# Patient Record
Sex: FEMALE | Race: WHITE | NOT HISPANIC OR LATINO | Employment: FULL TIME | URBAN - METROPOLITAN AREA
[De-identification: names, ages, dates, MRNs, and addresses within clinical notes are randomized per-mention and may not be internally consistent; named-entity substitution may affect disease eponyms.]

---

## 2017-01-05 ENCOUNTER — OFFICE VISIT (OUTPATIENT)
Dept: PHYSICAL MEDICINE AND REHAB | Facility: CLINIC | Age: 67
End: 2017-01-05

## 2017-01-05 VITALS
SYSTOLIC BLOOD PRESSURE: 142 MMHG | RESPIRATION RATE: 20 BRPM | HEART RATE: 82 BPM | TEMPERATURE: 97.4 F | DIASTOLIC BLOOD PRESSURE: 63 MMHG | HEIGHT: 63 IN | OXYGEN SATURATION: 97 %

## 2017-01-05 DIAGNOSIS — G24.3 SPASMODIC TORTICOLLIS: Primary | ICD-10-CM

## 2017-01-05 DIAGNOSIS — G24.1 DYSTONIA, TORSION, FRAGMENTS OF: ICD-10-CM

## 2017-01-05 ASSESSMENT — PAIN SCALES - GENERAL: PAINLEVEL: MILD PAIN (3)

## 2017-01-05 NOTE — MR AVS SNAPSHOT
After Visit Summary   1/5/2017    Lilli Steven    MRN: [id number]           Patient Information     Date Of Birth          1950        Visit Information        Provider Department      1/5/2017 11:45 AM Hermes Frias MD [facility] Physical Medicine and Rehabilitation         Follow-ups after your visit        Follow-up notes from your care team     Return in about 10 weeks (around 3/16/2017) for Dystonia/Tremor.      Your next 10 appointments already scheduled     Feb 16, 2017  9:15 AM   [facility] Same Day Surgery with Joey Sargent MD   [facility] ([facility])    [facility] ([facility])   [phone]            Feb 16, 2017   Procedure with Joey Sargent MD   [facility] PeriOP Services (--)    [address]  [facility] [address]   [phone]            Mar 13, 2017  3:40 PM   (Arrive by 3:25 PM)   Return Botox with Hermes Frias MD   [facility] Physical Medicine and Rehabilitation ([facility] [place])    [address] [place] [id number] [phone]            May 25, 2017  3:50 PM   (Arrive by 3:35 PM)   Return Botox with Hermes Frias MD   [facility] Physical Medicine and Rehabilitation ([facility])    [address] [place] [id number] [phone]              Who to contact     Please call your clinic at [phone] to:    Ask questions about your health    Make or cancel appointments    Discuss your medicines    Learn about your test results    Speak to your doctor   If you have compliments or concerns about an experience at your clinic, or if you wish to file a complaint, please contact [facility] Patient Relations at [phone] or email us at [email or web address]         Additional Information About  "Your Visit        MyChart Information     911 View gives you secure access to your electronic health record. If you see a primary care provider, you can also send messages to your care team and make appointments. If you have questions, please call your primary care clinic.  If you do not have a primary care provider, please call [phone] and they will assist you.      [facility] View is an electronic gateway that provides easy, online access to your medical records. With [facility], you can request a clinic appointment, read your test results, renew a prescription or communicate with your care team.     To access your existing account, please contact your [facility] or call [phone] for assistance.        Care EveryWhere ID     This is your Care EveryWhere ID. This could be used by other organizations to access your [place] medical records  [id number]        Your Vitals Were     Pulse Temperature Respirations Height Pulse Oximetry Breastfeeding?    82 97.4  F (36.3  C) (Oral) 20 1.6 m (5' 3\") 97% No       Blood Pressure from Last 3 Encounters:   01/05/17 142/63   12/22/16 130/78   12/16/16 106/81    Weight from Last 3 Encounters:   12/22/16 76.658 kg (169 lb)   12/16/16 76.658 kg (169 lb)   10/10/16 74.39 kg (164 lb)              Today, you had the following     No orders found for display       Primary Care Provider Office Phone # Fax #    Vargas Ata Chaudhry [phone]       [address] [address]        Thank you!     Thank you for choosing [facility] PHYSICAL MEDICINE AND REHABILITATION  for your care. Our goal is always to provide you with excellent care. Hearing back from our patients is one way we can continue to improve our services. Please take a few minutes to complete the written survey that you may receive in the mail after your visit with us. Thank you!             Your Updated Medication List - Protect others " around you: Learn how to safely use, store and throw away your medicines at www.disposemymeds.org.          This list is accurate as of: 1/5/17 12:12 PM.  Always use your most recent med list.                   Brand Name Dispense Instructions for use    * BOTOX IJ      Inject 135 Units into the muscle Lot# /C3, Exp 03/2018       * BOTOX IJ      Inject 135 Units into the muscle Lot# /C3, Exp 08/2018       * BOTOX IJ      Inject 135 Units into the muscle Lot # /C3 Exp: 09/2018       * BOTOX IJ      Inject 125 Units as directed Lot #  C3 Exp 12/2018       * BOTOX IJ      Inject 125 Units into the muscle once Lot#/C3, Exp 04/2019       * BOTOX IJ      Inject 125 Units into the muscle Lot # /C3   Exp: 6/2019       * BOTOX IJ      Inject 125 Units into the muscle Lot # /C3  Exp: 8/2019       calcium-vitamin D 600-400 MG-UNIT per tablet    CALTRATE     Take 1 tablet by mouth 2 times daily       simvastatin 20 MG tablet    ZOCOR    90 tablet    Take 1 tablet (20 mg) by mouth At Bedtime       traZODone 50 MG tablet    DESYREL    270 tablet    TAKE THREE TABLETS BY MOUTH NIGHTLY AT BEDTIME       * Notice:  This list has 7 medication(s) that are the same as other medications prescribed for you. Read the directions carefully, and ask your doctor or other care provider to review them with you.

## 2017-01-05 NOTE — Clinical Note
"1/5/2017       RE: Lilli Steven  [address]   [facility] [address]     Dear Colleague,    Thank you for referring your patient, Lilli Steven, to the [facility] PHYSICAL MEDICINE AND REHABILITATION at [facility]. Please see a copy of my visit note below.    BOTULINUM TOXIN PROCEDURE NOTE    Chief Complaint   Patient presents with     Dystonia     UMP-BOTOX- CERVICAL DYSTONIA       /63 mmHg  Pulse 82  Temp(Src) 97.4  F (36.3  C) (Oral)  Resp 20  Ht 1.6 m (5' 3\")  Wt   SpO2 97%  Breastfeeding? No      Current outpatient prescriptions:      traZODone (DESYREL) 50 MG tablet, TAKE THREE TABLETS BY MOUTH NIGHTLY AT BEDTIME, Disp: 270 tablet, Rfl: 0     OnabotulinumtoxinA (BOTOX IJ), Inject 125 Units into the muscle Lot # /C3   Exp: 6/2019, Disp: , Rfl:      OnabotulinumtoxinA (BOTOX IJ), Inject 125 Units into the muscle once Lot#/C3, Exp 04/2019, Disp: , Rfl:      OnabotulinumtoxinA (BOTOX IJ), Inject 125 Units as directed Lot #  C3 Exp 12/2018, Disp: , Rfl:      OnabotulinumtoxinA (BOTOX IJ), Inject 135 Units into the muscle Lot # /C3 Exp: 09/2018, Disp: , Rfl:      OnabotulinumtoxinA (BOTOX IJ), Inject 135 Units into the muscle Lot# /C3, Exp 08/2018, Disp: , Rfl:      simvastatin (ZOCOR) 20 MG tablet, Take 1 tablet (20 mg) by mouth At Bedtime, Disp: 90 tablet, Rfl: 3     OnabotulinumtoxinA (BOTOX IJ), Inject 135 Units into the muscle Lot# /C3, Exp 03/2018, Disp: , Rfl:      calcium-vitamin D (CALTRATE) 600-400 MG-UNIT per tablet, Take 1 tablet by mouth 2 times daily, Disp: , Rfl:      Allergies   Allergen Reactions     Allergy      Dermabond  Anesthesia IV set misc       Meloxicam      Diarrhea, vomiting     Primidone Other (See Comments)     Felt like motion sickness     Tramadol      Diarrhea, vomiting      Adhesive Tape Rash     Durabond only        PHYSICAL EXAM:  HEAD, NECK AND TRUNK PATTERN:   Head Tremor: " Observed   Head & Neck Extension: Present - Slight  Sub-Occipital Extension: Present - Slight  Forward Head: Present - Slight   Head & Neck Lateral Bend: Right   Shoulder Elevation: Right    HPI:    Patient denies new medical diagnoses, illnesses, hospitalizations, emergency room visits, and injuries since the previous injection with botulinum neurotoxin.    We reviewed the recommended safety guidelines for  Botox from any vaccine injection, such as the seasonal flu vaccine, by a minimum of 10-14 days with Lilli Steven. She acknowledged understanding.    RESPONSE TO PREVIOUS TREATMENT:    Lilli Steven received 125 units of Botox on 10/26/2016.    Problems following the previous series of neurotoxin injections included:  No problems reported    BENEFITS BY PATIENT REPORT:    Pain Improvement: Yes. Percent Improvement: 85%,  Duration of Benefit: 9-10 weeks and followed by a gradual reduction in benefit.  Consistent benefit pattern.    Dystonia & Head Tremor Improvement: Yes. Percent Improvement: 85%,  Duration of Benefit: 9-10 weeks and followed by a gradual reduction in benefit.  Consistent benefit pattern.      BOTULINUM NEUROTOXIN INJECTION PROCEDURES:    VERIFICATION OF PATIENT IDENTIFICATION AND PROCEDURE     Initials   Patient Name lmd   Patient  lmd   Procedure Verified by: fareed     Prior to the start of the procedure and with procedural staff participation, I verbally confirmed the patient s identity using two indicators, relevant allergies, that the procedure was appropriate and matched the consent or emergent situation, and that the correct equipment/implants were available. Immediately prior to starting the procedure I conducted the Time Out with the procedural staff and re-confirmed the patient s name, procedure, and site/side. (The Joint Commission universal protocol was followed.)  Yes    Sedation (Moderate or Deep): None      Above assessments performed by:  Mikki Sotelo RN Care  Coordinator    Hermes Frias MD      INDICATION/S FOR PROCEDURE/S:  Lilli Steven is a 66 year old patient with dystonia affecting the head, neck and shoulder girdle musculature secondary to a diagnosis of cervical dystonia with associated pain, tremor, loss of volitional motor control and difficulty with activities of daily living.     Her baseline symptoms have been recalcitrant to oral medications and conservative therapy. She is here today for an injection of Botox.     GOAL OF PROCEDURE:  The goal of this procedure is to increase active range of motion, improve volitional motor control, decrease pain and enhance functional independence associated with dystonic movements.    TOTAL DOSE ADMINISTERED:  Dose Administered:  125 units Botox    Diluent Used:  Preservative Free Normal Saline  Total Volume of Diluent Used:  1.25 ml  Lot # /C3 with Expiration Date:  8/2019  NDC #: Botox 100u ([address]-09)    Medication guide was offered to patient and was declined.    CONSENT:  The risks, benefits, and treatment options were discussed with Lilli Steven and she agreed to proceed.      Written consent was obtained by lmd.     EQUIPMENT USED:  Needle-37mm stimulating/recording  EMG/NCS Machine    SKIN PREPARATION:  Skin preparation was performed using an alcohol wipe.    GUIDANCE DESCRIPTION:  Electro-myographic guidance was necessary throughout the procedure to accurately identify all areas of dystonic muscles while avoiding injection of non-dystonic muscles, neighboring nerves and nearby vascular structures.     AREA/MUSCLE INJECTED:  125 units of Botox  1. HEAD & NECK MUSCLES: 125 units Botox = Total Dose, 1:1 Dilution                 Right Mid-Trapezius - 10 units of Botox at 1 site/s.   Left Mid-Trapezius - 10 units of Botox at 1 site/s.    Right Splenius Capitus - 15 units of Botox at 2 site/s.   Left Splenius Capitus - 10 units of Botox at 2 site/s.    Right Levator Scapulae - 20 units of Botox at 1  site/s (shoulder muscles).   Left Levator Scapulae - 15 units of Botox at 1 site/s (shoulder muscles).    Right Inferior Obliquus Capitis - 10 units of Botox at 1 site/s.   Left Inferior Obliquus Capitis - 15 units of at 1 site/s.    Right Sternal head of the Sternocleidomastoid - 10 units of Botox at 1 site/s.   Left Sternal head of the Sternocleidomastoid - 10 units of Botox at 1 site/s.    RESPONSE TO PROCEDURE:  Lilli Steven tolerated the procedure well and there were no immediate complications.  She was allowed to recover for an appropriate period of time and was discharged home in stable condition.    FOLLOW UP:  Lilli Steven was asked to follow up by phone in 7-14 days with Erin Combs PT, Care Coordinator or Mikki Rojas RN, Care Coordinator, to report her response to this series of injections.  Based on the patient's previous response to this therapy, Lilli Steven was rescheduled for the next series of injections in 10 weeks.    PLAN (Medication Changes, Therapy Orders, Work or Disability Issues, etc.): Will monitor response to today's injections and report.        Again, thank you for allowing me to participate in the care of your patient.      Sincerely,    Hermes Frias MD

## 2017-01-05 NOTE — PROGRESS NOTES
"BOTULINUM TOXIN PROCEDURE NOTE    Chief Complaint   Patient presents with     Dystonia     UMP-BOTOX- CERVICAL DYSTONIA       /63 mmHg  Pulse 82  Temp(Src) 97.4  F (36.3  C) (Oral)  Resp 20  Ht 1.6 m (5' 3\")  Wt   SpO2 97%  Breastfeeding? No      Current outpatient prescriptions:      traZODone (DESYREL) 50 MG tablet, TAKE THREE TABLETS BY MOUTH NIGHTLY AT BEDTIME, Disp: 270 tablet, Rfl: 0     OnabotulinumtoxinA (BOTOX IJ), Inject 125 Units into the muscle Lot # /C3   Exp: 6/2019, Disp: , Rfl:      OnabotulinumtoxinA (BOTOX IJ), Inject 125 Units into the muscle once Lot#/C3, Exp 04/2019, Disp: , Rfl:      OnabotulinumtoxinA (BOTOX IJ), Inject 125 Units as directed Lot #  C3 Exp 12/2018, Disp: , Rfl:      OnabotulinumtoxinA (BOTOX IJ), Inject 135 Units into the muscle Lot # /C3 Exp: 09/2018, Disp: , Rfl:      OnabotulinumtoxinA (BOTOX IJ), Inject 135 Units into the muscle Lot# /C3, Exp 08/2018, Disp: , Rfl:      simvastatin (ZOCOR) 20 MG tablet, Take 1 tablet (20 mg) by mouth At Bedtime, Disp: 90 tablet, Rfl: 3     OnabotulinumtoxinA (BOTOX IJ), Inject 135 Units into the muscle Lot# /C3, Exp 03/2018, Disp: , Rfl:      calcium-vitamin D (CALTRATE) 600-400 MG-UNIT per tablet, Take 1 tablet by mouth 2 times daily, Disp: , Rfl:      Allergies   Allergen Reactions     Allergy      Dermabond  Anesthesia IV set misc       Meloxicam      Diarrhea, vomiting     Primidone Other (See Comments)     Felt like motion sickness     Tramadol      Diarrhea, vomiting      Adhesive Tape Rash     Durabond only        PHYSICAL EXAM:  HEAD, NECK AND TRUNK PATTERN:   Head Tremor: Observed   Head & Neck Extension: Present - Slight  Sub-Occipital Extension: Present - Slight  Forward Head: Present - Slight   Head & Neck Lateral Bend: Right   Shoulder Elevation: Right    HPI:    Patient denies new medical diagnoses, illnesses, hospitalizations, emergency room visits, and injuries since the previous " injection with botulinum neurotoxin.    We reviewed the recommended safety guidelines for  Botox from any vaccine injection, such as the seasonal flu vaccine, by a minimum of 10-14 days with Lilli Steven. She acknowledged understanding.    RESPONSE TO PREVIOUS TREATMENT:    Lilli Steven received 125 units of Botox on 10/26/2016.    Problems following the previous series of neurotoxin injections included:  No problems reported    BENEFITS BY PATIENT REPORT:    Pain Improvement: Yes. Percent Improvement: 85%,  Duration of Benefit: 9-10 weeks and followed by a gradual reduction in benefit.  Consistent benefit pattern.    Dystonia & Head Tremor Improvement: Yes. Percent Improvement: 85%,  Duration of Benefit: 9-10 weeks and followed by a gradual reduction in benefit.  Consistent benefit pattern.      BOTULINUM NEUROTOXIN INJECTION PROCEDURES:    VERIFICATION OF PATIENT IDENTIFICATION AND PROCEDURE     Initials   Patient Name lmd   Patient  lmd   Procedure Verified by: lmd     Prior to the start of the procedure and with procedural staff participation, I verbally confirmed the patient s identity using two indicators, relevant allergies, that the procedure was appropriate and matched the consent or emergent situation, and that the correct equipment/implants were available. Immediately prior to starting the procedure I conducted the Time Out with the procedural staff and re-confirmed the patient s name, procedure, and site/side. (The Joint Commission universal protocol was followed.)  Yes    Sedation (Moderate or Deep): None      Above assessments performed by:  Mikki Sotelo RN Care Coordinator    Hermes Frias MD      INDICATION/S FOR PROCEDURE/S:  Lilli Steven is a 66 year old patient with dystonia affecting the head, neck and shoulder girdle musculature secondary to a diagnosis of cervical dystonia with associated pain, tremor, loss of volitional motor control and difficulty with  activities of daily living.     Her baseline symptoms have been recalcitrant to oral medications and conservative therapy. She is here today for an injection of Botox.     GOAL OF PROCEDURE:  The goal of this procedure is to increase active range of motion, improve volitional motor control, decrease pain and enhance functional independence associated with dystonic movements.    TOTAL DOSE ADMINISTERED:  Dose Administered:  125 units Botox    Diluent Used:  Preservative Free Normal Saline  Total Volume of Diluent Used:  1.25 ml  Lot # /C3 with Expiration Date:  8/2019  NDC #: Botox 100u ([address]-65)    Medication guide was offered to patient and was declined.    CONSENT:  The risks, benefits, and treatment options were discussed with Lilli Steven and she agreed to proceed.      Written consent was obtained by lmd.     EQUIPMENT USED:  Needle-37mm stimulating/recording  EMG/NCS Machine    SKIN PREPARATION:  Skin preparation was performed using an alcohol wipe.    GUIDANCE DESCRIPTION:  Electro-myographic guidance was necessary throughout the procedure to accurately identify all areas of dystonic muscles while avoiding injection of non-dystonic muscles, neighboring nerves and nearby vascular structures.     AREA/MUSCLE INJECTED:  125 units of Botox  1. HEAD & NECK MUSCLES: 125 units Botox = Total Dose, 1:1 Dilution                 Right Mid-Trapezius - 10 units of Botox at 1 site/s.   Left Mid-Trapezius - 10 units of Botox at 1 site/s.    Right Splenius Capitus - 15 units of Botox at 2 site/s.   Left Splenius Capitus - 10 units of Botox at 2 site/s.    Right Levator Scapulae - 20 units of Botox at 1 site/s (shoulder muscles).   Left Levator Scapulae - 15 units of Botox at 1 site/s (shoulder muscles).    Right Inferior Obliquus Capitis - 10 units of Botox at 1 site/s.   Left Inferior Obliquus Capitis - 15 units of at 1 site/s.    Right Sternal head of the Sternocleidomastoid - 10 units of Botox at 1 site/s.    Left Sternal head of the Sternocleidomastoid - 10 units of Botox at 1 site/s.    RESPONSE TO PROCEDURE:  Lilli Steven tolerated the procedure well and there were no immediate complications.  She was allowed to recover for an appropriate period of time and was discharged home in stable condition.    FOLLOW UP:  Lilli Steven was asked to follow up by phone in 7-14 days with Erin Combs PT, Care Coordinator or Mikki Rojas RN, Care Coordinator, to report her response to this series of injections.  Based on the patient's previous response to this therapy, Lilli Steven was rescheduled for the next series of injections in 10 weeks.    PLAN (Medication Changes, Therapy Orders, Work or Disability Issues, etc.): Will monitor response to today's injections and report.

## 2017-01-23 ENCOUNTER — OFFICE VISIT (OUTPATIENT)
Dept: FAMILY MEDICINE | Facility: CLINIC | Age: 67
End: 2017-01-23
Payer: COMMERCIAL

## 2017-01-23 VITALS
RESPIRATION RATE: 16 BRPM | TEMPERATURE: 97.9 F | OXYGEN SATURATION: 96 % | HEIGHT: 63 IN | HEART RATE: 78 BPM | BODY MASS INDEX: 29.41 KG/M2 | DIASTOLIC BLOOD PRESSURE: 78 MMHG | SYSTOLIC BLOOD PRESSURE: 138 MMHG | WEIGHT: 166 LBS

## 2017-01-23 DIAGNOSIS — Z01.818 PREOP GENERAL PHYSICAL EXAM: Primary | ICD-10-CM

## 2017-01-23 DIAGNOSIS — K42.9 UMBILICAL HERNIA WITHOUT OBSTRUCTION AND WITHOUT GANGRENE: ICD-10-CM

## 2017-01-23 DIAGNOSIS — G24.3 CERVICAL DYSTONIA: ICD-10-CM

## 2017-01-23 DIAGNOSIS — G24.3 SPASMODIC TORTICOLLIS: ICD-10-CM

## 2017-01-23 PROBLEM — S83.289A TEAR OF LATERAL CARTILAGE OR MENISCUS OF KNEE, CURRENT: Status: ACTIVE | Noted: 2017-01-23

## 2017-01-23 PROCEDURE — 99214 OFFICE O/P EST MOD 30 MIN: CPT | Performed by: FAMILY MEDICINE

## 2017-01-23 PROCEDURE — 93000 ELECTROCARDIOGRAM COMPLETE: CPT | Performed by: FAMILY MEDICINE

## 2017-01-23 NOTE — NURSING NOTE
"Chief Complaint   Patient presents with     Pre-Op Exam     Date 02/16/2017     /78 mmHg  Pulse 78  Temp(Src) 97.9  F (36.6  C) (Oral)  Resp 16  Ht 5' 3\" (1.6 m)  Wt 166 lb (75.297 kg)  BMI 29.41 kg/m2  SpO2 96% Estimated body mass index is 29.41 kg/(m^2) as calculated from the following:    Height as of this encounter: 5' 3\" (1.6 m).    Weight as of this encounter: 166 lb (75.297 kg).  bp completed using cuff size: regular       Health Maintenance addressed:  NONE    n/a    Valentina Garza MA       "

## 2017-02-16 ENCOUNTER — APPOINTMENT (OUTPATIENT)
Dept: SURGERY | Facility: PHYSICIAN GROUP | Age: 67
End: 2017-02-16
Payer: COMMERCIAL

## 2017-02-16 ENCOUNTER — ANESTHESIA EVENT (OUTPATIENT)
Dept: SURGERY | Facility: CLINIC | Age: 67
End: 2017-02-16
Payer: COMMERCIAL

## 2017-02-16 ENCOUNTER — ANESTHESIA (OUTPATIENT)
Dept: SURGERY | Facility: CLINIC | Age: 67
End: 2017-02-16
Payer: COMMERCIAL

## 2017-02-16 PROBLEM — K43.9 HERNIA OF ABDOMINAL WALL: Status: ACTIVE | Noted: 2017-02-16

## 2017-02-16 PROCEDURE — 25000128 H RX IP 250 OP 636: Performed by: NURSE ANESTHETIST, CERTIFIED REGISTERED

## 2017-02-16 PROCEDURE — 25000125 ZZHC RX 250: Performed by: NURSE ANESTHETIST, CERTIFIED REGISTERED

## 2017-02-16 PROCEDURE — 49585 ZZHC REPAIR UMBILICAL HERN,5+Y/O,REDUC: CPT | Performed by: SURGERY

## 2017-02-16 PROCEDURE — 25800025 ZZH RX 258: Performed by: ANESTHESIOLOGY

## 2017-02-16 PROCEDURE — 25000128 H RX IP 250 OP 636: Performed by: SURGERY

## 2017-02-16 RX ORDER — GLYCOPYRROLATE 0.2 MG/ML
INJECTION, SOLUTION INTRAMUSCULAR; INTRAVENOUS PRN
Status: DISCONTINUED | OUTPATIENT
Start: 2017-02-16 | End: 2017-02-16

## 2017-02-16 RX ORDER — FENTANYL CITRATE 50 UG/ML
INJECTION, SOLUTION INTRAMUSCULAR; INTRAVENOUS PRN
Status: DISCONTINUED | OUTPATIENT
Start: 2017-02-16 | End: 2017-02-16

## 2017-02-16 RX ORDER — LIDOCAINE HYDROCHLORIDE 20 MG/ML
INJECTION, SOLUTION INFILTRATION; PERINEURAL PRN
Status: DISCONTINUED | OUTPATIENT
Start: 2017-02-16 | End: 2017-02-16

## 2017-02-16 RX ORDER — NEOSTIGMINE METHYLSULFATE 1 MG/ML
VIAL (ML) INJECTION PRN
Status: DISCONTINUED | OUTPATIENT
Start: 2017-02-16 | End: 2017-02-16

## 2017-02-16 RX ORDER — VECURONIUM BROMIDE 1 MG/ML
INJECTION, POWDER, LYOPHILIZED, FOR SOLUTION INTRAVENOUS PRN
Status: DISCONTINUED | OUTPATIENT
Start: 2017-02-16 | End: 2017-02-16

## 2017-02-16 RX ORDER — EPHEDRINE SULFATE 50 MG/ML
INJECTION, SOLUTION INTRAMUSCULAR; INTRAVENOUS; SUBCUTANEOUS PRN
Status: DISCONTINUED | OUTPATIENT
Start: 2017-02-16 | End: 2017-02-16

## 2017-02-16 RX ORDER — ONDANSETRON 2 MG/ML
INJECTION INTRAMUSCULAR; INTRAVENOUS PRN
Status: DISCONTINUED | OUTPATIENT
Start: 2017-02-16 | End: 2017-02-16

## 2017-02-16 RX ORDER — PROPOFOL 10 MG/ML
INJECTION, EMULSION INTRAVENOUS CONTINUOUS PRN
Status: DISCONTINUED | OUTPATIENT
Start: 2017-02-16 | End: 2017-02-16

## 2017-02-16 RX ORDER — CEFAZOLIN SODIUM 1 G/3ML
INJECTION, POWDER, FOR SOLUTION INTRAMUSCULAR; INTRAVENOUS PRN
Status: DISCONTINUED | OUTPATIENT
Start: 2017-02-16 | End: 2017-02-16

## 2017-02-16 RX ORDER — PROPOFOL 10 MG/ML
INJECTION, EMULSION INTRAVENOUS PRN
Status: DISCONTINUED | OUTPATIENT
Start: 2017-02-16 | End: 2017-02-16

## 2017-02-16 RX ORDER — DEXAMETHASONE SODIUM PHOSPHATE 4 MG/ML
INJECTION, SOLUTION INTRA-ARTICULAR; INTRALESIONAL; INTRAMUSCULAR; INTRAVENOUS; SOFT TISSUE PRN
Status: DISCONTINUED | OUTPATIENT
Start: 2017-02-16 | End: 2017-02-16

## 2017-02-16 RX ADMIN — LIDOCAINE HYDROCHLORIDE 60 MG: 20 INJECTION, SOLUTION INFILTRATION; PERINEURAL at 08:18

## 2017-02-16 RX ADMIN — PHENYLEPHRINE HYDROCHLORIDE 100 MCG: 10 INJECTION, SOLUTION INTRAMUSCULAR; INTRAVENOUS; SUBCUTANEOUS at 08:22

## 2017-02-16 RX ADMIN — SODIUM CHLORIDE, POTASSIUM CHLORIDE, SODIUM LACTATE AND CALCIUM CHLORIDE: 600; 310; 30; 20 INJECTION, SOLUTION INTRAVENOUS at 09:27

## 2017-02-16 RX ADMIN — ROCURONIUM BROMIDE 40 MG: 10 INJECTION INTRAVENOUS at 08:18

## 2017-02-16 RX ADMIN — FENTANYL CITRATE 50 MCG: 50 INJECTION, SOLUTION INTRAMUSCULAR; INTRAVENOUS at 08:18

## 2017-02-16 RX ADMIN — NEOSTIGMINE METHYLSULFATE 4 MG: 1 INJECTION INTRAMUSCULAR; INTRAVENOUS; SUBCUTANEOUS at 10:24

## 2017-02-16 RX ADMIN — PHENYLEPHRINE HYDROCHLORIDE 100 MCG: 10 INJECTION, SOLUTION INTRAMUSCULAR; INTRAVENOUS; SUBCUTANEOUS at 08:26

## 2017-02-16 RX ADMIN — Medication 5 MG: at 09:16

## 2017-02-16 RX ADMIN — CEFAZOLIN SODIUM 2 G: 2 INJECTION, SOLUTION INTRAVENOUS at 08:25

## 2017-02-16 RX ADMIN — FENTANYL CITRATE 25 MCG: 50 INJECTION, SOLUTION INTRAMUSCULAR; INTRAVENOUS at 10:30

## 2017-02-16 RX ADMIN — Medication 5 MG: at 09:09

## 2017-02-16 RX ADMIN — VECURONIUM BROMIDE 2 MG: 1 INJECTION, POWDER, LYOPHILIZED, FOR SOLUTION INTRAVENOUS at 09:20

## 2017-02-16 RX ADMIN — MIDAZOLAM HYDROCHLORIDE 2 MG: 1 INJECTION, SOLUTION INTRAMUSCULAR; INTRAVENOUS at 08:13

## 2017-02-16 RX ADMIN — PHENYLEPHRINE HYDROCHLORIDE 100 MCG: 10 INJECTION, SOLUTION INTRAMUSCULAR; INTRAVENOUS; SUBCUTANEOUS at 10:06

## 2017-02-16 RX ADMIN — PROPOFOL 35 MCG/KG/MIN: 10 INJECTION, EMULSION INTRAVENOUS at 08:21

## 2017-02-16 RX ADMIN — HYDROMORPHONE HYDROCHLORIDE 0.2 MG: 1 INJECTION, SOLUTION INTRAMUSCULAR; INTRAVENOUS; SUBCUTANEOUS at 09:41

## 2017-02-16 RX ADMIN — VECURONIUM BROMIDE 1 MG: 1 INJECTION, POWDER, LYOPHILIZED, FOR SOLUTION INTRAVENOUS at 08:41

## 2017-02-16 RX ADMIN — HYDROMORPHONE HYDROCHLORIDE 0.3 MG: 1 INJECTION, SOLUTION INTRAMUSCULAR; INTRAVENOUS; SUBCUTANEOUS at 09:34

## 2017-02-16 RX ADMIN — FENTANYL CITRATE 25 MCG: 50 INJECTION, SOLUTION INTRAMUSCULAR; INTRAVENOUS at 10:35

## 2017-02-16 RX ADMIN — FENTANYL CITRATE 100 MCG: 50 INJECTION, SOLUTION INTRAMUSCULAR; INTRAVENOUS at 08:41

## 2017-02-16 RX ADMIN — DEXAMETHASONE SODIUM PHOSPHATE 4 MG: 4 INJECTION, SOLUTION INTRAMUSCULAR; INTRAVENOUS at 08:25

## 2017-02-16 RX ADMIN — SODIUM CHLORIDE, POTASSIUM CHLORIDE, SODIUM LACTATE AND CALCIUM CHLORIDE: 600; 310; 30; 20 INJECTION, SOLUTION INTRAVENOUS at 07:15

## 2017-02-16 RX ADMIN — ONDANSETRON 4 MG: 2 INJECTION INTRAMUSCULAR; INTRAVENOUS at 10:17

## 2017-02-16 RX ADMIN — PHENYLEPHRINE HYDROCHLORIDE 100 MCG: 10 INJECTION, SOLUTION INTRAMUSCULAR; INTRAVENOUS; SUBCUTANEOUS at 09:06

## 2017-02-16 RX ADMIN — PROPOFOL 170 MG: 10 INJECTION, EMULSION INTRAVENOUS at 08:18

## 2017-02-16 RX ADMIN — GLYCOPYRROLATE 0.6 MG: 0.2 INJECTION, SOLUTION INTRAMUSCULAR; INTRAVENOUS at 10:24

## 2017-02-16 RX ADMIN — HYDROMORPHONE HYDROCHLORIDE 0.5 MG: 1 INJECTION, SOLUTION INTRAMUSCULAR; INTRAVENOUS; SUBCUTANEOUS at 09:02

## 2017-02-16 RX ADMIN — CEFAZOLIN 1 G: 1 INJECTION, POWDER, FOR SOLUTION INTRAMUSCULAR; INTRAVENOUS at 10:25

## 2017-02-16 RX ADMIN — Medication 5 MG: at 09:18

## 2017-02-16 RX ADMIN — PHENYLEPHRINE HYDROCHLORIDE 100 MCG: 10 INJECTION, SOLUTION INTRAMUSCULAR; INTRAVENOUS; SUBCUTANEOUS at 08:34

## 2017-02-16 RX ADMIN — FENTANYL CITRATE 50 MCG: 50 INJECTION, SOLUTION INTRAMUSCULAR; INTRAVENOUS at 08:45

## 2017-02-16 RX ADMIN — PHENYLEPHRINE HYDROCHLORIDE 100 MCG: 10 INJECTION, SOLUTION INTRAMUSCULAR; INTRAVENOUS; SUBCUTANEOUS at 08:38

## 2017-02-16 ASSESSMENT — LIFESTYLE VARIABLES: TOBACCO_USE: 1

## 2017-02-16 ASSESSMENT — ENCOUNTER SYMPTOMS: DYSRHYTHMIAS: 0

## 2017-02-16 NOTE — ANESTHESIA POSTPROCEDURE EVALUATION
Patient: Lilli Steven    Procedure(s):  PRIMARY UMBILICAL HERNIA REPAIR AND EPIGASTRIC HERNIA REPAIR; COSMETIC MODIFIED ABDOMINOPLASTY  - Wound Class: I-Clean   - Wound Class: I-Clean   - Wound Class: I-Clean    Diagnosis:UMBILICAL HERNIA AND EXCESS ABDOMINAL SKIN   Diagnosis Additional Information: No value filed.    Anesthesia Type:  General, ETT    Note:  Anesthesia Post Evaluation    Patient location during evaluation: PACU  Patient participation: Able to fully participate in evaluation  Level of consciousness: awake and alert  Pain management: adequate  Cardiovascular status: acceptable  Respiratory status: acceptable  Hydration status: acceptable  PONV: none     Anesthetic complications: None          Last vitals:  Vitals:    02/16/17 1430 02/16/17 1437 02/16/17 1530   BP: 124/61  125/68   Resp: 18 18 18   Temp:      SpO2: 97%  93%         Electronically Signed By: ARACELIS HDZ  February 16, 2017  3:58 PM

## 2017-02-16 NOTE — ANESTHESIA PREPROCEDURE EVALUATION
Anesthesia Evaluation     . Pt has had prior anesthetic.     History of anesthetic complications  - PONV    ROS/MED HX    ENT/Pulmonary:     (+)tobacco use, Past use , . .   (-) sleep apnea   Neurologic:       Cardiovascular:     (+) Dyslipidemia, ----. : . . . :. .      (-) arrhythmias, irregular heartbeat/palpitations and valvular problems/murmurs   METS/Exercise Tolerance:     Hematologic:         Musculoskeletal:         GI/Hepatic:     (+) GERD Other,       Renal/Genitourinary:         Endo:         Psychiatric:         Infectious Disease:         Malignancy:         Other:               Physical Exam  Normal systems: cardiovascular and pulmonary    Airway   Mallampati: II  TM distance: >3 FB  Neck ROM: full    Dental   Comment: native    Cardiovascular       Pulmonary                     Anesthesia Plan      History & Physical Review  History and physical reviewed and following examination; no interval change.    ASA Status:  2 .    NPO Status:  > 8 hours    Plan for General and ETT with Propofol induction. Maintenance will be Balanced.    PONV prophylaxis:  Ondansetron (or other 5HT-3) and Dexamethasone or Solumedrol  Propofol infusion      Postoperative Care  Postoperative pain management:  IV analgesics.      Consents  Anesthetic plan, risks, benefits and alternatives discussed with:  Patient..                          .

## 2017-02-16 NOTE — ANESTHESIA CARE TRANSFER NOTE
Patient: Lilli Steven    Procedure(s):  PRIMARY UMBILICAL HERNIA REPAIR AND EPIGASTRIC HERNIA REPAIR; COSMETIC MODIFIED ABDOMINOPLASTY  - Wound Class: I-Clean   - Wound Class: I-Clean   - Wound Class: I-Clean    Diagnosis: UMBILICAL HERNIA AND EXCESS ABDOMINAL SKIN   Diagnosis Additional Information: No value filed.    Anesthesia Type:   General, ETT     Note:  Airway :Face Mask and Oral Airway  Patient transferred to:PACU  Comments: Neuromuscular blockade reversed, TOF 4/4 with head lift sustained for 5 seconds, spontaneous respirations, followed commands, oropharynx suctioned with soft flexible catheter, extubated atraumatically with suction. Airway patent after extubation. Oxygen via face mask at 10 LPM to PACU, connected to wall O2 in PACU. All monitors and alarms on and functioning. Report given to PACU RN and questions answered.       Vitals: (Last set prior to Anesthesia Care Transfer)    CRNA VITALS  2/16/2017 1021 - 2/16/2017 1057      2/16/2017             Pulse: 98    SpO2: 97 %    Resp Rate (observed): (!)  2    Resp Rate (set): 10                Electronically Signed By: SIERRA Pederson CRNA  February 16, 2017  10:57 AM

## 2017-02-20 ENCOUNTER — TELEPHONE (OUTPATIENT)
Dept: FAMILY MEDICINE | Facility: CLINIC | Age: 67
End: 2017-02-20

## 2017-02-20 DIAGNOSIS — F51.01 PRIMARY INSOMNIA: ICD-10-CM

## 2017-02-20 RX ORDER — TRAZODONE HYDROCHLORIDE 50 MG/1
TABLET, FILM COATED ORAL
Qty: 90 TABLET | Refills: 0 | Status: SHIPPED | OUTPATIENT
Start: 2017-02-20 | End: 2017-03-28

## 2017-02-20 NOTE — TELEPHONE ENCOUNTER
Hospital F/U 2/17/2017 DX: Umbilical Hernia And Excess Abdominal Skin , Hernia Of Abdominal Wall ED/IP 0/0  [phone] (home) [phone] (work)

## 2017-02-20 NOTE — TELEPHONE ENCOUNTER
Pending Prescriptions:                       Disp   Refills    traZODone (DESYREL) 50 MG tablet [Pharmac*270 ta*0            Sig: TAKE THREE TABLETS BY MOUTH NIGHTLY AT BEDTIME           Last Written Prescription Date:  Not sure as med was patient reported  Last Fill Quantity: ; # refills:   Last Office Visit with [facility], P or [facility] prescribing provider:  1/23/17 with Dr. PIPER       Last PHQ-9 score on record= No flowsheet data found.    No results found for: AST  No results found for: ALT    Jumana Ryder MA  [facility]

## 2017-02-20 NOTE — TELEPHONE ENCOUNTER
ED / Discharge Outreach Protocol    Patient Contact    Discharge summary not completed yet.  Miya Valdovinos RN

## 2017-02-20 NOTE — TELEPHONE ENCOUNTER
Last refill 11/12/16 for #270.  Medication is being filled for 1 time refill only due to:  Patient needs to be seen because it has been more than one year since last visit.   Insomnia last addressed 1/7/16.  Miya Valdovinos RN

## 2017-02-21 NOTE — TELEPHONE ENCOUNTER
ED/Discharge Protocol  Following up with Dr. Sargent tomorrow for abdominoplasty and hernia repair.  Luisa Palomares RN

## 2017-02-22 ENCOUNTER — OFFICE VISIT (OUTPATIENT)
Dept: SURGERY | Facility: CLINIC | Age: 67
End: 2017-02-22
Payer: COMMERCIAL

## 2017-02-22 DIAGNOSIS — Z09 SURGERY FOLLOW-UP EXAMINATION: Primary | ICD-10-CM

## 2017-02-22 PROCEDURE — 99024 POSTOP FOLLOW-UP VISIT: CPT | Performed by: SURGERY

## 2017-02-22 NOTE — MR AVS SNAPSHOT
After Visit Summary   2/22/2017    Lilli Steven    MRN: [id number]           Patient Information     Date Of Birth          1950        Visit Information        Provider Department      2/22/2017 9:30 AM Joey Sargent MD [facility] Hernia      Today's Diagnoses     Surgery follow-up examination    -  1       Follow-ups after your visit        Your next 10 appointments already scheduled     Mar 13, 2017  3:40 PM CDT   (Arrive by 3:25 PM)   Return Botox with Hermes Frias MD   [facility] Physical Medicine and Rehabilitation ([facility])    [address] [place] [address] [id number] [phone]            May 25, 2017  3:50 PM CDT   (Arrive by 3:35 PM)   Return Botox with Hermes Frias MD   [facility] Physical Medicine and Rehabilitation ([facility])    [address] [place] [id number] [phone]              Who to contact     If you have questions or need follow up information about today's clinic visit or your schedule please contact [facility] directly at [phone].  Normal or non-critical lab and imaging results will be communicated to you by MyChart, letter or phone within 4 business days after the clinic has received the results. If you do not hear from us within 7 days, please contact the clinic through [employer] or phone. If you have a critical or abnormal lab result, we will notify you by phone as soon as possible.  Submit refill requests through [employer] or call your pharmacy and they will forward the refill request to us. Please allow 3 business days for your refill to be completed.          Additional Information About Your Visit        [employer] Information     [employer] gives you secure access to your electronic health record. If you see a primary care provider, you can also send messages to your  care team and make appointments. If you have questions, please call your primary care clinic.  If you do not have a primary care provider, please call [phone] and they will assist you.        Care EveryWhere ID     This is your Care EveryWhere ID. This could be used by other organizations to access your [place] medical records  [id number]         Blood Pressure from Last 3 Encounters:   02/17/17 103/56   01/23/17 138/78   01/05/17 142/63    Weight from Last 3 Encounters:   02/16/17 168 lb (76.2 kg)   01/23/17 166 lb (75.3 kg)   12/22/16 169 lb (76.7 kg)              Today, you had the following     No orders found for display       Primary Care Provider Office Phone # Fax #    Vargas Ata Chaudhry [phone]       [facility] [address] [facility] [address]        Thank you!     Thank you for choosing [facility]  for your care. Our goal is always to provide you with excellent care. Hearing back from our patients is one way we can continue to improve our services. Please take a few minutes to complete the written survey that you may receive in the mail after your visit with us. Thank you!             Your Updated Medication List - Protect others around you: Learn how to safely use, store and throw away your medicines at www.disposemymeds.org.          This list is accurate as of: 2/22/17 10:07 AM.  Always use your most recent med list.                   Brand Name Dispense Instructions for use    BOTOX IJ      Inject 125 Units into the muscle See Admin Instructions Every 10 weeks (Lot # /[id number]  Exp: 8/2019)       calcium carb 1250 mg (500 mg [place])/vitamin D 200 units 500-200 MG-UNIT per tablet    OSCAL with D     Take 1 tablet by mouth 2 times daily (with meals)       HYDROcodone-acetaminophen 5-325 MG per tablet    NORCO    30 tablet    Take 1-2 tablets by mouth every 4 hours as needed for other (Moderate to Severe Pain)       simvastatin 20 MG  tablet    ZOCOR    90 tablet    Take 1 tablet (20 mg) by mouth At Bedtime       traZODone 50 MG tablet    DESYREL    90 tablet    TAKE THREE TABLETS BY MOUTH NIGHTLY AT BEDTIME       triamcinolone 55 MCG/ACT Inhaler    NASACORT     Spray 1 spray into both nostrils daily as needed (congestion)

## 2017-02-22 NOTE — PROGRESS NOTES
Surgery Postop Note    Lilli Steven presents today for surgical followup.  she is doing well following Umbilical hernia repair with abdominoplasty.  Incisions look fine with no signs of wound infection.  There are significant ischemic changes with the umbilicus which is not terribly surprising. She is scheduled to meet with Dr. Harrell following her appointment with me.  I expect her to make a complete recovery.  Thank you for the opportunity to help in her care.    Neville Sargent M.D.  [facility]  [phone]    Please route or send letter to:  Primary Care Provider (PCP) and Referring Provider

## 2017-02-22 NOTE — LETTER
2017      RE:  Lilli Steven-:  [birth year]    Lilli Steven presents today for surgical followup. She is doing well following Umbilical hernia repair with abdominoplasty. Incisions look fine with no signs of wound infection. There are significant ischemic changes with the umbilicus which is not terribly surprising. She is scheduled to meet with Dr. Harrell following her appointment with me. I expect her to make a complete recovery. Thank you for the opportunity to help in her care.     Neville Sargent M.D.  [facility]  [phone]

## 2017-03-13 ENCOUNTER — RECORDS - HEALTHEAST (OUTPATIENT)
Dept: ADMINISTRATIVE | Facility: OTHER | Age: 67
End: 2017-03-13

## 2017-03-13 ENCOUNTER — OFFICE VISIT (OUTPATIENT)
Dept: PHYSICAL MEDICINE AND REHAB | Facility: CLINIC | Age: 67
End: 2017-03-13

## 2017-03-13 VITALS
WEIGHT: 165.8 LBS | BODY MASS INDEX: 29.38 KG/M2 | DIASTOLIC BLOOD PRESSURE: 52 MMHG | TEMPERATURE: 98.2 F | RESPIRATION RATE: 20 BRPM | OXYGEN SATURATION: 94 % | HEIGHT: 63 IN | HEART RATE: 98 BPM | SYSTOLIC BLOOD PRESSURE: 127 MMHG

## 2017-03-13 DIAGNOSIS — G24.1 DYSTONIA, TORSION, FRAGMENTS OF: ICD-10-CM

## 2017-03-13 DIAGNOSIS — G24.3 SPASMODIC TORTICOLLIS: Primary | ICD-10-CM

## 2017-03-13 ASSESSMENT — PAIN SCALES - GENERAL: PAINLEVEL: MODERATE PAIN (4)

## 2017-03-13 NOTE — LETTER
"3/13/2017       RE: Lilli Steven  [address]   [facility] [address]     Dear Colleague,    Thank you for referring your patient, Lilli Steven, to the [facility] PHYSICAL MEDICINE AND REHABILITATION at [facility]. Please see a copy of my visit note below.    BOTULINUM TOXIN PROCEDURE NOTE    Chief Complaint   Patient presents with     RECHECK     UMP- BOTOX-CERVICAL DYSTONIA       /52 (BP Location: Left arm, Patient Position: Chair, Cuff Size: Adult Large)  Pulse 98  Temp 98.2  F (36.8  C) (Oral)  Resp 20  Ht 1.6 m (5' 3\")  Wt 75.2 kg (165 lb 12.8 oz)  SpO2 94%  Breastfeeding? No  BMI 29.37 kg/m2      Current Outpatient Prescriptions:      CEPHALEXIN PO, Take 250 mg by mouth every 6 hours, Disp: , Rfl:      OnabotulinumtoxinA (BOTOX IJ), Inject 125 Units as directed once Lot #: /[id number] Exp: 09/2019, Disp: , Rfl:      traZODone (DESYREL) 50 MG tablet, TAKE THREE TABLETS BY MOUTH NIGHTLY AT BEDTIME, Disp: 90 tablet, Rfl: 0     triamcinolone (NASACORT) 55 MCG/ACT Inhaler, Spray 1 spray into both nostrils daily as needed (congestion), Disp: , Rfl:      HYDROcodone-acetaminophen (NORCO) 5-325 MG per tablet, Take 1-2 tablets by mouth every 4 hours as needed for other (Moderate to Severe Pain), Disp: 30 tablet, Rfl: 0     calcium carb 1250 mg, 500 mg [place],/vitamin D 200 units (OSCAL WITH D) 500-200 MG-UNIT per tablet, Take 1 tablet by mouth 2 times daily (with meals), Disp: , Rfl:      OnabotulinumtoxinA (BOTOX IJ), Inject 125 Units into the muscle See Admin Instructions Every 10 weeks (Lot # /C3  Exp: 8/2019), Disp: , Rfl:      simvastatin (ZOCOR) 20 MG tablet, Take 1 tablet (20 mg) by mouth At Bedtime, Disp: 90 tablet, Rfl: 3     Allergies   Allergen Reactions     Meloxicam      Diarrhea, vomiting     Primidone Other (See Comments)     Felt like motion sickness     Tramadol      Diarrhea, vomiting      Adhesive Tape Rash     Durabond only     " "Allergy Rash     Dermabond  Anesthesia IV set [facility]          PHYSICAL EXAM:    HEAD, NECK AND TRUNK PATTERN:   Head Tremor: Observed \"no\" tremor   Head & Neck Extension: Present - Slight  Sub-Occipital Extension: Present - Slight  Forward Head: Present - Slight   Head & Neck Lateral Bend: Right   Shoulder Elevation: Right    HPI:    Patient reports the following new medical problems since last visit: pt had a umbilical hernia repair    We reviewed the recommended safety guidelines for  Botox from any vaccine injection, such as the seasonal flu vaccine, by a minimum of 10-14 days with Lilli Steven. She acknowledged understanding.    RESPONSE TO PREVIOUS TREATMENT:    Lilli Steven received 125 units of Botox on 17.    Problems following the previous series of neurotoxin injections included:  No problems reported    BENEFITS BY PATIENT REPORT:    Pain Improvement: Yes.  Percent Improvement: 85 %    Duration of Benefit:  9-10 weeks.    Dystonia and Head Tremor Improvement: Yes.  Percent Improvement: 85 %    Duration of Benefit:  9-10 weeks and followed by a gradual reduction in benefit.      BOTULINUM NEUROTOXIN INJECTION PROCEDURES:    VERIFICATION OF PATIENT IDENTIFICATION AND PROCEDURE     Initials   Patient Name [facility]   Patient  [facility]   Procedure Verified by: [facility]     Prior to the start of the procedure and with procedural staff participation, I verbally confirmed the patient s identity using two indicators, relevant allergies, that the procedure was appropriate and matched the consent or emergent situation, and that the correct equipment/implants were available. Immediately prior to starting the procedure I conducted the Time Out with the procedural staff and re-confirmed the patient s name, procedure, and site/side. (The Joint Commission universal protocol was followed.)  Yes    Sedation (Moderate or Deep): None      Above assessments performed by:  Resident/Fellow         Jerri Wilburn DO      "      The attending provider was present for the entire procedure documented below.      Hermes Frias MD      INDICATION/S FOR PROCEDURE/S:  Lilli Steven is a 66 year old patient with dystonia affecting the  head, neck and shoulder girdle musculature secondary to a diagnosis of cervical dystonia with associated  pain, tremor, loss of volitional motor control and difficulty with activities of daily living.     Her baseline symptoms have been recalcitrant to oral medications and conservative therapy.  She is here today for an injection of Botox.      GOAL OF PROCEDURE:  The goal of this procedure is to increase active range of motion, improve volitional motor control, decrease pain  and enhance functional independence associated with dystonic movements.    TOTAL DOSE ADMINISTERED:  Dose Administered:  125 units Botox    Diluent Used:  Preservative Free Normal Saline  Total Volume of Diluent Used:  1.25 ml  Lot # /C3 with Expiration Date:  09/2019  NDC #: Botox 100u ([address]-82)    Medication guide was offered to patient and was declined.    CONSENT:  The risks, benefits, and treatment options were discussed with Lilli Steven and she agreed to proceed.      Written consent was obtained by [facility].     EQUIPMENT USED:  Needle-37mm stimulating/recording  EMG/NCS Machine    SKIN PREPARATION:  Skin preparation was performed using an alcohol wipe.    GUIDANCE DESCRIPTION:  Electro-myographic guidance was necessary throughout the procedure to accurately identify all areas of dystonic muscles while avoiding injection of non-dystonic muscles, neighboring nerves and nearby vascular structures.     AREA/MUSCLE INJECTED:  125 units of Botox  1. HEAD & NECK MUSCLES: 125 units Botox = Total Dose, 1:1 Dilution                 Right Mid-Trapezius - 10 units of Botox at 1 site/s.   Left Mid-Trapezius - 10 units of Botox at 1 site/s.     Right Splenius Capitus - 15 units of Botox at 2 site/s.   Left Splenius Capitus - 10  units of Botox at 2 site/s.     Right Levator Scapulae - 20 units of Botox at 1 site/s (shoulder muscles).   Left Levator Scapulae - 15 units of Botox at 1 site/s (shoulder muscles).     Right Inferior Obliquus Capitis - 10 units of Botox at 1 site/s.   Left Inferior Obliquus Capitis - 15 units of at 1 site/s.     Right Sternal head of the Sternocleidomastoid - 10 units of Botox at 1 site/s.    Left Sternal head of the Sternocleidomastoid - 10 units of Botox at 1 site/s.      RESPONSE TO PROCEDURE:  Lilli Steven tolerated the procedure well and there were no immediate complications.  She was allowed to recover for an appropriate period of time and was discharged home in stable condition.    FOLLOW UP:  Lilli Steven was asked to follow up by phone in 7-14 days with Erin Combs PT, Care Coordinator or Mikki Rojas RN, Care Coordinator, to report her response to this series of injections.  Based on the patient's previous response to this therapy, Lilli Steven was rescheduled for the next series of injections in 10 weeks.    PLAN (Medication Changes, Therapy Orders, Work or Disability Issues, etc.): Will monitor response to today's injections and report.

## 2017-03-13 NOTE — MR AVS SNAPSHOT
After Visit Summary   3/13/2017    Lilli Steven    MRN: [id number]           Patient Information     Date Of Birth          1950        Visit Information        Provider Department      3/13/2017 3:40 PM Hermes Frias MD [facility] Physical Medicine and Rehabilitation         Follow-ups after your visit        Follow-up notes from your care team     Return in about 20 weeks (around 7/31/2017) for Cervical Dystonia.      Your next 10 appointments already scheduled     May 25, 2017  3:50 PM CDT   (Arrive by 3:35 PM)   Return Botox with Hermes Frias MD   [facility] Physical Medicine and Rehabilitation ([facility])    [address] [place] [id number] [phone]            Aug 01, 2017  3:40 PM CDT   (Arrive by 3:25 PM)   Return Botox with Hermes Frias MD   [facility] Physical Medicine and Rehabilitation ([facility])    [address] [place] [id number] [phone]              Future tests that were ordered for you today     Open Future Orders        Priority Expected Expires Ordered    US Guided Needle Placement Routine  3/13/2018 3/13/2017            Who to contact     Please call your clinic at [phone] to:    Ask questions about your health    Make or cancel appointments    Discuss your medicines    Learn about your test results    Speak to your doctor   If you have compliments or concerns about an experience at your clinic, or if you wish to file a complaint, please contact [facility] Patient Relations at [phone] or email us at [email or web address]         Additional Information About Your Visit        MyChart Information     [employer] gives you secure access to your electronic health record. If you see a primary care provider, you can also send messages to your care team and make appointments. If you have questions, please  "call your primary care clinic.  If you do not have a primary care provider, please call [phone] and they will assist you.      [employer] is an electronic gateway that provides easy, online access to your medical records. With [employer], you can request a clinic appointment, read your test results, renew a prescription or communicate with your care team.     To access your existing account, please contact your [facility] or call [phone] for assistance.        Care EveryWhere ID     This is your Care EveryWhere ID. This could be used by other organizations to access your [place] medical records  [id number]        Your Vitals Were     Pulse Temperature Respirations Height Pulse Oximetry Breastfeeding?    98 98.2  F (36.8  C) (Oral) 20 1.6 m (5' 3\") 94% No    BMI (Body Mass Index)                   29.37 kg/m2            Blood Pressure from Last 3 Encounters:   03/13/17 127/52   02/17/17 103/56   01/23/17 138/78    Weight from Last 3 Encounters:   03/13/17 75.2 kg (165 lb 12.8 oz)   02/16/17 76.2 kg (168 lb)   01/23/17 75.3 kg (166 lb)              Today, you had the following     No orders found for display       Primary Care Provider Office Phone # Fax #    Vargas Ata Chaudhry [phone]       [facility] [address] [facility] [address]        Thank you!     Thank you for choosing [facility] PHYSICAL MEDICINE AND REHABILITATION  for your care. Our goal is always to provide you with excellent care. Hearing back from our patients is one way we can continue to improve our services. Please take a few minutes to complete the written survey that you may receive in the mail after your visit with us. Thank you!             Your Updated Medication List - Protect others around you: Learn how to safely use, store and throw away your medicines at www.disposemymeds.org.          This list is accurate as of: 3/13/17  3:49 PM.  Always use your most " recent med list.                   Brand Name Dispense Instructions for use    * BOTOX IJ      Inject 125 Units into the muscle See Admin Instructions Every 10 weeks (Lot # /C3  Exp: 8/2019)       * BOTOX IJ      Inject 125 Units as directed once Lot #: /C3 Exp: 09/2019       calcium carb 1250 mg (500 mg [place])/vitamin D 200 units 500-200 MG-UNIT per tablet    OSCAL with D     Take 1 tablet by mouth 2 times daily (with meals)       CEPHALEXIN PO      Take 250 mg by mouth every 6 hours       HYDROcodone-acetaminophen 5-325 MG per tablet    NORCO    30 tablet    Take 1-2 tablets by mouth every 4 hours as needed for other (Moderate to Severe Pain)       simvastatin 20 MG tablet    ZOCOR    90 tablet    Take 1 tablet (20 mg) by mouth At Bedtime       traZODone 50 MG tablet    DESYREL    90 tablet    TAKE THREE TABLETS BY MOUTH NIGHTLY AT BEDTIME       triamcinolone 55 MCG/ACT Inhaler    NASACORT     Spray 1 spray into both nostrils daily as needed (congestion)       * Notice:  This list has 2 medication(s) that are the same as other medications prescribed for you. Read the directions carefully, and ask your doctor or other care provider to review them with you.

## 2017-03-13 NOTE — PROGRESS NOTES
"BOTULINUM TOXIN PROCEDURE NOTE    Chief Complaint   Patient presents with     RECHECK     UMP- BOTOX-CERVICAL DYSTONIA       /52 (BP Location: Left arm, Patient Position: Chair, Cuff Size: Adult Large)  Pulse 98  Temp 98.2  F (36.8  C) (Oral)  Resp 20  Ht 1.6 m (5' 3\")  Wt 75.2 kg (165 lb 12.8 oz)  SpO2 94%  Breastfeeding? No  BMI 29.37 kg/m2      Current Outpatient Prescriptions:      CEPHALEXIN PO, Take 250 mg by mouth every 6 hours, Disp: , Rfl:      OnabotulinumtoxinA (BOTOX IJ), Inject 125 Units as directed once Lot #: /C3 Exp: 09/2019, Disp: , Rfl:      traZODone (DESYREL) 50 MG tablet, TAKE THREE TABLETS BY MOUTH NIGHTLY AT BEDTIME, Disp: 90 tablet, Rfl: 0     triamcinolone (NASACORT) 55 MCG/ACT Inhaler, Spray 1 spray into both nostrils daily as needed (congestion), Disp: , Rfl:      HYDROcodone-acetaminophen (NORCO) 5-325 MG per tablet, Take 1-2 tablets by mouth every 4 hours as needed for other (Moderate to Severe Pain), Disp: 30 tablet, Rfl: 0     calcium carb 1250 mg, 500 mg [place],/vitamin D 200 units (OSCAL WITH D) 500-200 MG-UNIT per tablet, Take 1 tablet by mouth 2 times daily (with meals), Disp: , Rfl:      OnabotulinumtoxinA (BOTOX IJ), Inject 125 Units into the muscle See Admin Instructions Every 10 weeks (Lot # /C3  Exp: 8/2019), Disp: , Rfl:      simvastatin (ZOCOR) 20 MG tablet, Take 1 tablet (20 mg) by mouth At Bedtime, Disp: 90 tablet, Rfl: 3     Allergies   Allergen Reactions     Meloxicam      Diarrhea, vomiting     Primidone Other (See Comments)     Felt like motion sickness     Tramadol      Diarrhea, vomiting      Adhesive Tape Rash     Durabond only     Allergy Rash     Dermabond  Anesthesia IV set misc          PHYSICAL EXAM:    HEAD, NECK AND TRUNK PATTERN:   Head Tremor: Observed \"no\" tremor   Head & Neck Extension: Present - Slight  Sub-Occipital Extension: Present - Slight  Forward Head: Present - Slight   Head & Neck Lateral Bend: Right   Shoulder Elevation: " Right    HPI:    Patient reports the following new medical problems since last visit: pt had a umbilical hernia repair    We reviewed the recommended safety guidelines for  Botox from any vaccine injection, such as the seasonal flu vaccine, by a minimum of 10-14 days with Lilli Steven. She acknowledged understanding.    RESPONSE TO PREVIOUS TREATMENT:    Lilli Steven received 125 units of Botox on 17.    Problems following the previous series of neurotoxin injections included:  No problems reported    BENEFITS BY PATIENT REPORT:    Pain Improvement: Yes.  Percent Improvement: 85 %    Duration of Benefit:  9-10 weeks.    Dystonia and Head Tremor Improvement: Yes.  Percent Improvement: 85 %    Duration of Benefit:  9-10 weeks and followed by a gradual reduction in benefit.      BOTULINUM NEUROTOXIN INJECTION PROCEDURES:    VERIFICATION OF PATIENT IDENTIFICATION AND PROCEDURE     Initials   Patient Name [facility]   Patient  [facility]   Procedure Verified by: [facility]     Prior to the start of the procedure and with procedural staff participation, I verbally confirmed the patient s identity using two indicators, relevant allergies, that the procedure was appropriate and matched the consent or emergent situation, and that the correct equipment/implants were available. Immediately prior to starting the procedure I conducted the Time Out with the procedural staff and re-confirmed the patient s name, procedure, and site/side. (The Joint Commission universal protocol was followed.)  Yes    Sedation (Moderate or Deep): None      Above assessments performed by:  Resident/Fellow         Jerri Wilburn DO           The attending provider was present for the entire procedure documented below.      Hermes Frias MD      INDICATION/S FOR PROCEDURE/S:  Lilli Steven is a 66 year old patient with dystonia affecting the  head, neck and shoulder girdle musculature secondary to a diagnosis of cervical dystonia with  associated  pain, tremor, loss of volitional motor control and difficulty with activities of daily living.     Her baseline symptoms have been recalcitrant to oral medications and conservative therapy.  She is here today for an injection of Botox.      GOAL OF PROCEDURE:  The goal of this procedure is to increase active range of motion, improve volitional motor control, decrease pain  and enhance functional independence associated with dystonic movements.    TOTAL DOSE ADMINISTERED:  Dose Administered:  125 units Botox    Diluent Used:  Preservative Free Normal Saline  Total Volume of Diluent Used:  1.25 ml  Lot # /C3 with Expiration Date:  09/2019  NDC #: Botox 100u ([address]-20)    Medication guide was offered to patient and was declined.    CONSENT:  The risks, benefits, and treatment options were discussed with Lilli Steven and she agreed to proceed.      Written consent was obtained by [facility].     EQUIPMENT USED:  Needle-37mm stimulating/recording  EMG/NCS Machine    SKIN PREPARATION:  Skin preparation was performed using an alcohol wipe.    GUIDANCE DESCRIPTION:  Electro-myographic guidance was necessary throughout the procedure to accurately identify all areas of dystonic muscles while avoiding injection of non-dystonic muscles, neighboring nerves and nearby vascular structures.     AREA/MUSCLE INJECTED:  125 units of Botox  1. HEAD & NECK MUSCLES: 125 units Botox = Total Dose, 1:1 Dilution                 Right Mid-Trapezius - 10 units of Botox at 1 site/s.   Left Mid-Trapezius - 10 units of Botox at 1 site/s.     Right Splenius Capitus - 15 units of Botox at 2 site/s.   Left Splenius Capitus - 10 units of Botox at 2 site/s.     Right Levator Scapulae - 20 units of Botox at 1 site/s (shoulder muscles).   Left Levator Scapulae - 15 units of Botox at 1 site/s (shoulder muscles).     Right Inferior Obliquus Capitis - 10 units of Botox at 1 site/s.   Left Inferior Obliquus Capitis - 15 units of at 1  site/s.     Right Sternal head of the Sternocleidomastoid - 10 units of Botox at 1 site/s.    Left Sternal head of the Sternocleidomastoid - 10 units of Botox at 1 site/s.      RESPONSE TO PROCEDURE:  Lilli Steven tolerated the procedure well and there were no immediate complications.  She was allowed to recover for an appropriate period of time and was discharged home in stable condition.    FOLLOW UP:  Lilli Steven was asked to follow up by phone in 7-14 days with Erin Combs PT, Care Coordinator or Mikki Rojas RN, Care Coordinator, to report her response to this series of injections.  Based on the patient's previous response to this therapy, Lilli Steven was rescheduled for the next series of injections in 10 weeks.    PLAN (Medication Changes, Therapy Orders, Work or Disability Issues, etc.): Will monitor response to today's injections and report.

## 2017-03-15 ENCOUNTER — HOSPITAL ENCOUNTER (OUTPATIENT)
Facility: CLINIC | Age: 67
Discharge: HOME | End: 2017-03-15
Attending: RADIOLOGY | Admitting: RADIOLOGY
Payer: COMMERCIAL

## 2017-03-15 ENCOUNTER — HOSPITAL ENCOUNTER (OUTPATIENT)
Dept: ULTRASOUND IMAGING | Facility: CLINIC | Age: 67
End: 2017-03-15
Attending: PLASTIC SURGERY | Admitting: RADIOLOGY
Payer: COMMERCIAL

## 2017-03-15 VITALS — DIASTOLIC BLOOD PRESSURE: 66 MMHG | RESPIRATION RATE: 18 BRPM | SYSTOLIC BLOOD PRESSURE: 177 MMHG

## 2017-03-15 DIAGNOSIS — E65 LOCALIZED ADIPOSITY: ICD-10-CM

## 2017-03-15 PROCEDURE — 40000863 ZZH STATISTIC RADIOLOGY XRAY, US, CT, MAR, NM

## 2017-03-15 PROCEDURE — 76942 ECHO GUIDE FOR BIOPSY: CPT

## 2017-03-15 RX ORDER — LIDOCAINE HYDROCHLORIDE 10 MG/ML
5 INJECTION, SOLUTION EPIDURAL; INFILTRATION; INTRACAUDAL; PERINEURAL ONCE
Status: COMPLETED | OUTPATIENT
Start: 2017-03-15 | End: 2017-03-15

## 2017-03-15 RX ADMIN — LIDOCAINE HYDROCHLORIDE 50 MG: 10 INJECTION, SOLUTION EPIDURAL; INFILTRATION; INTRACAUDAL; PERINEURAL at 13:46

## 2017-03-15 NOTE — PROGRESS NOTES
RADIOLOGY PROCEDURE NOTE  Patient name: Lilli Steven  MRN: [id number]  : 1950    Pre-procedure diagnosis: Abdominal wall seroma  Post-procedure diagnosis: Same    Procedure Date/Time: March 15, 2017  1:38 PM  Procedure: Percutaneous Aspiration  Estimated blood loss: None  Specimen(s) collected with description: seroma fluid  The patient tolerated the procedure well with no immediate complications.    See imaging dictation for procedural details and findings.    Provider name: Monty Govea  Assistant(s):None

## 2017-03-15 NOTE — IP AVS SNAPSHOT
[place] [address]    [place] [address]    Phone:  [phone]                                       After Visit Summary   3/15/2017    Lilli Steven    MRN: [id number]           After Visit Summary Signature Page     I have received my discharge instructions, and my questions have been answered. I have discussed any challenges I see with this plan with the nurse or doctor.    ..........................................................................................................................................  Patient/Patient Representative Signature      ..........................................................................................................................................  Patient Representative Print Name and Relationship to Patient    ..................................................               ................................................  Date                                            Time    ..........................................................................................................................................  Reviewed by Signature/Title    ...................................................              ..............................................  Date                                                            Time

## 2017-03-15 NOTE — DISCHARGE INSTRUCTIONS
Drain Placement Discharge Instructions     After you go home:      You may resume your normal diet    Care of Puncture Site:      You may have mild bruising, soreness & swelling at the puncture site. This will go away in a few days    For swelling & bruising, you may use an ice pack on the site.     Activity:      You may go back to your normal activity    Avoid strenuous activity for 24 hours    Medicines:      You may resume all medications    For minor pain, you may take Acetaminophen (Tylenol) or Ibuprofen (Advil)            Call the provider who ordered this test if:      Increased redness or swelling at the site    Fluid or blood oozing from the site    Severe pain at the site    Chills or a fever greater than 101 F (38 C).    Any questions or concerns    Call  911 or go to the Emergency Room if:      Trouble breathing    Your neck swells    Bleeding that you cannot control    Severe difficulty swallowing    If you have questions call:      [facility] Radiology Dept @ [phone]    The provider who performed your procedure was _________________.

## 2017-03-15 NOTE — IP AVS SNAPSHOT
MRN:[id number]                      After Visit Summary   3/15/2017    Lilli Steven    MRN: [id number]           Visit Information        Department      3/15/2017 12:28 PM [facility]          Review of your medicines      UNREVIEWED medicines. Ask your doctor about these medicines        Dose / Directions    * BOTOX IJ        Dose:  125 Units   Inject 125 Units into the muscle See Admin Instructions Every 10 weeks (Lot # /C3  Exp: 8/2019)   Refills:  0       * BOTOX IJ        Dose:  125 Units   Inject 125 Units as directed once Lot #: /C3 Exp: 09/2019   Refills:  0       calcium carb 1250 mg (500 mg [place])/vitamin D 200 units 500-200 MG-UNIT per tablet   Commonly known as:  OSCAL with D        Dose:  1 tablet   Take 1 tablet by mouth 2 times daily (with meals)   Refills:  0       CEPHALEXIN PO        Dose:  250 mg   Take 250 mg by mouth every 6 hours   Refills:  0       HYDROcodone-acetaminophen 5-325 MG per tablet   Commonly known as:  NORCO   Used for:  Umbilical hernia without obstruction and without gangrene        Dose:  1-2 tablet   Take 1-2 tablets by mouth every 4 hours as needed for other (Moderate to Severe Pain)   Quantity:  30 tablet   Refills:  0       simvastatin 20 MG tablet   Commonly known as:  ZOCOR   Used for:  Hyperlipidemia LDL goal <160        Dose:  20 mg   Take 1 tablet (20 mg) by mouth At Bedtime   Quantity:  90 tablet   Refills:  3       traZODone 50 MG tablet   Commonly known as:  DESYREL   Used for:  Primary insomnia        TAKE THREE TABLETS BY MOUTH NIGHTLY AT BEDTIME   Quantity:  90 tablet   Refills:  0       triamcinolone 55 MCG/ACT Inhaler   Commonly known as:  NASACORT        Dose:  1 spray   Spray 1 spray into both nostrils daily as needed (congestion)   Refills:  0       * Notice:  This list has 2 medication(s) that are the same as other medications prescribed for you. Read the directions carefully, and ask your doctor or other  care provider to review them with you.             Protect others around you: Learn how to safely use, store and throw away your medicines at www.disposemymeds.org.         Follow-ups after your visit        Your next 10 appointments already scheduled     Mar 15, 2017  2:00 PM CDT   US DRAIN OF SEROMA/HEMATOMA/ABSCESS/CYST with SHUS4   [facility]trasound ([facility])    [address] [place] [address] [phone]           Tell us in advance if there s any chance you may be pregnant.  Bring a list of your medicines to the exam. Include vitamins, minerals and over-the-counter drugs.  If you take blood thinners, you may need to stop taking them a few days before treatment. Talk to your doctor before stopping these medicines. You will need a blood test the morning of your exam.   Stop taking Coumadin (warfarin) 3 days before your exam. Restart the day after your exam.   If you take aspirin, you may need to stop taking it 3 days before your scan.   If you take Plavix, Ticlid, Pletal or Persantine, you may need to stop taking them 5 days before your scan. Please talk to your doctor before stopping these medicines.  If you will receive sedation for this test (medicine to help you relax):   See your family doctor for an exam within 30 days of treatment.   Plan for an adult to drive you home and stay with you for at least 6 hours.   Follow the eating and drinking guidelines checked below:   No eating or drinking for 4 hours before your test. You may take medicine with small sips of water.   If you have diabetes:If you take insulin, call your diabetes care team. Do not take diabetes pills on the morning of your test. If you take metformin (Avandamet, Glucophage, Glucovance, Metaglip) and received contrast, wait 48 hours before re-starting this medicine.  Please call the Imaging Department at your exam site with any questions.            May 25, 2017  3:50 PM CDT   (Arrive by 3:35  PM)   Return Botox with Hermes Frias MD   [facility] ([facility])    [address] [facility] [address]   [phone]            Aug 01, 2017  3:40 PM CDT   (Arrive by 3:25 PM)   Return Botox with Hermes Frias MD   [facility] ([facility])    [address] [facility] [address]   [phone]               Care Instructions        Further instructions from your care team       Drain Placement Discharge Instructions     After you go home:      You may resume your normal diet    Care of Puncture Site:      You may have mild bruising, soreness & swelling at the puncture site. This will go away in a few days    For swelling & bruising, you may use an ice pack on the site.     Activity:      You may go back to your normal activity    Avoid strenuous activity for 24 hours    Medicines:      You may resume all medications    For minor pain, you may take Acetaminophen (Tylenol) or Ibuprofen (Advil)            Call the provider who ordered this test if:      Increased redness or swelling at the site    Fluid or blood oozing from the site    Severe pain at the site    Chills or a fever greater than 101 F (38 C).    Any questions or concerns    Call  911 or go to the Emergency Room if:      Trouble breathing    Your neck swells    Bleeding that you cannot control    Severe difficulty swallowing    If you have questions call:      [facility] Radiology Dept @ [phone]    The provider who performed your procedure was _________________.     Additional Information About Your Visit        jobs-dial LLChart Information     [facility] gives you secure access to your electronic health record. If you see a primary care provider, you can also send messages to your care team and make appointments. If you have questions, please call your primary care clinic.   If you do not have a primary care provider, please call [phone] and they will assist you.        Care EveryWhere ID     This is your Care EveryWhere ID. This could be used by other organizations to access your [facility] medical records  [id number]         Primary Care Provider Office Phone # Fax #    Vargas Ata Chaudhry [phone]      Thank you!     Thank you for choosing [facility] for your care. Our goal is always to provide you with excellent care. Hearing back from our patients is one way we can continue to improve our services. Please take a few minutes to complete the written survey that you may receive in the mail after you visit with us. Thank you!             Medication List: This is a list of all your medications and when to take them. Check marks below indicate your daily home schedule. Keep this list as a reference.      Medications           Morning Afternoon Evening Bedtime As Needed    * BOTOX IJ   Inject 125 Units into the muscle See Admin Instructions Every 10 weeks (Lot # /C3  Exp: 8/2019)                                * BOTOX IJ   Inject 125 Units as directed once Lot #: /C3 Exp: 09/2019                                calcium carb 1250 mg (500 mg [place])/vitamin D 200 units 500-200 MG-UNIT per tablet   Commonly known as:  OSCAL with D   Take 1 tablet by mouth 2 times daily (with meals)                                CEPHALEXIN PO   Take 250 mg by mouth every 6 hours                                HYDROcodone-acetaminophen 5-325 MG per tablet   Commonly known as:  NORCO   Take 1-2 tablets by mouth every 4 hours as needed for other (Moderate to Severe Pain)                                simvastatin 20 MG tablet   Commonly known as:  ZOCOR   Take 1 tablet (20 mg) by mouth At Bedtime                                traZODone 50 MG tablet   Commonly known as:  DESYREL   TAKE THREE TABLETS BY MOUTH NIGHTLY AT BEDTIME                                triamcinolone 55  MCG/ACT Inhaler   Commonly known as:  NASACORT   [place] 1 spray into both nostrils daily as needed (congestion)                                * Notice:  This list has 2 medication(s) that are the same as other medications prescribed for you. Read the directions carefully, and ask your doctor or other care provider to review them with you.

## 2017-03-15 NOTE — PROGRESS NOTES
Abdominal drainage sites CDI. Bandages intact.  Pt. Verbalizes understanding of discharge instructions.  No further questions at this time.  Discharge to home.

## 2017-03-20 DIAGNOSIS — E78.5 HYPERLIPIDEMIA LDL GOAL <160: ICD-10-CM

## 2017-03-20 DIAGNOSIS — F51.01 PRIMARY INSOMNIA: ICD-10-CM

## 2017-03-20 NOTE — TELEPHONE ENCOUNTER
Zocor  Last Written Prescription Date: 1/7/16  Last Fill Quantity: 90, # refills: 3  Last Office Visit with [facility] or [facility] prescribing provider: 1/23/17 with Dr. Chaudhry       No results found for: CHOL  No results found for: HDL  No results found for: LDL  No results found for: TRIG  No results found for: CHOLHDLRATIO    Trazodone       Last Written Prescription Date: 2/20/17  Last Fill Quantity: 90; # refills: 0  Last Office Visit with [facility], [facility] or [facility] prescribing provider:          Last PHQ-9 score on record= No flowsheet data found.    No results found for: AST  No results found for: ALT      Jumana Ryder MA  [facility]

## 2017-03-21 RX ORDER — SIMVASTATIN 20 MG
TABLET ORAL
Start: 2017-03-21

## 2017-03-21 RX ORDER — TRAZODONE HYDROCHLORIDE 50 MG/1
TABLET, FILM COATED ORAL
Start: 2017-03-21

## 2017-03-21 NOTE — TELEPHONE ENCOUNTER
Patient due for fasting labs and physical or med check    Next 5 appointments (look out 90 days)     Mar 31, 2017  4:30 PM CDT   Office Visit with Vargas Chaudhry MD   [facility] ([facility]    [address] [facility] [id number] [phone]                Patient states she has enough medication until appt; denied refill requests.  Dasha RYAN RN

## 2017-03-27 ENCOUNTER — OFFICE VISIT (OUTPATIENT)
Dept: FAMILY MEDICINE | Facility: CLINIC | Age: 67
End: 2017-03-27
Payer: COMMERCIAL

## 2017-03-27 VITALS
HEART RATE: 102 BPM | OXYGEN SATURATION: 97 % | DIASTOLIC BLOOD PRESSURE: 72 MMHG | SYSTOLIC BLOOD PRESSURE: 112 MMHG | WEIGHT: 162 LBS | HEIGHT: 63 IN | BODY MASS INDEX: 28.7 KG/M2 | TEMPERATURE: 98.7 F

## 2017-03-27 DIAGNOSIS — E78.5 HYPERLIPIDEMIA LDL GOAL <160: ICD-10-CM

## 2017-03-27 DIAGNOSIS — Z23 NEED FOR TDAP VACCINATION: ICD-10-CM

## 2017-03-27 DIAGNOSIS — Z23 NEED FOR PNEUMOCOCCAL VACCINE: ICD-10-CM

## 2017-03-27 DIAGNOSIS — Z01.818 PREOP GENERAL PHYSICAL EXAM: Primary | ICD-10-CM

## 2017-03-27 LAB
ALBUMIN SERPL-MCNC: 3.4 G/DL (ref 3.4–5)
ALP SERPL-CCNC: 103 U/L (ref 40–150)
ALT SERPL W P-5'-P-CCNC: 19 U/L (ref 0–50)
ANION GAP SERPL CALCULATED.3IONS-SCNC: 6 MMOL/L (ref 3–14)
AST SERPL W P-5'-P-CCNC: 12 U/L (ref 0–45)
BILIRUB SERPL-MCNC: 0.4 MG/DL (ref 0.2–1.3)
BUN SERPL-MCNC: 16 MG/DL (ref 7–30)
CALCIUM SERPL-MCNC: 9.2 MG/DL (ref 8.5–10.1)
CHLORIDE SERPL-SCNC: 109 MMOL/L (ref 94–109)
CHOLEST SERPL-MCNC: 190 MG/DL
CO2 SERPL-SCNC: 27 MMOL/L (ref 20–32)
CREAT SERPL-MCNC: 0.76 MG/DL (ref 0.52–1.04)
GFR SERPL CREATININE-BSD FRML MDRD: 76 ML/MIN/1.7M2
GLUCOSE SERPL-MCNC: 100 MG/DL (ref 70–99)
HDLC SERPL-MCNC: 54 MG/DL
LDLC SERPL CALC-MCNC: 106 MG/DL
NONHDLC SERPL-MCNC: 136 MG/DL
POTASSIUM SERPL-SCNC: 4.6 MMOL/L (ref 3.4–5.3)
PROT SERPL-MCNC: 7.2 G/DL (ref 6.8–8.8)
SODIUM SERPL-SCNC: 142 MMOL/L (ref 133–144)
TRIGL SERPL-MCNC: 151 MG/DL

## 2017-03-27 PROCEDURE — 90472 IMMUNIZATION ADMIN EACH ADD: CPT | Performed by: PHYSICIAN ASSISTANT

## 2017-03-27 PROCEDURE — 99214 OFFICE O/P EST MOD 30 MIN: CPT | Mod: 25 | Performed by: PHYSICIAN ASSISTANT

## 2017-03-27 PROCEDURE — 90670 PCV13 VACCINE IM: CPT | Performed by: PHYSICIAN ASSISTANT

## 2017-03-27 PROCEDURE — 36415 COLL VENOUS BLD VENIPUNCTURE: CPT | Performed by: PHYSICIAN ASSISTANT

## 2017-03-27 PROCEDURE — 80061 LIPID PANEL: CPT | Performed by: PHYSICIAN ASSISTANT

## 2017-03-27 PROCEDURE — 90715 TDAP VACCINE 7 YRS/> IM: CPT | Performed by: PHYSICIAN ASSISTANT

## 2017-03-27 PROCEDURE — 80053 COMPREHEN METABOLIC PANEL: CPT | Performed by: PHYSICIAN ASSISTANT

## 2017-03-27 PROCEDURE — 90471 IMMUNIZATION ADMIN: CPT | Performed by: PHYSICIAN ASSISTANT

## 2017-03-27 NOTE — H&P (VIEW-ONLY)
[facility]  [address] [place]  [facility] [address]  [phone]  Dept: [phone]    PRE-OP EVALUATION:  Today's date: 3/27/2017    Lilli Steven (: 1950) presents for pre-operative evaluation assessment as requested by  Inés Harrell MD.  She requires evaluation and anesthesia risk assessment prior to undergoing surgery/procedure for treatment of ABDOMINAL SEROMA   .  Proposed procedure: COMBINED IRRIGATION AND DEBRIDEMENT TRUNK        Date of Surgery/ Procedure: 2017  Time of Surgery/ Procedure: 4:50PM  Hospital/Surgical Facility:  [facility]  Primary Physician: Vargas Chaudhry  Type of Anesthesia Anticipated: General    Patient has a Health Care Directive or Living Will:  NO    1. NO - Do you have a history of heart attack, stroke, stent, bypass or surgery on an artery in the head, neck, heart or legs?  2. NO - Do you ever have any pain or discomfort in your chest?  3. NO - Do you have a history of  Heart Failure?  4. NO - Are you troubled by shortness of breath when: walking on the level, up a slight hill or at night?  5. NO - Do you currently have a cold, bronchitis or other respiratory infection?  6. NO - Do you have a cough, shortness of breath or wheezing?  7. NO - Do you sometimes get pains in the calves of your legs when you walk?  8. NO - Do you or anyone in your family have previous history of blood clots?  9. NO - Do you or does anyone in your family have a serious bleeding problem such as prolonged bleeding following surgeries or cuts?  10. NO - Have you ever had problems with anemia or been told to take iron pills?  11. NO - Have you had any abnormal blood loss such as black, tarry or bloody stools, or abnormal vaginal bleeding?  12. YES - Have you ever had a blood transfusion? - when she was born (RH) factor  13. YES - Have you or any of your relatives ever had problems with anesthesia? - Self, fainting, vomting  14. NO - Do you have sleep  apnea, excessive snoring or daytime drowsiness?  15. NO - Do you have any prosthetic heart valves?  16. NO - Do you have prosthetic joints?  17. NO - Is there any chance that you may be pregnant?      HPI:                                                      Brief HPI related to upcoming procedure: 66 y/o female here for pre-op exam.  Patient did have hernia repair last month, and since then she has struggled with fluid collection.  She did have US guided drain done 3/15, but it did fill back up within one day.  They are now going to proceed with open surgical repair.  She is not longer taking anything for the pain.  She did get quite nauseated with some vomiting when she came out of anaesthesia.        HYPERLIPIDEMIA - Patient has a long history of significant Hyperlipidemia requiring medication for treatment with recent good control. Patient reports no problems or side effects with the medication.                                                                                                                                                       .    MEDICAL HISTORY:                                                      Patient Active Problem List    Diagnosis Date Noted     Hernia of abdominal wall 02/16/2017     Priority: Medium     Tear of lateral cartilage or meniscus of knee, current 01/23/2017     Priority: Medium     Diagnosed by CT arthrogram (Can't get MRI, has an implanted stimulator)       Umbilical hernia without obstruction and without gangrene 12/23/2016     Priority: Medium     Hyperlipidemia LDL goal <160 01/28/2013     Priority: Medium     On Simvastatin       Urge incontinence 01/28/2013     Priority: Medium     Esophageal reflux 01/28/2013     Priority: Medium     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     Priority: Medium     Insomnia 09/06/2012     Priority: Medium     trazodone       Snores 09/06/2012     Priority: Medium     Wears glasses 09/06/2012     Priority: Medium     Diarrhea 09/06/2012      Priority: Medium     Neck pain 09/06/2012     Priority: Medium     Seasonal allergies 09/06/2012     Priority: Medium     Spring months       Osteopenia 05/17/2011     Priority: Medium     DEXA 2011  T -1 Lumbar spine  Right hip T -1.8  Left hip T -1.9       Spasmodic torticollis 07/19/1988     Priority: Medium     Botox injection        Past Medical History:   Diagnosis Date     Diarrhea 9/6/2012     Hypercholesterolemia 9/6/2012     Neck pain 9/6/2012     PONV (postoperative nausea and vomiting)      Seasonal allergies 9/6/2012     Snores 9/6/2012     Wears glasses 9/6/2012     Past Surgical History:   Procedure Laterality Date     COSMETIC ABDOMINOPLASTY MODIFIED N/A 2/16/2017    Procedure: COSMETIC ABDOMINOPLASTY MODIFIED;  Surgeon: Inés Harrell MD;  Location: [facility] OR     HERNIORRHAPHY EPIGASTRIC N/A 2/16/2017    Procedure: HERNIORRHAPHY EPIGASTRIC;  Surgeon: Joey Sargent MD;  Location: [facility] OR     HERNIORRHAPHY UMBILICAL N/A 2/16/2017    Procedure: HERNIORRHAPHY UMBILICAL;  Surgeon: Joey Sargent MD;  Location:  OR     IMPLANT STIMULATOR SACRAL NERVE STAGE ONE      for bladder incontinence, failed     TONSILLECTOMY       Current Outpatient Prescriptions   Medication Sig Dispense Refill     CEPHALEXIN PO Take 250 mg by mouth every 6 hours       OnabotulinumtoxinA (BOTOX IJ) Inject 125 Units as directed once Lot #: /[id number]  Exp: 09/2019       traZODone (DESYREL) 50 MG tablet TAKE THREE TABLETS BY MOUTH NIGHTLY AT BEDTIME 90 tablet 0     triamcinolone (NASACORT) 55 MCG/ACT Inhaler Spray 1 spray into both nostrils daily as needed (congestion)       HYDROcodone-acetaminophen (NORCO) 5-325 MG per tablet Take 1-2 tablets by mouth every 4 hours as needed for other (Moderate to Severe Pain) 30 tablet 0     calcium carb 1250 mg, 500 mg [place],/vitamin D 200 units (OSCAL WITH D) 500-200 MG-UNIT per tablet Take 1 tablet by mouth 2 times daily (with meals)       OnabotulinumtoxinA (BOTOX IJ)  "Inject 125 Units into the muscle See Admin Instructions Every 10 weeks (Lot # /C3  Exp: 8/2019)       simvastatin (ZOCOR) 20 MG tablet Take 1 tablet (20 mg) by mouth At Bedtime 90 tablet 3     OTC products: None, except as noted above    Allergies   Allergen Reactions     Meloxicam      Diarrhea, vomiting     Primidone Other (See Comments)     Felt like motion sickness     Tramadol      Diarrhea, vomiting      Adhesive Tape Rash     Durabond only     Allergy Rash     Dermabond  Anesthesia IV set misc        Latex Allergy: NO    Social History   Substance Use Topics     Smoking status: Former Smoker     Types: Cigarettes     Quit date: 1/1/1985     Smokeless tobacco: Never Used     Alcohol use 0.0 oz/week     0 Standard drinks or equivalent per week      Comment: glass of wine daily     History   Drug Use No       REVIEW OF SYSTEMS:                                                    Constitutional, HEENT, cardiovascular, pulmonary, gi and gu systems are negative, except as otherwise noted.    EXAM:                                                    /72 (BP Location: Right arm, Patient Position: Right side, Cuff Size: Adult Large)  Pulse 102  Temp 98.7  F (37.1  C) (Tympanic)  Ht 5' 3\" (1.6 m)  Wt 162 lb (73.5 kg)  SpO2 97%  Breastfeeding? No  BMI 28.7 kg/m2    GENERAL APPEARANCE: alert, active and no distress     EYES: EOMI, PERRL     HENT: ear canals and TM's normal and nose and mouth without ulcers or lesions     NECK: no adenopathy, no asymmetry, masses, or scars and thyroid normal to palpation     RESP: lungs clear to auscultation - no rales, rhonchi or wheezes     CV: regular rates and rhythm, normal S1 S2, no S3 or S4 and no murmur, click or rub     MS: extremities normal- no gross deformities noted, no evidence of inflammation in joints, FROM in all extremities.     SKIN: no suspicious lesions or rashes     NEURO: Normal strength and tone, sensory exam grossly normal, mentation intact and " speech normal     PSYCH: mentation appears normal. and affect normal/bright    DIAGNOSTICS:                                                    EKG: from 1/23/17, NSR    Recent Labs   Lab Test  02/16/17   0710   HGB  14.4        IMPRESSION:                                                    Reason for surgery/procedure: irrigation/debridement seroma  Diagnosis/reason for consult: as above    The proposed surgical procedure is considered INTERMEDIATE risk.    REVISED CARDIAC RISK INDEX  The patient has the following serious cardiovascular risks for perioperative complications such as (MI, PE, VFib and 3  AV Block):  No serious cardiac risks  INTERPRETATION: 0 risks: Class I (very low risk - 0.4% complication rate)    The patient has the following additional risks for perioperative complications:  No identified additional risks      ICD-10-CM    1. Preop general physical exam Z01.818    2. Seroma T14.8    3. Hyperlipidemia LDL goal <160 E78.5 Lipid panel reflex to direct LDL     Comprehensive metabolic panel   4. Need for Tdap vaccination Z23 TDAP VACCINE (ADACEL)   5. Need for pneumococcal vaccine Z23 PNEUMOCOCCAL CONJ VACCINE 13 VALENT IM       RECOMMENDATIONS:                                                        Cardiovascular Risk  Performs 4 METs exercise without symptoms (Walk on level ground at 15 minutes per mile (4 miles/hour)) .       --Patient is to take all scheduled medications on the day of surgery EXCEPT for modifications listed below.    APPROVAL GIVEN to proceed with proposed procedure, without further diagnostic evaluation       Signed Electronically by: Viktor Whitaker PA-C    Copy of this evaluation report is provided to requesting physician.    [facility] Preop Guidelines

## 2017-03-27 NOTE — NURSING NOTE
Screening Questionnaire for Adult Immunization    Are you sick today?   No   Do you have allergies to medications, food, a vaccine component or latex?   No   Have you ever had a serious reaction after receiving a vaccination?   No   Do you have a long-term health problem with heart disease, lung disease, asthma, kidney disease, metabolic disease (e.g. diabetes), anemia, or other blood disorder?   No   Do you have cancer, leukemia, HIV/AIDS, or any other immune system problem?   No   In the past 3 months, have you taken medications that affect  your immune system, such as prednisone, other steroids, or anticancer drugs; drugs for the treatment of rheumatoid arthritis, Crohn s disease, or psoriasis; or have you had radiation treatments?   No   Have you had a seizure, or a brain or other nervous system problem?   No   During the past year, have you received a transfusion of blood or blood     products, or been given immune (gamma) globulin or antiviral drug?   No   For women: Are you pregnant or is there a chance you could become        pregnant during the next month?   No   Have you received any vaccinations in the past 4 weeks?   No     Immunization questionnaire answers were all negative.      MNVFC doesn't apply on this patient    Per orders of Vel VALERIO, injection of TDap and PVC 13 given by Bozena Razo. Patient instructed to remain in clinic for 20 minutes afterwards, and to report any adverse reaction to me immediately.       Screening performed by Bozena Razo on 3/27/2017 at 9:07 AM.

## 2017-03-27 NOTE — NURSING NOTE
"Chief Complaint   Patient presents with     Pre-Op Exam     /72 (BP Location: Right arm, Patient Position: Right side, Cuff Size: Adult Large)  Pulse 102  Temp 98.7  F (37.1  C) (Tympanic)  Ht 5' 3\" (1.6 m)  Wt 162 lb (73.5 kg)  SpO2 97%  Breastfeeding? No  BMI 28.7 kg/m2 Estimated body mass index is 28.7 kg/(m^2) as calculated from the following:    Height as of this encounter: 5' 3\" (1.6 m).    Weight as of this encounter: 162 lb (73.5 kg).  bp completed using cuff size: large      Health Maintenance addressed:  NONE    n/a              "

## 2017-03-27 NOTE — MR AVS SNAPSHOT
After Visit Summary   3/27/2017    Lilli Steven    MRN: [id number]           Patient Information     Date Of Birth          1950        Visit Information        Provider Department      3/27/2017 8:40 AM Viktor Whitaker PA-C [facility]        Today's Diagnoses     Preop general physical exam    -  1    Seroma        Hyperlipidemia LDL goal <160        Need for Tdap vaccination        Need for pneumococcal vaccine          Care Instructions      Before Your Surgery      Call your surgeon if there is any change in your health. This includes signs of a cold or flu (such as a sore throat, runny nose, cough, rash or fever).    Do not smoke, drink alcohol or take over the counter medicine (unless your surgeon or primary care doctor tells you to) for the 24 hours before and after surgery.    If you take prescribed drugs: Follow your doctor s orders about which medicines to take and which to stop until after surgery.    Eating and drinking prior to surgery: follow the instructions from your surgeon    Take a shower or bath the night before surgery. Use the soap your surgeon gave you to gently clean your skin. If you do not have soap from your surgeon, use your regular soap. Do not shave or scrub the surgery site.  Wear clean pajamas and have clean sheets on your bed.         Follow-ups after your visit        Your next 10 appointments already scheduled     Mar 29, 2017   Procedure with Inés Harrell MD   [facility] (--)    [address]  [facility] [address]   [phone]            May 25, 2017  3:50 PM CDT   (Arrive by 3:35 PM)   Return Botox with Hermes Frias MD   [facility] ([facility])    [address] [place] [address]   [phone]            Aug 01, 2017  3:40 PM CDT   (Arrive by 3:25 PM)   Return Botox with Hermes Frias MD  "  [facility] ([facility])    [address]  3rd Floor  [facility] [address] [phone]              Who to contact     If you have questions or need follow up information about today's clinic visit or your schedule please contact [facility] directly at [phone].  Normal or non-critical lab and imaging results will be communicated to you by MyChart, letter or phone within 4 business days after the clinic has received the results. If you do not hear from us within 7 days, please contact the clinic through [employer] or phone. If you have a critical or abnormal lab result, we will notify you by phone as soon as possible.  Submit refill requests through [employer] or call your pharmacy and they will forward the refill request to us. Please allow 3 business days for your refill to be completed.          Additional Information About Your Visit        [employer] Information     [employer] gives you secure access to your electronic health record. If you see a primary care provider, you can also send messages to your care team and make appointments. If you have questions, please call your primary care clinic.  If you do not have a primary care provider, please call [phone] and they will assist you.        Care EveryWhere ID     This is your Care EveryWhere ID. This could be used by other organizations to access your [facility] medical records  [id number]        Your Vitals Were     Pulse Temperature Height Pulse Oximetry Breastfeeding? BMI (Body Mass Index)    102 98.7  F (37.1  C) (Tympanic) 5' 3\" (1.6 m) 97% No 28.7 kg/m2       Blood Pressure from Last 3 Encounters:   03/27/17 112/72   03/15/17 177/66   03/13/17 127/52    Weight from Last 3 Encounters:   03/27/17 162 lb (73.5 kg)   03/13/17 165 lb 12.8 oz (75.2 kg)   02/16/17 168 lb (76.2 kg)              We Performed the Following     Comprehensive metabolic panel     Lipid panel reflex " to direct LDL     PNEUMOCOCCAL CONJ VACCINE 13 VALENT IM     TDAP VACCINE (ADACEL)        Primary Care Provider Office Phone # Fax #    Vargas Ata Chaudhry [phone]       [facility] [address] [address]        Thank you!     Thank you for choosing [facility]  for your care. Our goal is always to provide you with excellent care. Hearing back from our patients is one way we can continue to improve our services. Please take a few minutes to complete the written survey that you may receive in the mail after your visit with us. Thank you!             Your Updated Medication List - Protect others around you: Learn how to safely use, store and throw away your medicines at www.disposemymeds.org.          This list is accurate as of: 3/27/17  8:59 AM.  Always use your most recent med list.                   Brand Name Dispense Instructions for use    * BOTOX IJ      Inject 125 Units into the muscle See Admin Instructions Every 10 weeks (Lot # /[id number]  Exp: 8/2019)       * BOTOX IJ      Inject 125 Units as directed once Lot #: /C3 Exp: 09/2019       calcium carb 1250 mg (500 mg [place])/vitamin D 200 units 500-200 MG-UNIT per tablet    OSCAL with D     Take 1 tablet by mouth 2 times daily (with meals)       CEPHALEXIN PO      Take 250 mg by mouth every 6 hours       HYDROcodone-acetaminophen 5-325 MG per tablet    NORCO    30 tablet    Take 1-2 tablets by mouth every 4 hours as needed for other (Moderate to Severe Pain)       simvastatin 20 MG tablet    ZOCOR    90 tablet    Take 1 tablet (20 mg) by mouth At Bedtime       traZODone 50 MG tablet    DESYREL    90 tablet    TAKE THREE TABLETS BY MOUTH NIGHTLY AT BEDTIME       triamcinolone 55 MCG/ACT Inhaler    NASACORT     Spray 1 spray into both nostrils daily as needed (congestion)       * Notice:  This list has 2 medication(s) that are the same as other medications prescribed for you. Read the directions  carefully, and ask your doctor or other care provider to review them with you.

## 2017-03-27 NOTE — PROGRESS NOTES
[facility]  [address]  [facility] [address]  [phone]  Dept: [phone]    PRE-OP EVALUATION:  Today's date: 3/27/2017    Lilli Steven (: 1950) presents for pre-operative evaluation assessment as requested by  Inés Harrell MD.  She requires evaluation and anesthesia risk assessment prior to undergoing surgery/procedure for treatment of ABDOMINAL SEROMA   .  Proposed procedure: COMBINED IRRIGATION AND DEBRIDEMENT TRUNK        Date of Surgery/ Procedure: 2017  Time of Surgery/ Procedure: 4:50PM  Hospital/Surgical Facility:  [facility]  Primary Physician: Vargas Chaudhry  Type of Anesthesia Anticipated: General    Patient has a Health Care Directive or Living Will:  NO    1. NO - Do you have a history of heart attack, stroke, stent, bypass or surgery on an artery in the head, neck, heart or legs?  2. NO - Do you ever have any pain or discomfort in your chest?  3. NO - Do you have a history of  Heart Failure?  4. NO - Are you troubled by shortness of breath when: walking on the level, up a slight hill or at night?  5. NO - Do you currently have a cold, bronchitis or other respiratory infection?  6. NO - Do you have a cough, shortness of breath or wheezing?  7. NO - Do you sometimes get pains in the calves of your legs when you walk?  8. NO - Do you or anyone in your family have previous history of blood clots?  9. NO - Do you or does anyone in your family have a serious bleeding problem such as prolonged bleeding following surgeries or cuts?  10. NO - Have you ever had problems with anemia or been told to take iron pills?  11. NO - Have you had any abnormal blood loss such as black, tarry or bloody stools, or abnormal vaginal bleeding?  12. YES - Have you ever had a blood transfusion? - when she was born (RH) factor  13. YES - Have you or any of your relatives ever had problems with anesthesia? - Self, fainting, vomting  14. NO - Do you have sleep  apnea, excessive snoring or daytime drowsiness?  15. NO - Do you have any prosthetic heart valves?  16. NO - Do you have prosthetic joints?  17. NO - Is there any chance that you may be pregnant?      HPI:                                                      Brief HPI related to upcoming procedure: 66 y/o female here for pre-op exam.  Patient did have hernia repair last month, and since then she has struggled with fluid collection.  She did have US guided drain done 3/15, but it did fill back up within one day.  They are now going to proceed with open surgical repair.  She is not longer taking anything for the pain.  She did get quite nauseated with some vomiting when she came out of anaesthesia.        HYPERLIPIDEMIA - Patient has a long history of significant Hyperlipidemia requiring medication for treatment with recent good control. Patient reports no problems or side effects with the medication.                                                                                                                                                       .    MEDICAL HISTORY:                                                      Patient Active Problem List    Diagnosis Date Noted     Hernia of abdominal wall 02/16/2017     Priority: Medium     Tear of lateral cartilage or meniscus of knee, current 01/23/2017     Priority: Medium     Diagnosed by CT arthrogram (Can't get MRI, has an implanted stimulator)       Umbilical hernia without obstruction and without gangrene 12/23/2016     Priority: Medium     Hyperlipidemia LDL goal <160 01/28/2013     Priority: Medium     On Simvastatin       Urge incontinence 01/28/2013     Priority: Medium     Esophageal reflux 01/28/2013     Priority: Medium     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     Priority: Medium     Insomnia 09/06/2012     Priority: Medium     trazodone       Snores 09/06/2012     Priority: Medium     Wears glasses 09/06/2012     Priority: Medium     Diarrhea 09/06/2012      Priority: Medium     Neck pain 09/06/2012     Priority: Medium     Seasonal allergies 09/06/2012     Priority: Medium     Spring months       Osteopenia 05/17/2011     Priority: Medium     DEXA 2011  T -1 Lumbar spine  Right hip T -1.8  Left hip T -1.9       Spasmodic torticollis 07/19/1988     Priority: Medium     Botox injection        Past Medical History:   Diagnosis Date     Diarrhea 9/6/2012     Hypercholesterolemia 9/6/2012     Neck pain 9/6/2012     PONV (postoperative nausea and vomiting)      Seasonal allergies 9/6/2012     Snores 9/6/2012     Wears glasses 9/6/2012     Past Surgical History:   Procedure Laterality Date     COSMETIC ABDOMINOPLASTY MODIFIED N/A 2/16/2017    Procedure: COSMETIC ABDOMINOPLASTY MODIFIED;  Surgeon: Inés Harrell MD;  Location: [facility] OR     HERNIORRHAPHY EPIGASTRIC N/A 2/16/2017    Procedure: HERNIORRHAPHY EPIGASTRIC;  Surgeon: Joey Sargent MD;  Location: [facility] OR     HERNIORRHAPHY UMBILICAL N/A 2/16/2017    Procedure: HERNIORRHAPHY UMBILICAL;  Surgeon: Joey Sargent MD;  Location:  OR     IMPLANT STIMULATOR SACRAL NERVE STAGE ONE      for bladder incontinence, failed     TONSILLECTOMY       Current Outpatient Prescriptions   Medication Sig Dispense Refill     CEPHALEXIN PO Take 250 mg by mouth every 6 hours       OnabotulinumtoxinA (BOTOX IJ) Inject 125 Units as directed once Lot #: /C3  Exp: 09/2019       traZODone (DESYREL) 50 MG tablet TAKE THREE TABLETS BY MOUTH NIGHTLY AT BEDTIME 90 tablet 0     triamcinolone (NASACORT) 55 MCG/ACT Inhaler Spray 1 spray into both nostrils daily as needed (congestion)       HYDROcodone-acetaminophen (NORCO) 5-325 MG per tablet Take 1-2 tablets by mouth every 4 hours as needed for other (Moderate to Severe Pain) 30 tablet 0     calcium carb 1250 mg, 500 mg [place],/vitamin D 200 units (OSCAL WITH D) 500-200 MG-UNIT per tablet Take 1 tablet by mouth 2 times daily (with meals)       OnabotulinumtoxinA (BOTOX IJ)  "Inject 125 Units into the muscle See Admin Instructions Every 10 weeks (Lot # /C3  Exp: 8/2019)       simvastatin (ZOCOR) 20 MG tablet Take 1 tablet (20 mg) by mouth At Bedtime 90 tablet 3     OTC products: None, except as noted above    Allergies   Allergen Reactions     Meloxicam      Diarrhea, vomiting     Primidone Other (See Comments)     Felt like motion sickness     Tramadol      Diarrhea, vomiting      Adhesive Tape Rash     Durabond only     Allergy Rash     Dermabond  Anesthesia IV set misc        Latex Allergy: NO    Social History   Substance Use Topics     Smoking status: Former Smoker     Types: Cigarettes     Quit date: 1/1/1985     Smokeless tobacco: Never Used     Alcohol use 0.0 oz/week     0 Standard drinks or equivalent per week      Comment: glass of wine daily     History   Drug Use No       REVIEW OF SYSTEMS:                                                    Constitutional, HEENT, cardiovascular, pulmonary, gi and gu systems are negative, except as otherwise noted.    EXAM:                                                    /72 (BP Location: Right arm, Patient Position: Right side, Cuff Size: Adult Large)  Pulse 102  Temp 98.7  F (37.1  C) (Tympanic)  Ht 5' 3\" (1.6 m)  Wt 162 lb (73.5 kg)  SpO2 97%  Breastfeeding? No  BMI 28.7 kg/m2    GENERAL APPEARANCE: alert, active and no distress     EYES: EOMI, PERRL     HENT: ear canals and TM's normal and nose and mouth without ulcers or lesions     NECK: no adenopathy, no asymmetry, masses, or scars and thyroid normal to palpation     RESP: lungs clear to auscultation - no rales, rhonchi or wheezes     CV: regular rates and rhythm, normal S1 S2, no S3 or S4 and no murmur, click or rub     MS: extremities normal- no gross deformities noted, no evidence of inflammation in joints, FROM in all extremities.     SKIN: no suspicious lesions or rashes     NEURO: Normal strength and tone, sensory exam grossly normal, mentation intact and " speech normal     PSYCH: mentation appears normal. and affect normal/bright    DIAGNOSTICS:                                                    EKG: from 1/23/17, NSR    Recent Labs   Lab Test  02/16/17   0710   HGB  14.4        IMPRESSION:                                                    Reason for surgery/procedure: irrigation/debridement seroma  Diagnosis/reason for consult: as above    The proposed surgical procedure is considered INTERMEDIATE risk.    REVISED CARDIAC RISK INDEX  The patient has the following serious cardiovascular risks for perioperative complications such as (MI, PE, VFib and 3  AV Block):  No serious cardiac risks  INTERPRETATION: 0 risks: Class I (very low risk - 0.4% complication rate)    The patient has the following additional risks for perioperative complications:  No identified additional risks      ICD-10-CM    1. Preop general physical exam Z01.818    2. Seroma T14.8    3. Hyperlipidemia LDL goal <160 E78.5 Lipid panel reflex to direct LDL     Comprehensive metabolic panel   4. Need for Tdap vaccination Z23 TDAP VACCINE (ADACEL)   5. Need for pneumococcal vaccine Z23 PNEUMOCOCCAL CONJ VACCINE 13 VALENT IM       RECOMMENDATIONS:                                                        Cardiovascular Risk  Performs 4 METs exercise without symptoms (Walk on level ground at 15 minutes per mile (4 miles/hour)) .       --Patient is to take all scheduled medications on the day of surgery EXCEPT for modifications listed below.    APPROVAL GIVEN to proceed with proposed procedure, without further diagnostic evaluation       Signed Electronically by: Viktor Whitaker PA-C    Copy of this evaluation report is provided to requesting physician.    [facility] Preop Guidelines

## 2017-03-28 NOTE — PROGRESS NOTES
Dear Lilli Good    Your test results are attached, feel free to contact me via [facility].    Everything looks good on your labs, keep up the good work.    Vel Whitaker PA-C

## 2017-03-29 ENCOUNTER — HOSPITAL ENCOUNTER (OUTPATIENT)
Facility: CLINIC | Age: 67
Discharge: HOME | End: 2017-03-29
Attending: PLASTIC SURGERY | Admitting: PLASTIC SURGERY
Payer: COMMERCIAL

## 2017-03-29 ENCOUNTER — ANESTHESIA (OUTPATIENT)
Dept: SURGERY | Facility: CLINIC | Age: 67
End: 2017-03-29
Payer: COMMERCIAL

## 2017-03-29 ENCOUNTER — ANESTHESIA EVENT (OUTPATIENT)
Dept: SURGERY | Facility: CLINIC | Age: 67
End: 2017-03-29
Payer: COMMERCIAL

## 2017-03-29 VITALS
WEIGHT: 161.6 LBS | SYSTOLIC BLOOD PRESSURE: 105 MMHG | HEIGHT: 63 IN | RESPIRATION RATE: 16 BRPM | HEART RATE: 86 BPM | OXYGEN SATURATION: 94 % | BODY MASS INDEX: 28.63 KG/M2 | TEMPERATURE: 97.5 F | DIASTOLIC BLOOD PRESSURE: 71 MMHG

## 2017-03-29 DIAGNOSIS — L76.34 POSTPROCEDURAL SEROMA OF SKIN AND SUBCUTANEOUS TISSUE FOLLOWING OTHER PROCEDURE: Primary | ICD-10-CM

## 2017-03-29 DIAGNOSIS — N39.41 URGE INCONTINENCE: ICD-10-CM

## 2017-03-29 PROCEDURE — 25000125 ZZHC RX 250: Performed by: ANESTHESIOLOGY

## 2017-03-29 PROCEDURE — 36000052 ZZH SURGERY LEVEL 2 EA 15 ADDTL MIN: Performed by: PLASTIC SURGERY

## 2017-03-29 PROCEDURE — 25000128 H RX IP 250 OP 636: Performed by: NURSE ANESTHETIST, CERTIFIED REGISTERED

## 2017-03-29 PROCEDURE — 37000008 ZZH ANESTHESIA TECHNICAL FEE, 1ST 30 MIN: Performed by: PLASTIC SURGERY

## 2017-03-29 PROCEDURE — 25000128 H RX IP 250 OP 636: Performed by: PLASTIC SURGERY

## 2017-03-29 PROCEDURE — 71000027 ZZH RECOVERY PHASE 2 EACH 15 MINS: Performed by: PLASTIC SURGERY

## 2017-03-29 PROCEDURE — 25000132 ZZH RX MED GY IP 250 OP 250 PS 637: Performed by: PLASTIC SURGERY

## 2017-03-29 PROCEDURE — 25000125 ZZHC RX 250: Performed by: PLASTIC SURGERY

## 2017-03-29 PROCEDURE — 25000125 ZZHC RX 250: Performed by: NURSE ANESTHETIST, CERTIFIED REGISTERED

## 2017-03-29 PROCEDURE — 25800025 ZZH RX 258: Performed by: NURSE ANESTHETIST, CERTIFIED REGISTERED

## 2017-03-29 PROCEDURE — 37000009 ZZH ANESTHESIA TECHNICAL FEE, EACH ADDTL 15 MIN: Performed by: PLASTIC SURGERY

## 2017-03-29 PROCEDURE — 71000012 ZZH RECOVERY PHASE 1 LEVEL 1 FIRST HR: Performed by: PLASTIC SURGERY

## 2017-03-29 PROCEDURE — 40000170 ZZH STATISTIC PRE-PROCEDURE ASSESSMENT II: Performed by: PLASTIC SURGERY

## 2017-03-29 PROCEDURE — 36000050 ZZH SURGERY LEVEL 2 1ST 30 MIN: Performed by: PLASTIC SURGERY

## 2017-03-29 PROCEDURE — 27210794 ZZH OR GENERAL SUPPLY STERILE: Performed by: PLASTIC SURGERY

## 2017-03-29 PROCEDURE — 25000128 H RX IP 250 OP 636: Performed by: ANESTHESIOLOGY

## 2017-03-29 PROCEDURE — 71000013 ZZH RECOVERY PHASE 1 LEVEL 1 EA ADDTL HR: Performed by: PLASTIC SURGERY

## 2017-03-29 PROCEDURE — 25000132 ZZH RX MED GY IP 250 OP 250 PS 637: Performed by: ANESTHESIOLOGY

## 2017-03-29 RX ORDER — CEFAZOLIN SODIUM 2 G/100ML
2 INJECTION, SOLUTION INTRAVENOUS
Status: COMPLETED | OUTPATIENT
Start: 2017-03-29 | End: 2017-03-29

## 2017-03-29 RX ORDER — PROPOFOL 10 MG/ML
INJECTION, EMULSION INTRAVENOUS PRN
Status: DISCONTINUED | OUTPATIENT
Start: 2017-03-29 | End: 2017-03-29

## 2017-03-29 RX ORDER — HYDROMORPHONE HYDROCHLORIDE 1 MG/ML
.3-.5 INJECTION, SOLUTION INTRAMUSCULAR; INTRAVENOUS; SUBCUTANEOUS EVERY 10 MIN PRN
Status: DISCONTINUED | OUTPATIENT
Start: 2017-03-29 | End: 2017-03-29 | Stop reason: HOSPADM

## 2017-03-29 RX ORDER — LIDOCAINE HYDROCHLORIDE 20 MG/ML
INJECTION, SOLUTION INFILTRATION; PERINEURAL PRN
Status: DISCONTINUED | OUTPATIENT
Start: 2017-03-29 | End: 2017-03-29

## 2017-03-29 RX ORDER — GLYCOPYRROLATE 0.2 MG/ML
INJECTION, SOLUTION INTRAMUSCULAR; INTRAVENOUS PRN
Status: DISCONTINUED | OUTPATIENT
Start: 2017-03-29 | End: 2017-03-29

## 2017-03-29 RX ORDER — BUPIVACAINE HYDROCHLORIDE AND EPINEPHRINE 2.5; 5 MG/ML; UG/ML
INJECTION, SOLUTION INFILTRATION; PERINEURAL PRN
Status: DISCONTINUED | OUTPATIENT
Start: 2017-03-29 | End: 2017-03-29 | Stop reason: HOSPADM

## 2017-03-29 RX ORDER — MEPERIDINE HYDROCHLORIDE 25 MG/ML
12.5 INJECTION INTRAMUSCULAR; INTRAVENOUS; SUBCUTANEOUS
Status: DISCONTINUED | OUTPATIENT
Start: 2017-03-29 | End: 2017-03-29 | Stop reason: HOSPADM

## 2017-03-29 RX ORDER — SODIUM CHLORIDE, SODIUM LACTATE, POTASSIUM CHLORIDE, CALCIUM CHLORIDE 600; 310; 30; 20 MG/100ML; MG/100ML; MG/100ML; MG/100ML
INJECTION, SOLUTION INTRAVENOUS CONTINUOUS
Status: DISCONTINUED | OUTPATIENT
Start: 2017-03-29 | End: 2017-03-29 | Stop reason: HOSPADM

## 2017-03-29 RX ORDER — ONDANSETRON 4 MG/1
4 TABLET, ORALLY DISINTEGRATING ORAL EVERY 30 MIN PRN
Status: DISCONTINUED | OUTPATIENT
Start: 2017-03-29 | End: 2017-03-29 | Stop reason: HOSPADM

## 2017-03-29 RX ORDER — HYDROCODONE BITARTRATE AND ACETAMINOPHEN 5; 325 MG/1; MG/1
1 TABLET ORAL ONCE
Status: COMPLETED | OUTPATIENT
Start: 2017-03-29 | End: 2017-03-29

## 2017-03-29 RX ORDER — HYDROCODONE BITARTRATE AND ACETAMINOPHEN 5; 325 MG/1; MG/1
1-2 TABLET ORAL EVERY 4 HOURS PRN
Qty: 30 TABLET | Refills: 0 | Status: ON HOLD | OUTPATIENT
Start: 2017-03-29 | End: 2017-06-02

## 2017-03-29 RX ORDER — PROPOFOL 10 MG/ML
INJECTION, EMULSION INTRAVENOUS CONTINUOUS PRN
Status: DISCONTINUED | OUTPATIENT
Start: 2017-03-29 | End: 2017-03-29

## 2017-03-29 RX ORDER — NEOSTIGMINE METHYLSULFATE 1 MG/ML
VIAL (ML) INJECTION PRN
Status: DISCONTINUED | OUTPATIENT
Start: 2017-03-29 | End: 2017-03-29

## 2017-03-29 RX ORDER — SODIUM CHLORIDE, SODIUM LACTATE, POTASSIUM CHLORIDE, CALCIUM CHLORIDE 600; 310; 30; 20 MG/100ML; MG/100ML; MG/100ML; MG/100ML
INJECTION, SOLUTION INTRAVENOUS CONTINUOUS PRN
Status: DISCONTINUED | OUTPATIENT
Start: 2017-03-29 | End: 2017-03-29

## 2017-03-29 RX ORDER — BUPIVACAINE HYDROCHLORIDE AND EPINEPHRINE 2.5; 5 MG/ML; UG/ML
INJECTION, SOLUTION EPIDURAL; INFILTRATION; INTRACAUDAL; PERINEURAL
Status: DISCONTINUED
Start: 2017-03-29 | End: 2017-03-29 | Stop reason: HOSPADM

## 2017-03-29 RX ORDER — NALOXONE HYDROCHLORIDE 0.4 MG/ML
.1-.4 INJECTION, SOLUTION INTRAMUSCULAR; INTRAVENOUS; SUBCUTANEOUS
Status: DISCONTINUED | OUTPATIENT
Start: 2017-03-29 | End: 2017-03-29 | Stop reason: HOSPADM

## 2017-03-29 RX ORDER — DEXAMETHASONE SODIUM PHOSPHATE 4 MG/ML
INJECTION, SOLUTION INTRA-ARTICULAR; INTRALESIONAL; INTRAMUSCULAR; INTRAVENOUS; SOFT TISSUE PRN
Status: DISCONTINUED | OUTPATIENT
Start: 2017-03-29 | End: 2017-03-29

## 2017-03-29 RX ORDER — FENTANYL CITRATE 50 UG/ML
25-50 INJECTION, SOLUTION INTRAMUSCULAR; INTRAVENOUS
Status: DISCONTINUED | OUTPATIENT
Start: 2017-03-29 | End: 2017-03-29 | Stop reason: HOSPADM

## 2017-03-29 RX ORDER — CEFAZOLIN SODIUM 1 G/3ML
1 INJECTION, POWDER, FOR SOLUTION INTRAMUSCULAR; INTRAVENOUS SEE ADMIN INSTRUCTIONS
Status: DISCONTINUED | OUTPATIENT
Start: 2017-03-29 | End: 2017-03-29 | Stop reason: HOSPADM

## 2017-03-29 RX ORDER — FENTANYL CITRATE 50 UG/ML
50-100 INJECTION, SOLUTION INTRAMUSCULAR; INTRAVENOUS
Status: COMPLETED | OUTPATIENT
Start: 2017-03-29 | End: 2017-03-29

## 2017-03-29 RX ORDER — ACETAMINOPHEN 500 MG
1000 TABLET ORAL ONCE
Status: COMPLETED | OUTPATIENT
Start: 2017-03-29 | End: 2017-03-29

## 2017-03-29 RX ORDER — FENTANYL CITRATE 50 UG/ML
25-50 INJECTION, SOLUTION INTRAMUSCULAR; INTRAVENOUS EVERY 5 MIN PRN
Status: DISCONTINUED | OUTPATIENT
Start: 2017-03-29 | End: 2017-03-29 | Stop reason: HOSPADM

## 2017-03-29 RX ORDER — DIPHENHYDRAMINE HYDROCHLORIDE 50 MG/ML
INJECTION INTRAMUSCULAR; INTRAVENOUS PRN
Status: DISCONTINUED | OUTPATIENT
Start: 2017-03-29 | End: 2017-03-29

## 2017-03-29 RX ORDER — BUPIVACAINE HYDROCHLORIDE 2.5 MG/ML
INJECTION, SOLUTION EPIDURAL; INFILTRATION; INTRACAUDAL
Status: DISCONTINUED
Start: 2017-03-29 | End: 2017-03-29 | Stop reason: WASHOUT

## 2017-03-29 RX ORDER — LEVOFLOXACIN 500 MG/1
500 TABLET, FILM COATED ORAL DAILY
Qty: 3 TABLET | Refills: 0 | Status: ON HOLD | OUTPATIENT
Start: 2017-03-29 | End: 2017-06-02

## 2017-03-29 RX ORDER — ONDANSETRON 2 MG/ML
INJECTION INTRAMUSCULAR; INTRAVENOUS PRN
Status: DISCONTINUED | OUTPATIENT
Start: 2017-03-29 | End: 2017-03-29

## 2017-03-29 RX ORDER — SCOLOPAMINE TRANSDERMAL SYSTEM 1 MG/1
1 PATCH, EXTENDED RELEASE TRANSDERMAL
Status: DISCONTINUED | OUTPATIENT
Start: 2017-03-29 | End: 2017-03-29 | Stop reason: HOSPADM

## 2017-03-29 RX ORDER — ONDANSETRON 2 MG/ML
4 INJECTION INTRAMUSCULAR; INTRAVENOUS EVERY 30 MIN PRN
Status: DISCONTINUED | OUTPATIENT
Start: 2017-03-29 | End: 2017-03-29 | Stop reason: HOSPADM

## 2017-03-29 RX ADMIN — HYDROMORPHONE HYDROCHLORIDE 0.5 MG: 1 INJECTION, SOLUTION INTRAMUSCULAR; INTRAVENOUS; SUBCUTANEOUS at 17:54

## 2017-03-29 RX ADMIN — FENTANYL CITRATE 100 MCG: 50 INJECTION, SOLUTION INTRAMUSCULAR; INTRAVENOUS at 15:48

## 2017-03-29 RX ADMIN — GLYCOPYRROLATE 0.4 MG: 0.2 INJECTION, SOLUTION INTRAMUSCULAR; INTRAVENOUS at 16:37

## 2017-03-29 RX ADMIN — PROPOFOL 200 MCG/KG/MIN: 10 INJECTION, EMULSION INTRAVENOUS at 15:48

## 2017-03-29 RX ADMIN — MIDAZOLAM HYDROCHLORIDE 2 MG: 1 INJECTION, SOLUTION INTRAMUSCULAR; INTRAVENOUS at 15:48

## 2017-03-29 RX ADMIN — PROPOFOL 50 MG: 10 INJECTION, EMULSION INTRAVENOUS at 15:59

## 2017-03-29 RX ADMIN — ACETAMINOPHEN 975 MG: 325 TABLET, FILM COATED ORAL at 14:43

## 2017-03-29 RX ADMIN — DIPHENHYDRAMINE HYDROCHLORIDE 12.5 MG: 50 INJECTION, SOLUTION INTRAMUSCULAR; INTRAVENOUS at 15:55

## 2017-03-29 RX ADMIN — FENTANYL CITRATE 50 MCG: 50 INJECTION, SOLUTION INTRAMUSCULAR; INTRAVENOUS at 18:15

## 2017-03-29 RX ADMIN — FENTANYL CITRATE 50 MCG: 50 INJECTION, SOLUTION INTRAMUSCULAR; INTRAVENOUS at 15:59

## 2017-03-29 RX ADMIN — FENTANYL CITRATE 50 MCG: 50 INJECTION, SOLUTION INTRAMUSCULAR; INTRAVENOUS at 17:21

## 2017-03-29 RX ADMIN — DEXAMETHASONE SODIUM PHOSPHATE 4 MG: 4 INJECTION, SOLUTION INTRAMUSCULAR; INTRAVENOUS at 15:55

## 2017-03-29 RX ADMIN — ONDANSETRON 4 MG: 2 INJECTION INTRAMUSCULAR; INTRAVENOUS at 16:38

## 2017-03-29 RX ADMIN — ROCURONIUM BROMIDE 40 MG: 10 INJECTION INTRAVENOUS at 15:48

## 2017-03-29 RX ADMIN — LIDOCAINE HYDROCHLORIDE 100 MG: 20 INJECTION, SOLUTION INFILTRATION; PERINEURAL at 15:48

## 2017-03-29 RX ADMIN — HYDROCODONE BITARTRATE AND ACETAMINOPHEN 1 TABLET: 5; 325 TABLET ORAL at 18:15

## 2017-03-29 RX ADMIN — PROPOFOL 150 MG: 10 INJECTION, EMULSION INTRAVENOUS at 15:48

## 2017-03-29 RX ADMIN — ONDANSETRON 4 MG: 2 INJECTION INTRAMUSCULAR; INTRAVENOUS at 15:55

## 2017-03-29 RX ADMIN — SODIUM CHLORIDE, POTASSIUM CHLORIDE, SODIUM LACTATE AND CALCIUM CHLORIDE: 600; 310; 30; 20 INJECTION, SOLUTION INTRAVENOUS at 15:40

## 2017-03-29 RX ADMIN — SCOPALAMINE 1 PATCH: 1 PATCH, EXTENDED RELEASE TRANSDERMAL at 15:20

## 2017-03-29 RX ADMIN — CEFAZOLIN SODIUM 2 G: 2 INJECTION, SOLUTION INTRAVENOUS at 15:55

## 2017-03-29 RX ADMIN — PHENYLEPHRINE HYDROCHLORIDE 50 MCG: 10 INJECTION, SOLUTION INTRAMUSCULAR; INTRAVENOUS; SUBCUTANEOUS at 16:14

## 2017-03-29 RX ADMIN — NEOSTIGMINE METHYLSULFATE 4 MG: 1 INJECTION INTRAMUSCULAR; INTRAVENOUS; SUBCUTANEOUS at 16:37

## 2017-03-29 RX ADMIN — FENTANYL CITRATE 50 MCG: 50 INJECTION, SOLUTION INTRAMUSCULAR; INTRAVENOUS at 17:11

## 2017-03-29 ASSESSMENT — ENCOUNTER SYMPTOMS
DYSRHYTHMIAS: 0
SEIZURES: 0

## 2017-03-29 ASSESSMENT — LIFESTYLE VARIABLES: TOBACCO_USE: 1

## 2017-03-29 ASSESSMENT — COPD QUESTIONNAIRES: COPD: 0

## 2017-03-29 NOTE — IP AVS SNAPSHOT
[facility]    [address]    [place] [address]    Phone:  [phone]    Fax:  [phone]                                       After Visit Summary   3/29/2017    Lilli Steven    MRN: [id number]           After Visit Summary Signature Page     I have received my discharge instructions, and my questions have been answered. I have discussed any challenges I see with this plan with the nurse or doctor.    ..........................................................................................................................................  Patient/Patient Representative Signature      ..........................................................................................................................................  Patient Representative Print Name and Relationship to Patient    ..................................................               ................................................  Date                                            Time    ..........................................................................................................................................  Reviewed by Signature/Title    ...................................................              ..............................................  Date                                                            Time

## 2017-03-29 NOTE — ANESTHESIA CARE TRANSFER NOTE
Patient: Lilli Steven    Procedure(s):  EVACUATION ABDOMINAL SEROMA  suction lipectomy - Wound Class: I-Clean    Diagnosis: ABDOMINAL SEROMA   Diagnosis Additional Information: No value filed.    Anesthesia Type:   General, ETT     Note:  Airway :Face Mask  Patient transferred to:PACU        Vitals: (Last set prior to Anesthesia Care Transfer)    CRNA VITALS  3/29/2017 1627 - 3/29/2017 1702      3/29/2017             Pulse: 84    SpO2: 100 %    Resp Rate (set): 10                Electronically Signed By: SIERRA Barragan CRNA  March 29, 2017  5:02 PM

## 2017-03-29 NOTE — DISCHARGE INSTRUCTIONS
While you were at the hospital today you were given 975 mg of Tylenol at 1445. The recommended daily maximum dose is 4000 mg.     Information for Patients Discharging with a Transderm Scopolamine Patch     Dry mouth is a common side effect.    Drowsiness is another common side effect especially when combined with pain medication.  Please avoid activities that require mental alertness such as driving a car or making important legal decisions.    Since Scopolamine can cause temporary dilation of the pupils and blurred vision if it comes in contact with the eyes; be sure to wash your hands thoroughly with soap and water immediately after handling the patch.   When you remove your patch, please stick it to a tissue or paper towel for disposal.      Remove the patch immediately and contact a physician in the unlikely event that you experience symptoms of acute glaucoma (pain and reddening of the eyes, accompanied by dilated pupils).    Remove the patch if you develop any difficulties urinating.  If you cannot urinate after removing your patch, please notify your surgeon.    Remove the patch 24 hours after surgery.      Same Day Surgery Discharge Instructions for  Sedation and General Anesthesia       It's not unusual to feel dizzy, light-headed or faint for up to 24 hours after surgery or while taking pain medication.  If you have these symptoms: sit for a few minutes before standing and have someone assist you when you get up to walk or use the bathroom.      You should rest and relax for the next 24 hours. We recommend you make arrangements to have an adult stay with you for at least 24 hours after your discharge.  Avoid hazardous and strenuous activity.      DO NOT DRIVE any vehicle or operate mechanical equipment for 24 hours following the end of your surgery.  Even though you may feel normal, your reactions may be affected by the medication you have received.      Do not drink alcoholic beverages for 24 hours  following surgery.       Slowly progress to your regular diet as you feel able. It's not unusual to feel nauseated and/or vomit after receiving anesthesia.  If you develop these symptoms, drink clear liquids (apple juice, ginger ale, broth, 7-up, etc. ) until you feel better.  If your nausea and vomiting persists for 24 hours, please notify your surgeon.        All narcotic pain medications, along with inactivity and anesthesia, can cause constipation. Drinking plenty of liquids and increasing fiber intake will help.      For any questions of a medical nature, call your surgeon.      Do not make important decisions for 24 hours.      If you had general anesthesia, you may have a sore throat for a couple of days related to the breathing tube used during surgery.  You may use Cepacol lozenges to help with this discomfort.  If it worsens or if you develop a fever, contact your surgeon.       If you feel your pain is not well managed with the pain medications prescribed by your surgeon, please contact your surgeon's office to let them know so they can address your concerns.     **If you have questions or concerns about your procedure,  call  at [phone]**    Reasons to contact your surgeon:    1. Signs of possible infection: Check your incision daily for redness, swelling, warmth, red streaks or foul drainage.   2. Elevated temperature.  3. Pain not controlled with pain medication and/or rest.   4. Uncontrolled nausea or vomiting.  5. Any questions or concerns.      Oniel Linares Drain  Home Care Instructions  What is a Oniel Linares (RITCHIE) Drain?  This is a small tube that connects to a bulb.  Its gentle suction removes extra fluid from a surgical wound.  Your doctor will remove the tube when the amount of fluid decreases.  The color and amount of fluid varies.  Right after surgery the fluid is bright red.  Over time, it changes to light pink and may become clear or the color of straw.    How should I care  for my tube site?    Keep the skin around the tube dry.  Check with your doctor about how to shower.  You may need to cover the site with plastic when you shower.  Or, it may be okay to let the site get wet and put on a clean bandage after you shower.      If the bandage gets wet, you will need to change it.  How should I care for the bulb?    Keep the bulb compressed at all times except while you empty it.     Attach the bulb to your clothing with tape and a safety pin.    Try to empty the bulb at the same time every day.  Empty the bulb at least once a day, or when the bulb becomes half full.  If it becomes too full, there will not be enough suction.    To empty the bulb:    Wash your hands.    Open the bulb cap.    Drain the fluid from the bulb into the measuring cup.  If you have two drains, use two cups.      Clean the mouth of the bulb with an alcohol wipe if your nurse told you to.    Squeeze the bulb (fold it in half before you close the bulb cap) If it does not stay compressed, call your nurse or clinic.    Write the amount of drainage on the drainage record (see back page).  If you have two drains, write the amount for each bulb.    Flush the drainage down the toilet.  Rinse the measuring cup.    Wash your hands.    When should I call my doctor?   Call your doctor if:    You have a fever over 101 F (38.3 C), taken under the tongue.     The drainage increases or smells bad.    The skin around your tube has increased redness, swelling, warmth or pain.    You have pus or fluid leaking at the tube site.    Your stitches break.    You think the tube is not draining.    The tube falls out.    You have any problems or concerns.    Your drainage record:    Empty your bulb at least once per day or when 1/2 to 1/3 full.  Write down the date, time and amount of drainage in each bulb.   Bring this record to each clinic visit.    Date Time Bulb 1: amount of  Drainage in (ml or cc) Bulb 2: amount of drainage (in ml or  cc) Notes

## 2017-03-29 NOTE — IP AVS SNAPSHOT
MRN:[id number]                      After Visit Summary   3/29/2017    Lilli Steven    MRN: [id number]           Thank you!     Thank you for choosing [facility] for your care. Our goal is always to provide you with excellent care. Hearing back from our patients is one way we can continue to improve our services. Please take a few minutes to complete the written survey that you may receive in the mail after you visit with us. Thank you!        Patient Information     Date Of Birth          1950        About your hospital stay     You were admitted on:  March 29, 2017 You last received care in the:  [facility] Same Day Surgery    You were discharged on:  March 29, 2017        Reason for your hospital stay       Surgery.                  Who to Call     For medical emergencies, please call 911.  For non-urgent questions about your medical care, please call your primary care provider or clinic, [phone]  For questions related to your surgery, please call your surgery clinic        Attending Provider     Provider Inés Moyer MD Plastic Surgery       Primary Care Provider Office Phone # Fax #    Vargas Aat Chaudhry [phone]       [address] [address]        After Care Instructions     Discharge Instructions       Follow up with Dr. Harrell in 1 weeks.            Wound care and dressings       Remove dressing 24-48 hours after surgery.  Then may shower with incisions uncovered. Apply Aquaphor to incisions daily. Avoid heavy lifting or strenuous exercise for 2 weeks. Abdominal binder at all times.  Record drain output daily.  Strip drains daily.                  Your next 10 appointments already scheduled     May 25, 2017  3:50 PM CDT   (Arrive by 3:35 PM)   Return Botox with Hermes Frias MD   [facility] ([facility])    [address] [place] [address]  [address]  [address] [facility] [address]   [phone]            Aug 01, 2017  3:40 PM CDT   (Arrive by 3:25 PM)   Return Botox with Hermes Frias MD   [facility] ([facility])    [address]  [address] [facility] [address]   [phone]              Further instructions from your care team       While you were at the hospital today you were given 975 mg of Tylenol at 1445. The recommended daily maximum dose is 4000 mg.     Information for Patients Discharging with a Transderm Scopolamine Patch     Dry mouth is a common side effect.    Drowsiness is another common side effect especially when combined with pain medication.  Please avoid activities that require mental alertness such as driving a car or making important legal decisions.    Since Scopolamine can cause temporary dilation of the pupils and blurred vision if it comes in contact with the eyes; be sure to wash your hands thoroughly with soap and water immediately after handling the patch.   When you remove your patch, please stick it to a tissue or paper towel for disposal.      Remove the patch immediately and contact a physician in the unlikely event that you experience symptoms of acute glaucoma (pain and reddening of the eyes, accompanied by dilated pupils).    Remove the patch if you develop any difficulties urinating.  If you cannot urinate after removing your patch, please notify your surgeon.    Remove the patch 24 hours after surgery.      Same Day Surgery Discharge Instructions for  Sedation and General Anesthesia       It's not unusual to feel dizzy, light-headed or faint for up to 24 hours after surgery or while taking pain medication.  If you have these symptoms: sit for a few minutes before standing and have someone assist you when you get up to walk or use the bathroom.      You should rest and relax for the next 24 hours. We recommend you make  arrangements to have an adult stay with you for at least 24 hours after your discharge.  Avoid hazardous and strenuous activity.      DO NOT DRIVE any vehicle or operate mechanical equipment for 24 hours following the end of your surgery.  Even though you may feel normal, your reactions may be affected by the medication you have received.      Do not drink alcoholic beverages for 24 hours following surgery.       Slowly progress to your regular diet as you feel able. It's not unusual to feel nauseated and/or vomit after receiving anesthesia.  If you develop these symptoms, drink clear liquids (apple juice, ginger ale, broth, 7-up, etc. ) until you feel better.  If your nausea and vomiting persists for 24 hours, please notify your surgeon.        All narcotic pain medications, along with inactivity and anesthesia, can cause constipation. Drinking plenty of liquids and increasing fiber intake will help.      For any questions of a medical nature, call your surgeon.      Do not make important decisions for 24 hours.      If you had general anesthesia, you may have a sore throat for a couple of days related to the breathing tube used during surgery.  You may use Cepacol lozenges to help with this discomfort.  If it worsens or if you develop a fever, contact your surgeon.       If you feel your pain is not well managed with the pain medications prescribed by your surgeon, please contact your surgeon's office to let them know so they can address your concerns.     **If you have questions or concerns about your procedure,  call  at [phone]**    Reasons to contact your surgeon:    1. Signs of possible infection: Check your incision daily for redness, swelling, warmth, red streaks or foul drainage.   2. Elevated temperature.  3. Pain not controlled with pain medication and/or rest.   4. Uncontrolled nausea or vomiting.  5. Any questions or concerns.      Oniel Linares Drain  Home Care Instructions  What is a  Oniel Linares (RITCHIE) Drain?  This is a small tube that connects to a bulb.  Its gentle suction removes extra fluid from a surgical wound.  Your doctor will remove the tube when the amount of fluid decreases.  The color and amount of fluid varies.  Right after surgery the fluid is bright red.  Over time, it changes to light pink and may become clear or the color of straw.    How should I care for my tube site?    Keep the skin around the tube dry.  Check with your doctor about how to shower.  You may need to cover the site with plastic when you shower.  Or, it may be okay to let the site get wet and put on a clean bandage after you shower.      If the bandage gets wet, you will need to change it.  How should I care for the bulb?    Keep the bulb compressed at all times except while you empty it.     Attach the bulb to your clothing with tape and a safety pin.    Try to empty the bulb at the same time every day.  Empty the bulb at least once a day, or when the bulb becomes half full.  If it becomes too full, there will not be enough suction.    To empty the bulb:    Wash your hands.    Open the bulb cap.    Drain the fluid from the bulb into the measuring cup.  If you have two drains, use two cups.      Clean the mouth of the bulb with an alcohol wipe if your nurse told you to.    Squeeze the bulb (fold it in half before you close the bulb cap) If it does not stay compressed, call your nurse or clinic.    Write the amount of drainage on the drainage record (see back page).  If you have two drains, write the amount for each bulb.    Flush the drainage down the toilet.  Rinse the measuring cup.    Wash your hands.    When should I call my doctor?   Call your doctor if:    You have a fever over 101 F (38.3 C), taken under the tongue.     The drainage increases or smells bad.    The skin around your tube has increased redness, swelling, warmth or pain.    You have pus or fluid leaking at the tube site.    Your stitches  "break.    You think the tube is not draining.    The tube falls out.    You have any problems or concerns.    Your drainage record:    Empty your bulb at least once per day or when 1/2 to 1/3 full.  Write down the date, time and amount of drainage in each bulb.   Bring this record to each clinic visit.    Date Time Bulb 1: amount of  Drainage in (ml or cc) Bulb 2: amount of drainage (in ml or cc) Notes                                                              Pending Results     No orders found from 3/27/2017 to 3/30/2017.            Admission Information     Date & Time Provider Department Dept. Phone    3/29/2017 Inés Harrell MD [facility] Same Day Surgery [phone]      Your Vitals Were     Blood Pressure Pulse Temperature Respirations Height Weight    116/50 86 97  F (36.1  C) (Temporal) 20 1.6 m (5' 3\") 73.3 kg (161 lb 9.6 oz)    Pulse Oximetry BMI (Body Mass Index)                94% 28.63 kg/m2          [employer] Information     [employer] gives you secure access to your electronic health record. If you see a primary care provider, you can also send messages to your care team and make appointments. If you have questions, please call your primary care clinic.  If you do not have a primary care provider, please call [phone] and they will assist you.        Care EveryWhere ID     This is your Care EveryWhere ID. This could be used by other organizations to access your [facility] medical records  [id number]           Review of your medicines      START taking        Dose / Directions    HYDROcodone-acetaminophen 5-325 MG per tablet   Commonly known as:  NORCO   Used for:  Postprocedural seroma of skin and subcutaneous tissue following other procedure   Notes to Patient:  One given at 6:10 PM          Dose:  1-2 tablet   Take 1-2 tablets by mouth every 4 hours as needed for moderate to severe pain   Quantity:  30 tablet   Refills:  0       levofloxacin 500 MG tablet   Commonly known as:  " LEVAQUIN   Used for:  Urge incontinence        Dose:  500 mg   Take 1 tablet (500 mg) by mouth daily   Quantity:  3 tablet   Refills:  0         CONTINUE these medicines which have NOT CHANGED        Dose / Directions    BOTOX IJ        Dose:  125 Units   Inject 125 Units into the muscle See Admin Instructions Every 10 weeks (Lot # /C3  Exp: 8/2019)   Refills:  0       calcium carb 1250 mg (500 mg [place])/vitamin D 200 units 500-200 MG-UNIT per tablet   Commonly known as:  OSCAL with D        Dose:  1 tablet   Take 1 tablet by mouth 2 times daily (with meals)   Refills:  0       simvastatin 20 MG tablet   Commonly known as:  ZOCOR   Used for:  Hyperlipidemia LDL goal <160        Dose:  20 mg   Take 1 tablet (20 mg) by mouth At Bedtime   Quantity:  90 tablet   Refills:  3       traZODone 50 MG tablet   Commonly known as:  DESYREL   Used for:  Primary insomnia        TAKE THREE TABLETS BY MOUTH NIGHTLY AT BEDTIME   Quantity:  90 tablet   Refills:  0       triamcinolone 55 MCG/ACT Inhaler   Commonly known as:  NASACORT        Dose:  1 spray   Spray 1 spray into both nostrils daily as needed (congestion)   Refills:  0            Where to get your medicines      These medications were sent to [facility] [place], [place] - [address]  [address] [facility] [address], [place] [address]     Phone:  [phone]     levofloxacin 500 MG tablet         Some of these will need a paper prescription and others can be bought over the counter. Ask your nurse if you have questions.     Bring a paper prescription for each of these medications     HYDROcodone-acetaminophen 5-325 MG per tablet                Protect others around you: Learn how to safely use, store and throw away your medicines at www.disposemymeds.org.             Medication List: This is a list of all your medications and when to take them. Check marks below indicate your daily home schedule. Keep this list as a reference.      Medications            Morning Afternoon Evening Bedtime As Needed    BOTOX IJ   Inject 125 Units into the muscle See Admin Instructions Every 10 weeks (Lot # /C3  Exp: 8/2019)                                calcium carb 1250 mg (500 mg [place])/vitamin D 200 units 500-200 MG-UNIT per tablet   Commonly known as:  OSCAL with D   Take 1 tablet by mouth 2 times daily (with meals)                                HYDROcodone-acetaminophen 5-325 MG per tablet   Commonly known as:  NORCO   Take 1-2 tablets by mouth every 4 hours as needed for moderate to severe pain   Notes to Patient:  One given at 6:10 PM                                  levofloxacin 500 MG tablet   Commonly known as:  LEVAQUIN   Take 1 tablet (500 mg) by mouth daily                                simvastatin 20 MG tablet   Commonly known as:  ZOCOR   Take 1 tablet (20 mg) by mouth At Bedtime                                traZODone 50 MG tablet   Commonly known as:  DESYREL   TAKE THREE TABLETS BY MOUTH NIGHTLY AT BEDTIME                                triamcinolone 55 MCG/ACT Inhaler   Commonly known as:  NASACORT   [place] 1 spray into both nostrils daily as needed (congestion)

## 2017-03-29 NOTE — BRIEF OP NOTE
[facility] Brief Operative Note    Pre-operative diagnosis: ABDOMINAL SEROMA    Post-operative diagnosis abdominal wall hematoma and seroma     Procedure: Procedure(s):  EVACUATION ABDOMINAL SEROMA and hematoma - Wound Class: I-Clean   Surgeon(s): Surgeon(s) and Role:     * Inés Harrell MD - Primary   Estimated blood loss: 5 mL    Specimens: * No specimens in log *   Findings: See op note.

## 2017-03-29 NOTE — ANESTHESIA POSTPROCEDURE EVALUATION
Patient: Lilli Steven    Procedure(s):  EVACUATION ABDOMINAL SEROMA  suction lipectomy - Wound Class: I-Clean    Diagnosis:ABDOMINAL SEROMA   Diagnosis Additional Information: No value filed.    Anesthesia Type:  General, ETT    Note:  Anesthesia Post Evaluation    Patient location during evaluation: PACU  Patient participation: Able to fully participate in evaluation  Level of consciousness: awake  Pain management: adequate  Airway patency: patent  Cardiovascular status: acceptable  Respiratory status: acceptable  Hydration status: acceptable  PONV: none     Anesthetic complications: None          Last vitals:  Vitals:    03/29/17 1745 03/29/17 1754 03/29/17 1815   BP: 116/50  105/71   Pulse:      Resp: 20  14   Temp:   36.4  C (97.5  F)   SpO2: 96% 94% 94%         Electronically Signed By: Fab Goyal MD  March 29, 2017  6:33 PM

## 2017-03-29 NOTE — OR NURSING
Pt's friend did not know she would need to be with her tonight.  After talking, Misa okay with staying.

## 2017-03-29 NOTE — ANESTHESIA PREPROCEDURE EVALUATION
Procedure: Procedure(s):  COMBINED IRRIGATION AND DEBRIDEMENT TRUNK  Preop diagnosis: ABDOMINAL SEROMA     Allergies   Allergen Reactions     Meloxicam      Diarrhea, vomiting     Primidone Other (See Comments)     Felt like motion sickness     Tramadol      Diarrhea, vomiting      Adhesive Tape Rash     Durabond only     Allergy Rash     Dermabond  Anesthesia IV set misc       Past Medical History:   Diagnosis Date     Diarrhea 9/6/2012     Hypercholesterolemia 9/6/2012     Neck pain 9/6/2012     PONV (postoperative nausea and vomiting)      Seasonal allergies 9/6/2012     Snores 9/6/2012     Wears glasses 9/6/2012     Past Surgical History:   Procedure Laterality Date     COSMETIC ABDOMINOPLASTY MODIFIED N/A 2/16/2017    Procedure: COSMETIC ABDOMINOPLASTY MODIFIED;  Surgeon: Inés Harrell MD;  Location:  OR     HERNIORRHAPHY EPIGASTRIC N/A 2/16/2017    Procedure: HERNIORRHAPHY EPIGASTRIC;  Surgeon: Joey Sargent MD;  Location:  OR     HERNIORRHAPHY UMBILICAL N/A 2/16/2017    Procedure: HERNIORRHAPHY UMBILICAL;  Surgeon: Joey Sargent MD;  Location:  OR     IMPLANT STIMULATOR SACRAL NERVE STAGE ONE      for bladder incontinence, failed; battery is dead     TONSILLECTOMY       Prior to Admission medications    Medication Sig Start Date End Date Taking? Authorizing Provider   traZODone (DESYREL) 50 MG tablet TAKE THREE TABLETS BY MOUTH NIGHTLY AT BEDTIME 2/20/17  Yes Vargas Chaudhry MD   triamcinolone (NASACORT) 55 MCG/ACT Inhaler Spray 1 spray into both nostrils daily as needed (congestion)   Yes Reported, Patient   calcium carb 1250 mg, 500 mg [place],/vitamin D 200 units (OSCAL WITH D) 500-200 MG-UNIT per tablet Take 1 tablet by mouth 2 times daily (with meals)   Yes Reported, Patient   OnabotulinumtoxinA (BOTOX IJ) Inject 125 Units into the muscle See Admin Instructions Every 10 weeks (Lot # /C3  Exp: 8/2019) 1/5/17  Yes Hermes Frias MD   simvastatin (ZOCOR)  20 MG tablet Take 1 tablet (20 mg) by mouth At Bedtime 1/7/16  Yes Vargas Chaudhry MD     Current Facility-Administered Medications Ordered in Epic   Medication Dose Route Frequency Last Rate Last Dose     ceFAZolin sodium-dextrose (ANCEF) infusion 2 g  2 g Intravenous Pre-Op/Pre-procedure x 1 dose         ceFAZolin (ANCEF) 1 g vial to attach to  ml bag for ADULT or 50 ml bag for PEDS  1 g Intravenous See Admin Instructions         bupivacaine HCl 0.25 % injection SOLN             No current [facility]-ordered outpatient prescriptions on file.     Wt Readings from Last 1 Encounters:   03/29/17 73.3 kg (161 lb 9.6 oz)     Temp Readings from Last 1 Encounters:   03/29/17 36.2  C (97.2  F) (Oral)     BP Readings from Last 6 Encounters:   03/29/17 164/77   03/27/17 112/72   03/15/17 177/66   03/13/17 127/52   02/17/17 103/56   01/23/17 138/78     Pulse Readings from Last 4 Encounters:   03/29/17 86   03/27/17 102   03/13/17 98   02/16/17 91     Resp Readings from Last 1 Encounters:   03/29/17 16     SpO2 Readings from Last 1 Encounters:   03/29/17 96%     Recent Labs   Lab Test  03/27/17   0910   NA  142   POTASSIUM  4.6   CHLORIDE  109   CO2  27   ANIONGAP  6   GLC  100*   BUN  16   CR  0.76   JAMEE  9.2     Recent Labs   Lab Test  02/16/17   0710   HGB  14.4      RECENT LABS:     ECG: NSR, no significant abnormalities      Anesthesia Evaluation     . Pt has had prior anesthetic.     History of anesthetic complications (Green 2 = Grade 1 View)   - PONV and motion sickness        ROS/MED HX    ENT/Pulmonary:     (+)tobacco use, Past use , . .   (-) asthma, COPD, sleep apnea and recent URI   Neurologic:      (-) seizures and CVA   Cardiovascular:     (+) Dyslipidemia, ----. : . . . :. .      (-) angina, hypertension, CAD, syncope, arrhythmias, irregular heartbeat/palpitations, valvular problems/murmurs and angina   METS/Exercise Tolerance:  >4 METS   Hematologic:         Musculoskeletal:         GI/Hepatic:      (+) GERD (uncommon) Other,      (-) liver disease   Renal/Genitourinary:      (-) renal disease   Endo:      (-) Type II DM, thyroid disease and chronic steroid usage   Psychiatric:         Infectious Disease:         Malignancy:         Other:                     Physical Exam  Normal systems: cardiovascular and pulmonary    Airway   Mallampati: II  TM distance: >3 FB  Neck ROM: full    Dental   Comment: native    Cardiovascular       Pulmonary                         Anesthesia Plan      History & Physical Review  History and physical reviewed and following examination; no interval change.    ASA Status:  2 .    NPO Status:  > 8 hours    Plan for General and ETT with Propofol induction. Maintenance will be TIVA.    PONV prophylaxis:  Ondansetron (or other 5HT-3), Dexamethasone or Solumedrol and Scopolamine patch  Scopolamine patch   Propofol infusion  Zofran 8 mg (divided)   Benadryl 12.5 mg     TIVA with propofol - no background gas       Postoperative Care  Postoperative pain management:  IV analgesics.      Consents  Anesthetic plan, risks, benefits and alternatives discussed with:  Patient..

## 2017-04-01 NOTE — OP NOTE
PLASTIC SURGERY OPERATIVE NOTE  Patient Name:  Lilli Steven     Date of Service:  3/29/2017     PREOPERATIVE DIAGNOSES:   1.  ABDOMINAL wall SEROMA      POSTOPERATIVE DIAGNOSES:   1.  ABDOMINAL wall SEROMA  2. Abdominal wall hematoma          SURGEON:  Inés Harrell MD   OR STAFF:  Circulator: Garima Tubbs RN  Scrub Person: Eri Barrientos VIRAJ     ANESTHESIA:  General     ESTIMATED BLOOD LOSS:  * No blood loss amount entered *   SPECIMEN(S):  * No specimens in log *     PROCEDURES:   1.  Evacuate abdominal wall hematoma  2.  Evacuate abdominal wall seroma      DESCRIPTION OF PROCEDURE:  Patient was marked for incisions and taken to the operating room.  General anesthesia was administered.  The abdominal area was prepped and draped in a sterile manner.  An incision was made along the lower abdomen following the preoperative marking.  Dissection was carried down to the underlying abdominal wall.  Dissection then moved cephalad and a seroma in the lower abdomen was encountered.  This seroma was evacuated and the capsule scored circumferentially and radially.  Dissection then extended into the epigastric area and a hematoma was encountered.  An incision was made through the wall of the hematoma and the liquefied contact evacuated.  The anterior surface of the hematoma capsule was removed.  Hemostasis was achieved.    A drain was placed in the lower abdomen and a second drain placement epigastric area.  Both drains were secured with a 3-0 nylon suture.  The incision was then reapproximated with interrupted 0 PDS in the superficial fascia followed by interrupted 3-0 Monocryl in the subcutaneous tissue and a running 4-0 subcuticular Monocryl suture.  The umbilicus was re-inset using interrupted 5-0 nylon suture.  Xeroform followed by light dressing was applied.    IMPLANTS:  * No implants in log *    Inés Harrell MD  Plastic Surgery  [facility]  [phone] (office)

## 2017-05-25 ENCOUNTER — RECORDS - HEALTHEAST (OUTPATIENT)
Dept: ADMINISTRATIVE | Facility: OTHER | Age: 67
End: 2017-05-25

## 2017-05-25 ENCOUNTER — OFFICE VISIT (OUTPATIENT)
Dept: PHYSICAL MEDICINE AND REHAB | Facility: CLINIC | Age: 67
End: 2017-05-25

## 2017-05-25 VITALS — HEIGHT: 63 IN | SYSTOLIC BLOOD PRESSURE: 132 MMHG | DIASTOLIC BLOOD PRESSURE: 61 MMHG | HEART RATE: 93 BPM

## 2017-05-25 DIAGNOSIS — G24.1 DYSTONIA, TORSION, FRAGMENTS OF: ICD-10-CM

## 2017-05-25 DIAGNOSIS — G24.3 SPASMODIC TORTICOLLIS: Primary | ICD-10-CM

## 2017-05-25 ASSESSMENT — PAIN SCALES - GENERAL: PAINLEVEL: NO PAIN (0)

## 2017-05-25 NOTE — PROGRESS NOTES
"BOTULINUM TOXIN PROCEDURE NOTE    Chief Complaint   Patient presents with     Dystonia     Botox cervical dystonia       /61  Pulse 93  Ht 1.6 m (5' 3\")      Current Outpatient Prescriptions:      traZODone (DESYREL) 50 MG tablet, TAKE 3 TABLETS BY MOUTH AT BEDTIME, Disp: 90 tablet, Rfl: 3     simvastatin (ZOCOR) 20 MG tablet, TAKE ONE TABLET BY MOUTH NIGHTLY AT BEDTIME, Disp: 90 tablet, Rfl: 2     HYDROcodone-acetaminophen (NORCO) 5-325 MG per tablet, Take 1-2 tablets by mouth every 4 hours as needed for moderate to severe pain, Disp: 30 tablet, Rfl: 0     levofloxacin (LEVAQUIN) 500 MG tablet, Take 1 tablet (500 mg) by mouth daily, Disp: 3 tablet, Rfl: 0     triamcinolone (NASACORT) 55 MCG/ACT Inhaler, Spray 1 spray into both nostrils daily as needed (congestion), Disp: , Rfl:      calcium carb 1250 mg, 500 mg [place],/vitamin D 200 units (OSCAL WITH D) 500-200 MG-UNIT per tablet, Take 1 tablet by mouth 2 times daily (with meals), Disp: , Rfl:      OnabotulinumtoxinA (BOTOX IJ), Inject 125 Units into the muscle See Admin Instructions Every 10 weeks (Lot # /C3  Exp: 8/2019), Disp: , Rfl:      Allergies   Allergen Reactions     Meloxicam      Diarrhea, vomiting     Primidone Other (See Comments)     Felt like motion sickness     Tegaderm Transparent Dressing (Informational Only)      Tramadol      Diarrhea, vomiting      Adhesive Tape Rash     Durabond only     Allergy Rash     Dermabond  Anesthesia IV set misc          PHYSICAL EXAM:  HEAD, NECK AND TRUNK PATTERN:   Head Tremor: Observed \"no\" tremor   Head & Neck Extension: Present - Slight  Sub-Occipital Extension: Present - Slight  Forward Head: Present - Slight   Head & Neck Lateral Bend: Right   Shoulder Elevation: Right    HPI:    Patient reports the following new medical problems since last visit: took a fall 2 weeks ago sustained bruising throughout body and top lip.    We reviewed the recommended safety guidelines for  Botox from any " vaccine injection, such as the seasonal flu vaccine, by a minimum of 10-14 days with Lilli Steven. She acknowledged understanding.    RESPONSE TO PREVIOUS TREATMENT:    Lilli Steven received 125 units of Botox on 3/13/2017.    Problems following the previous series of neurotoxin injections included:  No problems reported    BENEFITS BY PATIENT REPORT:    Pain Improvement: Yes.  Percent Improvement: 85 %    Duration of Benefit:  9-10 weeks.     Dystonia and Head Tremor Improvement: Yes.  Percent Improvement: 85 %    Duration of Benefit:  9-10 weeks and followed by a gradual reduction in benefit.       BOTULINUM NEUROTOXIN INJECTION PROCEDURES:    VERIFICATION OF PATIENT IDENTIFICATION AND PROCEDURE     Initials   Patient Name lmd   Patient  lmd   Procedure Verified by: lmd     Prior to the start of the procedure and with procedural staff participation, I verbally confirmed the patient s identity using two indicators, relevant allergies, that the procedure was appropriate and matched the consent or emergent situation, and that the correct equipment/implants were available. Immediately prior to starting the procedure I conducted the Time Out with the procedural staff and re-confirmed the patient s name, procedure, and site/side. (The Joint Commission universal protocol was followed.)  Yes    Sedation (Moderate or Deep): None      Above assessments performed by:  Mikki Sotelo RN Care Coordinator    Hermes Frias MD      INDICATION/S FOR PROCEDURE/S:  Lilli Steven is a 67 year old patient with dystonia affecting the  head, neck and shoulder girdle musculature secondary to a diagnosis of cervical dystonia with associated  pain, tremor, loss of volitional motor control and difficulty with activities of daily living.      Her baseline symptoms have been recalcitrant to oral medications and conservative therapy.  She is here today for an injection of Botox.       GOAL OF PROCEDURE:  The goal of this  procedure is to increase active range of motion, improve volitional motor control, decrease pain  and enhance functional independence associated with dystonic movements.       TOTAL DOSE ADMINISTERED:  Dose Administered:  125 units Botox    Diluent Used:  Preservative Free Normal Saline  Total Volume of Diluent Used:  1.25 ml  Lot # /C3 with Expiration Date:  1/2020  NDC #: Botox 100u ([address]-69)    Medication guide was offered to patient and was declined.    CONSENT:  The risks, benefits, and treatment options were discussed with Lilli Steven and she agreed to proceed.      Written consent was obtained by lmd.     EQUIPMENT USED:  Needle-37mm stimulating/recording  EMG/NCS Machine     SKIN PREPARATION:  Skin preparation was performed using an alcohol wipe.     GUIDANCE DESCRIPTION:  Electro-myographic guidance was necessary throughout the procedure to accurately identify all areas of dystonic muscles while avoiding injection of non-dystonic muscles, neighboring nerves and nearby vascular structures.      AREA/MUSCLE INJECTED:  125 Units Botox  1. HEAD & NECK MUSCLES: 125 units Botox = Total Dose, 1:1 Dilution                 Right Mid-Trapezius - 10 units of Botox at 1 site/s.   Left Mid-Trapezius - 10 units of Botox at 1 site/s.      Right Splenius Capitus - 15 units of Botox at 2 site/s.   Left Splenius Capitus - 10 units of Botox at 2 site/s.      Right Levator Scapulae - 20 units of Botox at 1 site/s (shoulder muscles).   Left Levator Scapulae - 15 units of Botox at 1 site/s (shoulder muscles).      Right Inferior Obliquus Capitis - 10 units of Botox at 1 site/s.   Left Inferior Obliquus Capitis - 15 units of at 1 site/s.      Right Sternal head of the Sternocleidomastoid - 10 units of Botox at 1 site/s.                          Left Sternal head of the Sternocleidomastoid - 10 units of Botox at 1 site/s.    RESPONSE TO PROCEDURE:  Lilli Steven tolerated the procedure well and there were no  immediate complications.  She was allowed to recover for an appropriate period of time and was discharged home in stable condition.    FOLLOW UP:  Lilli Steven was asked to follow up by phone in 7-14 days with Erin Combs PT, Care Coordinator or Mikki Rojas RN, Care Coordinator, to report her response to this series of injections.  Based on the patient's previous response to this therapy, Lilli Steven was rescheduled for the next series of injections in 10 weeks.    PLAN (Medication Changes, Therapy Orders, Work or Disability Issues, etc.): Will monitor response to today's injections and report.

## 2017-05-25 NOTE — NURSING NOTE
Chief Complaint   Patient presents with     RECHECK     Botox cervical dystonia    Jennifer Camacho CMA

## 2017-05-25 NOTE — MR AVS SNAPSHOT
After Visit Summary   5/25/2017    Lilli Steven    MRN: [id number]           Patient Information     Date Of Birth          1950        Visit Information        Provider Department      5/25/2017 3:50 PM Hermes Frias MD [facility]         Follow-ups after your visit        Follow-up notes from your care team     Return in about 10 weeks (around 8/3/2017) for Dystonia.      Your next 10 appointments already scheduled     Aug 01, 2017  3:40 PM CDT   (Arrive by 3:25 PM)   Return Botox with Hermes Frias MD   [facility] ([facility])    [address] [place] [address] [phone]            Oct 12, 2017  3:50 PM CDT   (Arrive by 3:35 PM)   Return Botox with Hermes Frias MD   [facility] ([facility])    [address] [place] [address] [phone]              Who to contact     Please call your clinic at [phone] to:    Ask questions about your health    Make or cancel appointments    Discuss your medicines    Learn about your test results    Speak to your doctor   If you have compliments or concerns about an experience at your clinic, or if you wish to file a complaint, please contact [facility] Patient Relations at [phone] or email us at [email or web address]         Additional Information About Your Visit        [employer] Information     [employer] gives you secure access to your electronic health record. If you see a primary care provider, you can also send messages to your care team and make appointments. If you have questions, please call your primary care clinic.  If you do not have a primary care provider, please call [phone] and they will assist you.      [employer] is an electronic gateway that provides easy,  "online access to your medical records. With [employer], you can request a clinic appointment, read your test results, renew a prescription or communicate with your care team.     To access your existing account, please contact your [facility] or call [phone] for assistance.        Care EveryWhere ID     This is your Care EveryWhere ID. This could be used by other organizations to access your [facility] medical records  [id number]        Your Vitals Were     Pulse Height                93 1.6 m (5' 3\")           Blood Pressure from Last 3 Encounters:   05/25/17 132/61   03/29/17 105/71   03/27/17 112/72    Weight from Last 3 Encounters:   03/29/17 73.3 kg (161 lb 9.6 oz)   03/27/17 73.5 kg (162 lb)   03/13/17 75.2 kg (165 lb 12.8 oz)              Today, you had the following     No orders found for display       Primary Care Provider Office Phone # Fax #    Vargas Ata Chaudhry [phone]       [facility] [address] [place] [address]        Thank you!     Thank you for choosing [facility]IL[facility]ATION  for your care. Our goal is always to provide you with excellent care. Hearing back from our patients is one way we can continue to improve our services. Please take a few minutes to complete the written survey that you may receive in the mail after your visit with us. Thank you!             Your Updated Medication List - Protect others around you: Learn how to safely use, store and throw away your medicines at www.disposemymeds.org.          This list is accurate as of: 5/25/17  4:11 PM.  Always use your most recent med list.                   Brand Name Dispense Instructions for use    * BOTOX IJ      Inject 125 Units into the muscle See Admin Instructions Every 10 weeks (Lot # /C3  Exp: 8/2019)       * BOTOX IJ      Inject 125 Units into the muscle Lot # /c3  Exp: 1/2020       calcium carb 1250 mg (500 mg " [place])/vitamin D 200 units 500-200 MG-UNIT per tablet    OSCAL with D     Take 1 tablet by mouth 2 times daily (with meals)       HYDROcodone-acetaminophen 5-325 MG per tablet    NORCO    30 tablet    Take 1-2 tablets by mouth every 4 hours as needed for moderate to severe pain       levofloxacin 500 MG tablet    LEVAQUIN    3 tablet    Take 1 tablet (500 mg) by mouth daily       simvastatin 20 MG tablet    ZOCOR    90 tablet    TAKE ONE TABLET BY MOUTH NIGHTLY AT BEDTIME       traZODone 50 MG tablet    DESYREL    90 tablet    TAKE 3 TABLETS BY MOUTH AT BEDTIME       triamcinolone 55 MCG/ACT Inhaler    NASACORT     Spray 1 spray into both nostrils daily as needed (congestion)       * Notice:  This list has 2 medication(s) that are the same as other medications prescribed for you. Read the directions carefully, and ask your doctor or other care provider to review them with you.

## 2017-05-25 NOTE — LETTER
"5/25/2017       RE: Lilli Steven  [address]   [facility] [place] [address]     Dear Colleague,    Thank you for referring your patient, Lilli Steven, to the [facility] REHABIL[facility]ATION at [facility]. Please see a copy of my visit note below.    BOTULINUM TOXIN PROCEDURE NOTE  Chief Complaint   Patient presents with     Dystonia     Botox cervical dystonia       /61  Pulse 93  Ht 1.6 m (5' 3\")    Current Outpatient Prescriptions:      traZODone (DESYREL) 50 MG tablet, TAKE 3 TABLETS BY MOUTH AT BEDTIME, Disp: 90 tablet, Rfl: 3     simvastatin (ZOCOR) 20 MG tablet, TAKE ONE TABLET BY MOUTH NIGHTLY AT BEDTIME, Disp: 90 tablet, Rfl: 2     HYDROcodone-acetaminophen (NORCO) 5-325 MG per tablet, Take 1-2 tablets by mouth every 4 hours as needed for moderate to severe pain, Disp: 30 tablet, Rfl: 0     levofloxacin (LEVAQUIN) 500 MG tablet, Take 1 tablet (500 mg) by mouth daily, Disp: 3 tablet, Rfl: 0     triamcinolone (NASACORT) 55 MCG/ACT Inhaler, Spray 1 spray into both nostrils daily as needed (congestion), Disp: , Rfl:      calcium carb 1250 mg, 500 mg [place],/vitamin D 200 units (OSCAL WITH D) 500-200 MG-UNIT per tablet, Take 1 tablet by mouth 2 times daily (with meals), Disp: , Rfl:      OnabotulinumtoxinA (BOTOX IJ), Inject 125 Units into the muscle See Admin Instructions Every 10 weeks (Lot # /C3  Exp: 8/2019), Disp: , Rfl:      Allergies   Allergen Reactions     Meloxicam      Diarrhea, vomiting     Primidone Other (See Comments)     Felt like motion sickness     Tegaderm Transparent Dressing (Informational Only)      Tramadol      Diarrhea, vomiting      Adhesive Tape Rash     Durabond only     Allergy Rash     Dermabond  Anesthesia IV set misc        PHYSICAL EXAM:  HEAD, NECK AND TRUNK PATTERN:   Head Tremor: Observed \"no\" tremor   Head & Neck Extension: Present - Slight  Sub-Occipital Extension: Present - Slight  Forward Head: " Present - Slight   Head & Neck Lateral Bend: Right   Shoulder Elevation: Right    HPI:  Patient reports the following new medical problems since last visit: took a fall 2 weeks ago sustained bruising throughout body and top lip.    We reviewed the recommended safety guidelines for  Botox from any vaccine injection, such as the seasonal flu vaccine, by a minimum of 10-14 days with Lilli Steven. She acknowledged understanding.    RESPONSE TO PREVIOUS TREATMENT:  Lilli Steven received 125 units of Botox on 3/13/2017.    Problems following the previous series of neurotoxin injections included:  No problems reported    BENEFITS BY PATIENT REPORT:  Pain Improvement: Yes.  Percent Improvement: 85 %    Duration of Benefit:  9-10 weeks.     Dystonia and Head Tremor Improvement: Yes.  Percent Improvement: 85 %    Duration of Benefit:  9-10 weeks and followed by a gradual reduction in benefit.       BOTULINUM NEUROTOXIN INJECTION PROCEDURES:    VERIFICATION OF PATIENT IDENTIFICATION AND PROCEDURE     Initials   Patient Name lmd   Patient  lmd   Procedure Verified by: lmd     Prior to the start of the procedure and with procedural staff participation, I verbally confirmed the patient s identity using two indicators, relevant allergies, that the procedure was appropriate and matched the consent or emergent situation, and that the correct equipment/implants were available. Immediately prior to starting the procedure I conducted the Time Out with the procedural staff and re-confirmed the patient s name, procedure, and site/side. (The Joint Commission universal protocol was followed.)  Yes    Sedation (Moderate or Deep): None    Above assessments performed by:  Mikki Sotelo RN Care Coordinator    Hermes Frias MD    INDICATION/S FOR PROCEDURE/S:  Lilli Steven is a 67 year old patient with dystonia affecting the  head, neck and shoulder girdle musculature secondary to a diagnosis of cervical dystonia  with associated  pain, tremor, loss of volitional motor control and difficulty with activities of daily living.      Her baseline symptoms have been recalcitrant to oral medications and conservative therapy.  She is here today for an injection of Botox.       GOAL OF PROCEDURE:  The goal of this procedure is to increase active range of motion, improve volitional motor control, decrease pain  and enhance functional independence associated with dystonic movements.       TOTAL DOSE ADMINISTERED:  Dose Administered:  125 units Botox    Diluent Used:  Preservative Free Normal Saline  Total Volume of Diluent Used:  1.25 ml  Lot # /C3 with Expiration Date:  1/2020  NDC #: Botox 100u ([address]-58)    Medication guide was offered to patient and was declined.    CONSENT:  The risks, benefits, and treatment options were discussed with Lilli Steven and she agreed to proceed.      Written consent was obtained by lmd.     EQUIPMENT USED:  Needle-37mm stimulating/recording  EMG/NCS Machine     SKIN PREPARATION:  Skin preparation was performed using an alcohol wipe.     GUIDANCE DESCRIPTION:  Electro-myographic guidance was necessary throughout the procedure to accurately identify all areas of dystonic muscles while avoiding injection of non-dystonic muscles, neighboring nerves and nearby vascular structures.      AREA/MUSCLE INJECTED:  125 Units Botox  1. HEAD & NECK MUSCLES: 125 units Botox = Total Dose, 1:1 Dilution                 Right Mid-Trapezius - 10 units of Botox at 1 site/s.   Left Mid-Trapezius - 10 units of Botox at 1 site/s.      Right Splenius Capitus - 15 units of Botox at 2 site/s.   Left Splenius Capitus - 10 units of Botox at 2 site/s.      Right Levator Scapulae - 20 units of Botox at 1 site/s (shoulder muscles).   Left Levator Scapulae - 15 units of Botox at 1 site/s (shoulder muscles).      Right Inferior Obliquus Capitis - 10 units of Botox at 1 site/s.   Left Inferior Obliquus Capitis - 15 units  of at 1 site/s.      Right Sternal head of the Sternocleidomastoid - 10 units of Botox at 1 site/s.                          Left Sternal head of the Sternocleidomastoid - 10 units of Botox at 1 site/s.    RESPONSE TO PROCEDURE:  Lilli Steven tolerated the procedure well and there were no immediate complications.  She was allowed to recover for an appropriate period of time and was discharged home in stable condition.    FOLLOW UP:  Lilli Steven was asked to follow up by phone in 7-14 days with Erin Combs PT, Care Coordinator or Mikki Rojas RN, Care Coordinator, to report her response to this series of injections.  Based on the patient's previous response to this therapy, Lilli Steven was rescheduled for the next series of injections in 10 weeks.    PLAN (Medication Changes, Therapy Orders, Work or Disability Issues, etc.): Will monitor response to today's injections and report.    Hermes Frias MD

## 2017-06-02 ENCOUNTER — ANESTHESIA EVENT (OUTPATIENT)
Dept: SURGERY | Facility: CLINIC | Age: 67
End: 2017-06-02
Payer: COMMERCIAL

## 2017-06-02 ENCOUNTER — ANESTHESIA (OUTPATIENT)
Dept: SURGERY | Facility: CLINIC | Age: 67
End: 2017-06-02
Payer: COMMERCIAL

## 2017-06-02 ENCOUNTER — HOSPITAL ENCOUNTER (OUTPATIENT)
Facility: CLINIC | Age: 67
Discharge: HOME | End: 2017-06-03
Attending: PLASTIC SURGERY | Admitting: PLASTIC SURGERY
Payer: COMMERCIAL

## 2017-06-02 PROBLEM — G89.18 POST-OPERATIVE PAIN: Status: ACTIVE | Noted: 2017-06-02

## 2017-06-02 LAB
CREAT SERPL-MCNC: 0.9 MG/DL (ref 0.52–1.04)
GFR SERPL CREATININE-BSD FRML MDRD: 63 ML/MIN/1.7M2
GRAM STN SPEC: ABNORMAL
MICRO REPORT STATUS: ABNORMAL
SPECIMEN SOURCE: ABNORMAL

## 2017-06-02 PROCEDURE — C9399 UNCLASSIFIED DRUGS OR BIOLOG: HCPCS | Performed by: NURSE ANESTHETIST, CERTIFIED REGISTERED

## 2017-06-02 PROCEDURE — 40000170 ZZH STATISTIC PRE-PROCEDURE ASSESSMENT II: Performed by: PLASTIC SURGERY

## 2017-06-02 PROCEDURE — 87205 SMEAR GRAM STAIN: CPT | Performed by: PLASTIC SURGERY

## 2017-06-02 PROCEDURE — 82565 ASSAY OF CREATININE: CPT | Performed by: PLASTIC SURGERY

## 2017-06-02 PROCEDURE — 27210995 ZZH RX 272: Performed by: PLASTIC SURGERY

## 2017-06-02 PROCEDURE — 87186 SC STD MICRODIL/AGAR DIL: CPT | Performed by: PLASTIC SURGERY

## 2017-06-02 PROCEDURE — 87070 CULTURE OTHR SPECIMN AEROBIC: CPT | Performed by: PLASTIC SURGERY

## 2017-06-02 PROCEDURE — 36000056 ZZH SURGERY LEVEL 3 1ST 30 MIN: Performed by: PLASTIC SURGERY

## 2017-06-02 PROCEDURE — 25000125 ZZHC RX 250: Performed by: ANESTHESIOLOGY

## 2017-06-02 PROCEDURE — 27210794 ZZH OR GENERAL SUPPLY STERILE: Performed by: PLASTIC SURGERY

## 2017-06-02 PROCEDURE — 25000128 H RX IP 250 OP 636: Performed by: PLASTIC SURGERY

## 2017-06-02 PROCEDURE — 37000009 ZZH ANESTHESIA TECHNICAL FEE, EACH ADDTL 15 MIN: Performed by: PLASTIC SURGERY

## 2017-06-02 PROCEDURE — 71000012 ZZH RECOVERY PHASE 1 LEVEL 1 FIRST HR: Performed by: PLASTIC SURGERY

## 2017-06-02 PROCEDURE — 37000008 ZZH ANESTHESIA TECHNICAL FEE, 1ST 30 MIN: Performed by: PLASTIC SURGERY

## 2017-06-02 PROCEDURE — 25000132 ZZH RX MED GY IP 250 OP 250 PS 637: Performed by: PLASTIC SURGERY

## 2017-06-02 PROCEDURE — 87077 CULTURE AEROBIC IDENTIFY: CPT | Performed by: PLASTIC SURGERY

## 2017-06-02 PROCEDURE — 36415 COLL VENOUS BLD VENIPUNCTURE: CPT | Performed by: PLASTIC SURGERY

## 2017-06-02 PROCEDURE — 36000058 ZZH SURGERY LEVEL 3 EA 15 ADDTL MIN: Performed by: PLASTIC SURGERY

## 2017-06-02 PROCEDURE — 25000125 ZZHC RX 250: Performed by: NURSE ANESTHETIST, CERTIFIED REGISTERED

## 2017-06-02 PROCEDURE — 87176 TISSUE HOMOGENIZATION CULTR: CPT | Performed by: PLASTIC SURGERY

## 2017-06-02 PROCEDURE — 87075 CULTR BACTERIA EXCEPT BLOOD: CPT | Performed by: PLASTIC SURGERY

## 2017-06-02 PROCEDURE — 25000128 H RX IP 250 OP 636: Performed by: NURSE ANESTHETIST, CERTIFIED REGISTERED

## 2017-06-02 RX ORDER — MEPERIDINE HYDROCHLORIDE 25 MG/ML
12.5 INJECTION INTRAMUSCULAR; INTRAVENOUS; SUBCUTANEOUS
Status: DISCONTINUED | OUTPATIENT
Start: 2017-06-02 | End: 2017-06-02 | Stop reason: HOSPADM

## 2017-06-02 RX ORDER — SODIUM CHLORIDE, SODIUM LACTATE, POTASSIUM CHLORIDE, CALCIUM CHLORIDE 600; 310; 30; 20 MG/100ML; MG/100ML; MG/100ML; MG/100ML
INJECTION, SOLUTION INTRAVENOUS CONTINUOUS
Status: DISCONTINUED | OUTPATIENT
Start: 2017-06-02 | End: 2017-06-02 | Stop reason: HOSPADM

## 2017-06-02 RX ORDER — LIDOCAINE 40 MG/G
CREAM TOPICAL
Status: DISCONTINUED | OUTPATIENT
Start: 2017-06-02 | End: 2017-06-03 | Stop reason: HOSPADM

## 2017-06-02 RX ORDER — ONDANSETRON 4 MG/1
4 TABLET, ORALLY DISINTEGRATING ORAL EVERY 30 MIN PRN
Status: DISCONTINUED | OUTPATIENT
Start: 2017-06-02 | End: 2017-06-02 | Stop reason: HOSPADM

## 2017-06-02 RX ORDER — MAGNESIUM HYDROXIDE 1200 MG/15ML
LIQUID ORAL PRN
Status: DISCONTINUED | OUTPATIENT
Start: 2017-06-02 | End: 2017-06-02 | Stop reason: HOSPADM

## 2017-06-02 RX ORDER — DIPHENHYDRAMINE HYDROCHLORIDE 50 MG/ML
INJECTION INTRAMUSCULAR; INTRAVENOUS PRN
Status: DISCONTINUED | OUTPATIENT
Start: 2017-06-02 | End: 2017-06-02

## 2017-06-02 RX ORDER — HYDROCODONE BITARTRATE AND ACETAMINOPHEN 5; 325 MG/1; MG/1
1-2 TABLET ORAL EVERY 4 HOURS PRN
Status: DISCONTINUED | OUTPATIENT
Start: 2017-06-02 | End: 2017-06-03 | Stop reason: HOSPADM

## 2017-06-02 RX ORDER — ACETAMINOPHEN 325 MG/1
650 TABLET ORAL EVERY 6 HOURS PRN
Status: DISCONTINUED | OUTPATIENT
Start: 2017-06-02 | End: 2017-06-03 | Stop reason: HOSPADM

## 2017-06-02 RX ORDER — SODIUM CHLORIDE, SODIUM LACTATE, POTASSIUM CHLORIDE, CALCIUM CHLORIDE 600; 310; 30; 20 MG/100ML; MG/100ML; MG/100ML; MG/100ML
INJECTION, SOLUTION INTRAVENOUS CONTINUOUS PRN
Status: DISCONTINUED | OUTPATIENT
Start: 2017-06-02 | End: 2017-06-02

## 2017-06-02 RX ORDER — NALOXONE HYDROCHLORIDE 0.4 MG/ML
.1-.4 INJECTION, SOLUTION INTRAMUSCULAR; INTRAVENOUS; SUBCUTANEOUS
Status: DISCONTINUED | OUTPATIENT
Start: 2017-06-02 | End: 2017-06-02 | Stop reason: HOSPADM

## 2017-06-02 RX ORDER — PROCHLORPERAZINE MALEATE 5 MG
5 TABLET ORAL EVERY 6 HOURS PRN
Status: DISCONTINUED | OUTPATIENT
Start: 2017-06-02 | End: 2017-06-03 | Stop reason: HOSPADM

## 2017-06-02 RX ORDER — HYDROMORPHONE HYDROCHLORIDE 1 MG/ML
.3-.5 INJECTION, SOLUTION INTRAMUSCULAR; INTRAVENOUS; SUBCUTANEOUS
Status: DISCONTINUED | OUTPATIENT
Start: 2017-06-02 | End: 2017-06-03 | Stop reason: HOSPADM

## 2017-06-02 RX ORDER — ONDANSETRON 2 MG/ML
INJECTION INTRAMUSCULAR; INTRAVENOUS PRN
Status: DISCONTINUED | OUTPATIENT
Start: 2017-06-02 | End: 2017-06-02

## 2017-06-02 RX ORDER — LIDOCAINE HYDROCHLORIDE 20 MG/ML
INJECTION, SOLUTION INFILTRATION; PERINEURAL PRN
Status: DISCONTINUED | OUTPATIENT
Start: 2017-06-02 | End: 2017-06-02

## 2017-06-02 RX ORDER — ONDANSETRON 4 MG/1
4 TABLET, ORALLY DISINTEGRATING ORAL EVERY 6 HOURS PRN
Status: DISCONTINUED | OUTPATIENT
Start: 2017-06-02 | End: 2017-06-03 | Stop reason: HOSPADM

## 2017-06-02 RX ORDER — GLYCOPYRROLATE 0.2 MG/ML
INJECTION, SOLUTION INTRAMUSCULAR; INTRAVENOUS PRN
Status: DISCONTINUED | OUTPATIENT
Start: 2017-06-02 | End: 2017-06-02

## 2017-06-02 RX ORDER — DEXAMETHASONE SODIUM PHOSPHATE 4 MG/ML
INJECTION, SOLUTION INTRA-ARTICULAR; INTRALESIONAL; INTRAMUSCULAR; INTRAVENOUS; SOFT TISSUE PRN
Status: DISCONTINUED | OUTPATIENT
Start: 2017-06-02 | End: 2017-06-02

## 2017-06-02 RX ORDER — PROPOFOL 10 MG/ML
INJECTION, EMULSION INTRAVENOUS PRN
Status: DISCONTINUED | OUTPATIENT
Start: 2017-06-02 | End: 2017-06-02

## 2017-06-02 RX ORDER — CEFAZOLIN SODIUM 2 G/100ML
2 INJECTION, SOLUTION INTRAVENOUS
Status: COMPLETED | OUTPATIENT
Start: 2017-06-02 | End: 2017-06-02

## 2017-06-02 RX ORDER — METHOCARBAMOL 750 MG/1
750 TABLET, FILM COATED ORAL EVERY 6 HOURS PRN
Status: DISCONTINUED | OUTPATIENT
Start: 2017-06-02 | End: 2017-06-03 | Stop reason: HOSPADM

## 2017-06-02 RX ORDER — FENTANYL CITRATE 0.05 MG/ML
25-50 INJECTION, SOLUTION INTRAMUSCULAR; INTRAVENOUS
Status: DISCONTINUED | OUTPATIENT
Start: 2017-06-02 | End: 2017-06-02 | Stop reason: HOSPADM

## 2017-06-02 RX ORDER — ONDANSETRON 2 MG/ML
4 INJECTION INTRAMUSCULAR; INTRAVENOUS EVERY 6 HOURS PRN
Status: DISCONTINUED | OUTPATIENT
Start: 2017-06-02 | End: 2017-06-03 | Stop reason: HOSPADM

## 2017-06-02 RX ORDER — CEFAZOLIN SODIUM 1 G/3ML
1 INJECTION, POWDER, FOR SOLUTION INTRAMUSCULAR; INTRAVENOUS SEE ADMIN INSTRUCTIONS
Status: DISCONTINUED | OUTPATIENT
Start: 2017-06-02 | End: 2017-06-02 | Stop reason: HOSPADM

## 2017-06-02 RX ORDER — FENTANYL CITRATE 50 UG/ML
INJECTION, SOLUTION INTRAMUSCULAR; INTRAVENOUS PRN
Status: DISCONTINUED | OUTPATIENT
Start: 2017-06-02 | End: 2017-06-02

## 2017-06-02 RX ORDER — NEOSTIGMINE METHYLSULFATE 1 MG/ML
VIAL (ML) INJECTION PRN
Status: DISCONTINUED | OUTPATIENT
Start: 2017-06-02 | End: 2017-06-02

## 2017-06-02 RX ORDER — SCOLOPAMINE TRANSDERMAL SYSTEM 1 MG/1
1 PATCH, EXTENDED RELEASE TRANSDERMAL ONCE
Status: COMPLETED | OUTPATIENT
Start: 2017-06-02 | End: 2017-06-02

## 2017-06-02 RX ORDER — PROPOFOL 10 MG/ML
INJECTION, EMULSION INTRAVENOUS CONTINUOUS PRN
Status: DISCONTINUED | OUTPATIENT
Start: 2017-06-02 | End: 2017-06-02

## 2017-06-02 RX ORDER — IBUPROFEN 600 MG/1
600 TABLET, FILM COATED ORAL EVERY 6 HOURS PRN
Status: DISCONTINUED | OUTPATIENT
Start: 2017-06-02 | End: 2017-06-03 | Stop reason: HOSPADM

## 2017-06-02 RX ORDER — SULFAMETHOXAZOLE/TRIMETHOPRIM 800-160 MG
1 TABLET ORAL 2 TIMES DAILY
Status: DISCONTINUED | OUTPATIENT
Start: 2017-06-02 | End: 2017-06-03 | Stop reason: HOSPADM

## 2017-06-02 RX ORDER — NALOXONE HYDROCHLORIDE 0.4 MG/ML
.1-.4 INJECTION, SOLUTION INTRAMUSCULAR; INTRAVENOUS; SUBCUTANEOUS
Status: DISCONTINUED | OUTPATIENT
Start: 2017-06-02 | End: 2017-06-03 | Stop reason: HOSPADM

## 2017-06-02 RX ORDER — ONDANSETRON 2 MG/ML
4 INJECTION INTRAMUSCULAR; INTRAVENOUS EVERY 30 MIN PRN
Status: DISCONTINUED | OUTPATIENT
Start: 2017-06-02 | End: 2017-06-02 | Stop reason: HOSPADM

## 2017-06-02 RX ADMIN — PROPOFOL 200 MCG/KG/MIN: 10 INJECTION, EMULSION INTRAVENOUS at 07:51

## 2017-06-02 RX ADMIN — MIDAZOLAM HYDROCHLORIDE 2 MG: 1 INJECTION, SOLUTION INTRAMUSCULAR; INTRAVENOUS at 07:49

## 2017-06-02 RX ADMIN — NEOSTIGMINE METHYLSULFATE 4 MG: 1 INJECTION INTRAMUSCULAR; INTRAVENOUS; SUBCUTANEOUS at 08:10

## 2017-06-02 RX ADMIN — SCOPALAMINE 1 PATCH: 1 PATCH, EXTENDED RELEASE TRANSDERMAL at 07:28

## 2017-06-02 RX ADMIN — DIPHENHYDRAMINE HYDROCHLORIDE 12.5 MG: 50 INJECTION, SOLUTION INTRAMUSCULAR; INTRAVENOUS at 07:57

## 2017-06-02 RX ADMIN — ROCURONIUM BROMIDE 35 MG: 10 INJECTION INTRAVENOUS at 07:51

## 2017-06-02 RX ADMIN — PHENYLEPHRINE HYDROCHLORIDE 100 MCG: 10 INJECTION, SOLUTION INTRAMUSCULAR; INTRAVENOUS; SUBCUTANEOUS at 08:06

## 2017-06-02 RX ADMIN — DEXAMETHASONE SODIUM PHOSPHATE 4 MG: 4 INJECTION, SOLUTION INTRA-ARTICULAR; INTRALESIONAL; INTRAMUSCULAR; INTRAVENOUS; SOFT TISSUE at 07:59

## 2017-06-02 RX ADMIN — GLYCOPYRROLATE 0.6 MG: 0.2 INJECTION, SOLUTION INTRAMUSCULAR; INTRAVENOUS at 08:10

## 2017-06-02 RX ADMIN — LIDOCAINE HYDROCHLORIDE 100 MG: 20 INJECTION, SOLUTION INFILTRATION; PERINEURAL at 07:51

## 2017-06-02 RX ADMIN — PHENYLEPHRINE HYDROCHLORIDE 100 MCG: 10 INJECTION, SOLUTION INTRAMUSCULAR; INTRAVENOUS; SUBCUTANEOUS at 08:02

## 2017-06-02 RX ADMIN — SODIUM CHLORIDE, POTASSIUM CHLORIDE, SODIUM LACTATE AND CALCIUM CHLORIDE: 600; 310; 30; 20 INJECTION, SOLUTION INTRAVENOUS at 07:49

## 2017-06-02 RX ADMIN — FENTANYL CITRATE 50 MCG: 50 INJECTION, SOLUTION INTRAMUSCULAR; INTRAVENOUS at 07:49

## 2017-06-02 RX ADMIN — SUGAMMADEX 150 MG: 100 INJECTION, SOLUTION INTRAVENOUS at 08:31

## 2017-06-02 RX ADMIN — PHENYLEPHRINE HYDROCHLORIDE 100 MCG: 10 INJECTION, SOLUTION INTRAMUSCULAR; INTRAVENOUS; SUBCUTANEOUS at 07:58

## 2017-06-02 RX ADMIN — PHENYLEPHRINE HYDROCHLORIDE 100 MCG: 10 INJECTION, SOLUTION INTRAMUSCULAR; INTRAVENOUS; SUBCUTANEOUS at 08:12

## 2017-06-02 RX ADMIN — ONDANSETRON 4 MG: 2 INJECTION INTRAMUSCULAR; INTRAVENOUS at 07:59

## 2017-06-02 RX ADMIN — CEFAZOLIN SODIUM 2 G: 2 INJECTION, SOLUTION INTRAVENOUS at 07:59

## 2017-06-02 RX ADMIN — SULFAMETHOXAZOLE AND TRIMETHOPRIM 1 TABLET: 800; 160 TABLET ORAL at 22:12

## 2017-06-02 RX ADMIN — PROPOFOL 150 MG: 10 INJECTION, EMULSION INTRAVENOUS at 07:51

## 2017-06-02 ASSESSMENT — LIFESTYLE VARIABLES: TOBACCO_USE: 1

## 2017-06-02 NOTE — ANESTHESIA POSTPROCEDURE EVALUATION
Patient: Lilli Setven    Procedure(s):  EVACUATION OF ABDOMINAL WALL SEROMA - Wound Class: II-Clean Contaminated    Diagnosis:ABDOMINAL WALL SEROMA   Diagnosis Additional Information: No value filed.    Anesthesia Type:  General, ETT    Note:  Anesthesia Post Evaluation    Patient location during evaluation: PACU  Patient participation: Able to fully participate in evaluation  Level of consciousness: awake  Pain management: adequate  Cardiovascular status: acceptable  Respiratory status: acceptable  Hydration status: acceptable  PONV: none     Anesthetic complications: None          Last vitals:  Vitals:    06/02/17 0943 06/02/17 0945 06/02/17 1000   BP:  97/67 105/60   Resp: 12 16 18   Temp:      SpO2: 97% 97% 95%         Electronically Signed By: ARACELIS HDZ  June 2, 2017  11:05 AM

## 2017-06-02 NOTE — PROGRESS NOTES
Admission medication history interview status for the 6/2/2017  admission is complete. See EPIC admission navigator for prior to admission medications     Medication history source reliability:Good    Medication history interview source(s):Patient    Medication history resources (including written lists, pill bottles, clinic record):None    Primary pharmacy.[facility]    Additional medication history information not noted on PTA med list :None    Time spent in this activity: 45 minutes    Prior to Admission medications    Medication Sig Last Dose Taking? Auth Provider   SIMVASTATIN PO Take 20 mg by mouth every evening 6/1/2017 at pm Yes Reported, Patient   TRAZODONE HCL PO Take 150 mg by mouth At Bedtime (Takes 3 x 50mg tablet = 150mg dose) 6/1/2017 at pm Yes Reported, Patient   Sulfamethoxazole-Trimethoprim (BACTRIM DS PO) Take 1 tablet by mouth 2 times daily For 10 days 6/1/2017 at pm Yes Reported, Patient   triamcinolone (NASACORT) 55 MCG/ACT Inhaler Spray 1 spray into both nostrils daily as needed (congestion) more than a week at prn Yes Reported, Patient   calcium carb 1250 mg, 500 mg [place],/vitamin D 200 units (OSCAL WITH D) 500-200 MG-UNIT per tablet Take 1 tablet by mouth 2 times daily (with meals) 6/1/2017 at pm Yes Reported, Patient   OnabotulinumtoxinA (BOTOX IJ) Inject 125 Units into the muscle See Admin Instructions Every 10 weeks (Lot # /C3  Exp: 8/2019) 5/30/2017 Yes Hermes Frias MD

## 2017-06-02 NOTE — H&P
H&P from 4/27/2017 reviewed with patient and updated.    No changes.  Lungs: CTAB  Heart: RRR    A/P:  OK to proceed with surgery per evaluation by [facility].  Inés Harrell MD  Plastic Surgery  [facility]  [phone] (office)

## 2017-06-02 NOTE — OR NURSING
Report called to [address], Rose KENDRICK.  Pt to room via cart with NA in escort.  All belongings sent with pt.  Family luis notified of transfer.

## 2017-06-02 NOTE — BRIEF OP NOTE
[facility] Brief Operative Note    Pre-operative diagnosis: ABDOMINAL WALL SEROMA    Post-operative diagnosis chronic abdominal wall seroma     Procedure: Procedure(s):  EVACUATION OF ABDOMINAL WALL SEROMA - Wound Class: II-Clean Contaminated   Surgeon(s): Surgeon(s) and Role:     * Inés Harrell MD - Primary   Estimated blood loss: 5 mL    Specimens:   ID Type Source Tests Collected by Time Destination   1 : Abdominal Wall  Tissue Abdomen ANAEROBIC BACTERIAL CULTURE, GRAM STAIN, TISSUE CULTURE AEROBIC BACTERIAL Inés Harrell MD 6/2/2017  8:03 AM       Findings: See op note.

## 2017-06-02 NOTE — PLAN OF CARE
Problem: Goal Outcome Summary  Goal: Goal Outcome Summary  Outcome: No Change  Dressing with moderate amt sero-sang drainage. Reinforced. Denies pain.

## 2017-06-02 NOTE — IP AVS SNAPSHOT
[facility]    [address] [place] [address]    Phone:  [phone]                                       After Visit Summary   6/2/2017    Lilli Steven    MRN: [id number]           After Visit Summary Signature Page     I have received my discharge instructions, and my questions have been answered. I have discussed any challenges I see with this plan with the nurse or doctor.    ..........................................................................................................................................  Patient/Patient Representative Signature      ..........................................................................................................................................  Patient Representative Print Name and Relationship to Patient    ..................................................               ................................................  Date                                            Time    ..........................................................................................................................................  Reviewed by Signature/Title    ...................................................              ..............................................  Date                                                            Time

## 2017-06-02 NOTE — IP AVS SNAPSHOT
MRN:[id number]                      After Visit Summary   6/2/2017    Lilli Steven    MRN: [id number]           Thank you!     Thank you for choosing [facility] for your care. Our goal is always to provide you with excellent care. Hearing back from our patients is one way we can continue to improve our services. Please take a few minutes to complete the written survey that you may receive in the mail after you visit with us. Thank you!        Patient Information     Date Of Birth          1950        About your hospital stay     You were admitted on:  June 2, 2017 You last received care in the:  [place] [address] [facility]    You were discharged on:  Breana 3, 2017       Who to Call     For medical emergencies, please call 911.  For non-urgent questions about your medical care, please call your primary care provider or clinic, [phone]  For questions related to your surgery, please call your surgery clinic        Attending Provider     Provider Specialty    Inés Harrell MD Plastic Surgery       Primary Care Provider Office Phone # Fax #    Vargas Ata Chaudhry [phone]      Your next 10 appointments already scheduled     Aug 01, 2017  3:40 PM CDT   (Arrive by 3:25 PM)   Return Botox with Hermes Frias MD   [facility] ([facility])    [address] [place] [id number] [phone]            Oct 12, 2017  3:50 PM CDT   (Arrive by 3:35 PM)   Return Botox with Hermes Frias MD   [facility] ([facility])    [address] [place] [id number] [phone]              Further instructions from your care team               Information for Patients Discharging with a Transderm Scopolamine Patch       Dry mouth is a common side effect.    Drowsiness is another common  side effect especially when combined with pain medication.  Please avoid activities that require mental alertness such as driving a car or making important legal decisions.    Since Scopolamine can cause temporary dilation of the pupils and blurred vision if it comes in contact with the eyes; be sure to wash your hands thoroughly with soap and water immediately after handling the patch.   When you remove your patch, please stick it to a tissue or paper towel for disposal.      Remove the patch immediately and contact a physician in the unlikely event that you experience symptoms of acute glaucoma (pain and reddening of the eyes, accompanied by dilated pupils).    Remove the patch if you develop any difficulties urinating.  If you cannot urinate after removing your patch, please notify your surgeon.    Remove the patch 24 hours after surgery.      Discharge instructions:  Regular diet  Activity as tolerated, avoid heavy exercise/lifting greater than 10-15 pounds/belly exercises until instructed by Dr. Gamboa  May shower.  Continue changing dressings 2 times a day at home.  Saline soaked Kerlix roll and then covered with ABD pad and tape.    Call Dr. Gamboa:    1.  ANY questions/concerns  2.  Increased pain needing pain meds  3.  Any sign of infections including purulent drainage, redness/warmth around incision, temp greater than 100.4.     Follow up appt:    Call office to schedule an appt with Dr. Harrell for next week.    Pending Results     Date and Time Order Name Status Description    2017 0803 Tissue Culture Aerobic Bacterial Preliminary     2017 0803 Anaerobic bacterial culture In process             Admission Information     Date & Time Provider Department Dept. Phone    2017 Inés Harrell MD [place] [address] [facility] [phone]      Your Vitals Were     Blood Pressure Temperature Respirations Height Weight Pulse Oximetry    109/71 97.5  F (36.4  C) (Oral) 16 1.6 m (5'  "3\") 73.2 kg (161 lb 6.4 oz) 94%    BMI (Body Mass Index)                   28.59 kg/m2           [employer] Information     [employer] gives you secure access to your electronic health record. If you see a primary care provider, you can also send messages to your care team and make appointments. If you have questions, please call your primary care clinic.  If you do not have a primary care provider, please call [phone] and they will assist you.        Care EveryWhere ID     This is your Care EveryWhere ID. This could be used by other organizations to access your [facility] medical records  [id number]           Review of your medicines      UNREVIEWED medicines. Ask your doctor about these medicines        Dose / Directions    BACTRIM DS PO        Dose:  1 tablet   Take 1 tablet by mouth 2 times daily For 10 days   Refills:  0       BOTOX IJ        Dose:  125 Units   Inject 125 Units into the muscle See Admin Instructions Every 10 weeks (Lot # /C3  Exp: 8/2019)   Refills:  0       calcium carb 1250 mg (500 mg [place])/vitamin D 200 units 500-200 MG-UNIT per tablet   Commonly known as:  OSCAL with D        Dose:  1 tablet   Take 1 tablet by mouth 2 times daily (with meals)   Refills:  0       SIMVASTATIN PO        Dose:  20 mg   Take 20 mg by mouth every evening   Refills:  0       TRAZODONE HCL PO        Dose:  150 mg   Take 150 mg by mouth At Bedtime (Takes 3 x 50mg tablet = 150mg dose)   Refills:  0       triamcinolone 55 MCG/ACT Inhaler   Commonly known as:  NASACORT        Dose:  1 spray   Spray 1 spray into both nostrils daily as needed (congestion)   Refills:  0                Protect others around you: Learn how to safely use, store and throw away your medicines at www.disposemymeds.org.             Medication List: This is a list of all your medications and when to take them. Check marks below indicate your daily home schedule. Keep this list as a reference.      Medications           Morning Afternoon " Evening Bedtime As Needed    BACTRIM DS PO   Take 1 tablet by mouth 2 times daily For 10 days   Last time this was given:  1 tablet on 6/3/2017  8:11 AM   Next Dose Due:  Take again this evening and continue taking 2 times a day for 10 days.                                      BOTOX IJ   Inject 125 Units into the muscle See Admin Instructions Every 10 weeks (Lot # /C3  Exp: 8/2019)                                calcium carb 1250 mg (500 mg [place])/vitamin D 200 units 500-200 MG-UNIT per tablet   Commonly known as:  OSCAL with D   Take 1 tablet by mouth 2 times daily (with meals)   Next Dose Due:  As per home routine                                SIMVASTATIN PO   Take 20 mg by mouth every evening   Next Dose Due:  As per home routine                                TRAZODONE HCL PO   Take 150 mg by mouth At Bedtime (Takes 3 x 50mg tablet = 150mg dose)                                triamcinolone 55 MCG/ACT Inhaler   Commonly known as:  NASACORT   [place] 1 spray into both nostrils daily as needed (congestion)   Next Dose Due:  As per home routine

## 2017-06-02 NOTE — ANESTHESIA PREPROCEDURE EVALUATION
Anesthesia Evaluation     . Pt has had prior anesthetic.     History of anesthetic complications   - PONV        ROS/MED HX    ENT/Pulmonary:     (+)tobacco use, Past use , . .    Neurologic:     (+)other neuro dystonia - s/p botox tx    Cardiovascular:     (+) Dyslipidemia, ----. : . . . :. .       METS/Exercise Tolerance:     Hematologic:         Musculoskeletal:   (+) , , other musculoskeletal- abdominal wall seroma      GI/Hepatic:     (+) GERD Asymptomatic on medication,       Renal/Genitourinary:         Endo:         Psychiatric:         Infectious Disease:         Malignancy:         Other:                     Physical Exam  Normal systems: cardiovascular, pulmonary and dental    Airway   Mallampati: II  TM distance: >3 FB    Dental     Cardiovascular       Pulmonary                     Anesthesia Plan      History & Physical Review  History and physical reviewed and following examination; no interval change.    ASA Status:  2 .    NPO Status:  > 8 hours    Plan for General and ETT with Intravenous induction. Maintenance will be TIVA.    PONV prophylaxis:  Ondansetron (or other 5HT-3), Dexamethasone or Solumedrol and Scopolamine patch       Postoperative Care  Postoperative pain management:  IV analgesics.      Consents  Anesthetic plan, risks, benefits and alternatives discussed with:  Patient..        Procedure: Procedure(s):  COMBINED INCISION AND DRAINAGE ABDOMEN WASHOUT  Preop diagnosis: ABDOMINAL WALL SEROMA     Allergies   Allergen Reactions     Meloxicam      Diarrhea, vomiting     Primidone Other (See Comments)     Felt like motion sickness     Tegaderm Transparent Dressing (Informational Only)      Tramadol      Diarrhea, vomiting      Adhesive Tape Rash     Durabond only     Allergy Rash     Dermabond  Anesthesia IV set misc       Past Medical History:   Diagnosis Date     Diarrhea 9/6/2012     Hypercholesterolemia 9/6/2012     Neck pain 9/6/2012     PONV (postoperative nausea and vomiting)       Seasonal allergies 9/6/2012     Snores 9/6/2012     Wears glasses 9/6/2012     Past Surgical History:   Procedure Laterality Date     COSMETIC ABDOMINOPLASTY MODIFIED N/A 2/16/2017    Procedure: COSMETIC ABDOMINOPLASTY MODIFIED;  Surgeon: Inés Harrell MD;  Location:  OR     HERNIORRHAPHY EPIGASTRIC N/A 2/16/2017    Procedure: HERNIORRHAPHY EPIGASTRIC;  Surgeon: Joey Sargent MD;  Location:  OR     HERNIORRHAPHY UMBILICAL N/A 2/16/2017    Procedure: HERNIORRHAPHY UMBILICAL;  Surgeon: Joey Sargent MD;  Location:  OR     IMPLANT STIMULATOR SACRAL NERVE STAGE ONE      for bladder incontinence, failed; battery is dead     IRRIGATION AND DEBRIDEMENT TRUNK, COMBINED N/A 3/29/2017    Procedure: COMBINED IRRIGATION AND DEBRIDEMENT TRUNK;  Surgeon: Inés Harrell MD;  Location:  SD     TONSILLECTOMY       Prior to Admission medications    Medication Sig Start Date End Date Taking? Authorizing Provider   SIMVASTATIN PO Take 20 mg by mouth every evening   Yes Reported, Patient   TRAZODONE HCL PO Take 150 mg by mouth At Bedtime (Takes 3 x 50mg tablet = 150mg dose)   Yes Reported, Patient   Sulfamethoxazole-Trimethoprim (BACTRIM DS PO) Take 1 tablet by mouth 2 times daily For 10 days 5/31/17 6/10/17 Yes Reported, Patient   triamcinolone (NASACORT) 55 MCG/ACT Inhaler Spray 1 spray into both nostrils daily as needed (congestion)   Yes Reported, Patient   calcium carb 1250 mg, 500 mg [place],/vitamin D 200 units (OSCAL WITH D) 500-200 MG-UNIT per tablet Take 1 tablet by mouth 2 times daily (with meals)   Yes Reported, Patient   OnabotulinumtoxinA (BOTOX IJ) Inject 125 Units into the muscle See Admin Instructions Every 10 weeks (Lot # /C3  Exp: 8/2019) 1/5/17  Yes Hermes Frias MD     No current Epic-ordered facility-administered medications on file.      No current [facility]-ordered outpatient prescriptions on file.     Wt Readings from Last 1 Encounters:   03/29/17 73.3 kg (161 lb  9.6 oz)     Temp Readings from Last 1 Encounters:   03/29/17 36.4  C (97.5  F) (Temporal)     BP Readings from Last 6 Encounters:   05/25/17 132/61   03/29/17 105/71   03/27/17 112/72   03/15/17 177/66   03/13/17 127/52   02/17/17 103/56     Pulse Readings from Last 4 Encounters:   05/25/17 93   03/29/17 86   03/27/17 102   03/13/17 98     Resp Readings from Last 1 Encounters:   03/29/17 16     SpO2 Readings from Last 1 Encounters:   03/29/17 94%     Recent Labs   Lab Test  03/27/17   0910   NA  142   POTASSIUM  4.6   CHLORIDE  109   CO2  27   ANIONGAP  6   GLC  100*   BUN  16   CR  0.76   JAMEE  9.2     Recent Labs   Lab Test  02/16/17   0710   HGB  14.4     No results for input(s): INR in the last 70187 hours.    Invalid input(s): APTT   RECENT LABS:   ECG:   ECHO:   CXR:                        .

## 2017-06-02 NOTE — ANESTHESIA CARE TRANSFER NOTE
Patient: Lilli Steven    Procedure(s):  EVACUATION OF ABDOMINAL WALL SEROMA - Wound Class: II-Clean Contaminated    Diagnosis: ABDOMINAL WALL SEROMA   Diagnosis Additional Information: No value filed.    Anesthesia Type:   General, ETT     Note:  Airway :Face Mask  Patient transferred to:PACU  Comments: Pt to PACU with O2 via mask, airway patent, VSS.  Report to RN.      Vitals: (Last set prior to Anesthesia Care Transfer)    CRNA VITALS  6/2/2017 0802 - 6/2/2017 0837      6/2/2017             NIBP: 130/61    Pulse: 98    NIBP Mean: 74    SpO2: 95 %    Resp Rate (observed): 19                Electronically Signed By: SIERRA Alonso CRNA  June 2, 2017  8:37 AM

## 2017-06-03 VITALS
SYSTOLIC BLOOD PRESSURE: 109 MMHG | RESPIRATION RATE: 16 BRPM | DIASTOLIC BLOOD PRESSURE: 71 MMHG | TEMPERATURE: 97.5 F | OXYGEN SATURATION: 94 % | BODY MASS INDEX: 28.6 KG/M2 | HEIGHT: 63 IN | WEIGHT: 161.4 LBS

## 2017-06-03 PROCEDURE — 25000132 ZZH RX MED GY IP 250 OP 250 PS 637: Performed by: PLASTIC SURGERY

## 2017-06-03 RX ADMIN — SULFAMETHOXAZOLE AND TRIMETHOPRIM 1 TABLET: 800; 160 TABLET ORAL at 08:11

## 2017-06-03 NOTE — OP NOTE
DATE OF PROCEDURE:  2017      PREOPERATIVE DIAGNOSIS:  Chronic abdominal wall seroma.      POSTOPERATIVE DIAGNOSIS:  Chronic abdominal wall seroma.      PROCEDURE:  Evacuation of abdominal wall seroma.      DESCRIPTION OF PROCEDURE:  Sara Steven was marked for incisions and taken to the operating room.  General anesthesia was administered.  The abdominal area was prepped and draped in a sterile manner.  An incision made along the lower abdomen following the previous scar and dissection was carried down to the underlying abdominal wall.  A chronic seroma was entered.  Some fluid and and soft tissue was sent for culture.  The space was irrigated.  Cautery was used to score the wall of the seroma.  Hemostasis was achieved.  Areas of fat necrosis were debrided.  Hemostasis was achieved.  A saline moistened Kerlix roll was then placed within the wound and secured with an ABD.         MELECIO LEE MD             D: 2017 08:21   T: 2017 21:33   MT: [id number]      Name:     SARA STEVEN   MRN:      [id number]        Account:        [id number]   :      1950           Procedure Date: 2017      Document: [id number]

## 2017-06-03 NOTE — PROVIDER NOTIFICATION
On call plastic surgeon paged regarding pt's positive gram stain culture. Order received telephonically to resume home med bactrim ds po.

## 2017-06-03 NOTE — PLAN OF CARE
Problem: Goal Outcome Summary  Goal: Goal Outcome Summary  Outcome: No Change  VSS, denies pain, A&OX4.  Up independently.  Passing flatus.  Tolerating regular diet.  Pt instructed on abdominal dressing change (packing) 2 times a day and states she feels confident she can do it herself at home. DC instructions reviewed and well received by pt.  Pt states having bactrim at home.  DC to home via w/c.

## 2017-06-03 NOTE — PLAN OF CARE
Problem: Goal Outcome Summary  Goal: Goal Outcome Summary  Outcome: Improving  AVSS on 1L o2.. Denies pain and nausea. Dressing changed tonight per order. Bloody drainage. Covered with ABD. Pt resting comfortably

## 2017-06-03 NOTE — DISCHARGE INSTRUCTIONS
Information for Patients Discharging with a Transderm Scopolamine Patch       Dry mouth is a common side effect.    Drowsiness is another common side effect especially when combined with pain medication.  Please avoid activities that require mental alertness such as driving a car or making important legal decisions.    Since Scopolamine can cause temporary dilation of the pupils and blurred vision if it comes in contact with the eyes; be sure to wash your hands thoroughly with soap and water immediately after handling the patch.   When you remove your patch, please stick it to a tissue or paper towel for disposal.      Remove the patch immediately and contact a physician in the unlikely event that you experience symptoms of acute glaucoma (pain and reddening of the eyes, accompanied by dilated pupils).    Remove the patch if you develop any difficulties urinating.  If you cannot urinate after removing your patch, please notify your surgeon.    Remove the patch 24 hours after surgery.      Discharge instructions:  Regular diet  Activity as tolerated, avoid heavy exercise/lifting greater than 10-15 pounds/belly exercises until instructed by Dr. Gamboa  May shower.  Continue changing dressings 2 times a day at home.  Saline soaked Kerlix roll and then covered with ABD pad and tape.    Call Dr. Gamboa:    1.  ANY questions/concerns  2.  Increased pain needing pain meds  3.  Any sign of infections including purulent drainage, redness/warmth around incision, temp greater than 100.4.     Follow up appt:    Call office to schedule an appt with Dr. Harrell for next week.

## 2017-06-03 NOTE — PLAN OF CARE
Problem: Goal Outcome Summary  Goal: Goal Outcome Summary  Outcome: Improving  VSS on RA. A/Ox4. Incision on abdomen UTV, dressing CDI. Denies pain. Up with SBA/independent. Regular diet. BS hypoactive, Flatus -. Voiding adequately to the bathroom. LS clear.

## 2017-06-04 LAB
BACTERIA SPEC CULT: ABNORMAL
MICRO REPORT STATUS: ABNORMAL
MICROORGANISM SPEC CULT: ABNORMAL
SPECIMEN SOURCE: ABNORMAL

## 2017-06-09 LAB
BACTERIA SPEC CULT: NORMAL
Lab: NORMAL
MICRO REPORT STATUS: NORMAL
SPECIMEN SOURCE: NORMAL

## 2017-07-22 DIAGNOSIS — F51.01 PRIMARY INSOMNIA: ICD-10-CM

## 2017-07-24 RX ORDER — TRAZODONE HYDROCHLORIDE 50 MG/1
TABLET, FILM COATED ORAL
Qty: 90 TABLET | Refills: 3 | Status: SHIPPED | OUTPATIENT
Start: 2017-07-24 | End: 2017-10-12

## 2017-07-24 NOTE — TELEPHONE ENCOUNTER
Trazodone       Last Written Prescription Date:    Last Fill Quantity: 90,   # refills: 3  Last Office Visit with G, P or [facility] prescribing provider: 03/27/2017  Future Office visit:       Routing refill request to provider for review/approval because:  Medication is reported/historical

## 2017-07-28 ENCOUNTER — OFFICE VISIT (OUTPATIENT)
Dept: FAMILY MEDICINE | Facility: CLINIC | Age: 67
End: 2017-07-28
Payer: COMMERCIAL

## 2017-07-28 VITALS
SYSTOLIC BLOOD PRESSURE: 136 MMHG | OXYGEN SATURATION: 97 % | BODY MASS INDEX: 28.81 KG/M2 | HEART RATE: 96 BPM | DIASTOLIC BLOOD PRESSURE: 84 MMHG | WEIGHT: 162.6 LBS | HEIGHT: 63 IN

## 2017-07-28 DIAGNOSIS — M85.80 OSTEOPENIA, UNSPECIFIED LOCATION: ICD-10-CM

## 2017-07-28 DIAGNOSIS — Z00.00 ROUTINE HISTORY AND PHYSICAL EXAMINATION OF ADULT: Primary | ICD-10-CM

## 2017-07-28 DIAGNOSIS — L30.9 DERMATITIS: ICD-10-CM

## 2017-07-28 DIAGNOSIS — Z12.31 VISIT FOR SCREENING MAMMOGRAM: ICD-10-CM

## 2017-07-28 DIAGNOSIS — I78.1 SPIDER TELANGIECTASIS: ICD-10-CM

## 2017-07-28 PROCEDURE — 99397 PER PM REEVAL EST PAT 65+ YR: CPT | Performed by: FAMILY MEDICINE

## 2017-07-28 RX ORDER — MOMETASONE FUROATE 1 MG/G
CREAM TOPICAL
Qty: 45 G | Refills: 0 | Status: SHIPPED | OUTPATIENT
Start: 2017-07-28 | End: 2019-01-25

## 2017-07-28 NOTE — MR AVS SNAPSHOT
After Visit Summary   7/28/2017    Lilli Steven    MRN: [id number]           Patient Information     Date Of Birth          1950        Visit Information        Provider Department      7/28/2017 4:30 PM Vargas Chaudhry MD [facility]        Today's Diagnoses     Routine history and physical examination of adult    -  1    Spider telangiectasis        Visit for screening mammogram        Dermatitis        Osteopenia, unspecified location          Care Instructions      Dexa scan [phone]    Call [facility] today to schedule your mammogram: [phone]    ---      Preventive Health Recommendations  Female Ages 65 +    Yearly exam:     See your health care provider every year in order to  o Review health changes.   o Discuss preventive care.    o Review your medicines if your doctor has prescribed any.      You no longer need a yearly Pap test unless you've had an abnormal Pap test in the past 10 years. If you have vaginal symptoms, such as bleeding or discharge, be sure to talk with your provider about a Pap test.      Every 1 to 2 years, have a mammogram.  If you are over 69, talk with your health care provider about whether or not you want to continue having screening mammograms.      Every 10 years, have a colonoscopy. Or, have a yearly FIT test (stool test). These exams will check for colon cancer.       Have a cholesterol test every 5 years, or more often if your doctor advises it.       Have a diabetes test (fasting glucose) every three years. If you are at risk for diabetes, you should have this test more often.       At age 65, have a bone density scan (DEXA) to check for osteoporosis (brittle bone disease).    Shots:    Get a flu shot each year.    Get a tetanus shot every 10 years.    Talk to your doctor about your pneumonia vaccines. There are now two you should receive - Pneumovax (PPSV 23) and Prevnar (PCV 13).    Talk to your doctor about the  shingles vaccine.    Talk to your doctor about the hepatitis B vaccine.    Nutrition:     Eat at least 5 servings of fruits and vegetables each day.      Eat whole-grain bread, whole-wheat pasta and brown rice instead of white grains and rice.      Talk to your provider about Calcium and Vitamin D.     Lifestyle    Exercise at least 150 minutes a week (30 minutes a day, 5 days a week). This will help you control your weight and prevent disease.      Limit alcohol to one drink per day.      No smoking.       Wear sunscreen to prevent skin cancer.       See your dentist twice a year for an exam and cleaning.      See your eye doctor every 1 to 2 years to screen for conditions such as glaucoma, macular degeneration, cataracts, etc           Follow-ups after your visit        Additional Services     DERMATOLOGY REFERRAL       Your provider has referred you to: [facility], [facility] for [place]    Please be aware that coverage of these services is subject to the terms and limitations of your health insurance plan.  Call member services at your health plan with any benefit or coverage questions.      Please bring the following with you to your appointment:    (1) Any X-Rays, CTs or MRIs which have been performed.  Contact the facility where they were done to arrange for  prior to your scheduled appointment.    (2) List of current medications  (3) This referral request   (4) Any documents/labs given to you for this referral                  Your next 10 appointments already scheduled     Aug 01, 2017  3:40 PM CDT   (Arrive by 3:25 PM)   Return Botox with Hermes Frias MD   [facility] ([facility])    [address] [place] [id number] [phone]            Oct 12, 2017  3:50 PM CDT   (Arrive by 3:35 PM)   Return Botox with Hermes Frias MD   [facility] ([facility]    [address] [facility] [address] [phone]              Future tests that were ordered for you today     Open Future Orders        Priority Expected Expires Ordered    MA Screening Digital Bilateral Routine  7/28/2018 7/28/2017    DX Hip/Pelvis/Spine Routine  7/28/2018 7/28/2017            Who to contact     If you have questions or need follow up information about today's clinic visit or your schedule please contact [facility] directly at [phone].  Normal or non-critical lab and imaging results will be communicated to you by Thatgamecompanyhart, letter or phone within 4 business days after the clinic has received the results. If you do not hear from us within 7 days, please contact the clinic through [employer] or phone. If you have a critical or abnormal lab result, we will notify you by phone as soon as possible.  Submit refill requests through [employer] or call your pharmacy and they will forward the refill request to us. Please allow 3 business days for your refill to be completed.          Additional Information About Your Visit        Thatgamecompany[employer]fercity Information     [employer] gives you secure access to your electronic health record. If you see a primary care provider, you can also send messages to your care team and make appointments. If you have questions, please call your primary care clinic.  If you do not have a primary care provider, please call [phone] and they will assist you.        Care EveryWhere ID     This is your Care EveryWhere ID. This could be used by other organizations to access your [facility] medical records  [id number]        Your Vitals Were     Pulse Height Pulse Oximetry BMI (Body Mass Index)          96 5' 3\" (1.6 m) 97% 28.8 kg/m2         Blood Pressure from Last 3 Encounters:   07/28/17 (!) 128/96   06/03/17 109/71   05/25/17 132/61    Weight from Last 3 Encounters:   07/28/17 162 lb 9.6 oz (73.8 kg)   06/02/17 161 lb 6.4 oz (73.2 kg) "   03/29/17 161 lb 9.6 oz (73.3 kg)              We Performed the Following     DERMATOLOGY REFERRAL          Today's Medication Changes          These changes are accurate as of: 7/28/17  5:07 PM.  If you have any questions, ask your nurse or doctor.               Start taking these medicines.        Dose/Directions    mometasone 0.1 % cream   Commonly known as:  ELOCON   Used for:  Dermatitis   Started by:  Vargas Chaudhry MD        Apply sparingly to affected area twice daily as needed.  Do not apply to face.   Quantity:  45 g   Refills:  0            Where to get your medicines      These medications were sent to [place] [address] [facility] - [place], [place] - [address] [facility]  [address] [address]     Phone:  [phone]     mometasone 0.1 % cream                Primary Care Provider Office Phone # Fax #    Vargas Chaudhry [phone]       [facility] [address] [facility] [address]        Equal Access to Services     RAJWINDER TEJEDA AH: Hadii aad ku hadasho Soomaali, waaxda luqadaha, qaybta kaalmada adeegyada, waxay idiin hayaan adeeg kharaalexandrea la'faizan . So [facility] [phone].    ATENCIÓN: Si habla español, tiene a sweet disposición servicios gratuitos de asistencia lingüística. Llame al [phone].    We comply with applicable federal civil rights laws and [place] laws. We do not discriminate on the basis of race, color, national origin, age, disability sex, sexual orientation or gender identity.            Thank you!     Thank you for choosing [facility]  for your care. Our goal is always to provide you with excellent care. Hearing back from our patients is one way we can continue to improve our services. Please take a few minutes to complete the written survey that you may receive in the mail after your visit with us. Thank you!             Your Updated Medication List - Protect others around you: Learn how to safely use, store and  throw away your medicines at www.disposemymeds.org.          This list is accurate as of: 7/28/17  5:07 PM.  Always use your most recent med list.                   Brand Name Dispense Instructions for use Diagnosis    BOTOX IJ      Inject 125 Units into the muscle See Admin Instructions Every 10 weeks (Lot # /[id number]  Exp: 8/2019)        calcium carb 1250 mg (500 mg Igiugig)/vitamin D 200 units 500-200 MG-UNIT per tablet    OSCAL with D     Take 1 tablet by mouth 2 times daily (with meals)        mometasone 0.1 % cream    ELOCON    45 g    Apply sparingly to affected area twice daily as needed.  Do not apply to face.    Dermatitis       SIMVASTATIN PO      Take 20 mg by mouth every evening        * TRAZODONE HCL PO      Take 150 mg by mouth At Bedtime (Takes 3 x 50mg tablet = 150mg dose)        * traZODone 50 MG tablet    DESYREL    90 tablet    TAKE 3 TABLETS BY MOUTH AT BEDTIME    Primary insomnia       triamcinolone 55 MCG/ACT Inhaler    NASACORT     Spray 1 spray into both nostrils daily as needed (congestion)        * Notice:  This list has 2 medication(s) that are the same as other medications prescribed for you. Read the directions carefully, and ask your doctor or other care provider to review them with you.

## 2017-07-28 NOTE — PATIENT INSTRUCTIONS
Dexa scan [phone]    Call [facility] today to schedule your mammogram: [phone]    ---      Preventive Health Recommendations  Female Ages 65 +    Yearly exam:     See your health care provider every year in order to  o Review health changes.   o Discuss preventive care.    o Review your medicines if your doctor has prescribed any.      You no longer need a yearly Pap test unless you've had an abnormal Pap test in the past 10 years. If you have vaginal symptoms, such as bleeding or discharge, be sure to talk with your provider about a Pap test.      Every 1 to 2 years, have a mammogram.  If you are over 69, talk with your health care provider about whether or not you want to continue having screening mammograms.      Every 10 years, have a colonoscopy. Or, have a yearly FIT test (stool test). These exams will check for colon cancer.       Have a cholesterol test every 5 years, or more often if your doctor advises it.       Have a diabetes test (fasting glucose) every three years. If you are at risk for diabetes, you should have this test more often.       At age 65, have a bone density scan (DEXA) to check for osteoporosis (brittle bone disease).    Shots:    Get a flu shot each year.    Get a tetanus shot every 10 years.    Talk to your doctor about your pneumonia vaccines. There are now two you should receive - Pneumovax (PPSV 23) and Prevnar (PCV 13).    Talk to your doctor about the shingles vaccine.    Talk to your doctor about the hepatitis B vaccine.    Nutrition:     Eat at least 5 servings of fruits and vegetables each day.      Eat whole-grain bread, whole-wheat pasta and brown rice instead of white grains and rice.      Talk to your provider about Calcium and Vitamin D.     Lifestyle    Exercise at least 150 minutes a week (30 minutes a day, 5 days a week). This will help you control your weight and prevent disease.      Limit alcohol to one drink per day.      No smoking.       Wear  sunscreen to prevent skin cancer.       See your dentist twice a year for an exam and cleaning.      See your eye doctor every 1 to 2 years to screen for conditions such as glaucoma, macular degeneration, cataracts, etc

## 2017-07-28 NOTE — PROGRESS NOTES
"   SUBJECTIVE:   CC: Lilli Steven is an 67 year old woman who presents for preventive health visit.     Healthy Habits:    Do you get at least three servings of calcium containing foods daily (dairy, green leafy vegetables, etc.)? {YES/NO, DAIRY INTAKE:724159::\"yes\"}    Amount of exercise or daily activities, outside of work: {AMOUNT EXERCISE:051144}    Problems taking medications regularly {Yes /No default:028830::\"No\"}    Medication side effects: {Yes /No default.:393798::\"No\"}    Have you had an eye exam in the past two years? {YESNOBLANK:249886}    Do you see a dentist twice per year? {YESNOBLANK:398034}    Do you have sleep apnea, excessive snoring or daytime drowsiness?{YESNOBLANK:601183}    {Outside tests to abstract? :766364}    {additional problems to add (Optional):673031}    Today's PHQ-2 Score:   PHQ-2 ( 1999 Pfizer) 7/25/2017 3/27/2017   Q1: Little interest or pleasure in doing things 0 0   Q2: Feeling down, depressed or hopeless 0 0   PHQ-2 Score 0 0   Q1: Little interest or pleasure in doing things Not at all -   Q2: Feeling down, depressed or hopeless Not at all -   PHQ-2 Score 0 -     {PHQ-2 LOOK IN ASSESSMENTS (Optional) :573247}  Abuse: Current or Past(Physical, Sexual or Emotional)- {YES/NO/NA:997206}  Do you feel safe in your environment - {YES/NO/NA:357692}    Social History   Substance Use Topics     Smoking status: Former Smoker     Types: Cigarettes     Quit date: 1/1/1985     Smokeless tobacco: Never Used     Alcohol use 0.0 oz/week     0 Standard drinks or equivalent per week      Comment: glass of wine daily     {ETOH AUDIT:229714}    Reviewed orders with patient.  Reviewed health maintenance and updated orders accordingly - {Yes/No:588064::\"Yes\"}  {Chronicprobdata (Optional):764200}    {Mammo Decision Support (Optional):093119}    Pertinent mammograms are reviewed under the imaging tab.  History of abnormal Pap smear: {PAP HX:647631}    Reviewed and updated as needed this visit " "by clinical staff         Reviewed and updated as needed this visit by Provider        {HISTORY OPTIONS (Optional):489995}    ROS:  {FEMALE PREVENTATIVE ROS:489545}    OBJECTIVE:   There were no vitals taken for this visit.  EXAM:  {Exam Choices:084297}    ASSESSMENT/PLAN:   {Diag Picklist:951623}    COUNSELING:   {FEMALE COUNSELING MESSAGES:818022::\"Reviewed preventive health counseling, as reflected in patient instructions\"}    {BP Counseling- Complete if BP >= 120/80  (Optional):600858}     reports that she quit smoking about 32 years ago. Her smoking use included Cigarettes. She has never used smokeless tobacco.  {Tobacco Cessation -- Complete if patient is a smoker (Optional):753593}  Estimated body mass index is 28.59 kg/(m^2) as calculated from the following:    Height as of 6/2/17: 5' 3\" (1.6 m).    Weight as of 6/2/17: 161 lb 6.4 oz (73.2 kg).   {Weight Management Plan (ACO) Complete if BMI is abnormal-  Ages 18-64  BMI >24.9.  Age 65+ with BMI <23 or >30 (Optional):790556}    Counseling Resources:  ATP IV Guidelines  Pooled Cohorts Equation Calculator  Breast Cancer Risk Calculator  FRAX Risk Assessment  ICSI Preventive Guidelines  Dietary Guidelines for Americans, 2010  USDA's MyPlate  ASA Prophylaxis  Lung CA Screening    Vargas Chaudhry MD  [facility] CL[facility]ICAnswers for HPI/ROS submitted by the patient on 7/25/2017   Annual Exam:  Getting at least 3 servings of Calcium per day:: Yes  Bi-annual eye exam:: NO  Dental care twice a year:: Yes  Sleep apnea or symptoms of sleep apnea:: None  Frequency of exercise:: None  Taking medications regularly:: Yes  Medication side effects:: Not applicable  Additional concerns today:: No  PHQ-2 Score: 0    "

## 2017-07-28 NOTE — NURSING NOTE
"Chief Complaint   Patient presents with     Physical     discuss mometasone  would like rx, discuss other rx for botox-referral, also derm referral     BP (!) 128/96  Pulse 96  Ht 5' 3\" (1.6 m)  Wt 162 lb 9.6 oz (73.8 kg)  SpO2 97%  BMI 28.8 kg/m2 Estimated body mass index is 28.8 kg/(m^2) as calculated from the following:    Height as of this encounter: 5' 3\" (1.6 m).    Weight as of this encounter: 162 lb 9.6 oz (73.8 kg).  Medication Reconciliation: complete      Health Maintenance due pending provider review:  Mammogram    MAMMO/COLON--Gave pt phone number, and pended order for Mammo and/or Colonoscopy    Nargis Brown CMA  "

## 2017-07-28 NOTE — PROGRESS NOTES
SUBJECTIVE:   Lilli Steven is a 67 year old female who presents for Preventive Visit.      Are you in the first 12 months of your Medicare coverage?  No    Physical   Annual:     Getting at least 3 servings of Calcium per day::  Yes    Bi-annual eye exam::  NO    Dental care twice a year::  Yes    Sleep apnea or symptoms of sleep apnea::  None    Frequency of exercise::  None    Taking medications regularly::  Yes    Medication side effects::  Not applicable    Additional concerns today::  No      COGNITIVE SCREEN  1) Repeat 3 items (Banana, Sunrise, Chair)    2) Clock draw: NORMAL  3) 3 item recall: Recalls 3 objects  Results: 3 items recalled: COGNITIVE IMPAIRMENT LESS LIKELY    Mini-CogTM Copyright S Sandeep. Licensed by the author for use in [facility] [employer]; reprinted with permission ([email or web address]). All rights reserved.        Reviewed and updated as needed this visit by clinical staffTobacco  Allergies  Meds  Med Hx  Surg Hx  Fam Hx  Soc Hx        Reviewed and updated as needed this visit by Provider        Social History   Substance Use Topics     Smoking status: Former Smoker     Types: Cigarettes     Quit date: 1/1/1985     Smokeless tobacco: Never Used     Alcohol use 0.0 oz/week     0 Standard drinks or equivalent per week      Comment: glass of wine daily       The patient does not drink >3 drinks per day nor >7 drinks per week.            Today's PHQ-2 Score:   PHQ-2 ( 1999 Pfizer) 7/28/2017   Q1: Little interest or pleasure in doing things 0   Q2: Feeling down, depressed or hopeless 0   PHQ-2 Score 0   Q1: Little interest or pleasure in doing things -   Q2: Feeling down, depressed or hopeless -   PHQ-2 Score -       Do you feel safe in your environment - Yes    Do you have a Health Care Directive?: No: Advance care planning was reviewed with patient; patient declined at this time.      Current providers sharing in care for this patient include: Patient Care Team:  Vargas Chaudhry  MD Ata as PCP - General (Family Practice)  Hermes Frias MD as MD (Physical Medicine and Rehab)  Erin Combs PT as Physical Therapist (Physical Medicine and Rehab)  Mikki Sotelo, RN as Clinic Care Coordinator (Physical Medicine and Rehab)      Hearing impairment: No    Ability to successfully perform activities of daily living: Yes, no assistance needed     Fall risk:  Fallen 2 or more times in the past year?: No  Any fall with injury in the past year?: Yes  Timed Up and Go Test (>13.5 is fall risk; contact physician) : 6      Home safety:  none identified      The following health maintenance items are reviewed in Epic and correct as of today:  Health Maintenance   Topic Date Due     ADVANCE DIRECTIVE PLANNING Q5 YRS  03/18/2005     MAMMO SCREEN Q2 YR (SYSTEM ASSIGNED)  04/08/2017     DEXA Q2 YR  07/14/2017     INFLUENZA VACCINE (SYSTEM ASSIGNED)  09/01/2017     PNEUMOCOCCAL (2 of 2 - PPSV23) 03/27/2018     FALL RISK ASSESSMENT  07/28/2018     COLON CANCER SCREEN (SYSTEM ASSIGNED)  10/14/2018     LIPID SCREEN Q5 YR FEMALE (SYSTEM ASSIGNED)  03/27/2022     TETANUS IMMUNIZATION (SYSTEM ASSIGNED)  03/27/2027     HEPATITIS C SCREENING  Addressed     Patient Active Problem List   Diagnosis     Spasmodic torticollis     Wears glasses     Diarrhea     Neck pain     Seasonal allergies     Genetic torsion dystonia     Osteopenia     Hyperlipidemia LDL goal <160     Urge incontinence     Tear of lateral cartilage or meniscus of knee, current     Urinary incontinence     Past Surgical History:   Procedure Laterality Date     COSMETIC ABDOMINOPLASTY MODIFIED N/A 2/16/2017    Procedure: COSMETIC ABDOMINOPLASTY MODIFIED;  Surgeon: Inés Harrell MD;  Location: [facility] OR     HERNIORRHAPHY EPIGASTRIC N/A 2/16/2017    Procedure: HERNIORRHAPHY EPIGASTRIC;  Surgeon: Joey Sargent MD;  Location: [facility] OR     HERNIORRHAPHY UMBILICAL N/A 2/16/2017    Procedure: HERNIORRHAPHY UMBILICAL;  Surgeon:  Joey Sargent MD;  Location:  OR     IMPLANT STIMULATOR SACRAL NERVE STAGE ONE      for bladder incontinence, failed; battery is dead     INCISION AND DRAINAGE ABDOMEN WASHOUT, COMBINED N/A 6/2/2017    Procedure: COMBINED INCISION AND DRAINAGE ABDOMEN WASHOUT;  EVACUATION OF ABDOMINAL WALL SEROMA;  Surgeon: Inés Harrell MD;  Location:  OR     IRRIGATION AND DEBRIDEMENT TRUNK, COMBINED N/A 3/29/2017    Procedure: COMBINED IRRIGATION AND DEBRIDEMENT TRUNK;  Surgeon: Inés Harrell MD;  Location:  [facility]     TONSILLECTOMY         Social History   Substance Use Topics     Smoking status: Former Smoker     Types: Cigarettes     Quit date: 1/1/1985     Smokeless tobacco: Never Used     Alcohol use 0.0 oz/week     0 Standard drinks or equivalent per week      Comment: glass of wine daily     Family History   Problem Relation Age of Onset     CANCER Father      49 yrs old - bladder     Dementia Mother      she is living in [facility] in [facility]     Breast Cancer Mother      70s     HEART DISEASE Maternal Grandfather      disease     Hypertension Maternal Grandfather      High cholesterol Maternal Grandfather      HEART DISEASE Paternal Grandmother      disease     Hypertension Paternal Grandmother      High cholesterol Paternal Grandmother      HEART DISEASE Maternal Grandmother      disease     Hypertension Paternal Grandfather      High cholesterol Paternal Grandfather              Mammogram Screening: Patient over age 50, mutual decision to screen reflected in health maintenance.    History of abnormal Pap smear: NO - age 65 - see link Cervical Cytology Screening Guidelines    in addition to health maintanance patient would like to discuss the following problem:  Went to [facility] dermatology, she though she was just going to a cosmetic appt, but got a medical evaulation first, needs a retroactive referral for that  We discussed briefly some skin issues including spider  "telangiectasias and a cream for dermatitis  Refilled the cream      ROS:  C: NEGATIVE for fever, chills, change in weight  I: NEGATIVE for worrisome rashes, moles or lesions  E: NEGATIVE for vision changes or irritation  E/M: NEGATIVE for ear, mouth and throat problems  R: NEGATIVE for significant cough or SOB  B: NEGATIVE for masses, tenderness or discharge  CV: NEGATIVE for chest pain, palpitations or peripheral edema  GI: NEGATIVE for nausea, abdominal pain, heartburn, or change in bowel habits  : NEGATIVE for frequency, dysuria, or hematuria  M: NEGATIVE for significant arthralgias or myalgia  N: NEGATIVE for weakness, dizziness or paresthesias  E: NEGATIVE for temperature intolerance, skin/hair changes  H: NEGATIVE for bleeding problems  P: NEGATIVE for changes in mood or affect    OBJECTIVE:   Objective:  /84  Pulse 96  Ht 5' 3\" (1.6 m)  Wt 162 lb 9.6 oz (73.8 kg)  SpO2 97%  BMI 28.8 kg/m2  General Appearance: Pleasant, alert, in no acute respiratory distress.  Head Exam: Normal. Normocephalic, atraumatic. No sinus tenderness.  Eye Exam: Normal external eye, conjunctiva, lids. ELENA.  Ear Exam: Normal auditory canals and external ears. Non-tender.  Left TM-normal. Right TM-normal.  OroPharynx Exam: Dental hygiene adequate. Normal buccal mucosa. Normal pharynx.  Neck Exam: Supple, no masses or enlarged, tender nodes.  Thyroid Exam: No nodules or enlargement or tenderness.  Chest/Respiratory Exam: Normal, comfortable, easy respirations. Chest wall normal. Lungs are clear to auscultation. No wheezing, crackles, or rhonchi.  Cardiovascular Exam: Regular rate and rhythm. No murmur, gallop, or rubs. No pedal edema.  Gastrointestinal Exam: Soft, non-tender, no masses or organomegaly.  Musculoskeletal Exam: Back is non-tender, full ROM of upper and lower extremities.   Skin: no rash, warm and dry.   Neurologic Exam: Nonfocal, no tremor. Normal gait.  Psychiatric Exam: Alert - appropriate, normal " "affect      ASSESSMENT / PLAN:       ICD-10-CM    1. Routine history and physical examination of adult Z00.00 MA Screening Digital Bilateral   2. 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 Visit for screening mammogram Z12.31 MA Screening Digital Bilateral   4. Dermatitis L30.9 mometasone (ELOCON) 0.1 % cream   5. Osteopenia, unspecified location M85.80 DX Hip/Pelvis/Spine       End of Life Planning:  Patient currently has an advanced directive: Yes.  Practitioner is supportive of decision.    COUNSELING:  Reviewed preventive health counseling, as reflected in patient instructions       Regular exercise       Healthy diet/nutrition       Osteoporosis Prevention/Bone Health       Colon cancer screening       Mammogram        Estimated body mass index is 28.8 kg/(m^2) as calculated from the following:    Height as of this encounter: 5' 3\" (1.6 m).    Weight as of this encounter: 162 lb 9.6 oz (73.8 kg).  Weight management plan: Discussed healthy diet and exercise guidelines and patient will follow up in 12 months in clinic to re-evaluate.   reports that she quit smoking about 32 years ago. Her smoking use included Cigarettes. She has never used smokeless tobacco.        Appropriate preventive services were discussed with this patient, including applicable screening as appropriate for cardiovascular disease, diabetes, osteopenia/osteoporosis, and glaucoma.  As appropriate for age/gender, discussed screening for colorectal cancer, prostate cancer, breast cancer, and cervical cancer. Checklist reviewing preventive services available has been given to the patient.    Reviewed patients plan of care and provided an AVS. The Basic Care Plan (routine screening as documented in Health Maintenance) for Lilli Good meets the Care Plan requirement. This Care Plan has been established and reviewed with the Patient.    Counseling Resources:  ATP IV Guidelines  Pooled Cohorts Equation Calculator  Breast Cancer Risk " Calculator  FRAX Risk Assessment  ICSI Preventive Guidelines  Dietary Guidelines for Americans, 2010  USDA's MyPlate  ASA Prophylaxis  Lung CA Screening    Vargas Ata Chaudhry MD  [facility]

## 2017-07-31 PROBLEM — G89.18 POST-OPERATIVE PAIN: Status: RESOLVED | Noted: 2017-06-02 | Resolved: 2017-07-31

## 2017-07-31 PROBLEM — K43.9 HERNIA OF ABDOMINAL WALL: Status: RESOLVED | Noted: 2017-02-16 | Resolved: 2017-07-31

## 2017-08-01 ENCOUNTER — OFFICE VISIT (OUTPATIENT)
Dept: PHYSICAL MEDICINE AND REHAB | Facility: CLINIC | Age: 67
End: 2017-08-01

## 2017-08-01 ENCOUNTER — RECORDS - HEALTHEAST (OUTPATIENT)
Dept: ADMINISTRATIVE | Facility: OTHER | Age: 67
End: 2017-08-01

## 2017-08-01 VITALS — DIASTOLIC BLOOD PRESSURE: 58 MMHG | HEART RATE: 87 BPM | SYSTOLIC BLOOD PRESSURE: 147 MMHG | HEIGHT: 63 IN

## 2017-08-01 DIAGNOSIS — G24.1 DYSTONIA, TORSION, FRAGMENTS OF: ICD-10-CM

## 2017-08-01 DIAGNOSIS — G24.3 SPASMODIC TORTICOLLIS: Primary | ICD-10-CM

## 2017-08-01 ASSESSMENT — PAIN SCALES - GENERAL: PAINLEVEL: NO PAIN (0)

## 2017-08-01 NOTE — NURSING NOTE
Chief Complaint   Patient presents with     RECHECK     Cervical dystonia    Jennifer Camacho CMA

## 2017-08-01 NOTE — MR AVS SNAPSHOT
After Visit Summary   8/1/2017    Lilli Steven    MRN: [id number]           Patient Information     Date Of Birth          1950        Visit Information        Provider Department      8/1/2017 3:40 PM Hermes Frias MD [facility]        Today's Diagnoses     Spasmodic torticollis    -  1    Dystonia, torsion, fragments of           Follow-ups after your visit        Your next 10 appointments already scheduled     Aug 10, 2017 11:30 AM CDT   DX HIP/PELVIS/SPINE with [id number]   [facility] ([facility])    [address] [place] [id number] [phone]           Please do not take any of the following 24 hours prior to the day of your exam: vitamins, calcium tablets, antacids.  If possible, please wear clothes without metal (snaps, zippers). A sweatsuit works well.            Oct 12, 2017  3:50 PM CDT   (Arrive by 3:35 PM)   Return Botox with Hermes Frias MD   [facility] ([facility])    [address] [place] [id number] [phone]            Dec 19, 2017  3:40 PM CST   (Arrive by 3:25 PM)   Return Botox with Hermes Frias MD   [facility] ([facility])    [address] [place] [id number] [phone]              Who to contact     Please call your clinic at [phone] to:    Ask questions about your health    Make or cancel appointments    Discuss your medicines    Learn about your test results    Speak to your doctor   If you have compliments or concerns about an experience at your clinic, or if you wish to file a complaint, please contact [facility] Patient Relations at [phone] or email us at [email or web address]         Additional Information About Your Visit       "  MyChart Information     [employer] gives you secure access to your electronic health record. If you see a primary care provider, you can also send messages to your care team and make appointments. If you have questions, please call your primary care clinic.  If you do not have a primary care provider, please call [phone] and they will assist you.      [employer] is an electronic gateway that provides easy, online access to your medical records. With [employer], you can request a clinic appointment, read your test results, renew a prescription or communicate with your care team.     To access your existing account, please contact your [facility] or call [phone] for assistance.        Care EveryWhere ID     This is your Care EveryWhere ID. This could be used by other organizations to access your [facility] medical records  [id number]        Your Vitals Were     Pulse Height                87 1.6 m (5' 3\")           Blood Pressure from Last 3 Encounters:   08/01/17 147/58   07/28/17 136/84   06/03/17 109/71    Weight from Last 3 Encounters:   07/28/17 73.8 kg (162 lb 9.6 oz)   06/02/17 73.2 kg (161 lb 6.4 oz)   03/29/17 73.3 kg (161 lb 9.6 oz)              We Performed the Following     HC CHEMODENERVATION 1 EXTREMITY, EA ADDL EXTREMITY, 1-4 MUSCLE     HC CHEMODENERVATION MUSCLE NECK UNILAT     HC CHEMODENERVATION ONE EXTREMITY, 1-4 MUSCLE     Needle EMG Guide w/Chemodenervation ([id number])        Primary Care Provider Office Phone # Fax #    Vargas Ata Chaudhry [phone]       [place] [id number] [facility] [address]        Equal Access to Services     RAJWINDER TEJEDA : Hadii aad ku hadasho Soomaali, waaxda luqadaha, qaybta kaalmada adeegyada, shakeel prado . So [facility] [phone].    ATENCIÓN: Si habla español, tiene a sweet disposición servicios gratuitos de asistencia lingüística. Llame al [phone].    We comply " with applicable federal civil rights laws and [place] laws. We do not discriminate on the basis of race, color, national origin, age, disability sex, sexual orientation or gender identity.            Thank you!     Thank you for choosing [facility] PHYSICAL MEDICINE AND REHABILITATION  for your care. Our goal is always to provide you with excellent care. Hearing back from our patients is one way we can continue to improve our services. Please take a few minutes to complete the written survey that you may receive in the mail after your visit with us. Thank you!             Your Updated Medication List - Protect others around you: Learn how to safely use, store and throw away your medicines at www.disposemymeds.org.          This list is accurate as of: 8/1/17  4:24 PM.  Always use your most recent med list.                   Brand Name Dispense Instructions for use Diagnosis    * BOTOX IJ      Inject 125 Units into the muscle See Admin Instructions Every 10 weeks (Lot # /C3  Exp: 8/2019)        * BOTOX IJ      Inject 125 Units into the muscle once /C3  Date of exp: 02/2020        calcium carb 1250 mg (500 mg [place])/vitamin D 200 units 500-200 MG-UNIT per tablet    OSCAL with D     Take 1 tablet by mouth 2 times daily (with meals)        mometasone 0.1 % cream    ELOCON    45 g    Apply sparingly to affected area twice daily as needed.  Do not apply to face.    Dermatitis       SIMVASTATIN PO      Take 20 mg by mouth every evening        * TRAZODONE HCL PO      Take 150 mg by mouth At Bedtime (Takes 3 x 50mg tablet = 150mg dose)        * traZODone 50 MG tablet    DESYREL    90 tablet    TAKE 3 TABLETS BY MOUTH AT BEDTIME    Primary insomnia       triamcinolone 55 MCG/ACT Inhaler    NASACORT     Spray 1 spray into both nostrils daily as needed (congestion)        * Notice:  This list has 4 medication(s) that are the same as other medications prescribed for you. Read the directions carefully, and ask your  doctor or other care provider to review them with you.

## 2017-08-01 NOTE — PROGRESS NOTES
Patient seen and injected by  me and discussed with Dr Muhammad. I agree with her note, exam and plan for this patient..  Hermes Frias MD  ................................................................................................................................................................................................................................

## 2017-08-01 NOTE — PROGRESS NOTES
"BOTULINUM TOXIN PROCEDURE NOTE    Chief Complaint   Patient presents with     RECHECK     Cervical dystonia       /58  Pulse 87  Ht 1.6 m (5' 3\")      Current Outpatient Prescriptions:      mometasone (ELOCON) 0.1 % cream, Apply sparingly to affected area twice daily as needed.  Do not apply to face., Disp: 45 g, Rfl: 0     traZODone (DESYREL) 50 MG tablet, TAKE 3 TABLETS BY MOUTH AT BEDTIME, Disp: 90 tablet, Rfl: 3     SIMVASTATIN PO, Take 20 mg by mouth every evening, Disp: , Rfl:      TRAZODONE HCL PO, Take 150 mg by mouth At Bedtime (Takes 3 x 50mg tablet = 150mg dose), Disp: , Rfl:      triamcinolone (NASACORT) 55 MCG/ACT Inhaler, Spray 1 spray into both nostrils daily as needed (congestion), Disp: , Rfl:      calcium carb 1250 mg, 500 mg [place],/vitamin D 200 units (OSCAL WITH D) 500-200 MG-UNIT per tablet, Take 1 tablet by mouth 2 times daily (with meals), Disp: , Rfl:      OnabotulinumtoxinA (BOTOX IJ), Inject 125 Units into the muscle See Admin Instructions Every 10 weeks (Lot # /C3  Exp: 8/2019), Disp: , Rfl:      Allergies   Allergen Reactions     Lidocaine Nausea and Vomiting     Meloxicam      Diarrhea, vomiting     Primidone Other (See Comments)     Felt like motion sickness     Tramadol      Diarrhea, vomiting      Adhesive Tape Rash     Durabond only     Allergy Rash     Dermabond  Anesthesia IV set misc       Tegaderm Transparent Dressing (Informational Only) Rash        PHYSICAL EXAM:    HEAD, NECK AND TRUNK PATTERN:   Head Tremor: Observed.  Head & Neck Extension: Present - Slight  Sub-Occipital Extension: Present - Slight  Forward Head: Present - Slight   Head & Neck Lateral Bend: Right   Shoulder Elevation: Right    HPI:    Patient reports the following new medical problems since last visit: I&D of seroma on 06/02/2017    We reviewed the recommended safety guidelines for  Botox from any vaccine injection, such as the seasonal flu vaccine, by a minimum of 10-14 days with Lilli " Latisha Steven. She acknowledged understanding.    RESPONSE TO PREVIOUS TREATMENT:    Lilli Steven received 125 units of Botox on 2017    Problems following the previous series of neurotoxin injections included:  No problems reported    BENEFITS BY PATIENT REPORT:    Pain Improvement: Yes.  Percent Improvement: unable to quantify in % but repots significant improvement   Duration of Benefit:  10-12  weeks.    Dystonia and tremor Improvement: Yes.  Percent Improvement: unable to quantify in %, but repots significant improvement   Duration of Benefit:  10 weeks.      BOTULINUM NEUROTOXIN INJECTION PROCEDURES:    VERIFICATION OF PATIENT IDENTIFICATION AND PROCEDURE     Initials   Patient Name AM   Patient  AM   Procedure Verified by: AM     Prior to the start of the procedure and with procedural staff participation, I verbally confirmed the patient s identity using two indicators, relevant allergies, that the procedure was appropriate and matched the consent or emergent situation, and that the correct equipment/implants were available. Immediately prior to starting the procedure I conducted the Time Out with the procedural staff and re-confirmed the patient s name, procedure, and site/side. (The Joint Commission universal protocol was followed.)  Yes    Sedation (Moderate or Deep): None      Above assessments performed by:  Resident/Fellow         Oliver Muhammad          The attending provider was present for the entire procedure documented below.        Hermes Frias MD      INDICATION/S FOR PROCEDURE/S:  Lilli Steven is a 67 year old patient with dystonia affecting the  head, neck and shoulder girdle musculature secondary to a diagnosis of cervical dystonia with associated  pain, tremor, spasms, loss of joint motion, loss of volitional motor control and difficulty with activities of daily living.     Her baseline symptoms have been recalcitrant to oral medications and conservative  therapy.  She is here today for an injection of Botox.      GOAL OF PROCEDURE:  The goal of this procedure is to increase active range of motion, improve volitional motor control and decrease pain  associated with dystonic movements, spasticity and tremor.    TOTAL DOSE ADMINISTERED:  Dose Administered:  125 units Botox    Diluent Used:  Preservative Free Normal Saline 1:1  Total Volume of Diluent Used:  1.25 ml  Lot # /C3 with Expiration Date:  02/2020  NDC #: Botox 100u ([address]-34)    Medication guide was offered to patient and was declined.    CONSENT:  The risks, benefits, and treatment options were discussed with Lilli Steven and she agreed to proceed.      Written consent was obtained by AM.     EQUIPMENT USED:  Needle-37mm stimulating/recording  EMG/NCS Machine    SKIN PREPARATION:  Skin preparation was performed using an alcohol wipe.    GUIDANCE DESCRIPTION:  Electro-myographic guidance was necessary throughout the procedure to accurately identify all areas of dystonic and spastic muscles while avoiding injection of non-dystonic muscles and non-spastic muscles, neighboring nerves and nearby vascular structures.   AREA/MUSCLE INJECTED:  125 Units Botox  1. HEAD & NECK MUSCLES: 125 units Botox = Total Dose, 1:1 Dilution                 Right Mid-Trapezius - 10 units of Botox at 1 site/s.   Left Mid-Trapezius - 10 units of Botox at 1 site/s.      Right Splenius Capitus - 15 units of Botox at 2 site/s.   Left Splenius Capitus - 10 units of Botox at 2 site/s.      Right Levator Scapulae - 20 units of Botox at 1 site/s (shoulder muscles).   Left Levator Scapulae - 15 units of Botox at 1 site/s (shoulder muscles).      Right Inferior Obliquus Capitis - 10 units of Botox at 1 site/s.   Left Inferior Obliquus Capitis - 15 units of at 1 site/s.      Right Sternal head of the Sternocleidomastoid - 10 units of Botox at 1 site/s.                          Left Sternal head of the Sternocleidomastoid -  10 units of Botox at 1 site/s.     RESPONSE TO PROCEDURE:  Lilli Steven tolerated the procedure well and there were no immediate complications.  She was allowed to recover for an appropriate period of time and was discharged home in stable condition.     FOLLOW UP:  Lilli Steven was asked to follow up by phone in 7-14 days with Erin Combs PT, Care Coordinator or Mikki Rojas RN, Care Coordinator, to report her response to this series of injections.  Based on the patient's previous response to this therapy, Lilli Steven was rescheduled for the next series of injections in 10 weeks.     PLAN (Medication Changes, Therapy Orders, Work or Disability Issues, etc.): Will monitor response to today's injections and report.

## 2017-08-01 NOTE — LETTER
"8/1/2017       RE: Lilli Steven  [address]   [place] [address]     Dear Colleague,    Thank you for referring your patient, Lilli Steven, to the [facility] PHYSICAL MEDICINE AND REHABIL[facility]ATION at [facility]. Please see a copy of my visit note below.    Patient seen and injected by  me and discussed with Dr Muhammad. I agree with her note, exam and plan for this patient..  Hermes Frias MD  ................................................................................................................................................................................................................................    BOTULINUM TOXIN PROCEDURE NOTE    Chief Complaint   Patient presents with     RECHECK     Cervical dystonia       /58  Pulse 87  Ht 1.6 m (5' 3\")      Current Outpatient Prescriptions:      mometasone (ELOCON) 0.1 % cream, Apply sparingly to affected area twice daily as needed.  Do not apply to face., Disp: 45 g, Rfl: 0     traZODone (DESYREL) 50 MG tablet, TAKE 3 TABLETS BY MOUTH AT BEDTIME, Disp: 90 tablet, Rfl: 3     SIMVASTATIN PO, Take 20 mg by mouth every evening, Disp: , Rfl:      TRAZODONE HCL PO, Take 150 mg by mouth At Bedtime (Takes 3 x 50mg tablet = 150mg dose), Disp: , Rfl:      triamcinolone (NASACORT) 55 MCG/ACT Inhaler, Spray 1 spray into both nostrils daily as needed (congestion), Disp: , Rfl:      calcium carb 1250 mg, 500 mg [place],/vitamin D 200 units (OSCAL WITH D) 500-200 MG-UNIT per tablet, Take 1 tablet by mouth 2 times daily (with meals), Disp: , Rfl:      OnabotulinumtoxinA (BOTOX IJ), Inject 125 Units into the muscle See Admin Instructions Every 10 weeks (Lot # /C3  Exp: 8/2019), Disp: , Rfl:      Allergies   Allergen Reactions     Lidocaine Nausea and Vomiting     Meloxicam      Diarrhea, vomiting     Primidone Other (See Comments)     Felt like motion sickness     Tramadol      Diarrhea, " vomiting      Adhesive Tape Rash     Durabond only     Allergy Rash     Dermabond  Anesthesia IV set misc       Tegaderm Transparent Dressing (Informational Only) Rash        PHYSICAL EXAM:    HEAD, NECK AND TRUNK PATTERN:   Head Tremor: Observed.  Head & Neck Extension: Present - Slight  Sub-Occipital Extension: Present - Slight  Forward Head: Present - Slight   Head & Neck Lateral Bend: Right   Shoulder Elevation: Right    HPI:    Patient reports the following new medical problems since last visit: I&D of seroma on 2017    We reviewed the recommended safety guidelines for  Botox from any vaccine injection, such as the seasonal flu vaccine, by a minimum of 10-14 days with Lilli Steven. She acknowledged understanding.    RESPONSE TO PREVIOUS TREATMENT:    Lilli Steven received 125 units of Botox on 2017    Problems following the previous series of neurotoxin injections included:  No problems reported    BENEFITS BY PATIENT REPORT:    Pain Improvement: Yes.  Percent Improvement: unable to quantify in % but repots significant improvement   Duration of Benefit:  10-12  weeks.    Dystonia and tremor Improvement: Yes.  Percent Improvement: unable to quantify in %, but repots significant improvement   Duration of Benefit:  10 weeks.      BOTULINUM NEUROTOXIN INJECTION PROCEDURES:    VERIFICATION OF PATIENT IDENTIFICATION AND PROCEDURE     Initials   Patient Name AM   Patient  AM   Procedure Verified by: AM     Prior to the start of the procedure and with procedural staff participation, I verbally confirmed the patient s identity using two indicators, relevant allergies, that the procedure was appropriate and matched the consent or emergent situation, and that the correct equipment/implants were available. Immediately prior to starting the procedure I conducted the Time Out with the procedural staff and re-confirmed the patient s name, procedure, and site/side. (The Joint  Commission universal protocol was followed.)  Yes    Sedation (Moderate or Deep): None      Above assessments performed by:  Resident/Fellow         Oliver Muhammad          The attending provider was present for the entire procedure documented below.        Hermes Frias MD      INDICATION/S FOR PROCEDURE/S:  Lilli Steven is a 67 year old patient with dystonia affecting the  head, neck and shoulder girdle musculature secondary to a diagnosis of cervical dystonia with associated  pain, tremor, spasms, loss of joint motion, loss of volitional motor control and difficulty with activities of daily living.     Her baseline symptoms have been recalcitrant to oral medications and conservative therapy.  She is here today for an injection of Botox.      GOAL OF PROCEDURE:  The goal of this procedure is to increase active range of motion, improve volitional motor control and decrease pain  associated with dystonic movements, spasticity and tremor.    TOTAL DOSE ADMINISTERED:  Dose Administered:  125 units Botox    Diluent Used:  Preservative Free Normal Saline 1:1  Total Volume of Diluent Used:  1.25 ml  Lot # /C3 with Expiration Date:  02/2020  NDC #: Botox 100u ([address]-25)    Medication guide was offered to patient and was declined.    CONSENT:  The risks, benefits, and treatment options were discussed with Lilli Steven and she agreed to proceed.      Written consent was obtained by AM.     EQUIPMENT USED:  Needle-37mm stimulating/recording  EMG/NCS Machine    SKIN PREPARATION:  Skin preparation was performed using an alcohol wipe.    GUIDANCE DESCRIPTION:  Electro-myographic guidance was necessary throughout the procedure to accurately identify all areas of dystonic and spastic muscles while avoiding injection of non-dystonic muscles and non-spastic muscles, neighboring nerves and nearby vascular structures.   AREA/MUSCLE INJECTED:  125 Units Botox  1. HEAD & NECK MUSCLES: 125 units Botox =  Total Dose, 1:1 Dilution                 Right Mid-Trapezius - 10 units of Botox at 1 site/s.   Left Mid-Trapezius - 10 units of Botox at 1 site/s.      Right Splenius Capitus - 15 units of Botox at 2 site/s.   Left Splenius Capitus - 10 units of Botox at 2 site/s.      Right Levator Scapulae - 20 units of Botox at 1 site/s (shoulder muscles).   Left Levator Scapulae - 15 units of Botox at 1 site/s (shoulder muscles).      Right Inferior Obliquus Capitis - 10 units of Botox at 1 site/s.   Left Inferior Obliquus Capitis - 15 units of at 1 site/s.      Right Sternal head of the Sternocleidomastoid - 10 units of Botox at 1 site/s.                          Left Sternal head of the Sternocleidomastoid - 10 units of Botox at 1 site/s.     RESPONSE TO PROCEDURE:  Lilli Steven tolerated the procedure well and there were no immediate complications.  She was allowed to recover for an appropriate period of time and was discharged home in stable condition.     FOLLOW UP:  Lilli Steven was asked to follow up by phone in 7-14 days with Erin Combs PT, Care Coordinator or Mikki Rojas RN, Care Coordinator, to report her response to this series of injections.  Based on the patient's previous response to this therapy, Lilli Steven was rescheduled for the next series of injections in 10 weeks.     PLAN (Medication Changes, Therapy Orders, Work or Disability Issues, etc.): Will monitor response to today's injections and report.       Again, thank you for allowing me to participate in the care of your patient.      Sincerely,    Hermes Frias MD

## 2017-08-10 ENCOUNTER — HOSPITAL ENCOUNTER (OUTPATIENT)
Dept: MAMMOGRAPHY | Facility: CLINIC | Age: 67
Discharge: HOME | End: 2017-08-10
Attending: FAMILY MEDICINE | Admitting: FAMILY MEDICINE
Payer: COMMERCIAL

## 2017-08-10 DIAGNOSIS — Z12.31 VISIT FOR SCREENING MAMMOGRAM: ICD-10-CM

## 2017-08-10 PROCEDURE — G0202 SCR MAMMO BI INCL CAD: HCPCS

## 2017-08-15 ENCOUNTER — RADIANT APPOINTMENT (OUTPATIENT)
Dept: BONE DENSITY | Facility: CLINIC | Age: 67
End: 2017-08-15
Attending: FAMILY MEDICINE
Payer: COMMERCIAL

## 2017-08-15 DIAGNOSIS — M85.80 OSTEOPENIA, UNSPECIFIED LOCATION: ICD-10-CM

## 2017-08-15 PROCEDURE — 77080 DXA BONE DENSITY AXIAL: CPT | Performed by: INTERNAL MEDICINE

## 2017-09-29 DIAGNOSIS — G47.00 PERSISTENT INSOMNIA: Primary | ICD-10-CM

## 2017-09-29 RX ORDER — TRAZODONE HYDROCHLORIDE 50 MG/1
150 TABLET, FILM COATED ORAL AT BEDTIME
Qty: 90 TABLET | Refills: 1 | Status: SHIPPED | OUTPATIENT
Start: 2017-09-29 | End: 2017-10-12

## 2017-09-29 NOTE — TELEPHONE ENCOUNTER
Trazodone 50 mg    Last Office Visit with [facility] primary care provider:  7/28/2017  Listed as historical.  Please authorize if appropriate.  Thanks, Miya Valdovinos RN

## 2017-10-12 ENCOUNTER — OFFICE VISIT (OUTPATIENT)
Dept: PHYSICAL MEDICINE AND REHAB | Facility: CLINIC | Age: 67
End: 2017-10-12

## 2017-10-12 ENCOUNTER — RECORDS - HEALTHEAST (OUTPATIENT)
Dept: ADMINISTRATIVE | Facility: OTHER | Age: 67
End: 2017-10-12

## 2017-10-12 VITALS
WEIGHT: 166.4 LBS | TEMPERATURE: 98.2 F | OXYGEN SATURATION: 93 % | HEART RATE: 100 BPM | HEIGHT: 63 IN | SYSTOLIC BLOOD PRESSURE: 140 MMHG | DIASTOLIC BLOOD PRESSURE: 70 MMHG | RESPIRATION RATE: 20 BRPM | BODY MASS INDEX: 29.48 KG/M2

## 2017-10-12 DIAGNOSIS — G24.3 SPASMODIC TORTICOLLIS: Primary | ICD-10-CM

## 2017-10-12 DIAGNOSIS — G24.1 DYSTONIA, TORSION, FRAGMENTS OF: ICD-10-CM

## 2017-10-12 ASSESSMENT — PAIN SCALES - GENERAL: PAINLEVEL: NO PAIN (0)

## 2017-10-12 NOTE — LETTER
"10/12/2017       RE: Lilli Steven  [address]   [place] [facility] [address]     Dear Colleague,    Thank you for referring your patient, Lilli Steven, to the [facility] PHYSICAL MEDICINE AND REHABILITATION at [facility]. Please see a copy of my visit note below.    BOTULINUM TOXIN PROCEDURE NOTE    Chief Complaint   Patient presents with     RECHECK     UMP- BOTOX- CERVICAL DYSTONIA       /70  Pulse 100  Temp 98.2  F (36.8  C)  Resp 20  Ht 1.6 m (5' 3\")  Wt 75.5 kg (166 lb 6.4 oz)  SpO2 93%  Breastfeeding? No  BMI 29.48 kg/m2      Current Outpatient Prescriptions:      OnabotulinumtoxinA (BOTOX IJ), Inject 125 Units into the muscle once Lot /C3 Exp 05/2020, Disp: , Rfl:      OnabotulinumtoxinA (BOTOX IJ), Inject 125 Units into the muscle once /C3  Date of exp: 02/2020, Disp: , Rfl:      mometasone (ELOCON) 0.1 % cream, Apply sparingly to affected area twice daily as needed.  Do not apply to face., Disp: 45 g, Rfl: 0     SIMVASTATIN PO, Take 20 mg by mouth every evening, Disp: , Rfl:      TRAZODONE HCL PO, Take 150 mg by mouth At Bedtime (Takes 3 x 50mg tablet = 150mg dose), Disp: , Rfl:      triamcinolone (NASACORT) 55 MCG/ACT Inhaler, Spray 1 spray into both nostrils daily as needed (congestion), Disp: , Rfl:      calcium carb 1250 mg, 500 mg [place],/vitamin D 200 units (OSCAL WITH D) 500-200 MG-UNIT per tablet, Take 1 tablet by mouth 2 times daily (with meals), Disp: , Rfl:      [DISCONTINUED] traZODone (DESYREL) 50 MG tablet, Take 3 tablets (150 mg) by mouth At Bedtime, Disp: 90 tablet, Rfl: 1     [DISCONTINUED] traZODone (DESYREL) 50 MG tablet, TAKE 3 TABLETS BY MOUTH AT BEDTIME, Disp: 90 tablet, Rfl: 3     [DISCONTINUED] OnabotulinumtoxinA (BOTOX IJ), Inject 125 Units into the muscle See Admin Instructions Every 10 weeks (Lot # /C3  Exp: 8/2019), Disp: , Rfl:      Allergies   Allergen Reactions     Lidocaine Nausea and Vomiting "     Meloxicam      Diarrhea, vomiting     Primidone Other (See Comments)     Felt like motion sickness     Tramadol      Diarrhea, vomiting      Adhesive Tape Rash     Durabond only     Allergy Rash     Dermabond  Anesthesia IV set misc       Tegaderm Transparent Dressing (Informational Only) Rash        PHYSICAL EXAM:    HEAD, NECK AND TRUNK PATTERN:   Head Tremor: Observed - no-no tremor  Head & Neck Extension: Present - Slight  Sub-Occipital Extension: Present - Slight  Forward Head: Present - Slight   Head & Neck Lateral Bend: Right   Shoulder Elevation: Right    HPI:    Patient denies new medical diagnoses, illnesses, hospitalizations, emergency room visits, and injuries since the previous injection with botulinum neurotoxin.    We reviewed the recommended safety guidelines for  Botox from any vaccine injection, such as the seasonal flu vaccine, by a minimum of 10-14 days with Lilli Steven. She acknowledged understanding.    RESPONSE TO PREVIOUS TREATMENT:    Lilli Steven received 125 units of Botox on 2017    Problems following the previous series of neurotoxin injections included:  No problems reported    BENEFITS BY PATIENT REPORT:    Pain Improvement: Yes.  Percent Improvement: unable to quantify in % but repots significant improvement  Duration of Benefit:  10-12  weeks.    Dystonia and tremor Improvement: Yes.  Percent Improvement: unable to quantify in %, but repots significant improvement   Duration of Benefit:  10 weeks.      BOTULINUM NEUROTOXIN INJECTION PROCEDURES:    VERIFICATION OF PATIENT IDENTIFICATION AND PROCEDURE     Initials   Patient Name SES   Patient  SES   Procedure Verified by: SES     Prior to the start of the procedure and with procedural staff participation, I verbally confirmed the patient s identity using two indicators, relevant allergies, that the procedure was appropriate and matched the consent or emergent situation, and that the correct  equipment/implants were available. Immediately prior to starting the procedure I conducted the Time Out with the procedural staff and re-confirmed the patient s name, procedure, and site/side. (The Joint Commission universal protocol was followed.)  Yes    Sedation (Moderate or Deep): None      Above assessments performed by:  Cassidy Frias MD      INDICATION/S FOR PROCEDURE/S:  Lilli Steven is a 67-year-old patient with dystonia affecting the  head, neck and shoulder girdle musculature secondary to a diagnosis of cervical dystonia with associated  pain, tremor, spasms, loss of joint motion, loss of volitional motor control and difficulty with activities of daily living.     Her baseline symptoms have been recalcitrant to oral medications and conservative therapy.  She is here today for an injection of Botox.      GOAL OF PROCEDURE:  The goal of this procedure is to increase active range of motion, improve volitional motor control and decrease pain  associated with dystonic movements, spasticity and tremor.    TOTAL DOSE ADMINISTERED:  Dose Administered:  125 units Botox    Diluent Used:  Preservative Free Normal Saline  Total Volume of Diluent Used:  1.25 ml  Lot # /C3 with Expiration Date:  05/2020  NDC #: Botox 100u ([address]-85)    Medication guide was offered to patient and was declined.    CONSENT:  The risks, benefits, and treatment options were discussed with Lilli Steven and she agreed to proceed.      Written consent was obtained by [facility].     EQUIPMENT USED:  Needle-37mm stimulating/recording  EMG/NCS Machine    SKIN PREPARATION:  Skin preparation was performed using an alcohol wipe.    GUIDANCE DESCRIPTION:  Electro-myographic guidance was necessary throughout the procedure to accurately identify all areas of dystonic and spastic muscles while avoiding injection of non-dystonic muscles and non-spastic muscles, neighboring nerves and nearby vascular  structures.     AREA/MUSCLE INJECTED:  125 Units Botox  1. HEAD & NECK MUSCLES: 125 units Botox = Total Dose, 1:1 Dilution                 Right Mid-Trapezius - 10 units of Botox at 1 site/s.   Left Mid-Trapezius - 10 units of Botox at 1 site/s.      Right Splenius Capitus - 15 units of Botox at 2 site/s.   Left Splenius Capitus - 10 units of Botox at 2 site/s.      Right Levator Scapulae - 20 units of Botox at 1 site/s (shoulder muscles).   Left Levator Scapulae - 15 units of Botox at 1 site/s (shoulder muscles).      Right Inferior Obliquus Capitis - 10 units of Botox at 1 site/s.   Left Inferior Obliquus Capitis - 15 units of at 1 site/s.      Right Sternal head of the Sternocleidomastoid - 10 units of Botox at 1 site/s.               Left Sternal head of the Sternocleidomastoid - 10 units of Botox at 1 site/s.       RESPONSE TO PROCEDURE:  Lilli Steven tolerated the procedure well and there were no immediate complications.  She was allowed to recover for an appropriate period of time and was discharged home in stable condition.     FOLLOW UP:  Lilli Steven was asked to follow up by phone in 7-14 days with Erin Combs PT, Care Coordinator or Mikki Rojas RN, Care Coordinator, to report her response to this series of injections.  Based on the patient's previous response to this therapy, Lilli Steven was rescheduled for the next series of injections in 10 weeks.     PLAN (Medication Changes, Therapy Orders, Work or Disability Issues, etc.): Will monitor response to today's injections and report.       Again, thank you for allowing me to participate in the care of your patient.      Sincerely,    Hermes Frias MD

## 2017-10-12 NOTE — MR AVS SNAPSHOT
After Visit Summary   10/12/2017    Lilli Steven    MRN: [id number]           Patient Information     Date Of Birth          1950        Visit Information        Provider Department      10/12/2017 3:50 PM Hermes Frias MD [facility] Physical Medicine and Rehabilitation        Today's Diagnoses     Spasmodic torticollis    -  1    Dystonia, torsion, fragments of           Follow-ups after your visit        Your next 10 appointments already scheduled     Dec 19, 2017  3:40 PM CST   (Arrive by 3:25 PM)   Return Botox with Hermes Frias MD   [facility] Physical Medicine and Rehabilitation ([facility])    [address] [place] [address] [phone]              Who to contact     Please call your clinic at [phone] to:    Ask questions about your health    Make or cancel appointments    Discuss your medicines    Learn about your test results    Speak to your doctor   If you have compliments or concerns about an experience at your clinic, or if you wish to file a complaint, please contact [facility] Patient Relations at [phone] or email us at [email or web address]         Additional Information About Your Visit        MyChart Information     [employer] gives you secure access to your electronic health record. If you see a primary care provider, you can also send messages to your care team and make appointments. If you have questions, please call your primary care clinic.  If you do not have a primary care provider, please call [phone] and they will assist you.      [employer] is an electronic gateway that provides easy, online access to your medical records. With [employer], you can request a clinic appointment, read your test results, renew a prescription or communicate with your care team.     To access your existing account, please contact your [facility] or  "call [phone] for assistance.        Care EveryWhere ID     This is your Care EveryWhere ID. This could be used by other organizations to access your [place] medical records  [id number]        Your Vitals Were     Pulse Temperature Respirations Height Pulse Oximetry Breastfeeding?    100 98.2  F (36.8  C) 20 1.6 m (5' 3\") 93% No    BMI (Body Mass Index)                   29.48 kg/m2            Blood Pressure from Last 3 Encounters:   10/12/17 140/70   08/01/17 147/58   07/28/17 136/84    Weight from Last 3 Encounters:   10/12/17 75.5 kg (166 lb 6.4 oz)   07/28/17 73.8 kg (162 lb 9.6 oz)   06/02/17 73.2 kg (161 lb 6.4 oz)              We Performed the Following     HC CHEMODENERVATION 1 EXTREMITY, EA ADDL EXTREMITY, 1-4 MUSCLE     HC CHEMODENERVATION MUSCLE NECK UNILAT     HC CHEMODENERVATION ONE EXTREMITY, 1-4 MUSCLE     Needle EMG Guide w/Chemodenervation ([id number])        Primary Care Provider Office Phone # Fax #    Vargas Ata Chaudhry [phone] [address] [facility] [address]        Equal Access to Services     RAJWINDER TEJEDA AH: Hadii thais ku hadasho Soomaali, waaxda luqadaha, qaybta kaalmada adeegyada, shakeel mcbridein hayjhonathann maria e carrillo. So [facility] [phone].    ATENCIÓN: Si habla español, tiene a sweet disposición servicios gratuitos de asistencia lingüística. Llame al [phone].    We comply with applicable federal civil rights laws and [place] laws. We do not discriminate on the basis of race, color, national origin, age, disability, sex, sexual orientation, or gender identity.            Thank you!     Thank you for choosing [facility] PHYSICAL MEDICINE AND REHABILITATION  for your care. Our goal is always to provide you with excellent care. Hearing back from our patients is one way we can continue to improve our services. Please take a few minutes to complete the written survey that you may receive in the mail after your visit with us. Thank you!           "   Your Updated Medication List - Protect others around you: Learn how to safely use, store and throw away your medicines at www.disposemymeds.org.          This list is accurate as of: 10/12/17  4:28 PM.  Always use your most recent med list.                   Brand Name Dispense Instructions for use Diagnosis    * BOTOX IJ      Inject 125 Units into the muscle once /C3  Date of exp: 02/2020        * BOTOX IJ      Inject 125 Units into the muscle once Lot /C3 Exp 05/2020        calcium carb 1250 mg (500 mg [place])/vitamin D 200 units 500-200 MG-UNIT per tablet    OSCAL with D     Take 1 tablet by mouth 2 times daily (with meals)        mometasone 0.1 % cream    ELOCON    45 g    Apply sparingly to affected area twice daily as needed.  Do not apply to face.    Dermatitis       SIMVASTATIN PO      Take 20 mg by mouth every evening        TRAZODONE HCL PO      Take 150 mg by mouth At Bedtime (Takes 3 x 50mg tablet = 150mg dose)        triamcinolone 55 MCG/ACT Inhaler    NASACORT     Spray 1 spray into both nostrils daily as needed (congestion)        * Notice:  This list has 2 medication(s) that are the same as other medications prescribed for you. Read the directions carefully, and ask your doctor or other care provider to review them with you.

## 2017-10-12 NOTE — NURSING NOTE
Chief Complaint   Patient presents with     RECHECK     UMP- BOTOX- CERVICAL DYSTONIA     Mauricio Watkins, CMA

## 2017-10-12 NOTE — PROGRESS NOTES
"BOTULINUM TOXIN PROCEDURE NOTE    Chief Complaint   Patient presents with     RECHECK     UMP- BOTOX- CERVICAL DYSTONIA       /70  Pulse 100  Temp 98.2  F (36.8  C)  Resp 20  Ht 1.6 m (5' 3\")  Wt 75.5 kg (166 lb 6.4 oz)  SpO2 93%  Breastfeeding? No  BMI 29.48 kg/m2      Current Outpatient Prescriptions:      OnabotulinumtoxinA (BOTOX IJ), Inject 125 Units into the muscle once Lot /C3 Exp 05/2020, Disp: , Rfl:      OnabotulinumtoxinA (BOTOX IJ), Inject 125 Units into the muscle once /C3  Date of exp: 02/2020, Disp: , Rfl:      mometasone (ELOCON) 0.1 % cream, Apply sparingly to affected area twice daily as needed.  Do not apply to face., Disp: 45 g, Rfl: 0     SIMVASTATIN PO, Take 20 mg by mouth every evening, Disp: , Rfl:      TRAZODONE HCL PO, Take 150 mg by mouth At Bedtime (Takes 3 x 50mg tablet = 150mg dose), Disp: , Rfl:      triamcinolone (NASACORT) 55 MCG/ACT Inhaler, Spray 1 spray into both nostrils daily as needed (congestion), Disp: , Rfl:      calcium carb 1250 mg, 500 mg [place],/vitamin D 200 units (OSCAL WITH D) 500-200 MG-UNIT per tablet, Take 1 tablet by mouth 2 times daily (with meals), Disp: , Rfl:      [DISCONTINUED] traZODone (DESYREL) 50 MG tablet, Take 3 tablets (150 mg) by mouth At Bedtime, Disp: 90 tablet, Rfl: 1     [DISCONTINUED] traZODone (DESYREL) 50 MG tablet, TAKE 3 TABLETS BY MOUTH AT BEDTIME, Disp: 90 tablet, Rfl: 3     [DISCONTINUED] OnabotulinumtoxinA (BOTOX IJ), Inject 125 Units into the muscle See Admin Instructions Every 10 weeks (Lot # /C3  Exp: 8/2019), Disp: , Rfl:      Allergies   Allergen Reactions     Lidocaine Nausea and Vomiting     Meloxicam      Diarrhea, vomiting     Primidone Other (See Comments)     Felt like motion sickness     Tramadol      Diarrhea, vomiting      Adhesive Tape Rash     Durabond only     Allergy Rash     Dermabond  Anesthesia IV set misc       Tegaderm Transparent Dressing (Informational Only) Rash        PHYSICAL " EXAM:    HEAD, NECK AND TRUNK PATTERN:   Head Tremor: Observed - no-no tremor  Head & Neck Extension: Present - Slight  Sub-Occipital Extension: Present - Slight  Forward Head: Present - Slight   Head & Neck Lateral Bend: Right   Shoulder Elevation: Right    HPI:    Patient denies new medical diagnoses, illnesses, hospitalizations, emergency room visits, and injuries since the previous injection with botulinum neurotoxin.    We reviewed the recommended safety guidelines for  Botox from any vaccine injection, such as the seasonal flu vaccine, by a minimum of 10-14 days with Lilli Steven. She acknowledged understanding.    RESPONSE TO PREVIOUS TREATMENT:    Lilli Steven received 125 units of Botox on 2017    Problems following the previous series of neurotoxin injections included:  No problems reported    BENEFITS BY PATIENT REPORT:    Pain Improvement: Yes.  Percent Improvement: unable to quantify in % but repots significant improvement  Duration of Benefit:  10-12  weeks.    Dystonia and tremor Improvement: Yes.  Percent Improvement: unable to quantify in %, but repots significant improvement   Duration of Benefit:  10 weeks.      BOTULINUM NEUROTOXIN INJECTION PROCEDURES:    VERIFICATION OF PATIENT IDENTIFICATION AND PROCEDURE     Initials   Patient Name SES   Patient  SES   Procedure Verified by: SES     Prior to the start of the procedure and with procedural staff participation, I verbally confirmed the patient s identity using two indicators, relevant allergies, that the procedure was appropriate and matched the consent or emergent situation, and that the correct equipment/implants were available. Immediately prior to starting the procedure I conducted the Time Out with the procedural staff and re-confirmed the patient s name, procedure, and site/side. (The Joint Commission universal protocol was followed.)  Yes    Sedation (Moderate or Deep): None      Above assessments  performed by:  Cassidy Frias MD      INDICATION/S FOR PROCEDURE/S:  Lilli Steven is a 67-year-old patient with dystonia affecting the  head, neck and shoulder girdle musculature secondary to a diagnosis of cervical dystonia with associated  pain, tremor, spasms, loss of joint motion, loss of volitional motor control and difficulty with activities of daily living.     Her baseline symptoms have been recalcitrant to oral medications and conservative therapy.  She is here today for an injection of Botox.      GOAL OF PROCEDURE:  The goal of this procedure is to increase active range of motion, improve volitional motor control and decrease pain  associated with dystonic movements, spasticity and tremor.    TOTAL DOSE ADMINISTERED:  Dose Administered:  125 units Botox    Diluent Used:  Preservative Free Normal Saline  Total Volume of Diluent Used:  1.25 ml  Lot # /C3 with Expiration Date:  05/2020  NDC #: Botox 100u ([address]-41)    Medication guide was offered to patient and was declined.    CONSENT:  The risks, benefits, and treatment options were discussed with Lilli Steven and she agreed to proceed.      Written consent was obtained by [facility].     EQUIPMENT USED:  Needle-37mm stimulating/recording  EMG/NCS Machine    SKIN PREPARATION:  Skin preparation was performed using an alcohol wipe.    GUIDANCE DESCRIPTION:  Electro-myographic guidance was necessary throughout the procedure to accurately identify all areas of dystonic and spastic muscles while avoiding injection of non-dystonic muscles and non-spastic muscles, neighboring nerves and nearby vascular structures.     AREA/MUSCLE INJECTED:  125 Units Botox  1. HEAD & NECK MUSCLES: 125 units Botox = Total Dose, 1:1 Dilution                 Right Mid-Trapezius - 10 units of Botox at 1 site/s.   Left Mid-Trapezius - 10 units of Botox at 1 site/s.      Right Splenius Capitus - 15 units of Botox at 2 site/s.   Left  Splenius Capitus - 10 units of Botox at 2 site/s.      Right Levator Scapulae - 20 units of Botox at 1 site/s (shoulder muscles).   Left Levator Scapulae - 15 units of Botox at 1 site/s (shoulder muscles).      Right Inferior Obliquus Capitis - 10 units of Botox at 1 site/s.   Left Inferior Obliquus Capitis - 15 units of at 1 site/s.      Right Sternal head of the Sternocleidomastoid - 10 units of Botox at 1 site/s.               Left Sternal head of the Sternocleidomastoid - 10 units of Botox at 1 site/s.       RESPONSE TO PROCEDURE:  Lilli Steven tolerated the procedure well and there were no immediate complications.  She was allowed to recover for an appropriate period of time and was discharged home in stable condition.     FOLLOW UP:  Lilli Steven was asked to follow up by phone in 7-14 days with Erin Combs PT, Care Coordinator or Mikki Rojas RN, Care Coordinator, to report her response to this series of injections.  Based on the patient's previous response to this therapy, Lilli Steven was rescheduled for the next series of injections in 10 weeks.     PLAN (Medication Changes, Therapy Orders, Work or Disability Issues, etc.): Will monitor response to today's injections and report.

## 2017-12-06 ENCOUNTER — OFFICE VISIT (OUTPATIENT)
Dept: FAMILY MEDICINE | Facility: CLINIC | Age: 67
End: 2017-12-06
Payer: COMMERCIAL

## 2017-12-06 VITALS
HEART RATE: 92 BPM | BODY MASS INDEX: 29.5 KG/M2 | SYSTOLIC BLOOD PRESSURE: 126 MMHG | TEMPERATURE: 97 F | HEIGHT: 63 IN | OXYGEN SATURATION: 96 % | DIASTOLIC BLOOD PRESSURE: 78 MMHG | RESPIRATION RATE: 16 BRPM | WEIGHT: 166.5 LBS

## 2017-12-06 DIAGNOSIS — R09.81 NASAL CONGESTION: ICD-10-CM

## 2017-12-06 DIAGNOSIS — J01.90 ACUTE SINUSITIS WITH SYMPTOMS > 10 DAYS: Primary | ICD-10-CM

## 2017-12-06 DIAGNOSIS — J34.2 DEVIATED NASAL SEPTUM: ICD-10-CM

## 2017-12-06 PROCEDURE — 99214 OFFICE O/P EST MOD 30 MIN: CPT | Performed by: FAMILY MEDICINE

## 2017-12-06 NOTE — MR AVS SNAPSHOT
After Visit Summary   12/6/2017    Lilli Steven    MRN: [id number]           Patient Information     Date Of Birth          1950        Visit Information        Provider Department      12/6/2017 4:20 PM Larissa Quinn MD [facility]        Today's Diagnoses     Acute sinusitis with symptoms > 10 days    -  1    Nasal congestion        Deviated nasal septum           Follow-ups after your visit        Additional Services     OTOLARYNGOLOGY REFERRAL       Your provider has referred you to: [facility]: Adult Ear, Nose [facility] Throat Clinic (Otolaryngology) [facility] [phone]  [email or web address]    Please be aware that coverage of these services is subject to the terms and limitations of your health insurance plan.  Call member services at your health plan with any benefit or coverage questions.      Please bring the following with you to your appointment:    (1) Any X-Rays, CTs or MRIs which have been performed.  Contact the facility where they were done to arrange for  prior to your scheduled appointment.   (2) List of current medications  (3) This referral request   (4) Any documents/labs given to you for this referral                  Your next 10 appointments already scheduled     Dec 19, 2017  3:40 PM CST   (Arrive by 3:25 PM)   Return Botox with Hermes Frias MD   [facility] ([facility])    [address] [place] [id number] [phone]            Mar 01, 2018  3:50 PM CST   (Arrive by 3:35 PM)   Return Botox with Hermes Frias MD   [facility] ([facility])    [address] [place] [id number] [phone]              Who to contact     If you have questions or need follow up information about today's clinic visit or  "your schedule please contact [facility] directly at [phone].  Normal or non-critical lab and imaging results will be communicated to you by MyChart, letter or phone within 4 business days after the clinic has received the results. If you do not hear from us within 7 days, please contact the clinic through [facility] [employer] or phone. If you have a critical or abnormal lab result, we will notify you by phone as soon as possible.  Submit refill requests through [employer] or call your pharmacy and they will forward the refill request to us. Please allow 3 business days for your refill to be completed.          Additional Information About Your Visit        [facility] [place] [employer] Information     [employer] gives you secure access to your electronic health record. If you see a primary care provider, you can also send messages to your care team and make appointments. If you have questions, please call your primary care clinic.  If you do not have a primary care provider, please call [phone] and they will assist you.        Care EveryWhere ID     This is your Care EveryWhere ID. This could be used by other organizations to access your [facility] medical records  [id number]        Your Vitals Were     Pulse Temperature Respirations Height Last Period Pulse Oximetry    92 97  F (36.1  C) (Oral) 16 5' 3\" (1.6 m) (LMP Unknown) 96%    Breastfeeding? BMI (Body Mass Index)                No 29.49 kg/m2           Blood Pressure from Last 3 Encounters:   12/06/17 126/78   10/12/17 140/70   08/01/17 147/58    Weight from Last 3 Encounters:   12/06/17 166 lb 8 oz (75.5 kg)   10/12/17 166 lb 6.4 oz (75.5 kg)   07/28/17 162 lb 9.6 oz (73.8 kg)              We Performed the Following     OTOLARYNGOLOGY REFERRAL          Today's Medication Changes          These changes are accurate as of: 12/6/17  4:50 PM.  If you have any questions, ask your nurse or doctor.               Start taking these medicines.        " Dose/Directions    amoxicillin-clavulanate 875-125 MG per tablet   Commonly known as:  AUGMENTIN   Used for:  Acute sinusitis with symptoms > 10 days, Nasal congestion, Deviated nasal septum   Started by:  Larissa Quinn MD        Dose:  1 tablet   Take 1 tablet by mouth 2 times daily   Quantity:  20 tablet   Refills:  0            Where to get your medicines      These medications were sent to [place] [address] [place] [facility] [place], [place] - [address]  [address], [place] [address]     Phone:  [phone]     amoxicillin-clavulanate 875-125 MG per tablet                Primary Care Provider Office Phone # Fax #    Vargas Ata Chaudhry [phone] [address] [facility] [address]        Equal Access to Services     RAJWINDER TEJEDA : Hadii thais fuentes hadasho Soomaali, waaxda luqadaha, qaybta kaalmada adeegyada, shakeel stevens hayfaizan prado . So [facility] [phone].    ATENCIÓN: Si habla español, tiene a sweet disposición servicios gratuitos de asistencia lingüística. Llame al [phone].    We comply with applicable federal civil rights laws and [place] laws. We do not discriminate on the basis of race, color, national origin, age, disability, sex, sexual orientation, or gender identity.            Thank you!     Thank you for choosing [facility]  for your care. Our goal is always to provide you with excellent care. Hearing back from our patients is one way we can continue to improve our services. Please take a few minutes to complete the written survey that you may receive in the mail after your visit with us. Thank you!             Your Updated Medication List - Protect others around you: Learn how to safely use, store and throw away your medicines at www.disposemymeds.org.          This list is accurate as of: 12/6/17  4:50 PM.  Always use your most recent med list.                   Brand Name Dispense Instructions for use  Diagnosis    amoxicillin-clavulanate 875-125 MG per tablet    AUGMENTIN    20 tablet    Take 1 tablet by mouth 2 times daily    Acute sinusitis with symptoms > 10 days, Nasal congestion, Deviated nasal septum       * BOTOX IJ      Inject 125 Units into the muscle once /C3  Date of exp: 02/2020        * BOTOX IJ      Inject 125 Units into the muscle once Lot /C3 Exp 05/2020        Calcium carb-Vitamin D 500 mg [place]-200 units 500-200 MG-UNIT per tablet    OSCAL with D;[place] Shell Calcium     Take 1 tablet by mouth 2 times daily (with meals)        mometasone 0.1 % cream    ELOCON    45 g    Apply sparingly to affected area twice daily as needed.  Do not apply to face.    Dermatitis       SIMVASTATIN PO      Take 20 mg by mouth every evening        TRAZODONE HCL PO      Take 150 mg by mouth At Bedtime (Takes 3 x 50mg tablet = 150mg dose)        triamcinolone 55 MCG/ACT Inhaler    NASACORT     Spray 1 spray into both nostrils daily as needed (congestion)        * Notice:  This list has 2 medication(s) that are the same as other medications prescribed for you. Read the directions carefully, and ask your doctor or other care provider to review them with you.

## 2017-12-06 NOTE — PROGRESS NOTES
SUBJECTIVE:   Lilli Steven is a 67 year old female who presents to clinic today for the following health issues:    RESPIRATORY SYMPTOMS      Duration: x 2 months    Description  Nose bleeds, nasal congestion, rhinorrhea, sore throat, facial pain/pressure, cough, headache, fatigue/malaise and conjunctival irritation    Severity: moderate    Accompanying signs and symptoms: None    History (predisposing factors):  none    Precipitating or alleviating factors: None    Therapies tried and outcome:  rest and fluids oral decongestant      Started 2 months ago.  At first khurram had allergies and took allergy medicine and didn't help.  Then med for contestion and didn't help.  Nose keeps getting more pluggesd  When blows it bleeds.  Changes seeds.  Bad headaches are new.  No fevers.  Has tried Nasacort - no help.    Lilli Steven is a 67 year old female here with concerns about sinus infection.  She states onset of symptoms were 2 month(s) ago.  She has had maxillary pressure. Course of illness is worsening. Severity severe  Current and Associated symptoms: nasal congestion, rhinorrhea, facial pain/pressure, headache and post-nasal drainage  Predisposing factors include none. Recent treatment has included: Antihistamine, Decongestants, OTC meds and Steroid nasal spray    Past Medical History:   Diagnosis Date     Diarrhea 9/6/2012     Hypercholesterolemia 9/6/2012     Neck pain 9/6/2012     PONV (postoperative nausea and vomiting)      Seasonal allergies 9/6/2012     Snores 9/6/2012     Tremor      Wears glasses 9/6/2012     Social History   Substance Use Topics     Smoking status: Former Smoker     Types: Cigarettes     Quit date: 1/1/1985     Smokeless tobacco: Never Used     Alcohol use 0.0 oz/week     0 Standard drinks or equivalent per week      Comment: glass of wine daily       ROS:  7 point Review of systems negative except as stated above.    OBJECTIVE:  /78  Pulse 92  Temp 97  F (36.1  C) (Oral)  " Resp 16  Ht 5' 3\" (1.6 m)  Wt 166 lb 8 oz (75.5 kg)  LMP  (LMP Unknown)  SpO2 96%  Breastfeeding? No  BMI 29.49 kg/m2  Exam:GENERAL APPEARANCE: alert, mild distress and cooperative  EYES: EOMI,  PERRL, conjunctiva clear  HENT: nasal turbinates erythematous, swollen, maxillary sinus tenderness  and significantly deviated septum  NECK: supple, nontender, no lymphadenopathy  RESP: lungs clear to auscultation - no rales, rhonchi or wheezes  CV: regular rates and rhythm, normal S1 S2, no murmur noted  ABDOMEN:  soft, nontender, no HSM or masses and bowel sounds normal  NEURO: Normal strength and tone, sensory exam grossly normal,  normal speech and mentation  SKIN: no suspicious lesions or rashes    ASSESSMENT:  Lilli was seen today for uri.    Diagnoses and all orders for this visit:    Acute sinusitis with symptoms > 10 days  Comments:  HA and symptoms x 10 days.  Treat for bacterial sinusitis.  Follow up if worsens or doesn't improve  Orders:  -     amoxicillin-clavulanate (AUGMENTIN) 875-125 MG per tablet; Take 1 tablet by mouth 2 times daily    Nasal congestion  -     amoxicillin-clavulanate (AUGMENTIN) 875-125 MG per tablet; Take 1 tablet by mouth 2 times daily  -     OTOLARYNGOLOGY REFERRAL    Deviated nasal septum  Comments:  Significantly deviated septum.  Should see ENT as may be contributing.  Referred  Orders:  -     amoxicillin-clavulanate (AUGMENTIN) 875-125 MG per tablet; Take 1 tablet by mouth 2 times daily  -     OTOLARYNGOLOGY REFERRAL      "

## 2017-12-06 NOTE — NURSING NOTE
"Chief Complaint   Patient presents with     URI     /78  Pulse 92  Temp 97  F (36.1  C) (Oral)  Resp 16  Ht 5' 3\" (1.6 m)  Wt 166 lb 8 oz (75.5 kg)  LMP  (LMP Unknown)  SpO2 96%  Breastfeeding? No  BMI 29.49 kg/m2 Estimated body mass index is 29.49 kg/(m^2) as calculated from the following:    Height as of this encounter: 5' 3\" (1.6 m).    Weight as of this encounter: 166 lb 8 oz (75.5 kg).  BP completed using cuff size: regular   Judi Marrero CMA    Health Maintenance Due   Topic Date Due     ADVANCE DIRECTIVE PLANNING Q5 YRS  03/18/2005     INFLUENZA VACCINE (SYSTEM ASSIGNED)  09/01/2017     Health Maintenance reviewed at today's visit patient asked to schedule/complete:   Immunizations:  Patient agrees to schedule    "

## 2017-12-11 DIAGNOSIS — E78.5 HYPERLIPIDEMIA LDL GOAL <160: ICD-10-CM

## 2017-12-11 RX ORDER — SIMVASTATIN 20 MG
TABLET ORAL
Qty: 90 TABLET | Refills: 2 | Status: SHIPPED | OUTPATIENT
Start: 2017-12-11 | End: 2018-03-13

## 2017-12-11 NOTE — TELEPHONE ENCOUNTER
Routing refill request to provider for review/approval because:  Medication is reported/historical  Dasha RYAN RN    Requested Prescriptions   Pending Prescriptions Disp Refills     simvastatin (ZOCOR) 20 MG tablet [Pharmacy Med Name: SIMVASTATIN 20 MG TABLET] 90 tablet 2     Sig: TAKE ONE TABLET BY MOUTH NIGHTLY AT BEDTIME    Statins Protocol Passed    12/11/2017 12:34 PM       Passed - LDL on file in past 12 months    Recent Labs   Lab Test  03/27/17   0910   LDL  106*            Passed - No abnormal creatine kinase in past 12 months    No lab results found.         Passed - Recent or future visit with authorizing provider    Patient had office visit in the last year or has a visit in the next 30 days with authorizing provider.  See chart review.              Passed - Patient is age 18 or older       Passed - No active pregnancy on record       Passed - No positive pregnancy test in past 12 months

## 2017-12-19 ENCOUNTER — OFFICE VISIT (OUTPATIENT)
Dept: PHYSICAL MEDICINE AND REHAB | Facility: CLINIC | Age: 67
End: 2017-12-19
Payer: COMMERCIAL

## 2017-12-19 ENCOUNTER — RECORDS - HEALTHEAST (OUTPATIENT)
Dept: ADMINISTRATIVE | Facility: OTHER | Age: 67
End: 2017-12-19

## 2017-12-19 VITALS — HEIGHT: 63 IN | SYSTOLIC BLOOD PRESSURE: 125 MMHG | DIASTOLIC BLOOD PRESSURE: 84 MMHG | HEART RATE: 94 BPM

## 2017-12-19 DIAGNOSIS — G24.3 SPASMODIC TORTICOLLIS: Primary | ICD-10-CM

## 2017-12-19 DIAGNOSIS — G24.1 DYSTONIA, TORSION, FRAGMENTS OF: ICD-10-CM

## 2017-12-19 ASSESSMENT — PAIN SCALES - GENERAL: PAINLEVEL: NO PAIN (0)

## 2017-12-19 NOTE — PROGRESS NOTES
"BOTULINUM TOXIN PROCEDURE NOTE    Chief Complaint   Patient presents with     RECHECK     Cervical Dystonia       /84  Pulse 94  Ht 1.6 m (5' 3\")  LMP  (LMP Unknown)      Current Outpatient Prescriptions:      simvastatin (ZOCOR) 20 MG tablet, TAKE ONE TABLET BY MOUTH NIGHTLY AT BEDTIME, Disp: 90 tablet, Rfl: 2     OnabotulinumtoxinA (BOTOX IJ), Inject 125 Units into the muscle once Lot /C3 Exp 05/2020, Disp: , Rfl:      OnabotulinumtoxinA (BOTOX IJ), Inject 125 Units into the muscle once /C3  Date of exp: 02/2020, Disp: , Rfl:      mometasone (ELOCON) 0.1 % cream, Apply sparingly to affected area twice daily as needed.  Do not apply to face., Disp: 45 g, Rfl: 0     SIMVASTATIN PO, Take 20 mg by mouth every evening, Disp: , Rfl:      TRAZODONE HCL PO, Take 150 mg by mouth At Bedtime (Takes 3 x 50mg tablet = 150mg dose), Disp: , Rfl:      triamcinolone (NASACORT) 55 MCG/ACT Inhaler, Spray 1 spray into both nostrils daily as needed (congestion), Disp: , Rfl:      calcium carb 1250 mg, 500 mg [place],/vitamin D 200 units (OSCAL WITH D) 500-200 MG-UNIT per tablet, Take 1 tablet by mouth 2 times daily (with meals), Disp: , Rfl:      Allergies   Allergen Reactions     Lidocaine Nausea and Vomiting     Meloxicam      Diarrhea, vomiting     Primidone Other (See Comments)     Felt like motion sickness     Tramadol      Diarrhea, vomiting      Adhesive Tape Rash     Durabond only     Allergy Rash     Dermabond  Anesthesia IV set misc       Tegaderm Transparent Dressing (Informational Only) Rash        PHYSICAL EXAM:    HEAD, NECK AND TRUNK PATTERN:   Head Tremor: Observed - no-no tremor  Head & Neck Extension: Present - Slight  Sub-Occipital Extension: Present - Slight  Forward Head: Present - Slight   Head & Neck Lateral Bend: Right   Shoulder Elevation: Right    HPI:    Patient denies new medical diagnoses, illnesses, hospitalizations, emergency room visits, and injuries since the previous injection with " botulinum neurotoxin.    We reviewed the recommended safety guidelines for  Botox from any vaccine injection, such as the seasonal flu vaccine, by a minimum of 10-14 days with Lilli Steven. She acknowledged understanding.    RESPONSE TO PREVIOUS TREATMENT:    Lilli Steven received 125 units of Botox on 10/12/2017    Problems following the previous series of neurotoxin injections included:  No problems reported    BENEFITS BY PATIENT REPORT:    Pain Improvement: Yes.  Percent Improvement: unable to quantify in % but repots significant improvement  Duration of Benefit:  10-12  weeks.    Dystonia and tremor Improvement: Yes.  Percent Improvement: unable to quantify in %, but repots significant improvement   Duration of Benefit:  10 weeks.      BOTULINUM NEUROTOXIN INJECTION PROCEDURES:    VERIFICATION OF PATIENT IDENTIFICATION AND PROCEDURE     Initials   Patient Name SES   Patient  SES   Procedure Verified by: SES     Prior to the start of the procedure and with procedural staff participation, I verbally confirmed the patient s identity using two indicators, relevant allergies, that the procedure was appropriate and matched the consent or emergent situation, and that the correct equipment/implants were available. Immediately prior to starting the procedure I conducted the Time Out with the procedural staff and re-confirmed the patient s name, procedure, and site/side. (The Joint Commission universal protocol was followed.)  Yes    Sedation (Moderate or Deep): None      Above assessments performed by:  Cassidy Frias MD      INDICATION/S FOR PROCEDURE/S:  Lilli Steven is a 67-year-old patient with dystonia affecting the  head, neck and shoulder girdle musculature secondary to a diagnosis of cervical dystonia with associated  pain, tremor, spasms, loss of joint motion, loss of volitional motor control and difficulty with activities of daily living.      Her baseline symptoms have been recalcitrant to oral medications and conservative therapy.  She is here today for an injection of Botox.      GOAL OF PROCEDURE:  The goal of this procedure is to increase active range of motion, improve volitional motor control and decrease pain  associated with dystonic movements, spasticity and tremor.    TOTAL DOSE ADMINISTERED:  Dose Administered:  125 units Botox    Diluent Used:  Preservative Free Normal Saline  Total Volume of Diluent Used:  1.25 ml  Lot # /C3 with Expiration Date:  07/2020  NDC #: Botox 100u ([address]-33)    Medication guide was offered to patient and was declined.    CONSENT:  The risks, benefits, and treatment options were discussed with Lilli Steven and she agreed to proceed.      Written consent was obtained by [facility].     EQUIPMENT USED:  Needle-37mm stimulating/recording  EMG/NCS Machine    SKIN PREPARATION:  Skin preparation was performed using an alcohol wipe.    GUIDANCE DESCRIPTION:  Electro-myographic guidance was necessary throughout the procedure to accurately identify all areas of dystonic and spastic muscles while avoiding injection of non-dystonic muscles and non-spastic muscles, neighboring nerves and nearby vascular structures.     AREA/MUSCLE INJECTED:  150 Units Botox  1. HEAD & NECK MUSCLES: 150 units Botox = Total Dose, 1:1 Dilution                 Right Mid-Trapezius - 10 units of Botox at 1 site/s.   Left Mid-Trapezius - 10 units of Botox at 1 site/s.      Right Splenius Capitus - 15 units of Botox at 2 site/s.   Left Splenius Capitus - 10 units of Botox at 2 site/s.      Right Levator Scapulae - 30 units of Botox at 1 site/s (shoulder muscles).   Left Levator Scapulae - 20 units of Botox at 1 site/s (shoulder muscles).      Right Inferior Obliquus Capitis - 10 units of Botox at 1 site/s.   Left Inferior Obliquus Capitis - 15 units of at 1 site/s.      Right Sternal head of the Sternocleidomastoid - 15 units of Botox  at 1 site/s.               Left Sternal head of the Sternocleidomastoid - 15 units of Botox at 1 site/s.       RESPONSE TO PROCEDURE:  Lilli Steven tolerated the procedure well and there were no immediate complications.  She was allowed to recover for an appropriate period of time and was discharged home in stable condition.     FOLLOW UP:  Lilli Steven was asked to follow up by phone in 7-14 days with Erin Combs PT, Care Coordinator or Mikki Rojas RN, Care Coordinator, to report her response to this series of injections.  Based on the patient's previous response to this therapy, Lilli Steven was rescheduled for the next series of injections in 10 weeks.     PLAN (Medication Changes, Therapy Orders, Work or Disability Issues, etc.): Dose increased from 125 units to 150 units today in hopes of increasing effectiveness. Will monitor response to today's injections and report.

## 2017-12-19 NOTE — NURSING NOTE
Chief Complaint   Patient presents with     RECHECK     Cervical Dystonia    Jennifer Camacho CMA

## 2017-12-19 NOTE — MR AVS SNAPSHOT
After Visit Summary   12/19/2017    Lilli Steven    MRN: [id number]           Patient Information     Date Of Birth          1950        Visit Information        Provider Department      12/19/2017 3:40 PM Hermes Frias MD [facility]        Today's Diagnoses     Spasmodic torticollis    -  1    Dystonia, torsion, fragments of           Follow-ups after your visit        Follow-up notes from your care team     Return in about 24 weeks (around 6/5/2018) for Cervical Dystonia.      Your next 10 appointments already scheduled     Mar 13, 2018  3:40 PM CDT   (Arrive by 3:25 PM)   Return Botox with Hermes Frias MD   [facility] ([facility])    [address] [place] [id number] [phone]            Jun 05, 2018  3:40 PM CDT   (Arrive by 3:25 PM)   Return Botox with Hermes Frias MD   [facility] ([facility])    [address] [place] [id number] [phone]              Who to contact     Please call your clinic at [phone] to:    Ask questions about your health    Make or cancel appointments    Discuss your medicines    Learn about your test results    Speak to your doctor   If you have compliments or concerns about an experience at your clinic, or if you wish to file a complaint, please contact [facility] Patient Relations at [phone] or email us at [email or web address]         Additional Information About Your Visit        MyChart Information     [facility] gives you secure access to your electronic health record. If you see a primary care provider, you can also send messages to your care team and make appointments. If you have questions, please call your primary care clinic.  If you do not have a primary care provider, please  "call [phone] and they will assist you.      [employer] is an electronic gateway that provides easy, online access to your medical records. With [employer], you can request a clinic appointment, read your test results, renew a prescription or communicate with your care team.     To access your existing account, please contact your [facility] or call [phone] for assistance.        Care EveryWhere ID     This is your Care EveryWhere ID. This could be used by other organizations to access your [place] medical records  [id number]        Your Vitals Were     Pulse Height Last Period             94 1.6 m (5' 3\") (LMP Unknown)          Blood Pressure from Last 3 Encounters:   12/19/17 125/84   12/06/17 126/78   10/12/17 140/70    Weight from Last 3 Encounters:   12/06/17 75.5 kg (166 lb 8 oz)   10/12/17 75.5 kg (166 lb 6.4 oz)   07/28/17 73.8 kg (162 lb 9.6 oz)              We Performed the Following     HC CHEMODENERVATION 1 EXTREMITY, EA ADDL EXTREMITY, 1-4 MUSCLE     HC CHEMODENERVATION MUSCLE NECK UNILAT     HC CHEMODENERVATION ONE EXTREMITY, 1-4 MUSCLE     Needle EMG Guide w/Chemodenervation (48036)        Primary Care Provider Office Phone # Fax #    Vargas Ata Chaudhry [phone] [address] [facility] [address]        Equal Access to Services     RAJWINDER TEJEDA AH: Hadii aad ku hadasho Soomaali, waaxda luqadaha, qaybta kaalmada adeegyada, shakeel prado . So [facility] [phone].    ATENCIÓN: Si habla español, tiene a sweet disposición servicios gratuitos de asistencia lingüística. Llame al [phone].    We comply with applicable federal civil rights laws and [place] laws. We do not discriminate on the basis of race, color, national origin, age, disability, sex, sexual orientation, or gender identity.            Thank you!     Thank you for choosing [facility]  for your care. Our goal is " always to provide you with excellent care. Hearing back from our patients is one way we can continue to improve our services. Please take a few minutes to complete the written survey that you may receive in the mail after your visit with us. Thank you!             Your Updated Medication List - Protect others around you: Learn how to safely use, store and throw away your medicines at www.disposemymeds.org.          This list is accurate as of: 12/19/17  4:48 PM.  Always use your most recent med list.                   Brand Name Dispense Instructions for use Diagnosis    * BOTOX IJ      Inject 125 Units into the muscle once /C3  Date of exp: 02/2020        * BOTOX IJ      Inject 125 Units into the muscle once Lot /C3 Exp 05/2020        * BOTOX IJ      Inject 150 Units into the muscle once Lot # /C3 Expiration Date:  07/2020        Calcium carb-Vitamin D 500 mg [place]-200 units 500-200 MG-UNIT per tablet    OSCAL with D;Oyster Shell Calcium     Take 1 tablet by mouth 2 times daily (with meals)        mometasone 0.1 % cream    ELOCON    45 g    Apply sparingly to affected area twice daily as needed.  Do not apply to face.    Dermatitis       * SIMVASTATIN PO      Take 20 mg by mouth every evening        * simvastatin 20 MG tablet    ZOCOR    90 tablet    TAKE ONE TABLET BY MOUTH NIGHTLY AT BEDTIME    Hyperlipidemia LDL goal <160       TRAZODONE HCL PO      Take 150 mg by mouth At Bedtime (Takes 3 x 50mg tablet = 150mg dose)        triamcinolone 55 MCG/ACT Inhaler    NASACORT     Spray 1 spray into both nostrils daily as needed (congestion)        * Notice:  This list has 5 medication(s) that are the same as other medications prescribed for you. Read the directions carefully, and ask your doctor or other care provider to review them with you.

## 2017-12-19 NOTE — LETTER
"12/19/2017       RE: Lilli Steven  [address]   [place] [address]     Dear Colleague,    Thank you for referring your patient, Lilli Steven, to the [facility] at [facility]. Please see a copy of my visit note below.    BOTULINUM TOXIN PROCEDURE NOTE    Chief Complaint   Patient presents with     RECHECK     Cervical Dystonia       /84  Pulse 94  Ht 1.6 m (5' 3\")  LMP  (LMP Unknown)      Current Outpatient Prescriptions:      simvastatin (ZOCOR) 20 MG tablet, TAKE ONE TABLET BY MOUTH NIGHTLY AT BEDTIME, Disp: 90 tablet, Rfl: 2     OnabotulinumtoxinA (BOTOX IJ), Inject 125 Units into the muscle once Lot /C3 Exp 05/2020, Disp: , Rfl:      OnabotulinumtoxinA (BOTOX IJ), Inject 125 Units into the muscle once /C3  Date of exp: 02/2020, Disp: , Rfl:      mometasone (ELOCON) 0.1 % cream, Apply sparingly to affected area twice daily as needed.  Do not apply to face., Disp: 45 g, Rfl: 0     SIMVASTATIN PO, Take 20 mg by mouth every evening, Disp: , Rfl:      TRAZODONE HCL PO, Take 150 mg by mouth At Bedtime (Takes 3 x 50mg tablet = 150mg dose), Disp: , Rfl:      triamcinolone (NASACORT) 55 MCG/ACT Inhaler, Spray 1 spray into both nostrils daily as needed (congestion), Disp: , Rfl:      calcium carb 1250 mg, 500 mg [place],/vitamin D 200 units (OSCAL WITH D) 500-200 MG-UNIT per tablet, Take 1 tablet by mouth 2 times daily (with meals), Disp: , Rfl:      Allergies   Allergen Reactions     Lidocaine Nausea and Vomiting     Meloxicam      Diarrhea, vomiting     Primidone Other (See Comments)     Felt like motion sickness     Tramadol      Diarrhea, vomiting      Adhesive Tape Rash     Durabond only     Allergy Rash     Dermabond  Anesthesia IV set misc       Tegaderm Transparent Dressing (Informational Only) Rash        PHYSICAL EXAM:    HEAD, NECK AND TRUNK PATTERN:   Head Tremor: Observed - no-no tremor  Head & " Neck Extension: Present - Slight  Sub-Occipital Extension: Present - Slight  Forward Head: Present - Slight   Head & Neck Lateral Bend: Right   Shoulder Elevation: Right    HPI:    Patient denies new medical diagnoses, illnesses, hospitalizations, emergency room visits, and injuries since the previous injection with botulinum neurotoxin.    We reviewed the recommended safety guidelines for  Botox from any vaccine injection, such as the seasonal flu vaccine, by a minimum of 10-14 days with Lilli Steven. She acknowledged understanding.    RESPONSE TO PREVIOUS TREATMENT:    Lilli Steven received 125 units of Botox on 10/12/2017    Problems following the previous series of neurotoxin injections included:  No problems reported    BENEFITS BY PATIENT REPORT:    Pain Improvement: Yes.  Percent Improvement: unable to quantify in % but repots significant improvement  Duration of Benefit:  10-12  weeks.    Dystonia and tremor Improvement: Yes.  Percent Improvement: unable to quantify in %, but repots significant improvement   Duration of Benefit:  10 weeks.      BOTULINUM NEUROTOXIN INJECTION PROCEDURES:    VERIFICATION OF PATIENT IDENTIFICATION AND PROCEDURE     Initials   Patient Name SES   Patient  SES   Procedure Verified by: SES     Prior to the start of the procedure and with procedural staff participation, I verbally confirmed the patient s identity using two indicators, relevant allergies, that the procedure was appropriate and matched the consent or emergent situation, and that the correct equipment/implants were available. Immediately prior to starting the procedure I conducted the Time Out with the procedural staff and re-confirmed the patient s name, procedure, and site/side. (The Joint Commission universal protocol was followed.)  Yes    Sedation (Moderate or Deep): None      Above assessments performed by:  Cassidy Frias MD      INDICATION/S FOR  PROCEDURE/S:  Lilli Steven is a 67-year-old patient with dystonia affecting the  head, neck and shoulder girdle musculature secondary to a diagnosis of cervical dystonia with associated  pain, tremor, spasms, loss of joint motion, loss of volitional motor control and difficulty with activities of daily living.     Her baseline symptoms have been recalcitrant to oral medications and conservative therapy.  She is here today for an injection of Botox.      GOAL OF PROCEDURE:  The goal of this procedure is to increase active range of motion, improve volitional motor control and decrease pain  associated with dystonic movements, spasticity and tremor.    TOTAL DOSE ADMINISTERED:  Dose Administered:  125 units Botox    Diluent Used:  Preservative Free Normal Saline  Total Volume of Diluent Used:  1.25 ml  Lot # /C3 with Expiration Date:  07/2020  NDC #: Botox 100u ([address]-33)    Medication guide was offered to patient and was declined.    CONSENT:  The risks, benefits, and treatment options were discussed with Lilli Steven and she agreed to proceed.      Written consent was obtained by [facility].     EQUIPMENT USED:  Needle-37mm stimulating/recording  EMG/NCS Machine    SKIN PREPARATION:  Skin preparation was performed using an alcohol wipe.    GUIDANCE DESCRIPTION:  Electro-myographic guidance was necessary throughout the procedure to accurately identify all areas of dystonic and spastic muscles while avoiding injection of non-dystonic muscles and non-spastic muscles, neighboring nerves and nearby vascular structures.     AREA/MUSCLE INJECTED:  1 50 Units Botox  1. HEAD & NECK MUSCLES: 150 units Botox = Total Dose, 1:1 Dilution                 Right Mid-Trapezius - 10 units of Botox at 1 site/s.   Left Mid-Trapezius - 10 units of Botox at 1 site/s.      Right Splenius Capitus - 15 units of Botox at 2 site/s.   Left Splenius Capitus - 10 units of Botox at 2 site/s.      Right Levator Scapulae - 30  units of Botox at 1 site/s (shoulder muscles).   Left Levator Scapulae - 20 units of Botox at 1 site/s (shoulder muscles).      Right Inferior Obliquus Capitis - 10 units of Botox at 1 site/s.   Left Inferior Obliquus Capitis - 15 units of at 1 site/s.      Right Sternal head of the Sternocleidomastoid - 15 units of Botox at 1 site/s.               Left Sternal head of the Sternocleidomastoid - 15 units of Botox at 1 site/s.       RESPONSE TO PROCEDURE:  Lilli Steven tolerated the procedure well and there were no immediate complications.  She was allowed to recover for an appropriate period of time and was discharged home in stable condition.     FOLLOW UP:  Lilli Steven was asked to follow up by phone in 7-14 days with Erin Combs PT, Care Coordinator or Mikki Rojas RN, Care Coordinator, to report her response to this series of injections.  Based on the patient's previous response to this therapy, Lilli Steven was rescheduled for the next series of injections in 10 weeks.     PLAN (Medication Changes, Therapy Orders, Work or Disability Issues, etc.):  Dose increased from 125 units to 150 units today in hopes of increasing effectiveness. Will monitor response to today's injections and report.       Again, thank you for allowing me to participate in the care of your patient.      Sincerely,    Hermes Frias MD

## 2017-12-21 DIAGNOSIS — G47.00 PERSISTENT INSOMNIA: ICD-10-CM

## 2017-12-22 RX ORDER — TRAZODONE HYDROCHLORIDE 50 MG/1
TABLET, FILM COATED ORAL
Qty: 90 TABLET | Refills: 1 | Status: SHIPPED | OUTPATIENT
Start: 2017-12-22 | End: 2018-03-03

## 2017-12-22 NOTE — TELEPHONE ENCOUNTER
Routing refill request to provider for review/approval because:  Medication is reported/historical  Dasha RYAN RN    Requested Prescriptions   Pending Prescriptions Disp Refills     traZODone (DESYREL) 50 MG tablet [Pharmacy Med Name: TRAZODONE 50 MG TABLET] 90 tablet 1     Sig: TAKE 3 TABLETS BY MOUTH AT BEDTIME    Serotonin Modulators Passed    12/21/2017  1:04 AM       Passed - Recent or future visit with authorizing provider's specialty    Patient had office visit in the last year or has a visit in the next 30 days with authorizing provider.  See chart review.              Passed - Patient is age 18 or older       Passed - No active pregnancy on record       Passed - No positive pregnancy test in past 12 months

## 2018-01-25 ENCOUNTER — OFFICE VISIT (OUTPATIENT)
Dept: FAMILY MEDICINE | Facility: CLINIC | Age: 68
End: 2018-01-25
Payer: COMMERCIAL

## 2018-01-25 VITALS
BODY MASS INDEX: 28.89 KG/M2 | HEART RATE: 110 BPM | WEIGHT: 163.1 LBS | SYSTOLIC BLOOD PRESSURE: 122 MMHG | OXYGEN SATURATION: 95 % | DIASTOLIC BLOOD PRESSURE: 74 MMHG | TEMPERATURE: 97.6 F | RESPIRATION RATE: 16 BRPM

## 2018-01-25 DIAGNOSIS — J20.9 ACUTE BRONCHITIS WITH SYMPTOMS > 10 DAYS: Primary | ICD-10-CM

## 2018-01-25 PROCEDURE — 99213 OFFICE O/P EST LOW 20 MIN: CPT | Performed by: FAMILY MEDICINE

## 2018-01-25 RX ORDER — ALBUTEROL SULFATE 90 UG/1
1-2 AEROSOL, METERED RESPIRATORY (INHALATION) EVERY 4 HOURS PRN
Qty: 1 INHALER | Refills: 0 | Status: SHIPPED | OUTPATIENT
Start: 2018-01-25 | End: 2018-03-13

## 2018-01-25 RX ORDER — AZITHROMYCIN 250 MG/1
TABLET, FILM COATED ORAL
Qty: 6 TABLET | Refills: 0 | Status: SHIPPED | OUTPATIENT
Start: 2018-01-25 | End: 2018-03-13

## 2018-01-25 NOTE — MR AVS SNAPSHOT
After Visit Summary   1/25/2018    Lilli Steven    MRN: [id number]           Patient Information     Date Of Birth          1950        Visit Information        Provider Department      1/25/2018 12:45 PM Vargas Chaudhry MD [facility]        Today's Diagnoses     Acute bronchitis with symptoms > 10 days    -  1       Follow-ups after your visit        Follow-up notes from your care team     Return if symptoms worsen or fail to improve.      Your next 10 appointments already scheduled     Mar 13, 2018  3:40 PM CDT   (Arrive by 3:25 PM)   Return Botox with Hermes Frias MD   [facility] ([facility])    [address] [place] [id number] [phone]            Jun 05, 2018  3:40 PM CDT   (Arrive by 3:25 PM)   Return Botox with Hermes Frias MD   [facility] ([facility])    [address] [place] [address] [phone]              Who to contact     If you have questions or need follow up information about today's clinic visit or your schedule please contact [facility] [place] directly at [phone].  Normal or non-critical lab and imaging results will be communicated to you by MyChart, letter or phone within 4 business days after the clinic has received the results. If you do not hear from us within 7 days, please contact the clinic through MyChart or phone. If you have a critical or abnormal lab result, we will notify you by phone as soon as possible.  Submit refill requests through [employer] or call your pharmacy and they will forward the refill request to us. Please allow 3 business days for your refill to be completed.          Additional Information About Your Visit        [employer] Information     [employer] gives you secure access to your electronic health record. If you see a primary  care provider, you can also send messages to your care team and make appointments. If you have questions, please call your primary care clinic.  If you do not have a primary care provider, please call [phone] and they will assist you.        Care EveryWhere ID     This is your Care EveryWhere ID. This could be used by other organizations to access your [facility] medical records  [id number]        Your Vitals Were     Pulse Temperature Respirations Last Period Pulse Oximetry BMI (Body Mass Index)    110 97.6  F (36.4  C) (Oral) 16 (LMP Unknown) 95% 28.89 kg/m2       Blood Pressure from Last 3 Encounters:   01/25/18 122/74   12/19/17 125/84   12/06/17 126/78    Weight from Last 3 Encounters:   01/25/18 163 lb 1.6 oz (74 kg)   12/06/17 166 lb 8 oz (75.5 kg)   10/12/17 166 lb 6.4 oz (75.5 kg)              Today, you had the following     No orders found for display         Today's Medication Changes          These changes are accurate as of 1/25/18 11:59 PM.  If you have any questions, ask your nurse or doctor.               Start taking these medicines.        Dose/Directions    albuterol 108 (90 BASE) MCG/ACT Inhaler   Commonly known as:  PROAIR HFA/PROVENTIL HFA/VENTOLIN HFA   Used for:  Acute bronchitis with symptoms > 10 days   Started by:  Vargas Chaudhry MD        Dose:  1-2 puff   Inhale 1-2 puffs into the lungs every 4 hours as needed for shortness of breath / dyspnea or wheezing   Quantity:  1 Inhaler   Refills:  0       azithromycin 250 MG tablet   Commonly known as:  ZITHROMAX   Used for:  Acute bronchitis with symptoms > 10 days   Started by:  Vargas Chaudhry MD        Two tablets first day, then one tablet daily for four days.   Quantity:  6 tablet   Refills:  0            Where to get your medicines      These medications were sent to [facility] [id number] [place], [place] - [address]  [address], [place] [address]     Phone:  [phone]     albuterol 108 (90  BASE) MCG/ACT Inhaler    azithromycin 250 MG tablet                Primary Care Provider Office Phone # Fax #    Vargas Ata Chaudhry [phone] [address] [facility] [address]        Equal Access to Services     LESLYEELVIRA NIKHIL : Hadii aad ku haddhruvo Sokunalali, waaxda luqadaha, qaybta kaalmada adeegyada, shakeel hainesn celiolincoln donaldson laivaniakevan . So [facility] [phone].    ATENCIÓN: Si habla español, tiene a sweet disposición servicios gratuitos de asistencia lingüística. Llame al [phone].    We comply with applicable federal civil rights laws and [place] laws. We do not discriminate on the basis of race, color, national origin, age, disability, sex, sexual orientation, or gender identity.            Thank you!     Thank you for choosing [facility]  for your care. Our goal is always to provide you with excellent care. Hearing back from our patients is one way we can continue to improve our services. Please take a few minutes to complete the written survey that you may receive in the mail after your visit with us. Thank you!             Your Updated Medication List - Protect others around you: Learn how to safely use, store and throw away your medicines at www.disposemymeds.org.          This list is accurate as of 1/25/18 11:59 PM.  Always use your most recent med list.                   Brand Name Dispense Instructions for use Diagnosis    albuterol 108 (90 BASE) MCG/ACT Inhaler    PROAIR HFA/PROVENTIL HFA/VENTOLIN HFA    1 Inhaler    Inhale 1-2 puffs into the lungs every 4 hours as needed for shortness of breath / dyspnea or wheezing    Acute bronchitis with symptoms > 10 days       azithromycin 250 MG tablet    ZITHROMAX    6 tablet    Two tablets first day, then one tablet daily for four days.    Acute bronchitis with symptoms > 10 days       * BOTOX IJ      Inject 125 Units into the muscle once /C3  Date of exp: 02/2020        * BOTOX IJ      Inject 125 Units  into the muscle once Lot /C3 Exp 05/2020        * BOTOX IJ      Inject 150 Units into the muscle once Lot # /C3 Expiration Date:  07/2020        Calcium carb-Vitamin D 500 mg [place]-200 units 500-200 MG-UNIT per tablet    OSCAL with D;Oyster Shell Calcium     Take 1 tablet by mouth 2 times daily (with meals)        mometasone 0.1 % cream    ELOCON    45 g    Apply sparingly to affected area twice daily as needed.  Do not apply to face.    Dermatitis       * SIMVASTATIN PO      Take 20 mg by mouth every evening        * simvastatin 20 MG tablet    ZOCOR    90 tablet    TAKE ONE TABLET BY MOUTH NIGHTLY AT BEDTIME    Hyperlipidemia LDL goal <160       * TRAZODONE HCL PO      Take 150 mg by mouth At Bedtime (Takes 3 x 50mg tablet = 150mg dose)        * traZODone 50 MG tablet    DESYREL    90 tablet    TAKE 3 TABLETS BY MOUTH AT BEDTIME    Persistent insomnia       triamcinolone 55 MCG/ACT Inhaler    NASACORT     Spray 1 spray into both nostrils daily as needed (congestion)        * Notice:  This list has 7 medication(s) that are the same as other medications prescribed for you. Read the directions carefully, and ask your doctor or other care provider to review them with you.

## 2018-01-25 NOTE — PROGRESS NOTES
SUBJECTIVE:   Lilli Steven is a 67 year old female who presents to clinic today for the following health issues:    RESPIRATORY SYMPTOMS      Duration: x 2 days of wheezing     Description  nasal congestion, facial pain/pressure, cough and wheezing    Severity: moderate    Accompanying signs and symptoms: None    History (predisposing factors):  none    Precipitating or alleviating factors: None    Therapies tried and outcome:  none      mild  Modifiers: OTC meds not helpful  Trend of symptoms: worsening  No international travel.    PMH reviewed  Past Medical History:   Diagnosis Date     Diarrhea 9/6/2012     Hypercholesterolemia 9/6/2012     Neck pain 9/6/2012     PONV (postoperative nausea and vomiting)      Seasonal allergies 9/6/2012     Snores 9/6/2012     Tremor      Wears glasses 9/6/2012       OBJECTIVE:  /74  Pulse 110  Temp 97.6  F (36.4  C) (Oral)  Resp 16  Wt 163 lb 1.6 oz (74 kg)  LMP  (LMP Unknown)  SpO2 95%  BMI 28.89 kg/m2  General appearance: ill appearing but no respiratory distress  Left Ear: normal  Right Ear: normal  Nose: rhinorrhea  Sinuses: normal  Oropharynx: mild erythema  Neck: no swollen nodes  Lungs: rales both lung bases.  Heart: normal    LAB: Rapid influenza not indicated    ASSESSMENT/PLAN:    ICD-10-CM    1. Acute bronchitis with symptoms > 10 days J20.9 azithromycin (ZITHROMAX) 250 MG tablet     albuterol (PROAIR HFA/PROVENTIL HFA/VENTOLIN HFA) 108 (90 BASE) MCG/ACT Inhaler     respiratory infection, most likely viral.  Discussed OTC therapies and to call back if symptoms worsen, fever  abx if worsens, Rx.  Try albuterol first    Symptomatic therapy suggested with fluids, acetaminophen for fever  tamiflu not indicated due to being outpatient and lack of risk factors  Call or return to clinic if these symptoms worsen or fail to improve as anticipated.

## 2018-01-25 NOTE — NURSING NOTE
"Chief Complaint   Patient presents with     Respiratory Problems     Initial /74  Pulse 110  Temp 97.6  F (36.4  C) (Oral)  Resp 16  Wt 163 lb 1.6 oz (74 kg)  LMP  (LMP Unknown)  SpO2 95%  BMI 28.89 kg/m2 Estimated body mass index is 28.89 kg/(m^2) as calculated from the following:    Height as of 12/19/17: 5' 3\" (1.6 m).    Weight as of this encounter: 163 lb 1.6 oz (74 kg).  BP completed using cuff size: large. R arm       Health Maintenance that is potentially due pending provider review:   Pt declined health care directive    n/a       Kimberly Winn CMA       "

## 2018-02-07 ENCOUNTER — TELEPHONE (OUTPATIENT)
Dept: FAMILY MEDICINE | Facility: CLINIC | Age: 68
End: 2018-02-07

## 2018-02-07 DIAGNOSIS — G24.3 SPASMODIC TORTICOLLIS: Primary | ICD-10-CM

## 2018-02-07 NOTE — TELEPHONE ENCOUNTER
Inés,  Would patient need a neuro referral to [facility]?  Thank you,  Luisa Palomares RN

## 2018-02-07 NOTE — TELEPHONE ENCOUNTER
Reason for Call: Request for an order or referral:    Order or referral being requested: needs a Referral to see  at [facility]  A  Neurologist     Date needed: as soon as possible    Has the patient been seen by the PCP for this problem? YES    Additional comments: call once taking care of    Phone number Patient can be reached at:  Cell number on file:    Telephone Information:   Mobile [phone]       Best Time:  anytime    Can we leave a detailed message on this number?  YES    Call taken on 2/7/2018 at 4:44 PM by Sotero Hines

## 2018-02-08 NOTE — TELEPHONE ENCOUNTER
LDVM - referral for Altagracia was signed.  If any questions call back to triage.  Luisa Palomares RN

## 2018-02-08 NOTE — TELEPHONE ENCOUNTER
Dr Frias is Physical Medicine & Rhab at [facility]. The [facility] always requires patient to have a referral before they can make an appt. Pending referral.    Inés Fisher  Referral Coordiantor

## 2018-02-08 NOTE — TELEPHONE ENCOUNTER
JF  Pt needs renewal of referral for physiatry referral, Dr. Frias.   Thank you,  Luisa Palomares RN

## 2018-03-03 DIAGNOSIS — G47.00 PERSISTENT INSOMNIA: ICD-10-CM

## 2018-03-05 RX ORDER — TRAZODONE HYDROCHLORIDE 50 MG/1
TABLET, FILM COATED ORAL
Qty: 90 TABLET | Refills: 0 | Status: SHIPPED | OUTPATIENT
Start: 2018-03-05 | End: 2018-03-13

## 2018-03-05 NOTE — TELEPHONE ENCOUNTER
JF  Routing refill request to provider for review/approval because:  Medication is reported/historical  Due for physical in July.  Thanks, Miya Valdovinos RN

## 2018-03-13 ENCOUNTER — OFFICE VISIT (OUTPATIENT)
Dept: FAMILY MEDICINE | Facility: CLINIC | Age: 68
End: 2018-03-13
Payer: COMMERCIAL

## 2018-03-13 ENCOUNTER — OFFICE VISIT (OUTPATIENT)
Dept: PHYSICAL MEDICINE AND REHAB | Facility: CLINIC | Age: 68
End: 2018-03-13
Payer: COMMERCIAL

## 2018-03-13 ENCOUNTER — RECORDS - HEALTHEAST (OUTPATIENT)
Dept: ADMINISTRATIVE | Facility: OTHER | Age: 68
End: 2018-03-13

## 2018-03-13 VITALS
SYSTOLIC BLOOD PRESSURE: 126 MMHG | WEIGHT: 162.9 LBS | OXYGEN SATURATION: 96 % | HEART RATE: 102 BPM | DIASTOLIC BLOOD PRESSURE: 70 MMHG | TEMPERATURE: 99.1 F | HEIGHT: 61 IN | BODY MASS INDEX: 30.76 KG/M2 | RESPIRATION RATE: 16 BRPM

## 2018-03-13 VITALS — HEIGHT: 63 IN | WEIGHT: 162.2 LBS | RESPIRATION RATE: 24 BRPM | BODY MASS INDEX: 28.74 KG/M2

## 2018-03-13 DIAGNOSIS — G24.3 SPASMODIC TORTICOLLIS: Primary | ICD-10-CM

## 2018-03-13 DIAGNOSIS — L90.5 PAIN IN SURGICAL SCAR: ICD-10-CM

## 2018-03-13 DIAGNOSIS — G24.1 DYSTONIA, TORSION, FRAGMENTS OF: ICD-10-CM

## 2018-03-13 DIAGNOSIS — Z01.818 PREOP GENERAL PHYSICAL EXAM: Primary | ICD-10-CM

## 2018-03-13 DIAGNOSIS — G47.00 PERSISTENT INSOMNIA: ICD-10-CM

## 2018-03-13 DIAGNOSIS — R52 PAIN IN SURGICAL SCAR: ICD-10-CM

## 2018-03-13 DIAGNOSIS — E78.5 HYPERLIPIDEMIA LDL GOAL <160: ICD-10-CM

## 2018-03-13 PROCEDURE — 99214 OFFICE O/P EST MOD 30 MIN: CPT | Performed by: FAMILY MEDICINE

## 2018-03-13 RX ORDER — TRAZODONE HYDROCHLORIDE 50 MG/1
TABLET, FILM COATED ORAL
Qty: 90 TABLET | Refills: 11 | Status: SHIPPED | OUTPATIENT
Start: 2018-03-13 | End: 2019-03-03

## 2018-03-13 RX ORDER — SIMVASTATIN 20 MG
20 TABLET ORAL AT BEDTIME
Qty: 90 TABLET | Refills: 3 | Status: ON HOLD | OUTPATIENT
Start: 2018-03-13 | End: 2019-05-02

## 2018-03-13 ASSESSMENT — PAIN SCALES - GENERAL: PAINLEVEL: NO PAIN (0)

## 2018-03-13 NOTE — PATIENT INSTRUCTIONS
Before Your Surgery      Call your surgeon if there is any change in your health. This includes signs of a cold or flu (such as a sore throat, runny nose, cough, rash or fever).    Do not smoke, drink alcohol or take over the counter medicine (unless your surgeon or primary care doctor tells you to) for the 24 hours before and after surgery.    If you take prescribed drugs: Follow your doctor s orders about which medicines to take and which to stop until after surgery.    Eating and drinking prior to surgery: follow the instructions from your surgeon    Take a shower or bath the night before surgery. Use the soap your surgeon gave you to gently clean your skin. If you do not have soap from your surgeon, use your regular soap. Do not shave or scrub the surgery site.  Wear clean pajamas and have clean sheets on your bed.     Before Your Surgery      Call your surgeon if there is any change in your health. This includes signs of a cold or flu (such as a sore throat, runny nose, cough, rash or fever).    Do not smoke, drink alcohol or take over the counter medicine (unless your surgeon or primary care doctor tells you to) for the 24 hours before and after surgery.    If you take prescribed drugs: Follow your doctor s orders about which medicines to take and which to stop until after surgery.    Eating and drinking prior to surgery: follow the instructions from your surgeon    Take a shower or bath the night before surgery. Use the soap your surgeon gave you to gently clean your skin. If you do not have soap from your surgeon, use your regular soap. Do not shave or scrub the surgery site.  Wear clean pajamas and have clean sheets on your bed.     Before Your Surgery      Call your surgeon if there is any change in your health. This includes signs of a cold or flu (such as a sore throat, runny nose, cough, rash or fever).    Do not smoke, drink alcohol or take over the counter medicine (unless your surgeon or primary  care doctor tells you to) for the 24 hours before and after surgery.    If you take prescribed drugs: Follow your doctor s orders about which medicines to take and which to stop until after surgery.    Eating and drinking prior to surgery: follow the instructions from your surgeon    Take a shower or bath the night before surgery. Use the soap your surgeon gave you to gently clean your skin. If you do not have soap from your surgeon, use your regular soap. Do not shave or scrub the surgery site.  Wear clean pajamas and have clean sheets on your bed.

## 2018-03-13 NOTE — NURSING NOTE
"Chief Complaint   Patient presents with     Pre-Op Exam     Initial /70  Pulse 102  Temp 99.1  F (37.3  C) (Tympanic)  Resp 16  Ht 5' 1.25\" (1.556 m)  Wt 162 lb 14.4 oz (73.9 kg)  LMP  (LMP Unknown)  SpO2 96%  BMI 30.53 kg/m2 Estimated body mass index is 30.53 kg/(m^2) as calculated from the following:    Height as of this encounter: 5' 1.25\" (1.556 m).    Weight as of this encounter: 162 lb 14.4 oz (73.9 kg).  BP completed using cuff size: regular. L arm       Health Maintenance that is potentially due pending provider review:   Pt states that she has the health care directive, but is currently working on it.        Kimberly Winn CMA     "

## 2018-03-13 NOTE — NURSING NOTE
Chief Complaint   Patient presents with     RECHECK     [facility]- BOTOX-CERVICAL DYSTONIA     Mauricio Watkins, CMA

## 2018-03-13 NOTE — PROGRESS NOTES
[facility]  [address]  [facility] [address] [phone]  Dept: [phone]    PRE-OP EVALUATION:  Today's date: 3/13/2018    Lilli Steven (: 1950) presents for pre-operative evaluation assessment as requested by Dr.Jackqueline Reta Harrell MD .  She requires evaluation and anesthesia risk assessment prior to undergoing surgery/procedure for treatment of Abdominal Scar revision  .    :[facility]   Primary Physician: Vargas Chaudhry  Type of Anesthesia Anticipated: to be determined    Patient has a Health Care Directive or Living Will:  NO    Preop Questions 3/11/2018   Who is doing your surgery? Dr Harrell   What are you having done? Fix to previous sugery   Date of Surgery/Procedure: -   Facility or Hospital where procedure/surgery will be performed: [facility]   1.  Do you have a history of Heart attack, stroke, stent, coronary bypass surgery, or other heart surgery? No   2.  Do you ever have any pain or discomfort in your chest? No   3.  Do you have a history of  Heart Failure? No   4.   Are you troubled by shortness of breath when:  walking on a level surface, or up a slight hill, or at night? No   5.  Do you currently have a cold, bronchitis or other respiratory infection? No   6.  Do you have a cough, shortness of breath, or wheezing? No   7.  Do you sometimes get pains in the calves of your legs when you walk? No   8. Do you or anyone in your family have previous history of blood clots? No   9.  Do you or does anyone in your family have a serious bleeding problem such as prolonged bleeding following surgeries or cuts? No   10. Have you ever had problems with anemia or been told to take iron pills? No   11. Have you had any abnormal blood loss such as black, tarry or bloody stools, or abnormal vaginal bleeding? No   12. Have you ever had a blood transfusion? No   13. Have you or any of your relatives ever had problems with anesthesia? YES - Self,  fainting, vomting   14. Do you have sleep apnea, excessive snoring or daytime drowsiness? No   15. Do you have any prosthetic heart valves? No   16. Do you have prosthetic joints? No   17. Is there any chance that you may be pregnant? No         HPI:     HPI related to upcoming procedure: Having a revision of an abdominal scar related to a previous surgery that had an infection, it is uncomfortable      See problem list for active medical problems.  Problems all longstanding and stable, except as noted/documented.  See ROS for pertinent symptoms related to these conditions.                                                                                                  .    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     Priority: Medium     Overview:   Created by Conversion       Wears glasses 09/06/2012     Priority: Medium     Neck pain 09/06/2012     Priority: Medium     Seasonal allergies 09/06/2012     Priority: Medium     Spring months       Osteopenia 05/17/2011     Priority: Medium     DEXA 2011  T -1 Lumbar spine  Right hip T -1.8  Left hip T -1.9       Urinary incontinence 08/04/2006     Priority: Medium     Spasmodic torticollis 07/19/1988     Priority: Medium     Botox injection        Past Medical History:   Diagnosis Date     Diarrhea 9/6/2012     Hypercholesterolemia 9/6/2012     Neck pain 9/6/2012     PONV (postoperative nausea and vomiting)      Seasonal allergies 9/6/2012     Snores 9/6/2012     Tremor      Wears glasses 9/6/2012     Past Surgical History:   Procedure Laterality Date     COSMETIC ABDOMINOPLASTY MODIFIED N/A 2/16/2017    Procedure: COSMETIC ABDOMINOPLASTY  MODIFIED;  Surgeon: Inés Harrell MD;  Location:  OR     HERNIORRHAPHY EPIGASTRIC N/A 2/16/2017    Procedure: HERNIORRHAPHY EPIGASTRIC;  Surgeon: Joey Sargent MD;  Location:  OR     HERNIORRHAPHY UMBILICAL N/A 2/16/2017    Procedure: HERNIORRHAPHY UMBILICAL;  Surgeon: Joey Sargent MD;  Location:  OR     IMPLANT STIMULATOR SACRAL NERVE STAGE ONE      for bladder incontinence, failed; battery is dead     INCISION AND DRAINAGE ABDOMEN WASHOUT, COMBINED N/A 6/2/2017    Procedure: COMBINED INCISION AND DRAINAGE ABDOMEN WASHOUT;  EVACUATION OF ABDOMINAL WALL SEROMA;  Surgeon: Inés Harrell MD;  Location:  OR     IRRIGATION AND DEBRIDEMENT TRUNK, COMBINED N/A 3/29/2017    Procedure: COMBINED IRRIGATION AND DEBRIDEMENT TRUNK;  Surgeon: Inés Harrell MD;  Location:  SD     TONSILLECTOMY       Current Outpatient Prescriptions   Medication Sig Dispense Refill     traZODone (DESYREL) 50 MG tablet TAKE 3 TABLETS BY MOUTH AT BEDTIME 90 tablet 11     simvastatin (ZOCOR) 20 MG tablet Take 1 tablet (20 mg) by mouth At Bedtime 90 tablet 3     mometasone (ELOCON) 0.1 % cream Apply sparingly to affected area twice daily as needed.  Do not apply to face. 45 g 0     calcium carb 1250 mg, 500 mg [place],/vitamin D 200 units (OSCAL WITH D) 500-200 MG-UNIT per tablet Take 1 tablet by mouth 2 times daily (with meals)       OnabotulinumtoxinA (BOTOX IJ) Inject 150 Units into the muscle Lot # /[id number]  Exp: 10/2020       OTC products: None, except as noted above    Allergies   Allergen Reactions     Lidocaine Nausea and Vomiting     Meloxicam      Diarrhea, vomiting     Primidone Other (See Comments)     Felt like motion sickness     Tramadol      Diarrhea, vomiting      Adhesive Tape Rash     Durabond only     Allergy Rash     Dermabond  Anesthesia IV set misc       Tegaderm Transparent Dressing (Informational Only) Rash      Latex Allergy: NO    Social History   Substance Use Topics  "    Smoking status: Former Smoker     Types: Cigarettes     Quit date: 1/1/1985     Smokeless tobacco: Never Used     Alcohol use 0.0 oz/week     0 Standard drinks or equivalent per week      Comment: glass of wine daily     History   Drug Use No       REVIEW OF SYSTEMS:   CONSTITUTIONAL: NEGATIVE for fever, chills, change in weight  INTEGUMENTARY/SKIN: NEGATIVE for worrisome rashes, moles or lesions  EYES: NEGATIVE for vision changes or irritation  ENT/MOUTH: NEGATIVE for ear, mouth and throat problems  RESP: NEGATIVE for significant cough or SOB  BREAST: NEGATIVE for masses, tenderness or discharge  CV: NEGATIVE for chest pain, palpitations or peripheral edema  GI: NEGATIVE for nausea, abdominal pain, heartburn, or change in bowel habits  : NEGATIVE for frequency, dysuria, or hematuria  MUSCULOSKELETAL: NEGATIVE for significant arthralgias or myalgia  NEURO: NEGATIVE for weakness, dizziness or paresthesias  ENDOCRINE: NEGATIVE for temperature intolerance, skin/hair changes  HEME: NEGATIVE for bleeding problems  PSYCHIATRIC: NEGATIVE for changes in mood or affect    EXAM:   /70  Pulse 102  Temp 99.1  F (37.3  C) (Tympanic)  Resp 16  Ht 5' 1.25\" (1.556 m)  Wt 162 lb 14.4 oz (73.9 kg)  LMP  (LMP Unknown)  SpO2 96%  BMI 30.53 kg/m2    General Appearance: Pleasant, alert, in no acute respiratory distress.  Head Exam: Normal. Normocephalic, atraumatic. No sinus tenderness.  Eye Exam: Normal external eye, conjunctiva, lids. ELENA.  Ear Exam: Normal auditory canals and external ears. Non-tender.  Left TM-normal. Right TM-normal.  OroPharynx Exam: Dental hygiene adequate. Normal buccal mucosa. Normal pharynx.  Neck Exam: Supple, no masses or enlarged, tender nodes.  Thyroid Exam: No nodules or enlargement or tenderness.  Chest/Respiratory Exam: Normal, comfortable, easy respirations. Chest wall normal. Lungs are clear to auscultation. No wheezing, crackles, or rhonchi.  Cardiovascular Exam: Regular rate and " rhythm. No murmur, gallop, or rubs. No pedal edema.  Gastrointestinal Exam: Soft, non-tender, no masses or organomegaly.  Musculoskeletal Exam: Back is non-tender, full ROM of upper and lower extremities.  Skin: no rash, warm and dry, contractured scar on lower abdomen  Neurologic Exam: Nonfocal, no tremor. Normal gait.  Psychiatric Exam: Alert - appropriate, normal affect      DIAGNOSTICS:   No labs or EKG required for low risk surgery (cataract, skin procedure, breast biopsy, etc)    Recent Labs   Lab Test  06/02/17   2140  03/27/17   0910  02/16/17   0710   HGB   --    --   14.4   NA   --   142   --    POTASSIUM   --   4.6   --    CR  0.90  0.76   --         IMPRESSION:   Reason for surgery/procedure: scar revision  Diagnosis/reason for consult: routine    The proposed surgical procedure is considered LOW risk.    REVISED CARDIAC RISK INDEX  The patient has the following serious cardiovascular risks for perioperative complications such as (MI, PE, VFib and 3  AV Block):  No serious cardiac risks  INTERPRETATION: 0 risks: Class I (very low risk - 0.4% complication rate)    The patient has the following additional risks for perioperative complications:  No identified additional risks      ICD-10-CM    1. Preop general physical exam Z01.818    2. Pain in surgical scar R52     L90.5    3. Persistent insomnia G47.00 traZODone (DESYREL) 50 MG tablet   4. Hyperlipidemia LDL goal <160 E78.5 simvastatin (ZOCOR) 20 MG tablet       RECOMMENDATIONS:         --Patient is to take all scheduled medications on the day of surgery EXCEPT for modifications listed below.    APPROVAL GIVEN to proceed with proposed procedure, without further diagnostic evaluation       Signed Electronically by: Vargas Chaudhry MD    Copy of this evaluation report is provided to requesting physician.    [place] Preop Guidelines

## 2018-03-13 NOTE — MR AVS SNAPSHOT
After Visit Summary   3/13/2018    Lilli Steven    MRN: [id number]           Patient Information     Date Of Birth          1950        Visit Information        Provider Department      3/13/2018 1:00 PM Vargas Chaudhry MD [facility]        Today's Diagnoses     Preop general physical exam    -  1    Pain in surgical scar        Persistent insomnia        Hyperlipidemia LDL goal <160          Care Instructions      Before Your Surgery      Call your surgeon if there is any change in your health. This includes signs of a cold or flu (such as a sore throat, runny nose, cough, rash or fever).    Do not smoke, drink alcohol or take over the counter medicine (unless your surgeon or primary care doctor tells you to) for the 24 hours before and after surgery.    If you take prescribed drugs: Follow your doctor s orders about which medicines to take and which to stop until after surgery.    Eating and drinking prior to surgery: follow the instructions from your surgeon    Take a shower or bath the night before surgery. Use the soap your surgeon gave you to gently clean your skin. If you do not have soap from your surgeon, use your regular soap. Do not shave or scrub the surgery site.  Wear clean pajamas and have clean sheets on your bed.     Before Your Surgery      Call your surgeon if there is any change in your health. This includes signs of a cold or flu (such as a sore throat, runny nose, cough, rash or fever).    Do not smoke, drink alcohol or take over the counter medicine (unless your surgeon or primary care doctor tells you to) for the 24 hours before and after surgery.    If you take prescribed drugs: Follow your doctor s orders about which medicines to take and which to stop until after surgery.    Eating and drinking prior to surgery: follow the instructions from your surgeon    Take a shower or bath the night before surgery. Use the soap your surgeon gave you to  gently clean your skin. If you do not have soap from your surgeon, use your regular soap. Do not shave or scrub the surgery site.  Wear clean pajamas and have clean sheets on your bed.     Before Your Surgery      Call your surgeon if there is any change in your health. This includes signs of a cold or flu (such as a sore throat, runny nose, cough, rash or fever).    Do not smoke, drink alcohol or take over the counter medicine (unless your surgeon or primary care doctor tells you to) for the 24 hours before and after surgery.    If you take prescribed drugs: Follow your doctor s orders about which medicines to take and which to stop until after surgery.    Eating and drinking prior to surgery: follow the instructions from your surgeon    Take a shower or bath the night before surgery. Use the soap your surgeon gave you to gently clean your skin. If you do not have soap from your surgeon, use your regular soap. Do not shave or scrub the surgery site.  Wear clean pajamas and have clean sheets on your bed.           Follow-ups after your visit        Follow-up notes from your care team     Return if symptoms worsen or fail to improve.      Your next 10 appointments already scheduled     Mar 21, 2018   Procedure with Inés Harrell MD   [facility] (--)    [address]  [facility] [address]   [phone]            Jun 05, 2018  3:40 PM CDT   (Arrive by 3:25 PM)   Return Botox with Hermes Frias MD   [facility] ([facility])    [address] [place] [id number] [phone]            Aug 15, 2018  3:40 PM CDT   (Arrive by 3:25 PM)   Return Botox with Hermes Frias MD   [facility] ([facility])    [address] [place] [id number] [phone]              Who to contact     If you have  "questions or need follow up information about today's clinic visit or your schedule please contact [facility] directly at [phone].  Normal or non-critical lab and imaging results will be communicated to you by Just E[employer], letter or phone within 4 business days after the clinic has received the results. If you do not hear from us within 7 days, please contact the clinic through [employer] or phone. If you have a critical or abnormal lab result, we will notify you by phone as soon as possible.  Submit refill requests through [facility]ar or call your pharmacy and they will forward the refill request to us. Please allow 3 business days for your refill to be completed.          Additional Information About Your Visit        Just [employer] Windows Information     [employer]azar gives you secure access to your electronic health record. If you see a primary care provider, you can also send messages to your care team and make appointments. If you have questions, please call your primary care clinic.  If you do not have a primary care provider, please call [phone] and they will assist you.        Care EveryWhere ID     This is your Care EveryWhere ID. This could be used by other organizations to access your [facility] medical records  [id number]        Your Vitals Were     Pulse Temperature Respirations Height Last Period Pulse Oximetry    102 99.1  F (37.3  C) (Tympanic) 16 5' 1.25\" (1.556 m) (LMP Unknown) 96%    BMI (Body Mass Index)                   30.53 kg/m2            Blood Pressure from Last 3 Encounters:   03/13/18 126/70   01/25/18 122/74   12/19/17 125/84    Weight from Last 3 Encounters:   03/13/18 162 lb 3.2 oz (73.6 kg)   03/13/18 162 lb 14.4 oz (73.9 kg)   01/25/18 163 lb 1.6 oz (74 kg)              Today, you had the following     No orders found for display         Today's Medication Changes          These changes are accurate as of 3/13/18 11:59 PM.  If you have any questions, ask your nurse or doctor.             "   These medicines have changed or have updated prescriptions.        Dose/Directions    simvastatin 20 MG tablet   Commonly known as:  ZOCOR   This may have changed:  See the new instructions.   Used for:  Hyperlipidemia LDL goal <160   Changed by:  Vargas Chaudhry MD        Dose:  20 mg   Take 1 tablet (20 mg) by mouth At Bedtime   Quantity:  90 tablet   Refills:  3       traZODone 50 MG tablet   Commonly known as:  DESYREL   This may have changed:  See the new instructions.   Used for:  Persistent insomnia   Changed by:  Vargas Chaudhry MD        TAKE 3 TABLETS BY MOUTH AT BEDTIME   Quantity:  90 tablet   Refills:  11            Where to get your medicines      These medications were sent to [place] [address] [place] [facility] [place], [place] - [address]  [address], [place] [address]     Phone:  [phone]     simvastatin 20 MG tablet    traZODone 50 MG tablet                Primary Care Provider Office Phone # Fax #    Vargas Chaudhry [phone] [address] [facility] [address]        Equal Access to Services     [facility]: Hadii aad ku hadasho Soomaali, waaxda luqadaha, qaybta kaalmada adeegyada, waxay idiin hayaan maria e prado . So [facility] [phone].    ATENCIÓN: Si habla español, tiene a sweet disposición servicios gratuitos de asistencia lingüística. Llame al [phone].    We comply with applicable federal civil rights laws and [place] laws. We do not discriminate on the basis of race, color, national origin, age, disability, sex, sexual orientation, or gender identity.            Thank you!     Thank you for choosing [facility]  for your care. Our goal is always to provide you with excellent care. Hearing back from our patients is one way we can continue to improve our services. Please take a few minutes to complete the written survey that you may receive in the mail after your visit with us. Thank you!              Your Updated Medication List - Protect others around you: Learn how to safely use, store and throw away your medicines at www.disposemymeds.org.          This list is accurate as of 3/13/18 11:59 PM.  Always use your most recent med list.                   Brand Name Dispense Instructions for use Diagnosis    BOTOX IJ      Inject 150 Units into the muscle Lot # /[id number]  Exp: 10/2020        Calcium carb-Vitamin D 500 mg [place]-200 units 500-200 MG-UNIT per tablet    OSCAL with D;Oyster Shell Calcium     Take 1 tablet by mouth 2 times daily (with meals)        mometasone 0.1 % cream    ELOCON    45 g    Apply sparingly to affected area twice daily as needed.  Do not apply to face.    Dermatitis       simvastatin 20 MG tablet    ZOCOR    90 tablet    Take 1 tablet (20 mg) by mouth At Bedtime    Hyperlipidemia LDL goal <160       traZODone 50 MG tablet    DESYREL    90 tablet    TAKE 3 TABLETS BY MOUTH AT BEDTIME    Persistent insomnia

## 2018-03-13 NOTE — PROGRESS NOTES
"BOTULINUM TOXIN PROCEDURE NOTE    Chief Complaint   Patient presents with     Dystonia     UMP- BOTOX-CERVICAL DYSTONIA       Resp 24  Ht 1.6 m (5' 3\")  Wt 73.6 kg (162 lb 3.2 oz)  LMP  (LMP Unknown)  Breastfeeding? No  BMI 28.73 kg/m2      Current Outpatient Prescriptions:      traZODone (DESYREL) 50 MG tablet, TAKE 3 TABLETS BY MOUTH AT BEDTIME, Disp: 90 tablet, Rfl: 11     simvastatin (ZOCOR) 20 MG tablet, Take 1 tablet (20 mg) by mouth At Bedtime, Disp: 90 tablet, Rfl: 3     mometasone (ELOCON) 0.1 % cream, Apply sparingly to affected area twice daily as needed.  Do not apply to face., Disp: 45 g, Rfl: 0     calcium carb 1250 mg, 500 mg [place],/vitamin D 200 units (OSCAL WITH D) 500-200 MG-UNIT per tablet, Take 1 tablet by mouth 2 times daily (with meals), Disp: , Rfl:      [DISCONTINUED] traZODone (DESYREL) 50 MG tablet, TAKE 3 TABLETS BY MOUTH AT BEDTIME, Disp: 90 tablet, Rfl: 0     [DISCONTINUED] simvastatin (ZOCOR) 20 MG tablet, TAKE ONE TABLET BY MOUTH NIGHTLY AT BEDTIME, Disp: 90 tablet, Rfl: 2     [DISCONTINUED] SIMVASTATIN PO, Take 20 mg by mouth every evening, Disp: , Rfl:      [DISCONTINUED] TRAZODONE HCL PO, Take 150 mg by mouth At Bedtime (Takes 3 x 50mg tablet = 150mg dose), Disp: , Rfl:      Allergies   Allergen Reactions     Lidocaine Nausea and Vomiting     Meloxicam      Diarrhea, vomiting     Primidone Other (See Comments)     Felt like motion sickness     Tramadol      Diarrhea, vomiting      Adhesive Tape Rash     Durabond only     Allergy Rash     Dermabond  Anesthesia IV set misc       Tegaderm Transparent Dressing (Informational Only) Rash        PHYSICAL EXAM:  HEAD, NECK AND TRUNK PATTERN:   Head Tremor: Observed - no-no tremor  Head & Neck Extension: Present - Slight  Sub-Occipital Extension: Present - Slight  Forward Head: Present - Slight   Head & Neck Lateral Bend: Right   Shoulder Elevation: Right    HPI:    Patient denies new medical diagnoses, illnesses, hospitalizations, " emergency room visits, and injuries since the previous injection with botulinum neurotoxin.  Will have surgery next week.    We reviewed the recommended safety guidelines for  Botox from any vaccine injection, such as the seasonal flu vaccine, by a minimum of 10-14 days with Lilli Steven. She acknowledged understanding.    RESPONSE TO PREVIOUS TREATMENT:    Lilli Steven received 150 units of Botox on 2017.    Problems following the previous series of neurotoxin injections included:  No problems reported    BENEFITS BY PATIENT REPORT:  Pain Improvement: Yes.  Percent Improvement: unable to quantify in % but reports significant improvement  Duration of Benefit:  10-12  weeks.     Dystonia and tremor Improvement: Yes.  Percent Improvement: unable to quantify in %, but reports significant improvement Duration of Benefit:  10 weeks.    BOTULINUM NEUROTOXIN INJECTION PROCEDURES:    VERIFICATION OF PATIENT IDENTIFICATION AND PROCEDURE     Initials   Patient Name lmd   Patient  lmd   Procedure Verified by: lmd     Prior to the start of the procedure and with procedural staff participation, I verbally confirmed the patient s identity using two indicators, relevant allergies, that the procedure was appropriate and matched the consent or emergent situation, and that the correct equipment/implants were available. Immediately prior to starting the procedure I conducted the Time Out with the procedural staff and re-confirmed the patient s name, procedure, and site/side. (The Joint Commission universal protocol was followed.)  Yes    Sedation (Moderate or Deep): None      Above assessments performed by:  Mikki Sotelo RN Care Coordinator    Hermes Frias MD    INDICATION/S FOR PROCEDURE/S:  Lilli Steven is a 67-year-old patient with dystonia affecting the  head, neck and shoulder girdle musculature secondary to a diagnosis of cervical dystonia with associated  pain, tremor, spasms, loss of  joint motion, loss of volitional motor control and difficulty with activities of daily living.      Her baseline symptoms have been recalcitrant to oral medications and conservative therapy.  She is here today for an injection of Botox.       GOAL OF PROCEDURE:  The goal of this procedure is to increase active range of motion, improve volitional motor control and decrease pain  associated with dystonic movements, spasticity and tremor.    TOTAL DOSE ADMINISTERED:  Dose Administered:  150 units Botox    Diluent Used:  Preservative Free Normal Saline  Total Volume of Diluent Used:  1.50 ml  Lot # /C3 with Expiration Date:  10/2020  NDC #: Botox 100u ([address]-61)    Medication guide was offered to patient and was declined.    CONSENT:  The risks, benefits, and treatment options were discussed with Lilli Steven and she agreed to proceed.      Written consent was obtained by lmd.     EQUIPMENT USED:  Needle-37mm stimulating/recording  EMG/NCS Machine     SKIN PREPARATION:  Skin preparation was performed using an alcohol wipe.     GUIDANCE DESCRIPTION:  Electro-myographic guidance was necessary throughout the procedure to accurately identify all areas of dystonic and spastic muscles while avoiding injection of non-dystonic muscles and non-spastic muscles, neighboring nerves and nearby vascular structures.     AREA/MUSCLE INJECTED:  150 Units Botox  1. HEAD & NECK MUSCLES: 150 units Botox = Total Dose, 1:1 Dilution                 Right Mid-Trapezius - 10 units of Botox at 1 site/s.   Left Mid-Trapezius - 10 units of Botox at 1 site/s.      Right Splenius Capitus - 15 units of Botox at 2 site/s.   Left Splenius Capitus - 10 units of Botox at 2 site/s.      Right Levator Scapulae - 30 units of Botox at 1 site/s (shoulder muscles).   Left Levator Scapulae - 20 units of Botox at 1 site/s (shoulder muscles).      Right Inferior Obliquus Capitis - 10 units of Botox at 1 site/s.   Left Inferior Obliquus Capitis - 15  units of at 1 site/s.      Right Sternal head of the Sternocleidomastoid - 15 units of Botox at 1 site/s.               Left Sternal head of the Sternocleidomastoid - 15 units of Botox at 1 site/s.        RESPONSE TO PROCEDURE:  Lilli Steven tolerated the procedure well and there were no immediate complications.  She was allowed to recover for an appropriate period of time and was discharged home in stable condition.    FOLLOW UP:  Lilli Steven was asked to follow up by phone in 7-14 days with Erin Combs PT, Care Coordinator or Mikki Rojas RN, Care Coordinator, to report her response to this series of injections.  Based on the patient's previous response to this therapy, Llili Steven was rescheduled for the next series of injections in 12 weeks.    PLAN (Medication Changes, Therapy Orders, Work or Disability Issues, etc.): Will monitor response to today's injections and report.

## 2018-03-13 NOTE — MR AVS SNAPSHOT
After Visit Summary   3/13/2018    Lilli Steven    MRN: [id number]           Patient Information     Date Of Birth          1950        Visit Information        Provider Department      3/13/2018 3:40 PM Hermes Frias MD [facility]         Follow-ups after your visit        Follow-up notes from your care team     Return in about 10 weeks (around 5/22/2018) for Cervical Dystonia.      Your next 10 appointments already scheduled     Mar 21, 2018   Procedure with Inés Harrell MD   [facility] (--)    [address]  [facility] [address]   [phone]            Jun 05, 2018  3:40 PM CDT   (Arrive by 3:25 PM)   Return Botox with Hermes Frias MD   [facility] ([facility])    [address] [place] [address] [id number] [phone]            Aug 15, 2018  3:40 PM CDT   (Arrive by 3:25 PM)   Return Botox with Hermes Frias MD   [facility] ([facility])    [address] [place] [address] [id number] [phone]              Who to contact     Please call your clinic at [phone] to:    Ask questions about your health    Make or cancel appointments    Discuss your medicines    Learn about your test results    Speak to your doctor            Additional Information About Your Visit        [facility] Information     CrossReader gives you secure access to your electronic health record. If you see a primary care provider, you can also send messages to your care team and make appointments. If you have questions, please call your primary care clinic.  If you do not have a primary care provider, please call [phone] and they will assist you.      CrossReader is an electronic gateway that provides easy, online access to your medical records. With [employer]Reader, you  "can request a clinic appointment, read your test results, renew a prescription or communicate with your care team.     To access your existing account, please contact your [facility] or call [phone] for assistance.        Care EveryWhere ID     This is your Care EveryWhere ID. This could be used by other organizations to access your [facility] medical records  [id number]        Your Vitals Were     Respirations Height Last Period Breastfeeding? BMI (Body Mass Index)       24 1.6 m (5' 3\") (LMP Unknown) No 28.73 kg/m2        Blood Pressure from Last 3 Encounters:   03/13/18 126/70   01/25/18 122/74   12/19/17 125/84    Weight from Last 3 Encounters:   03/13/18 73.6 kg (162 lb 3.2 oz)   03/13/18 73.9 kg (162 lb 14.4 oz)   01/25/18 74 kg (163 lb 1.6 oz)              Today, you had the following     No orders found for display         Today's Medication Changes          These changes are accurate as of 3/13/18  4:06 PM.  If you have any questions, ask your nurse or doctor.               These medicines have changed or have updated prescriptions.        Dose/Directions    simvastatin 20 MG tablet   Commonly known as:  ZOCOR   This may have changed:  See the new instructions.   Used for:  Hyperlipidemia LDL goal <160   Changed by:  Vargas Chaudhry MD        Dose:  20 mg   Take 1 tablet (20 mg) by mouth At Bedtime   Quantity:  90 tablet   Refills:  3       traZODone 50 MG tablet   Commonly known as:  DESYREL   This may have changed:  See the new instructions.   Used for:  Persistent insomnia   Changed by:  Vargas Chaudhry MD        TAKE 3 TABLETS BY MOUTH AT BEDTIME   Quantity:  90 tablet   Refills:  11            Where to get your medicines      These medications were sent to [facility] [address] [place], [place] - [address]  [address], [place] [address]     Phone:  [phone]     simvastatin 20 MG tablet    traZODone 50 MG tablet                " Primary Care Provider Office Phone # Fax #    Vargas Ata Chaudhry [phone] [address] [facility] [address]        Equal Access to Services     RAJWINDER TEJEDA : Zana thais fuentes violeto Sokunalali, waaxda luqadaha, qaybta kaalmada meagan, shakeel knappfaizan carrillo. So [facility] [phone].    ATENCIÓN: Si habla español, tiene a sweet disposición servicios gratuitos de asistencia lingüística. Llame al [phone].    We comply with applicable federal civil rights laws and [place] laws. We do not discriminate on the basis of race, color, national origin, age, disability, sex, sexual orientation, or gender identity.            Thank you!     Thank you for choosing [facility]  for your care. Our goal is always to provide you with excellent care. Hearing back from our patients is one way we can continue to improve our services. Please take a few minutes to complete the written survey that you may receive in the mail after your visit with us. Thank you!             Your Updated Medication List - Protect others around you: Learn how to safely use, store and throw away your medicines at www.disposemymeds.org.          This list is accurate as of 3/13/18  4:06 PM.  Always use your most recent med list.                   Brand Name Dispense Instructions for use Diagnosis    BOTOX IJ      Inject 150 Units into the muscle Lot # /[id number]  Exp: 10/2020        Calcium carb-Vitamin D 500 mg [place]-200 units 500-200 MG-UNIT per tablet    OSCAL with D;[place] Calcium     Take 1 tablet by mouth 2 times daily (with meals)        mometasone 0.1 % cream    ELOCON    45 g    Apply sparingly to affected area twice daily as needed.  Do not apply to face.    Dermatitis       simvastatin 20 MG tablet    ZOCOR    90 tablet    Take 1 tablet (20 mg) by mouth At Bedtime    Hyperlipidemia LDL goal <160       traZODone 50 MG tablet    DESYREL    90 tablet    TAKE 3  TABLETS BY MOUTH AT BEDTIME    Persistent insomnia

## 2018-03-13 NOTE — LETTER
"3/13/2018       RE: Lilli Steven  [address]   [facility] [address]     Dear Colleague,    Thank you for referring your patient, Lilli Steven, to the [facility] at [facility]. Please see a copy of my visit note below.    BOTULINUM TOXIN PROCEDURE NOTE    Chief Complaint   Patient presents with     Dystonia     UMP- BOTOX-CERVICAL DYSTONIA       Resp 24  Ht 1.6 m (5' 3\")  Wt 73.6 kg (162 lb 3.2 oz)  LMP  (LMP Unknown)  Breastfeeding? No  BMI 28.73 kg/m2      Current Outpatient Prescriptions:      traZODone (DESYREL) 50 MG tablet, TAKE 3 TABLETS BY MOUTH AT BEDTIME, Disp: 90 tablet, Rfl: 11     simvastatin (ZOCOR) 20 MG tablet, Take 1 tablet (20 mg) by mouth At Bedtime, Disp: 90 tablet, Rfl: 3     mometasone (ELOCON) 0.1 % cream, Apply sparingly to affected area twice daily as needed.  Do not apply to face., Disp: 45 g, Rfl: 0     calcium carb 1250 mg, 500 mg [place],/vitamin D 200 units (OSCAL WITH D) 500-200 MG-UNIT per tablet, Take 1 tablet by mouth 2 times daily (with meals), Disp: , Rfl:      [DISCONTINUED] traZODone (DESYREL) 50 MG tablet, TAKE 3 TABLETS BY MOUTH AT BEDTIME, Disp: 90 tablet, Rfl: 0     [DISCONTINUED] simvastatin (ZOCOR) 20 MG tablet, TAKE ONE TABLET BY MOUTH NIGHTLY AT BEDTIME, Disp: 90 tablet, Rfl: 2     [DISCONTINUED] SIMVASTATIN PO, Take 20 mg by mouth every evening, Disp: , Rfl:      [DISCONTINUED] TRAZODONE HCL PO, Take 150 mg by mouth At Bedtime (Takes 3 x 50mg tablet = 150mg dose), Disp: , Rfl:      Allergies   Allergen Reactions     Lidocaine Nausea and Vomiting     Meloxicam      Diarrhea, vomiting     Primidone Other (See Comments)     Felt like motion sickness     Tramadol      Diarrhea, vomiting      Adhesive Tape Rash     Durabond only     Allergy Rash     Dermabond  Anesthesia IV set misc       Tegaderm Transparent Dressing (Informational Only) Rash        PHYSICAL EXAM:  HEAD, NECK " AND TRUNK PATTERN:   Head Tremor: Observed - no-no tremor  Head & Neck Extension: Present - Slight  Sub-Occipital Extension: Present - Slight  Forward Head: Present - Slight   Head & Neck Lateral Bend: Right   Shoulder Elevation: Right    HPI:    Patient denies new medical diagnoses, illnesses, hospitalizations, emergency room visits, and injuries since the previous injection with botulinum neurotoxin.  Will have surgery next week.    We reviewed the recommended safety guidelines for  Botox from any vaccine injection, such as the seasonal flu vaccine, by a minimum of 10-14 days with Lilli Steven. She acknowledged understanding.    RESPONSE TO PREVIOUS TREATMENT:    Lilli Steven received 150 units of Botox on 2017.    Problems following the previous series of neurotoxin injections included:  No problems reported    BENEFITS BY PATIENT REPORT:  Pain Improvement: Yes.  Percent Improvement: unable to quantify in % but reports significant improvement  Duration of Benefit:  10-12  weeks.     Dystonia and tremor Improvement: Yes.  Percent Improvement: unable to quantify in %, but reports significant improvement Duration of Benefit:  10 weeks.    BOTULINUM NEUROTOXIN INJECTION PROCEDURES:    VERIFICATION OF PATIENT IDENTIFICATION AND PROCEDURE     Initials   Patient Name lmd   Patient  lmd   Procedure Verified by: lmd     Prior to the start of the procedure and with procedural staff participation, I verbally confirmed the patient s identity using two indicators, relevant allergies, that the procedure was appropriate and matched the consent or emergent situation, and that the correct equipment/implants were available. Immediately prior to starting the procedure I conducted the Time Out with the procedural staff and re-confirmed the patient s name, procedure, and site/side. (The Joint Commission universal protocol was followed.)  Yes    Sedation (Moderate or Deep): None      Above assessments  performed by:  Mikki Sotelo RN Care Coordinator    INDICATION/S FOR PROCEDURE/S:  Lilli Steven is a 67-year-old patient with dystonia affecting the  head, neck and shoulder girdle musculature secondary to a diagnosis of cervical dystonia with associated  pain, tremor, spasms, loss of joint motion, loss of volitional motor control and difficulty with activities of daily living.      Her baseline symptoms have been recalcitrant to oral medications and conservative therapy.  She is here today for an injection of Botox.       GOAL OF PROCEDURE:  The goal of this procedure is to increase active range of motion, improve volitional motor control and decrease pain  associated with dystonic movements, spasticity and tremor.    TOTAL DOSE ADMINISTERED:  Dose Administered:  150 units Botox    Diluent Used:  Preservative Free Normal Saline  Total Volume of Diluent Used:  1.50 ml  Lot # /C3 with Expiration Date:  10/2020  NDC #: Botox 100u ([address]-05)    Medication guide was offered to patient and was declined.    CONSENT:  The risks, benefits, and treatment options were discussed with Lilli Steven and she agreed to proceed.      Written consent was obtained by lmd.     EQUIPMENT USED:  Needle-37mm stimulating/recording  EMG/NCS Machine     SKIN PREPARATION:  Skin preparation was performed using an alcohol wipe.     GUIDANCE DESCRIPTION:  Electro-myographic guidance was necessary throughout the procedure to accurately identify all areas of dystonic and spastic muscles while avoiding injection of non-dystonic muscles and non-spastic muscles, neighboring nerves and nearby vascular structures.     AREA/MUSCLE INJECTED:  150 Units Botox  1. HEAD & NECK MUSCLES: 150 units Botox = Total Dose, 1:1 Dilution                 Right Mid-Trapezius - 10 units of Botox at 1 site/s.   Left Mid-Trapezius - 10 units of Botox at 1 site/s.      Right Splenius Capitus - 15 units of Botox at 2 site/s.   Left Splenius  Capitus - 10 units of Botox at 2 site/s.      Right Levator Scapulae - 30 units of Botox at 1 site/s (shoulder muscles).   Left Levator Scapulae - 20 units of Botox at 1 site/s (shoulder muscles).      Right Inferior Obliquus Capitis - 10 units of Botox at 1 site/s.   Left Inferior Obliquus Capitis - 15 units of at 1 site/s.      Right Sternal head of the Sternocleidomastoid - 15 units of Botox at 1 site/s.               Left Sternal head of the Sternocleidomastoid - 15 units of Botox at 1 site/s.        RESPONSE TO PROCEDURE:  Lilli Steven tolerated the procedure well and there were no immediate complications.  She was allowed to recover for an appropriate period of time and was discharged home in stable condition.    FOLLOW UP:  Lilli Steven was asked to follow up by phone in 7-14 days with Erin Combs PT, Care Coordinator or Mikki Rojas RN, Care Coordinator, to report her response to this series of injections.  Based on the patient's previous response to this therapy, Lilli Steven was rescheduled for the next series of injections in 12 weeks.    PLAN (Medication Changes, Therapy Orders, Work or Disability Issues, etc.): Will monitor response to today's injections and report.    Again, thank you for allowing me to participate in the care of your patient.      Sincerely,    Hermes Frias MD

## 2018-03-14 ASSESSMENT — PATIENT HEALTH QUESTIONNAIRE - PHQ9: SUM OF ALL RESPONSES TO PHQ QUESTIONS 1-9: 0

## 2018-03-19 NOTE — H&P (VIEW-ONLY)
[facility]  [address]  [facility] [id number] [phone]  Dept: [phone]    PRE-OP EVALUATION:  Today's date: 3/13/2018    Lilli Steven (: 1950) presents for pre-operative evaluation assessment as requested by Dr.Jackqueline Reta Harrell MD .  She requires evaluation and anesthesia risk assessment prior to undergoing surgery/procedure for treatment of Abdominal Scar revision  .    :[facility]   Primary Physician: Vargas Chaudhry  Type of Anesthesia Anticipated: to be determined    Patient has a Health Care Directive or Living Will:  NO    Preop Questions 3/11/2018   Who is doing your surgery? Dr Harrell   What are you having done? Fix to previous sugery   Date of Surgery/Procedure: -   Facility or Hospital where procedure/surgery will be performed: [facility]   1.  Do you have a history of Heart attack, stroke, stent, coronary bypass surgery, or other heart surgery? No   2.  Do you ever have any pain or discomfort in your chest? No   3.  Do you have a history of  Heart Failure? No   4.   Are you troubled by shortness of breath when:  walking on a level surface, or up a slight hill, or at night? No   5.  Do you currently have a cold, bronchitis or other respiratory infection? No   6.  Do you have a cough, shortness of breath, or wheezing? No   7.  Do you sometimes get pains in the calves of your legs when you walk? No   8. Do you or anyone in your family have previous history of blood clots? No   9.  Do you or does anyone in your family have a serious bleeding problem such as prolonged bleeding following surgeries or cuts? No   10. Have you ever had problems with anemia or been told to take iron pills? No   11. Have you had any abnormal blood loss such as black, tarry or bloody stools, or abnormal vaginal bleeding? No   12. Have you ever had a blood transfusion? No   13. Have you or any of your relatives ever had problems with anesthesia? YES - Self,  fainting, vomting   14. Do you have sleep apnea, excessive snoring or daytime drowsiness? No   15. Do you have any prosthetic heart valves? No   16. Do you have prosthetic joints? No   17. Is there any chance that you may be pregnant? No         HPI:     HPI related to upcoming procedure: Having a revision of an abdominal scar related to a previous surgery that had an infection, it is uncomfortable      See problem list for active medical problems.  Problems all longstanding and stable, except as noted/documented.  See ROS for pertinent symptoms related to these conditions.                                                                                                  .    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     Priority: Medium     Overview:   Created by Conversion       Wears glasses 09/06/2012     Priority: Medium     Neck pain 09/06/2012     Priority: Medium     Seasonal allergies 09/06/2012     Priority: Medium     Spring months       Osteopenia 05/17/2011     Priority: Medium     DEXA 2011  T -1 Lumbar spine  Right hip T -1.8  Left hip T -1.9       Urinary incontinence 08/04/2006     Priority: Medium     Spasmodic torticollis 07/19/1988     Priority: Medium     Botox injection        Past Medical History:   Diagnosis Date     Diarrhea 9/6/2012     Hypercholesterolemia 9/6/2012     Neck pain 9/6/2012     PONV (postoperative nausea and vomiting)      Seasonal allergies 9/6/2012     Snores 9/6/2012     Tremor      Wears glasses 9/6/2012     Past Surgical History:   Procedure Laterality Date     COSMETIC ABDOMINOPLASTY MODIFIED N/A 2/16/2017    Procedure: COSMETIC ABDOMINOPLASTY  MODIFIED;  Surgeon: Inés Harrell MD;  Location:  OR     HERNIORRHAPHY EPIGASTRIC N/A 2/16/2017    Procedure: HERNIORRHAPHY EPIGASTRIC;  Surgeon: Joey Sargent MD;  Location:  OR     HERNIORRHAPHY UMBILICAL N/A 2/16/2017    Procedure: HERNIORRHAPHY UMBILICAL;  Surgeon: Joey Sargent MD;  Location:  OR     IMPLANT STIMULATOR SACRAL NERVE STAGE ONE      for bladder incontinence, failed; battery is dead     INCISION AND DRAINAGE ABDOMEN WASHOUT, COMBINED N/A 6/2/2017    Procedure: COMBINED INCISION AND DRAINAGE ABDOMEN WASHOUT;  EVACUATION OF ABDOMINAL WALL SEROMA;  Surgeon: Inés Harrell MD;  Location:  OR     IRRIGATION AND DEBRIDEMENT TRUNK, COMBINED N/A 3/29/2017    Procedure: COMBINED IRRIGATION AND DEBRIDEMENT TRUNK;  Surgeon: Inés Harrell MD;  Location:  SD     TONSILLECTOMY       Current Outpatient Prescriptions   Medication Sig Dispense Refill     traZODone (DESYREL) 50 MG tablet TAKE 3 TABLETS BY MOUTH AT BEDTIME 90 tablet 11     simvastatin (ZOCOR) 20 MG tablet Take 1 tablet (20 mg) by mouth At Bedtime 90 tablet 3     mometasone (ELOCON) 0.1 % cream Apply sparingly to affected area twice daily as needed.  Do not apply to face. 45 g 0     calcium carb 1250 mg, 500 mg [place],/vitamin D 200 units (OSCAL WITH D) 500-200 MG-UNIT per tablet Take 1 tablet by mouth 2 times daily (with meals)       OnabotulinumtoxinA (BOTOX IJ) Inject 150 Units into the muscle Lot # /[id number]  Exp: 10/2020       OTC products: None, except as noted above    Allergies   Allergen Reactions     Lidocaine Nausea and Vomiting     Meloxicam      Diarrhea, vomiting     Primidone Other (See Comments)     Felt like motion sickness     Tramadol      Diarrhea, vomiting      Adhesive Tape Rash     Durabond only     Allergy Rash     Dermabond  Anesthesia IV set misc       Tegaderm Transparent Dressing (Informational Only) Rash      Latex Allergy: NO    Social History   Substance Use Topics  "    Smoking status: Former Smoker     Types: Cigarettes     Quit date: 1/1/1985     Smokeless tobacco: Never Used     Alcohol use 0.0 oz/week     0 Standard drinks or equivalent per week      Comment: glass of wine daily     History   Drug Use No       REVIEW OF SYSTEMS:   CONSTITUTIONAL: NEGATIVE for fever, chills, change in weight  INTEGUMENTARY/SKIN: NEGATIVE for worrisome rashes, moles or lesions  EYES: NEGATIVE for vision changes or irritation  ENT/MOUTH: NEGATIVE for ear, mouth and throat problems  RESP: NEGATIVE for significant cough or SOB  BREAST: NEGATIVE for masses, tenderness or discharge  CV: NEGATIVE for chest pain, palpitations or peripheral edema  GI: NEGATIVE for nausea, abdominal pain, heartburn, or change in bowel habits  : NEGATIVE for frequency, dysuria, or hematuria  MUSCULOSKELETAL: NEGATIVE for significant arthralgias or myalgia  NEURO: NEGATIVE for weakness, dizziness or paresthesias  ENDOCRINE: NEGATIVE for temperature intolerance, skin/hair changes  HEME: NEGATIVE for bleeding problems  PSYCHIATRIC: NEGATIVE for changes in mood or affect    EXAM:   /70  Pulse 102  Temp 99.1  F (37.3  C) (Tympanic)  Resp 16  Ht 5' 1.25\" (1.556 m)  Wt 162 lb 14.4 oz (73.9 kg)  LMP  (LMP Unknown)  SpO2 96%  BMI 30.53 kg/m2    General Appearance: Pleasant, alert, in no acute respiratory distress.  Head Exam: Normal. Normocephalic, atraumatic. No sinus tenderness.  Eye Exam: Normal external eye, conjunctiva, lids. ELENA.  Ear Exam: Normal auditory canals and external ears. Non-tender.  Left TM-normal. Right TM-normal.  OroPharynx Exam: Dental hygiene adequate. Normal buccal mucosa. Normal pharynx.  Neck Exam: Supple, no masses or enlarged, tender nodes.  Thyroid Exam: No nodules or enlargement or tenderness.  Chest/Respiratory Exam: Normal, comfortable, easy respirations. Chest wall normal. Lungs are clear to auscultation. No wheezing, crackles, or rhonchi.  Cardiovascular Exam: Regular rate and " rhythm. No murmur, gallop, or rubs. No pedal edema.  Gastrointestinal Exam: Soft, non-tender, no masses or organomegaly.  Musculoskeletal Exam: Back is non-tender, full ROM of upper and lower extremities.  Skin: no rash, warm and dry, contractured scar on lower abdomen  Neurologic Exam: Nonfocal, no tremor. Normal gait.  Psychiatric Exam: Alert - appropriate, normal affect      DIAGNOSTICS:   No labs or EKG required for low risk surgery (cataract, skin procedure, breast biopsy, etc)    Recent Labs   Lab Test  06/02/17   2140  03/27/17   0910  02/16/17   0710   HGB   --    --   14.4   NA   --   142   --    POTASSIUM   --   4.6   --    CR  0.90  0.76   --         IMPRESSION:   Reason for surgery/procedure: scar revision  Diagnosis/reason for consult: routine    The proposed surgical procedure is considered LOW risk.    REVISED CARDIAC RISK INDEX  The patient has the following serious cardiovascular risks for perioperative complications such as (MI, PE, VFib and 3  AV Block):  No serious cardiac risks  INTERPRETATION: 0 risks: Class I (very low risk - 0.4% complication rate)    The patient has the following additional risks for perioperative complications:  No identified additional risks      ICD-10-CM    1. Preop general physical exam Z01.818    2. Pain in surgical scar R52     L90.5    3. Persistent insomnia G47.00 traZODone (DESYREL) 50 MG tablet   4. Hyperlipidemia LDL goal <160 E78.5 simvastatin (ZOCOR) 20 MG tablet       RECOMMENDATIONS:         --Patient is to take all scheduled medications on the day of surgery EXCEPT for modifications listed below.    APPROVAL GIVEN to proceed with proposed procedure, without further diagnostic evaluation       Signed Electronically by: Vargas Chaudhry MD    Copy of this evaluation report is provided to requesting physician.    [facility] Preop Guidelines

## 2018-03-21 ENCOUNTER — ANESTHESIA EVENT (OUTPATIENT)
Dept: SURGERY | Facility: CLINIC | Age: 68
End: 2018-03-21
Payer: COMMERCIAL

## 2018-03-21 ENCOUNTER — ANESTHESIA (OUTPATIENT)
Dept: SURGERY | Facility: CLINIC | Age: 68
End: 2018-03-21
Payer: COMMERCIAL

## 2018-03-21 ENCOUNTER — SURGERY (OUTPATIENT)
Age: 68
End: 2018-03-21

## 2018-03-21 ENCOUNTER — HOSPITAL ENCOUNTER (OUTPATIENT)
Facility: CLINIC | Age: 68
Discharge: HOME | End: 2018-03-21
Attending: PLASTIC SURGERY | Admitting: PLASTIC SURGERY
Payer: COMMERCIAL

## 2018-03-21 VITALS
OXYGEN SATURATION: 92 % | WEIGHT: 162 LBS | TEMPERATURE: 99.5 F | HEIGHT: 63 IN | HEART RATE: 88 BPM | RESPIRATION RATE: 20 BRPM | SYSTOLIC BLOOD PRESSURE: 178 MMHG | BODY MASS INDEX: 28.7 KG/M2 | DIASTOLIC BLOOD PRESSURE: 86 MMHG

## 2018-03-21 DIAGNOSIS — L90.5 SCAR OF ABDOMINAL WALL: Primary | ICD-10-CM

## 2018-03-21 PROCEDURE — 25000125 ZZHC RX 250: Performed by: PLASTIC SURGERY

## 2018-03-21 PROCEDURE — 71000027 ZZH RECOVERY PHASE 2 EACH 15 MINS: Performed by: PLASTIC SURGERY

## 2018-03-21 PROCEDURE — 37000008 ZZH ANESTHESIA TECHNICAL FEE, 1ST 30 MIN: Performed by: PLASTIC SURGERY

## 2018-03-21 PROCEDURE — 40000170 ZZH STATISTIC PRE-PROCEDURE ASSESSMENT II: Performed by: PLASTIC SURGERY

## 2018-03-21 PROCEDURE — 25000128 H RX IP 250 OP 636: Performed by: PLASTIC SURGERY

## 2018-03-21 PROCEDURE — 25000128 H RX IP 250 OP 636: Performed by: NURSE ANESTHETIST, CERTIFIED REGISTERED

## 2018-03-21 PROCEDURE — 25000125 ZZHC RX 250: Performed by: NURSE ANESTHETIST, CERTIFIED REGISTERED

## 2018-03-21 PROCEDURE — 71000013 ZZH RECOVERY PHASE 1 LEVEL 1 EA ADDTL HR: Performed by: PLASTIC SURGERY

## 2018-03-21 PROCEDURE — 37000009 ZZH ANESTHESIA TECHNICAL FEE, EACH ADDTL 15 MIN: Performed by: PLASTIC SURGERY

## 2018-03-21 PROCEDURE — A9270 NON-COVERED ITEM OR SERVICE: HCPCS | Mod: GY | Performed by: PLASTIC SURGERY

## 2018-03-21 PROCEDURE — 36000052 ZZH SURGERY LEVEL 2 EA 15 ADDTL MIN: Performed by: PLASTIC SURGERY

## 2018-03-21 PROCEDURE — 25000132 ZZH RX MED GY IP 250 OP 250 PS 637: Performed by: PLASTIC SURGERY

## 2018-03-21 PROCEDURE — 27210995 ZZH RX 272: Performed by: PLASTIC SURGERY

## 2018-03-21 PROCEDURE — 27210794 ZZH OR GENERAL SUPPLY STERILE: Performed by: PLASTIC SURGERY

## 2018-03-21 PROCEDURE — 71000012 ZZH RECOVERY PHASE 1 LEVEL 1 FIRST HR: Performed by: PLASTIC SURGERY

## 2018-03-21 PROCEDURE — 25800025 ZZH RX 258: Performed by: PLASTIC SURGERY

## 2018-03-21 PROCEDURE — 25000128 H RX IP 250 OP 636: Performed by: ANESTHESIOLOGY

## 2018-03-21 PROCEDURE — 25000125 ZZHC RX 250: Performed by: ANESTHESIOLOGY

## 2018-03-21 PROCEDURE — 36000050 ZZH SURGERY LEVEL 2 1ST 30 MIN: Performed by: PLASTIC SURGERY

## 2018-03-21 RX ORDER — ONDANSETRON 2 MG/ML
4 INJECTION INTRAMUSCULAR; INTRAVENOUS EVERY 30 MIN PRN
Status: DISCONTINUED | OUTPATIENT
Start: 2018-03-21 | End: 2018-03-21 | Stop reason: HOSPADM

## 2018-03-21 RX ORDER — FENTANYL CITRATE 50 UG/ML
25-50 INJECTION, SOLUTION INTRAMUSCULAR; INTRAVENOUS
Status: DISCONTINUED | OUTPATIENT
Start: 2018-03-21 | End: 2018-03-21 | Stop reason: HOSPADM

## 2018-03-21 RX ORDER — ACETAMINOPHEN 500 MG
1000 TABLET ORAL ONCE
Status: COMPLETED | OUTPATIENT
Start: 2018-03-21 | End: 2018-03-21

## 2018-03-21 RX ORDER — DIPHENHYDRAMINE HYDROCHLORIDE 50 MG/ML
INJECTION INTRAMUSCULAR; INTRAVENOUS PRN
Status: DISCONTINUED | OUTPATIENT
Start: 2018-03-21 | End: 2018-03-21

## 2018-03-21 RX ORDER — PROPOFOL 10 MG/ML
INJECTION, EMULSION INTRAVENOUS PRN
Status: DISCONTINUED | OUTPATIENT
Start: 2018-03-21 | End: 2018-03-21

## 2018-03-21 RX ORDER — CEFAZOLIN SODIUM 1 G
1 VIAL (EA) INJECTION SEE ADMIN INSTRUCTIONS
Status: DISCONTINUED | OUTPATIENT
Start: 2018-03-21 | End: 2018-03-21 | Stop reason: HOSPADM

## 2018-03-21 RX ORDER — LIDOCAINE HYDROCHLORIDE 20 MG/ML
INJECTION, SOLUTION INFILTRATION; PERINEURAL PRN
Status: DISCONTINUED | OUTPATIENT
Start: 2018-03-21 | End: 2018-03-21

## 2018-03-21 RX ORDER — CEFAZOLIN SODIUM 2 G/100ML
2 INJECTION, SOLUTION INTRAVENOUS
Status: COMPLETED | OUTPATIENT
Start: 2018-03-21 | End: 2018-03-21

## 2018-03-21 RX ORDER — NALOXONE HYDROCHLORIDE 0.4 MG/ML
.1-.4 INJECTION, SOLUTION INTRAMUSCULAR; INTRAVENOUS; SUBCUTANEOUS
Status: DISCONTINUED | OUTPATIENT
Start: 2018-03-21 | End: 2018-03-21 | Stop reason: HOSPADM

## 2018-03-21 RX ORDER — MEPERIDINE HYDROCHLORIDE 25 MG/ML
12.5 INJECTION INTRAMUSCULAR; INTRAVENOUS; SUBCUTANEOUS
Status: DISCONTINUED | OUTPATIENT
Start: 2018-03-21 | End: 2018-03-21 | Stop reason: HOSPADM

## 2018-03-21 RX ORDER — SODIUM CHLORIDE, SODIUM LACTATE, POTASSIUM CHLORIDE, CALCIUM CHLORIDE 600; 310; 30; 20 MG/100ML; MG/100ML; MG/100ML; MG/100ML
INJECTION, SOLUTION INTRAVENOUS CONTINUOUS
Status: DISCONTINUED | OUTPATIENT
Start: 2018-03-21 | End: 2018-03-21 | Stop reason: HOSPADM

## 2018-03-21 RX ORDER — BUPIVACAINE HYDROCHLORIDE 2.5 MG/ML
INJECTION, SOLUTION INFILTRATION; PERINEURAL PRN
Status: DISCONTINUED | OUTPATIENT
Start: 2018-03-21 | End: 2018-03-21 | Stop reason: HOSPADM

## 2018-03-21 RX ORDER — HYDROMORPHONE HYDROCHLORIDE 1 MG/ML
.3-.5 INJECTION, SOLUTION INTRAMUSCULAR; INTRAVENOUS; SUBCUTANEOUS EVERY 10 MIN PRN
Status: DISCONTINUED | OUTPATIENT
Start: 2018-03-21 | End: 2018-03-21 | Stop reason: HOSPADM

## 2018-03-21 RX ORDER — PROPOFOL 10 MG/ML
INJECTION, EMULSION INTRAVENOUS CONTINUOUS PRN
Status: DISCONTINUED | OUTPATIENT
Start: 2018-03-21 | End: 2018-03-21

## 2018-03-21 RX ORDER — SODIUM CHLORIDE, SODIUM LACTATE, POTASSIUM CHLORIDE, CALCIUM CHLORIDE 600; 310; 30; 20 MG/100ML; MG/100ML; MG/100ML; MG/100ML
INJECTION, SOLUTION INTRAVENOUS CONTINUOUS PRN
Status: DISCONTINUED | OUTPATIENT
Start: 2018-03-21 | End: 2018-03-21

## 2018-03-21 RX ORDER — FENTANYL CITRATE 50 UG/ML
25-50 INJECTION, SOLUTION INTRAMUSCULAR; INTRAVENOUS EVERY 5 MIN PRN
Status: DISCONTINUED | OUTPATIENT
Start: 2018-03-21 | End: 2018-03-21 | Stop reason: HOSPADM

## 2018-03-21 RX ORDER — ONDANSETRON 2 MG/ML
INJECTION INTRAMUSCULAR; INTRAVENOUS PRN
Status: DISCONTINUED | OUTPATIENT
Start: 2018-03-21 | End: 2018-03-21

## 2018-03-21 RX ORDER — HYDROCODONE BITARTRATE AND ACETAMINOPHEN 5; 325 MG/1; MG/1
1-2 TABLET ORAL EVERY 4 HOURS PRN
Qty: 10 TABLET | Refills: 0 | Status: SHIPPED | OUTPATIENT
Start: 2018-03-21 | End: 2018-06-05

## 2018-03-21 RX ORDER — FENTANYL CITRATE 50 UG/ML
INJECTION, SOLUTION INTRAMUSCULAR; INTRAVENOUS PRN
Status: DISCONTINUED | OUTPATIENT
Start: 2018-03-21 | End: 2018-03-21

## 2018-03-21 RX ORDER — DEXAMETHASONE SODIUM PHOSPHATE 4 MG/ML
INJECTION, SOLUTION INTRA-ARTICULAR; INTRALESIONAL; INTRAMUSCULAR; INTRAVENOUS; SOFT TISSUE PRN
Status: DISCONTINUED | OUTPATIENT
Start: 2018-03-21 | End: 2018-03-21

## 2018-03-21 RX ORDER — ONDANSETRON 4 MG/1
4 TABLET, ORALLY DISINTEGRATING ORAL EVERY 30 MIN PRN
Status: DISCONTINUED | OUTPATIENT
Start: 2018-03-21 | End: 2018-03-21 | Stop reason: HOSPADM

## 2018-03-21 RX ORDER — HYDROCODONE BITARTRATE AND ACETAMINOPHEN 5; 325 MG/1; MG/1
1 TABLET ORAL ONCE
Status: COMPLETED | OUTPATIENT
Start: 2018-03-21 | End: 2018-03-21

## 2018-03-21 RX ORDER — EPINEPHRINE 1 MG/ML
INJECTION, SOLUTION, CONCENTRATE INTRAVENOUS PRN
Status: DISCONTINUED | OUTPATIENT
Start: 2018-03-21 | End: 2018-03-21 | Stop reason: HOSPADM

## 2018-03-21 RX ORDER — SCOLOPAMINE TRANSDERMAL SYSTEM 1 MG/1
1 PATCH, EXTENDED RELEASE TRANSDERMAL
Status: COMPLETED | OUTPATIENT
Start: 2018-03-21 | End: 2018-03-21

## 2018-03-21 RX ORDER — MAGNESIUM HYDROXIDE 1200 MG/15ML
LIQUID ORAL PRN
Status: DISCONTINUED | OUTPATIENT
Start: 2018-03-21 | End: 2018-03-21 | Stop reason: HOSPADM

## 2018-03-21 RX ORDER — PHYSOSTIGMINE SALICYLATE 1 MG/ML
1.2 INJECTION INTRAVENOUS
Status: DISCONTINUED | OUTPATIENT
Start: 2018-03-21 | End: 2018-03-21 | Stop reason: HOSPADM

## 2018-03-21 RX ADMIN — SCOPALAMINE 1 PATCH: 1 PATCH, EXTENDED RELEASE TRANSDERMAL at 09:57

## 2018-03-21 RX ADMIN — PHENYLEPHRINE HYDROCHLORIDE 100 MCG: 10 INJECTION INTRAVENOUS at 10:40

## 2018-03-21 RX ADMIN — FENTANYL CITRATE 50 MCG: 50 INJECTION, SOLUTION INTRAMUSCULAR; INTRAVENOUS at 10:41

## 2018-03-21 RX ADMIN — FENTANYL CITRATE 50 MCG: 50 INJECTION, SOLUTION INTRAMUSCULAR; INTRAVENOUS at 10:35

## 2018-03-21 RX ADMIN — PROPOFOL 40 MG: 10 INJECTION, EMULSION INTRAVENOUS at 12:01

## 2018-03-21 RX ADMIN — EPINEPHRINE 1 ML: 1 INJECTION, SOLUTION, CONCENTRATE INTRAVENOUS at 11:01

## 2018-03-21 RX ADMIN — PROPOFOL 200 MG: 10 INJECTION, EMULSION INTRAVENOUS at 10:36

## 2018-03-21 RX ADMIN — PROPOFOL 200 MCG/KG/MIN: 10 INJECTION, EMULSION INTRAVENOUS at 10:37

## 2018-03-21 RX ADMIN — ONDANSETRON 4 MG: 2 INJECTION INTRAMUSCULAR; INTRAVENOUS at 10:47

## 2018-03-21 RX ADMIN — SODIUM CHLORIDE 200 ML: 900 IRRIGANT IRRIGATION at 10:59

## 2018-03-21 RX ADMIN — DIPHENHYDRAMINE HYDROCHLORIDE 12.5 MG: 50 INJECTION, SOLUTION INTRAMUSCULAR; INTRAVENOUS at 10:58

## 2018-03-21 RX ADMIN — CEFAZOLIN SODIUM 2 G: 2 INJECTION, SOLUTION INTRAVENOUS at 10:38

## 2018-03-21 RX ADMIN — PROPOFOL 40 MG: 10 INJECTION, EMULSION INTRAVENOUS at 10:50

## 2018-03-21 RX ADMIN — SODIUM CHLORIDE 1000 ML: 900 IRRIGANT IRRIGATION at 10:57

## 2018-03-21 RX ADMIN — DEXAMETHASONE SODIUM PHOSPHATE 4 MG: 4 INJECTION, SOLUTION INTRA-ARTICULAR; INTRALESIONAL; INTRAMUSCULAR; INTRAVENOUS; SOFT TISSUE at 10:47

## 2018-03-21 RX ADMIN — FENTANYL CITRATE 25 MCG: 50 INJECTION, SOLUTION INTRAMUSCULAR; INTRAVENOUS at 11:33

## 2018-03-21 RX ADMIN — HYDROCODONE BITARTRATE AND ACETAMINOPHEN 1 TABLET: 5; 325 TABLET ORAL at 13:33

## 2018-03-21 RX ADMIN — SODIUM CHLORIDE, POTASSIUM CHLORIDE, SODIUM LACTATE AND CALCIUM CHLORIDE: 600; 310; 30; 20 INJECTION, SOLUTION INTRAVENOUS at 10:35

## 2018-03-21 RX ADMIN — FENTANYL CITRATE 50 MCG: 50 INJECTION INTRAMUSCULAR; INTRAVENOUS at 12:58

## 2018-03-21 RX ADMIN — FENTANYL CITRATE 50 MCG: 50 INJECTION INTRAMUSCULAR; INTRAVENOUS at 13:11

## 2018-03-21 RX ADMIN — FENTANYL CITRATE 50 MCG: 50 INJECTION, SOLUTION INTRAMUSCULAR; INTRAVENOUS at 10:57

## 2018-03-21 RX ADMIN — ACETAMINOPHEN 1000 MG: 500 TABLET, FILM COATED ORAL at 09:57

## 2018-03-21 RX ADMIN — SODIUM CHLORIDE 256 ML: 900 IRRIGANT IRRIGATION at 11:33

## 2018-03-21 RX ADMIN — ONDANSETRON 4 MG: 2 INJECTION INTRAMUSCULAR; INTRAVENOUS at 12:11

## 2018-03-21 RX ADMIN — PHENYLEPHRINE HYDROCHLORIDE 100 MCG: 10 INJECTION INTRAVENOUS at 10:51

## 2018-03-21 RX ADMIN — MIDAZOLAM 2 MG: 1 INJECTION INTRAMUSCULAR; INTRAVENOUS at 10:35

## 2018-03-21 RX ADMIN — PHENYLEPHRINE HYDROCHLORIDE 100 MCG: 10 INJECTION INTRAVENOUS at 11:17

## 2018-03-21 RX ADMIN — LIDOCAINE HYDROCHLORIDE 60 MG: 20 INJECTION, SOLUTION INFILTRATION; PERINEURAL at 10:36

## 2018-03-21 RX ADMIN — BUPIVACAINE HYDROCHLORIDE 30 ML: 2.5 INJECTION, SOLUTION EPIDURAL; INFILTRATION; INTRACAUDAL; PERINEURAL at 12:03

## 2018-03-21 ASSESSMENT — LIFESTYLE VARIABLES: TOBACCO_USE: 1

## 2018-03-21 NOTE — IP AVS SNAPSHOT
MRN:[id number]                      After Visit Summary   3/21/2018    Lilli Steven    MRN: [id number]           Thank you!     Thank you for choosing [facility] for your care. Our goal is always to provide you with excellent care. Hearing back from our patients is one way we can continue to improve our services. Please take a few minutes to complete the written survey that you may receive in the mail after you visit with us. Thank you!        Patient Information     Date Of Birth          1950        Designated Caregiver       Most Recent Value    Caregiver    Will someone help with your care after discharge? yes    Name of designated caregiver Aleena - sister    Phone number of caregiver [phone]      About your hospital stay     You were admitted on:  March 21, 2018 [facility]    You were discharged on:  March 21, 2018        Reason for your hospital stay       Surgery.                  Who to Call     For medical emergencies, please call 911.  For non-urgent questions about your medical care, please call your primary care provider or clinic, [phone]  For questions related to your surgery, please call your surgery clinic        Attending Provider     Provider Inés Moyer MD Plastic Surgery       Primary Care Provider Office Phone # Fax #    Vargas Ata Chaudhry [phone]      After Care Instructions     Discharge Instructions       Follow up with Dr. Harrell in 1-2 weeks.            Wound care and dressings       Remove dressing 24-48 hours after surgery.  Then may shower with incisions uncovered. Apply Aquaphor to incisions daily. No restrictions on arm movements. Avoid heavy lifting or strenuous exercise for 2 weeks. Abdominal binder at all times while active. Strip drains daily, record output daily. May use OTC ibuprofen/tylenol for discomfort.                  Your next 10  appointments already scheduled     Jun 05, 2018  3:40 PM CDT   (Arrive by 3:25 PM)   Return Botox with Hermes Frias MD   [facility] ([facility])    [address] [place] [id number]   [phone]            Aug 15, 2018  3:40 PM CDT   (Arrive by 3:25 PM)   Return Botox with Hermes Frias MD   [facility] ([facility])    [address] [place] [id number]   [phone]              Further instructions from your care team       Same Day Surgery Discharge Instructions for  Sedation and General Anesthesia       It's not unusual to feel dizzy, light-headed or faint for up to 24 hours after surgery or while taking pain medication.  If you have these symptoms: sit for a few minutes before standing and have someone assist you when you get up to walk or use the bathroom.      You should rest and relax for the next 24 hours. We recommend you make arrangements to have an adult stay with you for at least 24 hours after your discharge.  Avoid hazardous and strenuous activity.      DO NOT DRIVE any vehicle or operate mechanical equipment for 24 hours following the end of your surgery.  Even though you may feel normal, your reactions may be affected by the medication you have received.      Do not drink alcoholic beverages for 24 hours following surgery.       Slowly progress to your regular diet as you feel able. It's not unusual to feel nauseated and/or vomit after receiving anesthesia.  If you develop these symptoms, drink clear liquids (apple juice, ginger ale, broth, 7-up, etc. ) until you feel better.  If your nausea and vomiting persists for 24 hours, please notify your surgeon.        All narcotic pain medications, along with inactivity and anesthesia, can cause constipation. Drinking plenty of liquids and increasing fiber intake will  help.      For any questions of a medical nature, call your surgeon.      Do not make important decisions for 24 hours.      If you had general anesthesia, you may have a sore throat for a couple of days related to the breathing tube used during surgery.  You may use Cepacol lozenges to help with this discomfort.  If it worsens or if you develop a fever, contact your surgeon.       If you feel your pain is not well managed with the pain medications prescribed by your surgeon, please contact your surgeon's office to let them know so they can address your concerns.         **If you have questions or concerns about your procedure,  call  at [phone]**        Discharge Instructions following General Abdominal Surgery    Diet:    Diet as instructed by surgeon.  Avoid gas forming foods.  You may find your appetite to be diminished briefly after surgery.  Drink plenty of fluids, especially water.  Activities:    Activity as tolerated and instructed by surgeon.  Take frequent, short walks.  No strenuous activity or heavy lifting (greater than 10-15 lbs) until approved by surgeon.  Bathing/Incision Care    Ok to shower.  Do not submerge incision (no tub baths or swimming) until advised by surgeon.  Pat incisions dry.  If present, staples will be removed in the office.  If present, tape dressing (Steri-Strips) will fall off on their own in 7-10 days.  No lotion, powders, or perfumes near or on the incision.  What to expect:    A tingly or itchy sensation around the incision is a sign of normal healing.    A small amount of clear, pink drainage from the incision is normal.  Call your surgeon if you have these signs or symptoms:    Fever greater than 101 oF or chills.    Redness, swelling, warmth, foul-smelling drainage from incision.    Increased pain that does not improve with pain medicine.    With any questions or concerns.        Today you were given 1000 mg of Tylenol at 10:00am. The recommended daily maximum  dose is 4000 mg.       Information for Patients Discharging with a Transderm Scopolamine Patch       Dry mouth is a common side effect.    Drowsiness is another common side effect especially when combined with pain medication.  Please avoid activities that require mental alertness such as driving a car or making important legal decisions.    Since Scopolamine can cause temporary dilation of the pupils and blurred vision if it comes in contact with the eyes; be sure to wash your hands thoroughly with soap and water immediately after handling the patch.   When you remove your patch, please stick it to a tissue or paper towel for disposal.      Remove the patch immediately and contact a physician in the unlikely event that you experience symptoms of acute glaucoma (pain and reddening of the eyes, accompanied by dilated pupils).    Remove the patch if you develop any difficulties urinating.  If you cannot urinate after removing your patch, please notify your surgeon.    Remove the patch 24 hours after surgery.      Oniel Linares Drain  Home Care Instructions    What is a Oniel Linares (RITCHIE) Drain?  This is a small tube that connects to a bulb.  Its gentle suction removes extra fluid from a surgical wound.  Your doctor will remove the tube when the amount of fluid decreases.  The color and amount of fluid varies.  Right after surgery the fluid is bright red.  Over time, it changes to light pink and may become clear or the color of straw.    How should I care for my tube site?    Keep the skin around the tube dry.  Check with your doctor about how to shower.  You may need to cover the site with plastic when you shower.  Or, it may be okay to let the site get wet and put on a clean bandage after you shower.      If the bandage gets wet, you will need to change it.  How should I care for the bulb?    Keep the bulb compressed at all times except while you empty it.     Attach the bulb to your clothing with tape and a safety  "pin.    Try to empty the bulb at the same time every day.  Empty the bulb at least once a day, or when the bulb becomes half full.  If it becomes too full, there will not be enough suction.    To empty the bulb:    Wash your hands.    Open the bulb cap.    Drain the fluid from the bulb into the measuring cup.  If you have two drains, use two cups.      Clean the mouth of the bulb with an alcohol wipe if your nurse told you to.    Squeeze the bulb (fold it in half before you close the bulb cap) If it does not stay compressed, call your nurse or clinic.    Write the amount of drainage on the drainage record (see back page).  If you have two drains, write the amount for each bulb.    Flush the drainage down the toilet.  Rinse the measuring cup.    Wash your hands.    When should I call my doctor?   Call your doctor if:    You have a fever over 101 F (38.3 C), taken under the tongue.     The drainage increases or smells bad.    The skin around your tube has increased redness, swelling, warmth or pain.    You have pus or fluid leaking at the tube site.    Your stitches break.    You think the tube is not draining.    The tube falls out.    You have any problems or concerns.    Your drainage record:    Empty your bulb at least once per day or when 1/2 to 1/3 full.  Write down the date, time and amount of drainage in each bulb.   Bring this record to each clinic visit.    Date Time Bulb 1: amount of  Drainage in (ml or cc) Bulb 2: amount of drainage (in ml or cc) Notes                                                        Pending Results     No orders found from 3/19/2018 to 3/22/2018.            Admission Information     Date & Time Provider Department Dept. Phone    3/21/2018 Inés Harrell MD [facility] Same Day Surgery [phone]      Your Vitals Were     Blood Pressure Pulse Temperature Respirations Height Weight    143/85 88 99  F (37.2  C) 16 1.6 m (5' 3\") 73.5 kg (162 lb)    Last Period Pulse " Oximetry BMI (Body Mass Index)             (LMP Unknown) 92% 28.7 kg/m2         [facility] Information     [facility] gives you secure access to your electronic health record. If you see a primary care provider, you can also send messages to your care team and make appointments. If you have questions, please call your primary care clinic.  If you do not have a primary care provider, please call [phone] and they will assist you.        Care EveryWhere ID     This is your Care EveryWhere ID. This could be used by other organizations to access your [place] medical records  [id number]        Equal Access to Services     [facility] : Zana Romero, odell rajput, samia rhodes, shakeel carrillo. So [facility] [phone].    ATENCIÓN: Si habla español, tiene a sweet disposición servicios gratuitos de asistencia lingüística. [facility] [phone].    We comply with applicable federal civil rights laws and [place] laws. We do not discriminate on the basis of race, color, national origin, age, disability, sex, sexual orientation, or gender identity.               Review of your medicines      START taking        Dose / Directions    HYDROcodone-acetaminophen 5-325 MG per tablet   Commonly known as:  NORCO   Used for:  Scar of abdominal wall        Dose:  1-2 tablet   Take 1-2 tablets by mouth every 4 hours as needed for moderate to severe pain   Quantity:  10 tablet   Refills:  0         CONTINUE these medicines which have NOT CHANGED        Dose / Directions    BOTOX IJ        Dose:  150 Units   Inject 150 Units into the muscle Lot # /[id number]  Exp: 10/2020   Refills:  0       Calcium carb-Vitamin D 500 mg [place]-200 units 500-200 MG-UNIT per tablet   Commonly known as:  OSCAL with D;Oyster Shell Calcium        Dose:  1 tablet   Take 1 tablet by mouth 2 times daily (with meals)   Refills:  0       mometasone 0.1 % cream   Commonly known as:  ELOCON   Used for:  Dermatitis         Apply sparingly to affected area twice daily as needed.  Do not apply to face.   Quantity:  45 g   Refills:  0       simvastatin 20 MG tablet   Commonly known as:  ZOCOR   Used for:  Hyperlipidemia LDL goal <160        Dose:  20 mg   Take 1 tablet (20 mg) by mouth At Bedtime   Quantity:  90 tablet   Refills:  3       traZODone 50 MG tablet   Commonly known as:  DESYREL   Used for:  Persistent insomnia        TAKE 3 TABLETS BY MOUTH AT BEDTIME   Quantity:  90 tablet   Refills:  11            Where to get your medicines      Some of these will need a paper prescription and others can be bought over the counter. Ask your nurse if you have questions.     Bring a paper prescription for each of these medications     HYDROcodone-acetaminophen 5-325 MG per tablet                Protect others around you: Learn how to safely use, store and throw away your medicines at www.disposemymeds.org.        Information about OPIOIDS     PRESCRIPTION OPIOIDS: WHAT YOU NEED TO KNOW    Prescription opioids can be used to help relieve moderate to severe pain and are often prescribed following a surgery or injury, or for certain health conditions. These medications can be an important part of treatment but also come with serious risks. It is important to work with your health care provider to make sure you are getting the safest, most effective care.    WHAT ARE THE RISKS AND SIDE EFFECTS OF OPIOID USE?  Prescription opioids carry serious risks of addiction and overdose, especially with prolonged use. An opioid overdose, often marked by slowed breathing can cause sudden death. The use of prescription opioids can have a number of side effects as well, even when taken as directed:      Tolerance - meaning you might need to take more of a medication for the same pain relief    Physical dependence - meaning you have symptoms of withdrawal when a medication is stopped    Increased sensitivity to pain    Constipation    Nausea, vomiting,  and dry mouth    Sleepiness and dizziness    Confusion    Depression    Low levels of testosterone that can result in lower sex drive, energy, and strength    Itching and sweating    RISKS ARE GREATER WITH:    History of drug misuse, substance use disorder, or overdose    Mental health conditions (such as depression or anxiety)    Sleep apnea    Older age (65 years or older)    Pregnancy    Avoid alcohol while taking prescription opioids.   Also, unless specifically advised by your health care provider, medications to avoid include:    Benzodiazepines (such as Xanax or Valium)    Muscle relaxants (such as Soma or Flexeril)    Hypnotics (such as Ambien or Lunesta)    Other prescription opioids    KNOW YOUR OPTIONS:  Talk to your health care provider about ways to manage your pain that do not involve prescription opioids. Some of these options may actually work better and have fewer risks and side effects:    Pain relievers such as acetaminophen, ibuprofen, and naproxen    Some medications that are also used for depression or seizures    Physical therapy and exercise    Cognitive behavioral therapy, a psychological, goal-directed approach, in which patients learn how to modify physical, behavioral, and emotional triggers of pain and stress    IF YOU ARE PRESCRIBED OPIOIDS FOR PAIN:    Never take opioids in greater amounts or more often than prescribed    Follow up with your primary health care provider and work together to create a plan on how to manage your pain.    Talk about ways to help manage your pain that do not involve prescription opioids    Talk about all concerns and side effects    Help prevent misuse and abuse    Never sell or share prescription opioids    Never use another person's prescription opioids    Store prescription opioids in a secure place and out of reach of others (this may include visitors, children, friends, and family)    Visit www.cdc.gov/drugoverdose to learn about risks of opioid abuse  and overdose    If you believe you may be struggling with addiction, tell your health care provider and ask for guidance or call [facility]'s National Helpline at 0-782-683-HELP    LEARN MORE / www.cdc.gov/drugoverdose/prescribing/guideline.html    Safely dispose of unused prescription opioids: Find your local drug take-back programs and more information about the importance of safe disposal at www.doseofreality.[place].gov             Medication List: This is a list of all your medications and when to take them. Check marks below indicate your daily home schedule. Keep this list as a reference.      Medications           Morning Afternoon Evening Bedtime As Needed    BOTOX IJ   Inject 150 Units into the muscle Lot # /[id number]  Exp: 10/2020                                Calcium carb-Vitamin D 500 mg [place]-200 units 500-200 MG-UNIT per tablet   Commonly known as:  OSCAL with D;Oyster Shell Calcium   Take 1 tablet by mouth 2 times daily (with meals)                                HYDROcodone-acetaminophen 5-325 MG per tablet   Commonly known as:  NORCO   Take 1-2 tablets by mouth every 4 hours as needed for moderate to severe pain   Last time this was given:  1 tablet on 3/21/2018  1:33 PM                                mometasone 0.1 % cream   Commonly known as:  ELOCON   Apply sparingly to affected area twice daily as needed.  Do not apply to face.                                simvastatin 20 MG tablet   Commonly known as:  ZOCOR   Take 1 tablet (20 mg) by mouth At Bedtime                                traZODone 50 MG tablet   Commonly known as:  DESYREL   TAKE 3 TABLETS BY MOUTH AT BEDTIME

## 2018-03-21 NOTE — IP AVS SNAPSHOT
[facility] [place] [facility]    [address]    [place] [address]    Phone:  [phone]    Fax:  [phone]                                       After Visit Summary   3/21/2018    Lilli Steven    MRN: [id number]           After Visit Summary Signature Page     I have received my discharge instructions, and my questions have been answered. I have discussed any challenges I see with this plan with the nurse or doctor.    ..........................................................................................................................................  Patient/Patient Representative Signature      ..........................................................................................................................................  Patient Representative Print Name and Relationship to Patient    ..................................................               ................................................  Date                                            Time    ..........................................................................................................................................  Reviewed by Signature/Title    ...................................................              ..............................................  Date                                                            Time

## 2018-03-21 NOTE — OR NURSING
Reviewed RITCHIE drain teaching with sister and patient. Patient has had RITCHIE drains prior to this procedures. Reviewed general instructions. All questions asked and answered. VSS. IV will remain in place until ready to discharge from phase 2. PNDS met, po per I&O sheet. Pt dressed, up in recliner and transported to Phase 2.

## 2018-03-21 NOTE — ANESTHESIA POSTPROCEDURE EVALUATION
Patient: Lilli Steven    Procedure(s):  ABDOMINAL SCAR REVISION. - Wound Class: I-Clean    Diagnosis:ADBOMINAL WOUND (INSURANCE)  Diagnosis Additional Information: No value filed.    Anesthesia Type:  General    Note:  Anesthesia Post Evaluation    Patient location during evaluation: PACU  Patient participation: Able to fully participate in evaluation  Level of consciousness: awake  Pain management: adequate  Airway patency: patent  Cardiovascular status: acceptable  Respiratory status: acceptable  Hydration status: acceptable  PONV: controlled     Anesthetic complications: None          Last vitals:  Vitals:    03/21/18 1219 03/21/18 1230 03/21/18 1245   BP: 145/85 (!) 164/94 170/90   Pulse:      Resp: 16 16 16   Temp: 36.7  C (98  F) 36.8  C (98.2  F) 36.9  C (98.4  F)   SpO2: 98% 99% 99%         Electronically Signed By: Janes Talley MD  March 21, 2018  1:08 PM

## 2018-03-21 NOTE — DISCHARGE INSTRUCTIONS
Same Day Surgery Discharge Instructions for  Sedation and General Anesthesia       It's not unusual to feel dizzy, light-headed or faint for up to 24 hours after surgery or while taking pain medication.  If you have these symptoms: sit for a few minutes before standing and have someone assist you when you get up to walk or use the bathroom.      You should rest and relax for the next 24 hours. We recommend you make arrangements to have an adult stay with you for at least 24 hours after your discharge.  Avoid hazardous and strenuous activity.      DO NOT DRIVE any vehicle or operate mechanical equipment for 24 hours following the end of your surgery.  Even though you may feel normal, your reactions may be affected by the medication you have received.      Do not drink alcoholic beverages for 24 hours following surgery.       Slowly progress to your regular diet as you feel able. It's not unusual to feel nauseated and/or vomit after receiving anesthesia.  If you develop these symptoms, drink clear liquids (apple juice, ginger ale, broth, 7-up, etc. ) until you feel better.  If your nausea and vomiting persists for 24 hours, please notify your surgeon.        All narcotic pain medications, along with inactivity and anesthesia, can cause constipation. Drinking plenty of liquids and increasing fiber intake will help.      For any questions of a medical nature, call your surgeon.      Do not make important decisions for 24 hours.      If you had general anesthesia, you may have a sore throat for a couple of days related to the breathing tube used during surgery.  You may use Cepacol lozenges to help with this discomfort.  If it worsens or if you develop a fever, contact your surgeon.       If you feel your pain is not well managed with the pain medications prescribed by your surgeon, please contact your surgeon's office to let them know so they can address your concerns.         **If you have questions or concerns about  your procedure,  call  at [phone]**        Discharge Instructions following General Abdominal Surgery    Diet:    Diet as instructed by surgeon.  Avoid gas forming foods.  You may find your appetite to be diminished briefly after surgery.  Drink plenty of fluids, especially water.  Activities:    Activity as tolerated and instructed by surgeon.  Take frequent, short walks.  No strenuous activity or heavy lifting (greater than 10-15 lbs) until approved by surgeon.  Bathing/Incision Care    Ok to shower.  Do not submerge incision (no tub baths or swimming) until advised by surgeon.  Pat incisions dry.  If present, staples will be removed in the office.  If present, tape dressing (Steri-Strips) will fall off on their own in 7-10 days.  No lotion, powders, or perfumes near or on the incision.  What to expect:    A tingly or itchy sensation around the incision is a sign of normal healing.    A small amount of clear, pink drainage from the incision is normal.  Call your surgeon if you have these signs or symptoms:    Fever greater than 101 oF or chills.    Redness, swelling, warmth, foul-smelling drainage from incision.    Increased pain that does not improve with pain medicine.    With any questions or concerns.        Today you were given 1000 mg of Tylenol at 10:00am. The recommended daily maximum dose is 4000 mg.       Information for Patients Discharging with a Transderm Scopolamine Patch       Dry mouth is a common side effect.    Drowsiness is another common side effect especially when combined with pain medication.  Please avoid activities that require mental alertness such as driving a car or making important legal decisions.    Since Scopolamine can cause temporary dilation of the pupils and blurred vision if it comes in contact with the eyes; be sure to wash your hands thoroughly with soap and water immediately after handling the patch.   When you remove your patch, please stick it to a tissue or  paper towel for disposal.      Remove the patch immediately and contact a physician in the unlikely event that you experience symptoms of acute glaucoma (pain and reddening of the eyes, accompanied by dilated pupils).    Remove the patch if you develop any difficulties urinating.  If you cannot urinate after removing your patch, please notify your surgeon.    Remove the patch 24 hours after surgery.      Oniel Linares Drain  Home Care Instructions    What is a Oniel Linares (RITCHIE) Drain?  This is a small tube that connects to a bulb.  Its gentle suction removes extra fluid from a surgical wound.  Your doctor will remove the tube when the amount of fluid decreases.  The color and amount of fluid varies.  Right after surgery the fluid is bright red.  Over time, it changes to light pink and may become clear or the color of straw.    How should I care for my tube site?    Keep the skin around the tube dry.  Check with your doctor about how to shower.  You may need to cover the site with plastic when you shower.  Or, it may be okay to let the site get wet and put on a clean bandage after you shower.      If the bandage gets wet, you will need to change it.  How should I care for the bulb?    Keep the bulb compressed at all times except while you empty it.     Attach the bulb to your clothing with tape and a safety pin.    Try to empty the bulb at the same time every day.  Empty the bulb at least once a day, or when the bulb becomes half full.  If it becomes too full, there will not be enough suction.    To empty the bulb:    Wash your hands.    Open the bulb cap.    Drain the fluid from the bulb into the measuring cup.  If you have two drains, use two cups.      Clean the mouth of the bulb with an alcohol wipe if your nurse told you to.    Squeeze the bulb (fold it in half before you close the bulb cap) If it does not stay compressed, call your nurse or clinic.    Write the amount of drainage on the drainage record (see  back page).  If you have two drains, write the amount for each bulb.    Flush the drainage down the toilet.  Rinse the measuring cup.    Wash your hands.    When should I call my doctor?   Call your doctor if:    You have a fever over 101 F (38.3 C), taken under the tongue.     The drainage increases or smells bad.    The skin around your tube has increased redness, swelling, warmth or pain.    You have pus or fluid leaking at the tube site.    Your stitches break.    You think the tube is not draining.    The tube falls out.    You have any problems or concerns.    Your drainage record:    Empty your bulb at least once per day or when 1/2 to 1/3 full.  Write down the date, time and amount of drainage in each bulb.   Bring this record to each clinic visit.    Date Time Bulb 1: amount of  Drainage in (ml or cc) Bulb 2: amount of drainage (in ml or cc) Notes

## 2018-03-21 NOTE — ANESTHESIA PREPROCEDURE EVALUATION
Anesthesia Evaluation     . Pt has had prior anesthetic. Type: General    History of anesthetic complications   - PONV        ROS/MED HX    ENT/Pulmonary:     (+)tobacco use, Past use , . .    Neurologic:       Cardiovascular:     (+) Dyslipidemia, ----. : . . . :. .       METS/Exercise Tolerance:     Hematologic:         Musculoskeletal:         GI/Hepatic:         Renal/Genitourinary:         Endo:         Psychiatric:         Infectious Disease:         Malignancy:         Other:                                    Anesthesia Plan      History & Physical Review  History and physical reviewed and following examination; no interval change.    ASA Status:  2 .        Plan for General with Propofol and Intravenous induction. Maintenance will be TIVA.    PONV prophylaxis:  Ondansetron (or other 5HT-3) and Dexamethasone or Solumedrol  Propofol, Zofran, and decadron      Postoperative Care  Postoperative pain management:  IV analgesics and Oral pain medications.      Consents  Anesthetic plan, risks, benefits and alternatives discussed with:  Patient..                          .

## 2018-03-21 NOTE — ANESTHESIA CARE TRANSFER NOTE
Patient: Lilil Steven    Procedure(s):  ABDOMINAL SCAR REVISION. - Wound Class: I-Clean    Diagnosis: ADBOMINAL WOUND (INSURANCE)  Diagnosis Additional Information: No value filed.    Anesthesia Type:   General     Note:  Airway :Face Mask  Patient transferred to:PACU  Comments: Spontaneous respirations, tidal volume > 500, oxygen saturation > 97%, follows commands.  Suctioned and LMA removed.  Exchanging well.Transferred to PACU, spontaneous respirations, 10L oxygen via facemask.  All monitors and alarms on and functioning, VSS.  Patient awake, comfortable.  Report to PACU RN.Handoff Report: Identifed the Patient, Identified the Reponsible Provider, Reviewed the pertinent medical history, Discussed the surgical course, Reviewed Intra-OP anesthesia mangement and issues during anesthesia, Set expectations for post-procedure period and Allowed opportunity for questions and acknowledgement of understanding      Vitals: (Last set prior to Anesthesia Care Transfer)    CRNA VITALS  3/21/2018 1147 - 3/21/2018 1222      3/21/2018             Pulse: 92    SpO2: 98 %    Resp Rate (observed): 13                Electronically Signed By: SIERRA Caal CRNA  March 21, 2018  12:22 PM

## 2018-06-05 ENCOUNTER — RECORDS - HEALTHEAST (OUTPATIENT)
Dept: ADMINISTRATIVE | Facility: OTHER | Age: 68
End: 2018-06-05

## 2018-06-05 ENCOUNTER — OFFICE VISIT (OUTPATIENT)
Dept: PHYSICAL MEDICINE AND REHAB | Facility: CLINIC | Age: 68
End: 2018-06-05
Payer: COMMERCIAL

## 2018-06-05 VITALS
HEART RATE: 94 BPM | DIASTOLIC BLOOD PRESSURE: 69 MMHG | RESPIRATION RATE: 24 BRPM | BODY MASS INDEX: 28.92 KG/M2 | OXYGEN SATURATION: 95 % | WEIGHT: 163.2 LBS | TEMPERATURE: 97.8 F | SYSTOLIC BLOOD PRESSURE: 149 MMHG | HEIGHT: 63 IN

## 2018-06-05 DIAGNOSIS — G24.3 SPASMODIC TORTICOLLIS: Primary | ICD-10-CM

## 2018-06-05 DIAGNOSIS — G24.1 DYSTONIA, TORSION, FRAGMENTS OF: ICD-10-CM

## 2018-06-05 ASSESSMENT — PAIN SCALES - GENERAL: PAINLEVEL: MODERATE PAIN (4)

## 2018-06-05 NOTE — MR AVS SNAPSHOT
After Visit Summary   6/5/2018    Lilli Steven    MRN: [id number]           Patient Information     Date Of Birth          1950        Visit Information        Provider Department      6/5/2018 3:40 PM Hermes Frias MD [facility]        Today's Diagnoses     Spasmodic torticollis    -  1    Dystonia, torsion, fragments of           Follow-ups after your visit        Your next 10 appointments already scheduled     Aug 15, 2018  3:40 PM CDT   (Arrive by 3:25 PM)   Return Botox with Hermes Frias MD   [facility] ([facility])    [address] [place] [id number] [phone]            Nov 05, 2018  3:40 PM CST   (Arrive by 3:25 PM)   Return Botox with Hermes Frias MD   [facility] ([facility])    [address] [place] [id number] [phone]              Who to contact     Please call your clinic at [phone] to:    Ask questions about your health    Make or cancel appointments    Discuss your medicines    Learn about your test results    Speak to your doctor            Additional Information About Your Visit        [employer] Information     [employer] gives you secure access to your electronic health record. If you see a primary care provider, you can also send messages to your care team and make appointments. If you have questions, please call your primary care clinic.  If you do not have a primary care provider, please call [phone] and they will assist you.      [employer] is an electronic gateway that provides easy, online access to your medical records. With [employer], you can request a clinic appointment, read your test results, renew a prescription or communicate with your care team.     To access your existing account, please contact your [facility]  [facility] or call [phone] for assistance.        Care EveryWhere ID     This is your Care EveryWhere ID. This could be used by other organizations to access your [place] medical records  [id number]        Your Vitals Were     Pulse Temperature Respirations Height Last Period Pulse Oximetry    94 97.8  F (36.6  C) (Oral) 24 1.6 m (5' 3\") (LMP Unknown) 95%    Breastfeeding? BMI (Body Mass Index)                No 28.91 kg/m2           Blood Pressure from Last 3 Encounters:   06/05/18 149/69   03/21/18 178/86   03/13/18 126/70    Weight from Last 3 Encounters:   06/05/18 74 kg (163 lb 3.2 oz)   03/21/18 73.5 kg (162 lb)   03/13/18 73.6 kg (162 lb 3.2 oz)              We Performed the Following     HC CHEMODENERVATION 1 EXTREMITY, EA ADDL EXTREMITY, 1-4 MUSCLE     HC CHEMODENERVATION MUSCLE NECK UNILAT     HC CHEMODENERVATION ONE EXTREMITY, 1-4 MUSCLE     Needle EMG Guide w/Chemodenervation (38857)        Primary Care Provider Office Phone # Fax #    Vargas Ata Chaudhry [phone] [address] [facility] [address]        Equal Access to Services     RAJWINDER TEJEDA : Hadii aad ku hadasho Soomaali, waaxda luqadaha, qaybta kaalmada adeegyada, waxay idiin hayjhonathann maria e carrillo. So [facility] [phone].    ATENCIÓN: Si habla español, tiene a sweet disposición servicios gratuitos de asistencia lingüística. Llame al [phone].    We comply with applicable federal civil rights laws and [place] laws. We do not discriminate on the basis of race, color, national origin, age, disability, sex, sexual orientation, or gender identity.            Thank you!     Thank you for choosing [facility]  for your care. Our goal is always to provide you with excellent care. Hearing back from our patients is one way we can continue to improve our services. Please take a few minutes to complete the written survey that you may receive in the mail after your " visit with us. Thank you!             Your Updated Medication List - Protect others around you: Learn how to safely use, store and throw away your medicines at www.disposemymeds.org.          This list is accurate as of 6/5/18  4:18 PM.  Always use your most recent med list.                   Brand Name Dispense Instructions for use Diagnosis    * BOTOX IJ      Inject 150 Units into the muscle Lot # /C3  Exp: 10/2020        * BOTOX IJ      Inject 150 Units as directed once Lot # /C3 with Expiration Date:  11/2020        Calcium carb-Vitamin D 500 mg [place]-200 units 500-200 MG-UNIT per tablet    OSCAL with D;Oyster Shell Calcium     Take 1 tablet by mouth 2 times daily (with meals)        mometasone 0.1 % cream    ELOCON    45 g    Apply sparingly to affected area twice daily as needed.  Do not apply to face.    Dermatitis       simvastatin 20 MG tablet    ZOCOR    90 tablet    Take 1 tablet (20 mg) by mouth At Bedtime    Hyperlipidemia LDL goal <160       traZODone 50 MG tablet    DESYREL    90 tablet    TAKE 3 TABLETS BY MOUTH AT BEDTIME    Persistent insomnia       * Notice:  This list has 2 medication(s) that are the same as other medications prescribed for you. Read the directions carefully, and ask your doctor or other care provider to review them with you.

## 2018-06-05 NOTE — NURSING NOTE
Chief Complaint   Patient presents with     RECHECK     UMP- BOTOX- DYSTONIA     Mauricio Watkins, CMA

## 2018-06-05 NOTE — PROGRESS NOTES
"BOTULINUM TOXIN PROCEDURE NOTE    Chief Complaint   Patient presents with     RECHECK     UMP- BOTOX- DYSTONIA       /69  Pulse 94  Temp 97.8  F (36.6  C) (Oral)  Resp 24  Ht 1.6 m (5' 3\")  Wt 74 kg (163 lb 3.2 oz)  LMP  (LMP Unknown)  SpO2 95%  Breastfeeding? No  BMI 28.91 kg/m2      Current Outpatient Prescriptions:      calcium carb 1250 mg, 500 mg [place],/vitamin D 200 units (OSCAL WITH D) 500-200 MG-UNIT per tablet, Take 1 tablet by mouth 2 times daily (with meals), Disp: , Rfl:      mometasone (ELOCON) 0.1 % cream, Apply sparingly to affected area twice daily as needed.  Do not apply to face., Disp: 45 g, Rfl: 0     OnabotulinumtoxinA (BOTOX IJ), Inject 150 Units into the muscle Lot # /C3  Exp: 10/2020, Disp: , Rfl:      simvastatin (ZOCOR) 20 MG tablet, Take 1 tablet (20 mg) by mouth At Bedtime, Disp: 90 tablet, Rfl: 3     traZODone (DESYREL) 50 MG tablet, TAKE 3 TABLETS BY MOUTH AT BEDTIME, Disp: 90 tablet, Rfl: 11     Allergies   Allergen Reactions     Meloxicam      Diarrhea, vomiting     Primidone Other (See Comments)     Felt like motion sickness     Tramadol      Diarrhea, vomiting      Adhesive Tape Rash     Durabond only     Allergy Rash     Dermabond  Anesthesia IV set misc       Tegaderm Transparent Dressing (Informational Only) Rash        PHYSICAL EXAM:  HEAD, NECK AND TRUNK PATTERN:   Head Tremor: Observed - no-no tremor  Head & Neck Extension: Present - Slight  Sub-Occipital Extension: Present - Slight  Forward Head: Present - Slight   Head & Neck Lateral Bend: Right   Shoulder Elevation: Right    HPI:    Patient reports the following new medical problems since last visit: She underwent surgical revision of abdominal wall scar on 3/21/18. Otherwise, she denies any other new medical issues.       We reviewed the recommended safety guidelines for  Botox from any vaccine injection, such as the seasonal flu vaccine, by a minimum of 10-14 days with Lilli Steven. She " acknowledged understanding.    RESPONSE TO PREVIOUS TREATMENT:    Lilli Steven received 150 units of Botox on 2018.    Problems following the previous series of neurotoxin injections included:  No problems reported    BENEFITS BY PATIENT REPORT:  Pain Improvement: Yes.  Percent Improvement: unable to quantify in % but reports significant improvement  Duration of Benefit:  10-12  weeks.     Dystonia and tremor Improvement: Yes.  Percent Improvement: unable to quantify in %, but reports significant improvement Duration of Benefit:  10 weeks.    BOTULINUM NEUROTOXIN INJECTION PROCEDURES:    VERIFICATION OF PATIENT IDENTIFICATION AND PROCEDURE     Initials   Patient Name ses   Patient  ses   Procedure Verified by: ses     Prior to the start of the procedure and with procedural staff participation, I verbally confirmed the patient s identity using two indicators, relevant allergies, that the procedure was appropriate and matched the consent or emergent situation, and that the correct equipment/implants were available. Immediately prior to starting the procedure I conducted the Time Out with the procedural staff and re-confirmed the patient s name, procedure, and site/side. (The Joint Commission universal protocol was followed.)  Yes    Sedation (Moderate or Deep): None      Above assessments performed by:  Cassidy Frias MD    INDICATION/S FOR PROCEDURE/S:  Lilli Steven is a 67-year-old patient with dystonia affecting the  head, neck and shoulder girdle musculature secondary to a diagnosis of cervical dystonia with associated  pain, tremor, spasms, loss of joint motion, loss of volitional motor control and difficulty with activities of daily living.      Her baseline symptoms have been recalcitrant to oral medications and conservative therapy.  She is here today for an injection of Botox.       GOAL OF PROCEDURE:  The goal of this procedure is to increase active range of  motion, improve volitional motor control and decrease pain  associated with dystonic movements, spasticity and tremor.    TOTAL DOSE ADMINISTERED:  Dose Administered:  150 units Botox    Diluent Used:  Preservative Free Normal Saline  Total Volume of Diluent Used:  1.50 ml  Lot # /C3 with Expiration Date:  11/2020  NDC #: Botox 100u ([address]-18)    Medication guide was offered to patient and was declined.    CONSENT:  The risks, benefits, and treatment options were discussed with Lilli Steven and she agreed to proceed.      Written consent was obtained by [facility].     EQUIPMENT USED:  Needle-37mm stimulating/recording  EMG/NCS Machine     SKIN PREPARATION:  Skin preparation was performed using an alcohol wipe.     GUIDANCE DESCRIPTION:  Electro-myographic guidance was necessary throughout the procedure to accurately identify all areas of dystonic and spastic muscles while avoiding injection of non-dystonic muscles and non-spastic muscles, neighboring nerves and nearby vascular structures.     AREA/MUSCLE INJECTED:  150 Units Botox  1. HEAD & NECK MUSCLES: 150 units Botox = Total Dose, 1:1 Dilution                 Right Mid-Trapezius - 10 units of Botox at 1 site/s.   Left Mid-Trapezius - 10 units of Botox at 1 site/s.      Right Splenius Capitus - 15 units of Botox at 2 site/s.   Left Splenius Capitus - 10 units of Botox at 2 site/s.      Right Levator Scapulae - 30 units of Botox at 1 site/s (shoulder muscles).   Left Levator Scapulae - 20 units of Botox at 1 site/s (shoulder muscles).      Right Inferior Obliquus Capitis - 10 units of Botox at 1 site/s.   Left Inferior Obliquus Capitis - 15 units of at 1 site/s.      Right Sternal head of the Sternocleidomastoid - 15 units of Botox at 1 site/s.               Left Sternal head of the Sternocleidomastoid - 15 units of Botox at 1 site/s.        RESPONSE TO PROCEDURE:  Lilli Steven tolerated the procedure well and there were no immediate complications.   She was allowed to recover for an appropriate period of time and was discharged home in stable condition.    FOLLOW UP:  Lilli Steven was asked to follow up by phone in 7-14 days with Erin Combs PT, Care Coordinator or Mikki Rojas RN, Care Coordinator, to report her response to this series of injections.  Based on the patient's previous response to this therapy, Lilli Steven was rescheduled for the next series of injections in 12 weeks.    PLAN (Medication Changes, Therapy Orders, Work or Disability Issues, etc.): Will monitor response to today's injections and report.    There were 25 units of Botox as unavoidable waste for this patient.

## 2018-06-05 NOTE — LETTER
"6/5/2018     RE: Lilli Stveen  [address]  [facility] [address]     Dear Colleague,    Thank you for referring your patient, Lilli Steven, to the [facility] PHYSICAL MEDICINE AND REHABILITATION at [facility]. Please see a copy of my visit note below.    BOTULINUM TOXIN PROCEDURE NOTE    Chief Complaint   Patient presents with     RECHECK     UMP- BOTOX- DYSTONIA       /69  Pulse 94  Temp 97.8  F (36.6  C) (Oral)  Resp 24  Ht 1.6 m (5' 3\")  Wt 74 kg (163 lb 3.2 oz)  LMP  (LMP Unknown)  SpO2 95%  Breastfeeding? No  BMI 28.91 kg/m2      Current Outpatient Prescriptions:      calcium carb 1250 mg, 500 mg [place],/vitamin D 200 units (OSCAL WITH D) 500-200 MG-UNIT per tablet, Take 1 tablet by mouth 2 times daily (with meals), Disp: , Rfl:      mometasone (ELOCON) 0.1 % cream, Apply sparingly to affected area twice daily as needed.  Do not apply to face., Disp: 45 g, Rfl: 0     OnabotulinumtoxinA (BOTOX IJ), Inject 150 Units into the muscle Lot # /C3  Exp: 10/2020, Disp: , Rfl:      simvastatin (ZOCOR) 20 MG tablet, Take 1 tablet (20 mg) by mouth At Bedtime, Disp: 90 tablet, Rfl: 3     traZODone (DESYREL) 50 MG tablet, TAKE 3 TABLETS BY MOUTH AT BEDTIME, Disp: 90 tablet, Rfl: 11     Allergies   Allergen Reactions     Meloxicam      Diarrhea, vomiting     Primidone Other (See Comments)     Felt like motion sickness     Tramadol      Diarrhea, vomiting      Adhesive Tape Rash     Durabond only     Allergy Rash     Dermabond  Anesthesia IV set misc       Tegaderm Transparent Dressing (Informational Only) Rash        PHYSICAL EXAM:  HEAD, NECK AND TRUNK PATTERN:   Head Tremor: Observed - no-no tremor  Head & Neck Extension: Present - Slight  Sub-Occipital Extension: Present - Slight  Forward Head: Present - Slight   Head & Neck Lateral Bend: Right   Shoulder Elevation: Right    HPI:    Patient reports the following new medical problems since last " visit: She underwent surgical revision of abdominal wall scar on 3/21/18. Otherwise, she denies any other new medical issues.       We reviewed the recommended safety guidelines for  Botox from any vaccine injection, such as the seasonal flu vaccine, by a minimum of 10-14 days with Lilli Steven. She acknowledged understanding.    RESPONSE TO PREVIOUS TREATMENT:    Lilli Steven received 150 units of Botox on 2018.    Problems following the previous series of neurotoxin injections included:  No problems reported    BENEFITS BY PATIENT REPORT:  Pain Improvement: Yes.  Percent Improvement: unable to quantify in % but reports significant improvement  Duration of Benefit:  10-12  weeks.     Dystonia and tremor Improvement: Yes.  Percent Improvement: unable to quantify in %, but reports significant improvement Duration of Benefit:  10 weeks.    BOTULINUM NEUROTOXIN INJECTION PROCEDURES:    VERIFICATION OF PATIENT IDENTIFICATION AND PROCEDURE     Initials   Patient Name ses   Patient  ses   Procedure Verified by: ses     Prior to the start of the procedure and with procedural staff participation, I verbally confirmed the patient s identity using two indicators, relevant allergies, that the procedure was appropriate and matched the consent or emergent situation, and that the correct equipment/implants were available. Immediately prior to starting the procedure I conducted the Time Out with the procedural staff and re-confirmed the patient s name, procedure, and site/side. (The Joint Commission universal protocol was followed.)  Yes    Sedation (Moderate or Deep): None      Above assessments performed by:  Cassidy Frias MD    INDICATION/S FOR PROCEDURE/S:  Lilli Steven is a 67-year-old patient with dystonia affecting the  head, neck and shoulder girdle musculature secondary to a diagnosis of cervical dystonia with associated  pain, tremor, spasms, loss of joint  motion, loss of volitional motor control and difficulty with activities of daily living.      Her baseline symptoms have been recalcitrant to oral medications and conservative therapy.  She is here today for an injection of Botox.       GOAL OF PROCEDURE:  The goal of this procedure is to increase active range of motion, improve volitional motor control and decrease pain  associated with dystonic movements, spasticity and tremor.    TOTAL DOSE ADMINISTERED:  Dose Administered:  150 units Botox    Diluent Used:  Preservative Free Normal Saline  Total Volume of Diluent Used:  1.50 ml  Lot # /C3 with Expiration Date:  11/2020  NDC #: Botox 100u ([address]-61)    Medication guide was offered to patient and was declined.    CONSENT:  The risks, benefits, and treatment options were discussed with Lilli Steven and she agreed to proceed.      Written consent was obtained by [facility].     EQUIPMENT USED:  Needle-37mm stimulating/recording  EMG/NCS Machine     SKIN PREPARATION:  Skin preparation was performed using an alcohol wipe.     GUIDANCE DESCRIPTION:  Electro-myographic guidance was necessary throughout the procedure to accurately identify all areas of dystonic and spastic muscles while avoiding injection of non-dystonic muscles and non-spastic muscles, neighboring nerves and nearby vascular structures.     AREA/MUSCLE INJECTED:  150 Units Botox  1. HEAD & NECK MUSCLES: 150 units Botox = Total Dose, 1:1 Dilution                 Right Mid-Trapezius - 10 units of Botox at 1 site/s.   Left Mid-Trapezius - 10 units of Botox at 1 site/s.      Right Splenius Capitus - 15 units of Botox at 2 site/s.   Left Splenius Capitus - 10 units of Botox at 2 site/s.      Right Levator Scapulae - 30 units of Botox at 1 site/s (shoulder muscles).   Left Levator Scapulae - 20 units of Botox at 1 site/s (shoulder muscles).      Right Inferior Obliquus Capitis - 10 units of Botox at 1 site/s.   Left Inferior Obliquus Capitis - 15 units  of at 1 site/s.      Right Sternal head of the Sternocleidomastoid - 15 units of Botox at 1 site/s.               Left Sternal head of the Sternocleidomastoid - 15 units of Botox at 1 site/s.        RESPONSE TO PROCEDURE:  Lilli Steven tolerated the procedure well and there were no immediate complications.  She was allowed to recover for an appropriate period of time and was discharged home in stable condition.    FOLLOW UP:  Lilli Steven was asked to follow up by phone in 7-14 days with Erin Combs PT, Care Coordinator or Mikki Rojas RN, Care Coordinator, to report her response to this series of injections.  Based on the patient's previous response to this therapy, Lilli Steven was rescheduled for the next series of injections in 12 weeks.    PLAN (Medication Changes, Therapy Orders, Work or Disability Issues, etc.): Will monitor response to today's injections and report.    There were 25 units of Botox as unavoidable waste for this patient.    Again, thank you for allowing me to participate in the care of your patient.      Sincerely,    Hermes Frias MD

## 2018-06-19 ENCOUNTER — TRANSFERRED RECORDS (OUTPATIENT)
Dept: HEALTH INFORMATION MANAGEMENT | Facility: CLINIC | Age: 68
End: 2018-06-19

## 2018-07-24 ENCOUNTER — OFFICE VISIT (OUTPATIENT)
Dept: FAMILY MEDICINE | Facility: CLINIC | Age: 68
End: 2018-07-24
Payer: COMMERCIAL

## 2018-07-24 VITALS
HEART RATE: 92 BPM | HEIGHT: 63 IN | TEMPERATURE: 99.4 F | OXYGEN SATURATION: 96 % | SYSTOLIC BLOOD PRESSURE: 122 MMHG | DIASTOLIC BLOOD PRESSURE: 71 MMHG

## 2018-07-24 DIAGNOSIS — J30.2 CHRONIC SEASONAL ALLERGIC RHINITIS, UNSPECIFIED TRIGGER: Primary | ICD-10-CM

## 2018-07-24 PROCEDURE — 99213 OFFICE O/P EST LOW 20 MIN: CPT | Performed by: FAMILY MEDICINE

## 2018-07-24 RX ORDER — OLOPATADINE HYDROCHLORIDE 2 MG/ML
1 SOLUTION/ DROPS OPHTHALMIC DAILY
Qty: 2.5 ML | Refills: 3 | Status: SHIPPED | OUTPATIENT
Start: 2018-07-24 | End: 2018-11-13

## 2018-07-24 NOTE — MR AVS SNAPSHOT
After Visit Summary   7/24/2018    Lilli Steven    MRN: [id number]           Patient Information     Date Of Birth          1950        Visit Information        Provider Department      7/24/2018 4:00 PM Vargas Chaudhry MD [facility]        Today's Diagnoses     Chronic seasonal allergic rhinitis, unspecified trigger    -  1      Care Instructions        Nose:  Fluticasone nasal spray (Flonase) daily    Eyes: Ketotifen ([place]) Drops              Follow-ups after your visit        Your next 10 appointments already scheduled     Aug 15, 2018  3:40 PM CDT   (Arrive by 3:25 PM)   Return Botox with Hermes Frias MD   [facility] Physical Medicine and Rehabilitation ([facility])    [address] [place] [id number] [phone]            Nov 05, 2018  3:40 PM CST   (Arrive by 3:25 PM)   Return Botox with Hermes Frias MD   [facility] ([facility])    [address] [place] [id number] [phone]              Who to contact     If you have questions or need follow up information about today's clinic visit or your schedule please contact [facility] directly at [phone].  Normal or non-critical lab and imaging results will be communicated to you by MyChart, letter or phone within 4 business days after the clinic has received the results. If you do not hear from us within 7 days, please contact the clinic through MyChart or phone. If you have a critical or abnormal lab result, we will notify you by phone as soon as possible.  Submit refill requests through [employer] or call your pharmacy and they will forward the refill request to us. Please allow 3 business days for your refill to be completed.          Additional Information About Your Visit        [facility] Information     [employer] gives you secure access to your  "electronic health record. If you see a primary care provider, you can also send messages to your care team and make appointments. If you have questions, please call your primary care clinic.  If you do not have a primary care provider, please call [phone] and they will assist you.        Care EveryWhere ID     This is your Care EveryWhere ID. This could be used by other organizations to access your [facility] medical records  [id number]        Your Vitals Were     Pulse Temperature Height Last Period Pulse Oximetry       92 99.4  F (37.4  C) (Tympanic) 5' 3\" (1.6 m) (LMP Unknown) 96%        Blood Pressure from Last 3 Encounters:   07/24/18 122/71   06/05/18 149/69   03/21/18 178/86    Weight from Last 3 Encounters:   06/05/18 163 lb 3.2 oz (74 kg)   03/21/18 162 lb (73.5 kg)   03/13/18 162 lb 3.2 oz (73.6 kg)              Today, you had the following     No orders found for display         Today's Medication Changes          These changes are accurate as of 7/24/18  4:32 PM.  If you have any questions, ask your nurse or doctor.               Start taking these medicines.        Dose/Directions    olopatadine HCl 0.2 % Soln   Commonly known as:  PATADAY   Used for:  Chronic seasonal allergic rhinitis, unspecified trigger   Started by:  Vargas Chaudhry MD        Dose:  1 drop   Place 1 drop into both eyes daily   Quantity:  2.5 mL   Refills:  3            Where to get your medicines      These medications were sent to [place] [address] [place] [facility] [address]  [address], [place] [address]     Phone:  [phone]     olopatadine HCl 0.2 % Soln                Primary Care Provider Office Phone # Fax #    Vargas Chaudhry [phone] [address] [facility] [address]        Equal Access to Services     RAJWINDER TEJEDA AH: Hadii thais fuentes hadasho Soomaali, waaxda luqadaha, qaybta kaalmada adeegyada, shakeel prado ah. So wa " [phone].    ATENCIÓN: Si mathieu haynes, tiene a sweet disposición servicios gratuitos de asistencia lingüística. Debbie cuenca [phone].    We comply with applicable federal civil rights laws and [place] laws. We do not discriminate on the basis of race, color, national origin, age, disability, sex, sexual orientation, or gender identity.            Thank you!     Thank you for choosing [facility]  for your care. Our goal is always to provide you with excellent care. Hearing back from our patients is one way we can continue to improve our services. Please take a few minutes to complete the written survey that you may receive in the mail after your visit with us. Thank you!             Your Updated Medication List - Protect others around you: Learn how to safely use, store and throw away your medicines at www.disposemymeds.org.          This list is accurate as of 7/24/18  4:32 PM.  Always use your most recent med list.                   Brand Name Dispense Instructions for use Diagnosis    * BOTOX IJ      Inject 150 Units into the muscle Lot # /C3  Exp: 10/2020        * BOTOX IJ      Inject 150 Units as directed once Lot # /C3 with Expiration Date:  11/2020        Calcium carb-Vitamin D 500 mg [place]-200 units 500-200 MG-UNIT per tablet    OSCAL with D;Oyster Shell Calcium     Take 1 tablet by mouth 2 times daily (with meals)        mometasone 0.1 % cream    ELOCON    45 g    Apply sparingly to affected area twice daily as needed.  Do not apply to face.    Dermatitis       olopatadine HCl 0.2 % Soln    PATADAY    2.5 mL    Place 1 drop into both eyes daily    Chronic seasonal allergic rhinitis, unspecified trigger       simvastatin 20 MG tablet    ZOCOR    90 tablet    Take 1 tablet (20 mg) by mouth At Bedtime    Hyperlipidemia LDL goal <160       traZODone 50 MG tablet    DESYREL    90 tablet    TAKE 3 TABLETS BY MOUTH AT BEDTIME    Persistent insomnia       * Notice:  This list has 2  medication(s) that are the same as other medications prescribed for you. Read the directions carefully, and ask your doctor or other care provider to review them with you.

## 2018-07-24 NOTE — NURSING NOTE
"Chief Complaint   Patient presents with     Eye Problem     red itching eyes, ongoing     /71  Pulse 92  Temp 99.4  F (37.4  C) (Tympanic)  Ht 5' 3\" (1.6 m)  LMP  (LMP Unknown)  SpO2 96% Estimated body mass index is 28.91 kg/(m^2) as calculated from the following:    Height as of 6/5/18: 5' 3\" (1.6 m).    Weight as of 6/5/18: 163 lb 3.2 oz (74 kg).        Health Maintenance due pending provider review:  NONE    n/a    Nargis Brown CMA  "

## 2018-07-24 NOTE — PROGRESS NOTES
"  SUBJECTIVE:   Lilli Steven is a 68 year old female who presents to clinic today for the following health issues:      Red, itchy eyes      Duration: ongoing for about 3 months    Description (location/character/radiation): redness, itchy, some discharge    Intensity:  moderate    Accompanying signs and symptoms: as above    History (similar episodes/previous evaluation): None    Precipitating or alleviating factors: None    Therapies tried and outcome: benadryl at night, and something from [facility]      There has been no foreign body in eye,   noone has had infection.    OBJECTIVE:  /71  Pulse 92  Temp 99.4  F (37.4  C) (Tympanic)  Ht 5' 3\" (1.6 m)  LMP  (LMP Unknown)  SpO2 96%  EYES: bilater eye blepharal and scleral injection - no crusting or discharge.  No periorbital edema  ENT: normal  Skin: no rash  Lymph: no swollen nodes    ASSESSMENT/plan:    ICD-10-CM    1. Chronic seasonal allergic rhinitis, unspecified trigger J30.2 olopatadine HCl (PATADAY) 0.2 % SOLN        Patient education re: allergic conjunctivitis,treatment,   return to clinic within week if no improvement or worsening    "

## 2018-08-12 ENCOUNTER — HOSPITAL ENCOUNTER (OUTPATIENT)
Dept: CT IMAGING | Facility: CLINIC | Age: 68
Discharge: HOME | End: 2018-08-12
Attending: PLASTIC SURGERY | Admitting: PLASTIC SURGERY
Payer: COMMERCIAL

## 2018-08-12 DIAGNOSIS — E65 LOCALIZED ADIPOSITY: ICD-10-CM

## 2018-08-12 DIAGNOSIS — L76.34 POSTPROCEDURAL SEROMA OF SKIN AND SUBCUTANEOUS TISSUE FOLLOWING OTHER PROCEDURE: ICD-10-CM

## 2018-08-12 DIAGNOSIS — L90.5 SCAR CONDITION AND FIBROSIS OF SKIN: ICD-10-CM

## 2018-08-12 PROCEDURE — 74176 CT ABD & PELVIS W/O CONTRAST: CPT

## 2018-08-15 ENCOUNTER — OFFICE VISIT (OUTPATIENT)
Dept: PHYSICAL MEDICINE AND REHAB | Facility: CLINIC | Age: 68
End: 2018-08-15
Payer: COMMERCIAL

## 2018-08-15 ENCOUNTER — RECORDS - HEALTHEAST (OUTPATIENT)
Dept: ADMINISTRATIVE | Facility: OTHER | Age: 68
End: 2018-08-15

## 2018-08-15 VITALS
DIASTOLIC BLOOD PRESSURE: 55 MMHG | SYSTOLIC BLOOD PRESSURE: 133 MMHG | HEART RATE: 92 BPM | HEIGHT: 63 IN | WEIGHT: 165 LBS | BODY MASS INDEX: 29.23 KG/M2

## 2018-08-15 DIAGNOSIS — G24.3 SPASMODIC TORTICOLLIS: Primary | ICD-10-CM

## 2018-08-15 DIAGNOSIS — G24.1 DYSTONIA, TORSION, FRAGMENTS OF: ICD-10-CM

## 2018-08-15 ASSESSMENT — PAIN SCALES - GENERAL: PAINLEVEL: NO PAIN (0)

## 2018-08-15 NOTE — PROGRESS NOTES
"BOTULINUM TOXIN PROCEDURE NOTE    Chief Complaint   Patient presents with     RECHECK     Botox- Cervical Dystonia       Ht 1.6 m (5' 3\")  LMP  (LMP Unknown)      Current Outpatient Prescriptions:      mometasone (ELOCON) 0.1 % cream, Apply sparingly to affected area twice daily as needed.  Do not apply to face. (Patient not taking: Reported on 7/24/2018), Disp: 45 g, Rfl: 0     olopatadine HCl (PATADAY) 0.2 % SOLN, Place 1 drop into both eyes daily, Disp: 2.5 mL, Rfl: 3     OnabotulinumtoxinA (BOTOX IJ), Inject 150 Units as directed once Lot # /C3 with Expiration Date:  11/2020, Disp: , Rfl:      OnabotulinumtoxinA (BOTOX IJ), Inject 150 Units into the muscle Lot # /C3  Exp: 10/2020, Disp: , Rfl:      simvastatin (ZOCOR) 20 MG tablet, Take 1 tablet (20 mg) by mouth At Bedtime, Disp: 90 tablet, Rfl: 3     traZODone (DESYREL) 50 MG tablet, TAKE 3 TABLETS BY MOUTH AT BEDTIME, Disp: 90 tablet, Rfl: 11     Allergies   Allergen Reactions     Meloxicam      Diarrhea, vomiting     Primidone Other (See Comments)     Felt like motion sickness     Tramadol      Diarrhea, vomiting      Adhesive Tape Rash     Durabond only     Allergy Rash     Dermabond  Anesthesia IV set misc       Tegaderm Transparent Dressing (Informational Only) Rash        PHYSICAL EXAM:  HEAD, NECK AND TRUNK PATTERN:   Head Tremor: Observed - no-no tremor  Head & Neck Extension: Present - Slight  Sub-Occipital Extension: Present - Slight  Forward Head: Present - Slight   Head & Neck Lateral Bend: Left  Shoulder Elevation: Right    HPI:    No changes in medical condition.     We reviewed the recommended safety guidelines for  Botox from any vaccine injection, such as the seasonal flu vaccine, by a minimum of 10-14 days with Lilli Steven. She acknowledged understanding.    RESPONSE TO PREVIOUS TREATMENT:    Lilli Steven received 150 units of Botox on 06/05/2018.    Problems following the previous series of neurotoxin injections " included:  No problems reported    BENEFITS BY PATIENT REPORT:  Pain Improvement: Yes.  Percent Improvement: unable to quantify in % but reports significant improvement  Duration of Benefit:  10-12  weeks.     Dystonia and tremor Improvement: Yes.  Percent Improvement: unable to quantify in %, but reports significant improvement Duration of Benefit:  10 weeks.    BOTULINUM NEUROTOXIN INJECTION PROCEDURES:    VERIFICATION OF PATIENT IDENTIFICATION AND PROCEDURE     Initials   Patient Name landon   Patient  landon   Procedure Verified by: landon     Prior to the start of the procedure and with procedural staff participation, I verbally confirmed the patient s identity using two indicators, relevant allergies, that the procedure was appropriate and matched the consent or emergent situation, and that the correct equipment/implants were available. Immediately prior to starting the procedure I conducted the Time Out with the procedural staff and re-confirmed the patient s name, procedure, and site/side. (The Joint Commission universal protocol was followed.)  Yes    Sedation (Moderate or Deep): None      Above assessments performed by:  Rose Graf MD    INDICATION/S FOR PROCEDURE/S:  Lilli Steven is a 67-year-old patient with dystonia affecting the  head, neck and shoulder girdle musculature secondary to a diagnosis of cervical dystonia with associated  pain, tremor, spasms, loss of joint motion, loss of volitional motor control and difficulty with activities of daily living.      Her baseline symptoms have been recalcitrant to oral medications and conservative therapy.  She is here today for an injection of Botox.       GOAL OF PROCEDURE:  The goal of this procedure is to increase active range of motion, improve volitional motor control and decrease pain  associated with dystonic movements, spasticity and tremor.    TOTAL DOSE ADMINISTERED:  Dose Administered:  150 units Botox    Diluent Used:   Preservative Free Normal Saline  Total Volume of Diluent Used:  1.50 ml  Lot # /C3 with Expiration Date:  03/2021  NDC #: Botox 100u ([address]-21)    Medication guide was offered to patient and was declined.    CONSENT:  The risks, benefits, and treatment options were discussed with Lilli Steven and she agreed to proceed.      Written consent was obtained by landon.     EQUIPMENT USED:  Needle-37mm stimulating/recording  EMG/NCS Machine     SKIN PREPARATION:  Skin preparation was performed using an alcohol wipe.     GUIDANCE DESCRIPTION:  Electro-myographic guidance was necessary throughout the procedure to accurately identify all areas of dystonic and spastic muscles while avoiding injection of non-dystonic muscles and non-spastic muscles, neighboring nerves and nearby vascular structures.     AREA/MUSCLE INJECTED:  150 Units Botox  1. HEAD & NECK MUSCLES: 150 units Botox = Total Dose, 1:1 Dilution                 Right Mid-Trapezius - 10 units of Botox at 1 site/s.   Left Mid-Trapezius - 10 units of Botox at 1 site/s.      Right Splenius Capitus - 15 units of Botox at 2 site/s.   Left Splenius Capitus - 10 units of Botox at 2 site/s.      Right Levator Scapulae - 30 units of Botox at 1 site/s (shoulder muscles).   Left Levator Scapulae - 20 units of Botox at 1 site/s (shoulder muscles).      Right Inferior Obliquus Capitis - 10 units of Botox at 1 site/s.   Left Inferior Obliquus Capitis - 15 units of at 1 site/s.      Right Sternal head of the Sternocleidomastoid - 15 units of Botox at 1 site/s.               Left Sternal head of the Sternocleidomastoid - 15 units of Botox at 1 site/s.        RESPONSE TO PROCEDURE:  Lilli Steven tolerated the procedure well and there were no immediate complications.  She was allowed to recover for an appropriate period of time and was discharged home in stable condition.    FOLLOW UP:  Lilli Steven was asked to follow up by phone in 7-14 days with Erin Combs  PT, Care Coordinator or Mikki Rojas RN, Care Coordinator, to report her response to this series of injections.  Based on the patient's previous response to this therapy, Lilli Steven was rescheduled for the next series of injections in 12 weeks.    PLAN (Medication Changes, Therapy Orders, Work or Disability Issues, etc.): Will monitor response to today's injections and report.

## 2018-08-15 NOTE — MR AVS SNAPSHOT
After Visit Summary   8/15/2018    Lilli Steven    MRN: [id number]           Patient Information     Date Of Birth          1950        Visit Information        Provider Department      8/15/2018 3:40 PM Hermes Frias MD [facility] Physical Medicine and Rehabilitation        Today's Diagnoses     Spasmodic torticollis    -  1    Dystonia, torsion, fragments of           Follow-ups after your visit        Follow-up notes from your care team     Return in about 10 weeks (around 10/24/2018) for Dystonia.      Your next 10 appointments already scheduled     Nov 05, 2018  3:40 PM CST   (Arrive by 3:25 PM)   Return Botox with Hermes Frias MD   [facility] Physical Medicine and Rehabilitation ([facility])    [address] [place] [address] [phone]            Jan 16, 2019  3:40 PM CST   (Arrive by 3:25 PM)   Return Botox with Hermes Frias MD   [facility] Medicine and Rehabilitation ([facility])    [address] [place] [address] [phone]              Who to contact     Please call your clinic at [phone] to:    Ask questions about your health    Make or cancel appointments    Discuss your medicines    Learn about your test results    Speak to your doctor            Additional Information About Your Visit        [employer] Information     [employer] gives you secure access to your electronic health record. If you see a primary care provider, you can also send messages to your care team and make appointments. If you have questions, please call your primary care clinic.  If you do not have a primary care provider, please call [phone] and they will assist you.      [employer] is an electronic gateway that provides easy, online access to your medical records. With [employer], you can request a clinic appointment, read your test results, renew a prescription or  "communicate with your care team.     To access your existing account, please contact your [facility] or call [phone] for assistance.        Care EveryWhere ID     This is your Care EveryWhere ID. This could be used by other organizations to access your [place] medical records  [id number]        Your Vitals Were     Pulse Height Last Period BMI (Body Mass Index)          92 1.6 m (5' 3\") (LMP Unknown) 29.23 kg/m2         Blood Pressure from Last 3 Encounters:   08/15/18 133/55   07/24/18 122/71   06/05/18 149/69    Weight from Last 3 Encounters:   08/15/18 74.8 kg (165 lb)   06/05/18 74 kg (163 lb 3.2 oz)   03/21/18 73.5 kg (162 lb)              We Performed the Following     HC CHEMODENERVATION 1 EXTREMITY, EA ADDL EXTREMITY, 1-4 MUSCLE     HC CHEMODENERVATION MUSCLE NECK UNILAT     HC CHEMODENERVATION ONE EXTREMITY, 1-4 MUSCLE     Needle EMG Guide w/Chemodenervation (64407)          Today's Medication Changes          These changes are accurate as of 8/15/18  4:21 PM.  If you have any questions, ask your nurse or doctor.               Stop taking these medicines if you haven't already. Please contact your care team if you have questions.     Calcium carb-Vitamin D 500 mg [place]-200 units 500-200 MG-UNIT per tablet   Commonly known as:  OSCAL with D;Oyster Shell Calcium   Stopped by:  Hermes Frias MD                    Primary Care Provider Office Phone # Fax #    Vargas Ata Chaudhry [phone] [address] [facility] [address]        Equal Access to Services     [facility] : Hadmar Romero, odell rajput, shakeel reed. So [facility] [phone].    ATENCIÓN: Si habla español, tiene a sweet disposición servicios gratuitos de asistencia lingüística. Llame al [phone].    We comply with applicable federal civil rights laws and [place] laws. We do not discriminate on " the basis of race, color, national origin, age, disability, sex, sexual orientation, or gender identity.            Thank you!     Thank you for choosing [facility] PHYSICAL MEDICINE AND REHABILITATION  for your care. Our goal is always to provide you with excellent care. Hearing back from our patients is one way we can continue to improve our services. Please take a few minutes to complete the written survey that you may receive in the mail after your visit with us. Thank you!             Your Updated Medication List - Protect others around you: Learn how to safely use, store and throw away your medicines at www.disposemymeds.org.          This list is accurate as of 8/15/18  4:21 PM.  Always use your most recent med list.                   Brand Name Dispense Instructions for use Diagnosis    * BOTOX IJ      Inject 150 Units into the muscle Lot # /C3  Exp: 10/2020        * BOTOX IJ      Inject 150 Units as directed once Lot # /C3 with Expiration Date:  11/2020        * BOTOX IJ      Inject 150 Units into the muscle once Lot # /C3 with Expiration Date: 03/2021        mometasone 0.1 % cream    ELOCON    45 g    Apply sparingly to affected area twice daily as needed.  Do not apply to face.    Dermatitis       olopatadine HCl 0.2 % Soln    PATADAY    2.5 mL    Place 1 drop into both eyes daily    Chronic seasonal allergic rhinitis, unspecified trigger       simvastatin 20 MG tablet    ZOCOR    90 tablet    Take 1 tablet (20 mg) by mouth At Bedtime    Hyperlipidemia LDL goal <160       traZODone 50 MG tablet    DESYREL    90 tablet    TAKE 3 TABLETS BY MOUTH AT BEDTIME    Persistent insomnia       * Notice:  This list has 3 medication(s) that are the same as other medications prescribed for you. Read the directions carefully, and ask your doctor or other care provider to review them with you.

## 2018-08-15 NOTE — NURSING NOTE
Chief Complaint   Patient presents with     RECHECK     Botox- Cervical Dystonia     Lennie Galicia

## 2018-08-15 NOTE — LETTER
"8/15/2018     RE: Lilli Steven  [address]  [facility] [address]     Dear Colleague,    Thank you for referring your patient, Lilli Steven, to the [facility] PHYSICAL MEDICINE AND REHABILITATION at [facility]. Please see a copy of my visit note below.    BOTULINUM TOXIN PROCEDURE NOTE    Chief Complaint   Patient presents with     RECHECK     Botox- Cervical Dystonia       Ht 1.6 m (5' 3\")  LMP  (LMP Unknown)      Current Outpatient Prescriptions:      mometasone (ELOCON) 0.1 % cream, Apply sparingly to affected area twice daily as needed.  Do not apply to face. (Patient not taking: Reported on 7/24/2018), Disp: 45 g, Rfl: 0     olopatadine HCl (PATADAY) 0.2 % SOLN, Place 1 drop into both eyes daily, Disp: 2.5 mL, Rfl: 3     OnabotulinumtoxinA (BOTOX IJ), Inject 150 Units as directed once Lot # /C3 with Expiration Date:  11/2020, Disp: , Rfl:      OnabotulinumtoxinA (BOTOX IJ), Inject 150 Units into the muscle Lot # /C3  Exp: 10/2020, Disp: , Rfl:      simvastatin (ZOCOR) 20 MG tablet, Take 1 tablet (20 mg) by mouth At Bedtime, Disp: 90 tablet, Rfl: 3     traZODone (DESYREL) 50 MG tablet, TAKE 3 TABLETS BY MOUTH AT BEDTIME, Disp: 90 tablet, Rfl: 11     Allergies   Allergen Reactions     Meloxicam      Diarrhea, vomiting     Primidone Other (See Comments)     Felt like motion sickness     Tramadol      Diarrhea, vomiting      Adhesive Tape Rash     Durabond only     Allergy Rash     Dermabond  Anesthesia IV set misc       Tegaderm Transparent Dressing (Informational Only) Rash        PHYSICAL EXAM:  HEAD, NECK AND TRUNK PATTERN:   Head Tremor: Observed - no-no tremor  Head & Neck Extension: Present - Slight  Sub-Occipital Extension: Present - Slight  Forward Head: Present - Slight   Head & Neck Lateral Bend: Left  Shoulder Elevation: Right    HPI:    No changes in medical condition.     We reviewed the recommended safety guidelines for "  Botox from any vaccine injection, such as the seasonal flu vaccine, by a minimum of 10-14 days with Lilli Steven. She acknowledged understanding.    RESPONSE TO PREVIOUS TREATMENT:    Lilli Steven received 150 units of Botox on 2018.    Problems following the previous series of neurotoxin injections included:  No problems reported    BENEFITS BY PATIENT REPORT:  Pain Improvement: Yes.  Percent Improvement: unable to quantify in % but reports significant improvement  Duration of Benefit:  10-12  weeks.     Dystonia and tremor Improvement: Yes.  Percent Improvement: unable to quantify in %, but reports significant improvement Duration of Benefit:  10 weeks.    BOTULINUM NEUROTOXIN INJECTION PROCEDURES:    VERIFICATION OF PATIENT IDENTIFICATION AND PROCEDURE     Initials   Patient Name landon   Patient  landon   Procedure Verified by: landon     Prior to the start of the procedure and with procedural staff participation, I verbally confirmed the patient s identity using two indicators, relevant allergies, that the procedure was appropriate and matched the consent or emergent situation, and that the correct equipment/implants were available. Immediately prior to starting the procedure I conducted the Time Out with the procedural staff and re-confirmed the patient s name, procedure, and site/side. (The Joint Commission universal protocol was followed.)  Yes    Sedation (Moderate or Deep): None      Above assessments performed by:  Rose Graf MD    INDICATION/S FOR PROCEDURE/S:  Lilli Steven is a 67-year-old patient with dystonia affecting the  head, neck and shoulder girdle musculature secondary to a diagnosis of cervical dystonia with associated  pain, tremor, spasms, loss of joint motion, loss of volitional motor control and difficulty with activities of daily living.      Her baseline symptoms have been recalcitrant to oral medications and conservative therapy.  She is  here today for an injection of Botox.       GOAL OF PROCEDURE:  The goal of this procedure is to increase active range of motion, improve volitional motor control and decrease pain  associated with dystonic movements, spasticity and tremor.    TOTAL DOSE ADMINISTERED:  Dose Administered:  150 units Botox    Diluent Used:  Preservative Free Normal Saline  Total Volume of Diluent Used:  1.50 ml  Lot # /C3 with Expiration Date:  03/2021  NDC #: Botox 100u ([address]-98)    Medication guide was offered to patient and was declined.      CONSENT:  The risks, benefits, and treatment options were discussed with Lilli Steven and she agreed to proceed.      Written consent was obtained by landon.     EQUIPMENT USED:  Needle-37mm stimulating/recording  EMG/NCS Machine     SKIN PREPARATION:  Skin preparation was performed using an alcohol wipe.     GUIDANCE DESCRIPTION:  Electro-myographic guidance was necessary throughout the procedure to accurately identify all areas of dystonic and spastic muscles while avoiding injection of non-dystonic muscles and non-spastic muscles, neighboring nerves and nearby vascular structures.     AREA/MUSCLE INJECTED:  150 Units Botox  1. HEAD & NECK MUSCLES: 150 units Botox = Total Dose, 1:1 Dilution                 Right Mid-Trapezius - 10 units of Botox at 1 site/s.   Left Mid-Trapezius - 10 units of Botox at 1 site/s.      Right Splenius Capitus - 15 units of Botox at 2 site/s.   Left Splenius Capitus - 10 units of Botox at 2 site/s.      Right Levator Scapulae - 30 units of Botox at 1 site/s (shoulder muscles).   Left Levator Scapulae - 20 units of Botox at 1 site/s (shoulder muscles).      Right Inferior Obliquus Capitis - 10 units of Botox at 1 site/s.   Left Inferior Obliquus Capitis - 15 units of at 1 site/s.      Right Sternal head of the Sternocleidomastoid - 15 units of Botox at 1 site/s.               Left Sternal head of the Sternocleidomastoid - 15 units of Botox at 1  site/s.      RESPONSE TO PROCEDURE:  Lilli Steven tolerated the procedure well and there were no immediate complications.  She was allowed to recover for an appropriate period of time and was discharged home in stable condition.    FOLLOW UP:  Lilli Steven was asked to follow up by phone in 7-14 days with Erin Combs PT, Care Coordinator or Mikki Rojas RN, Care Coordinator, to report her response to this series of injections.  Based on the patient's previous response to this therapy, Lilli Steven was rescheduled for the next series of injections in 12 weeks.    PLAN (Medication Changes, Therapy Orders, Work or Disability Issues, etc.): Will monitor response to today's injections and report.      Again, thank you for allowing me to participate in the care of your patient.      Sincerely,    Hermes Frias MD

## 2018-11-05 ENCOUNTER — OFFICE VISIT (OUTPATIENT)
Dept: PHYSICAL MEDICINE AND REHAB | Facility: CLINIC | Age: 68
End: 2018-11-05
Payer: COMMERCIAL

## 2018-11-05 VITALS
HEIGHT: 62 IN | WEIGHT: 172.5 LBS | OXYGEN SATURATION: 94 % | SYSTOLIC BLOOD PRESSURE: 125 MMHG | TEMPERATURE: 97.9 F | DIASTOLIC BLOOD PRESSURE: 80 MMHG | HEART RATE: 95 BPM | BODY MASS INDEX: 31.74 KG/M2

## 2018-11-05 DIAGNOSIS — G24.3 SPASMODIC TORTICOLLIS: Primary | ICD-10-CM

## 2018-11-05 DIAGNOSIS — G24.1 DYSTONIA, TORSION, FRAGMENTS OF: ICD-10-CM

## 2018-11-05 ASSESSMENT — PAIN SCALES - GENERAL: PAINLEVEL: MILD PAIN (3)

## 2018-11-05 NOTE — LETTER
11/5/2018       RE: Lilli Steven  [address]  [facility] [address]     Dear Colleague,    Thank you for referring your patient, Lilli Steven, to the [facility] PHYSICAL MEDICINE AND REHABILITATION at [facility]. Please see a copy of my visit note below.    BOTULINUM TOXIN PROCEDURE NOTE    Chief Complaint   Patient presents with     RECHECK     UMP RETURN - BOTOX       LMP  (LMP Unknown)      Current Outpatient Prescriptions:      mometasone (ELOCON) 0.1 % cream, Apply sparingly to affected area twice daily as needed.  Do not apply to face., Disp: 45 g, Rfl: 0     OnabotulinumtoxinA (BOTOX IJ), Inject 150 Units into the muscle once Lot # /C3 with Expiration Date: 03/2021, Disp: , Rfl:      OnabotulinumtoxinA (BOTOX IJ), Inject 150 Units as directed once Lot # /C3 with Expiration Date:  11/2020, Disp: , Rfl:      OnabotulinumtoxinA (BOTOX IJ), Inject 150 Units into the muscle Lot # /C3  Exp: 10/2020, Disp: , Rfl:      RESTASIS MULTIDOSE 0.05 % ophthalmic emulsion, Place 1 drop into both eyes 2 times daily, Disp: , Rfl: 3     simvastatin (ZOCOR) 20 MG tablet, Take 1 tablet (20 mg) by mouth At Bedtime, Disp: 90 tablet, Rfl: 3     traZODone (DESYREL) 50 MG tablet, TAKE 3 TABLETS BY MOUTH AT BEDTIME, Disp: 90 tablet, Rfl: 11     olopatadine HCl (PATADAY) 0.2 % SOLN, Place 1 drop into both eyes daily (Patient not taking: Reported on 11/5/2018), Disp: 2.5 mL, Rfl: 3     Allergies   Allergen Reactions     Meloxicam      Diarrhea, vomiting     Primidone Other (See Comments)     Felt like motion sickness     Tramadol      Diarrhea, vomiting      Adhesive Tape Rash     Durabond only     Allergy Rash     Dermabond  Anesthesia IV set misc       Tegaderm Transparent Dressing (Informational Only) Rash        PHYSICAL EXAM:  HEAD, NECK AND TRUNK PATTERN:   Head Tremor: Observed - no-no tremor  Head & Neck Extension: Present - Slight  Sub-Occipital  Extension: Present - Slight  Forward Head: Present - Slight   Head & Neck Lateral Bend: Left  Shoulder Elevation: Right    HPI:    No changes in medical condition.     We reviewed the recommended safety guidelines for  Botox from any vaccine injection, such as the seasonal flu vaccine, by a minimum of 10-14 days with Lilli Steven. She acknowledged understanding.    RESPONSE TO PREVIOUS TREATMENT:    Lilli Steven received 150 units of Botox on 08/15/2018.    Problems following the previous series of neurotoxin injections included:  No problems reported    BENEFITS BY PATIENT REPORT:  Pain Improvement: Yes.  Percent Improvement: unable to quantify in % but reports significant improvement  Duration of Benefit:  10-12  weeks.     Dystonia and tremor Improvement: Yes.  Percent Improvement: unable to quantify in %, but reports significant improvement Duration of Benefit:  10 weeks.    BOTULINUM NEUROTOXIN INJECTION PROCEDURES:    VERIFICATION OF PATIENT IDENTIFICATION AND PROCEDURE     Initials   Patient Name da   Patient  da   Procedure Verified by: [facility]     Prior to the start of the procedure and with procedural staff participation, I verbally confirmed the patient s identity using two indicators, relevant allergies, that the procedure was appropriate and matched the consent or emergent situation, and that the correct equipment/implants were available. Immediately prior to starting the procedure I conducted the Time Out with the procedural staff and re-confirmed the patient s name, procedure, and site/side. (The Joint Commission universal protocol was followed.)  Yes    Sedation (Moderate or Deep): None      Above assessments performed by:  Michelle Orellana MD, PM&R PGY4    Hermes Frias MD    INDICATION/S FOR PROCEDURE/S:  Lilli Steven is a 67-year-old patient with dystonia affecting the  head, neck and shoulder girdle musculature secondary to a diagnosis of cervical dystonia with  associated  pain, tremor, spasms, loss of joint motion, loss of volitional motor control and difficulty with activities of daily living.      Her baseline symptoms have been recalcitrant to oral medications and conservative therapy.  She is here today for an injection of Botox.       GOAL OF PROCEDURE:  The goal of this procedure is to increase active range of motion, improve volitional motor control and decrease pain  associated with dystonic movements, spasticity and tremor.    TOTAL DOSE ADMINISTERED:  Dose Administered:  150 units Botox    Diluent Used:  Preservative Free Normal Saline  Total Volume of Diluent Used:  1.50 ml  Lot # /C3 with Expiration Date:  05/2021  NDC #: Botox 100u ([address]-51)    Medication guide was offered to patient and was declined.    CONSENT:  The risks, benefits, and treatment options were discussed with Lilli Steven and she agreed to proceed.      Written consent was obtained by [facility]     EQUIPMENT USED:  Needle-37mm stimulating/recording  EMG/NCS Machine     SKIN PREPARATION:  Skin preparation was performed using an alcohol wipe.     GUIDANCE DESCRIPTION:  Electro-myographic guidance was necessary throughout the procedure to accurately identify all areas of dystonic and spastic muscles while avoiding injection of non-dystonic muscles and non-spastic muscles, neighboring nerves and nearby vascular structures.     AREA/MUSCLE INJECTED:  150 Units Botox  1. HEAD & NECK MUSCLES: 150 units Botox = Total Dose, 1:1 Dilution                 Right Mid-Trapezius - 10 units of Botox at 1 site/s.   Left Mid-Trapezius - 10 units of Botox at 1 site/s.      Right Splenius Capitus - 15 units of Botox at 2 site/s.   Left Splenius Capitus - 10 units of Botox at 2 site/s.      Right Levator Scapulae - 30 units of Botox at 1 site/s (shoulder muscles).   Left Levator Scapulae - 20 units of Botox at 1 site/s (shoulder muscles).      Right Inferior Obliquus Capitis - 10 units of Botox at 1  site/s.   Left Inferior Obliquus Capitis - 15 units of at 1 site/s.      Right Sternal head of the Sternocleidomastoid - 15 units of Botox at 1 site/s.               Left Sternal head of the Sternocleidomastoid - 15 units of Botox at 1 site/s.        RESPONSE TO PROCEDURE:  Lilli Steven tolerated the procedure well and there were no immediate complications.  She was allowed to recover for an appropriate period of time and was discharged home in stable condition.    FOLLOW UP:  Lilli Steven was asked to follow up by phone in 7-14 days with Erin Combs PT, Care Coordinator or Mikki Rojas RN, Care Coordinator, to report her response to this series of injections.  Based on the patient's previous response to this therapy, Lilli Steven was rescheduled for the next series of injections in 12 weeks.    PLAN (Medication Changes, Therapy Orders, Work or Disability Issues, etc.): Will monitor response to today's injections and report.    There were 25 units of Botox as unavoidable waste for this patient.      Again, thank you for allowing me to participate in the care of your patient.      Sincerely,    Hermes Frias MD

## 2018-11-05 NOTE — PROGRESS NOTES
BOTULINUM TOXIN PROCEDURE NOTE    Chief Complaint   Patient presents with     RECHECK     [facility] RETURN - BOTOX       LMP  (LMP Unknown)      Current Outpatient Prescriptions:      mometasone (ELOCON) 0.1 % cream, Apply sparingly to affected area twice daily as needed.  Do not apply to face., Disp: 45 g, Rfl: 0     OnabotulinumtoxinA (BOTOX IJ), Inject 150 Units into the muscle once Lot # /C3 with Expiration Date: 03/2021, Disp: , Rfl:      OnabotulinumtoxinA (BOTOX IJ), Inject 150 Units as directed once Lot # /C3 with Expiration Date:  11/2020, Disp: , Rfl:      OnabotulinumtoxinA (BOTOX IJ), Inject 150 Units into the muscle Lot # /C3  Exp: 10/2020, Disp: , Rfl:      RESTASIS MULTIDOSE 0.05 % ophthalmic emulsion, Place 1 drop into both eyes 2 times daily, Disp: , Rfl: 3     simvastatin (ZOCOR) 20 MG tablet, Take 1 tablet (20 mg) by mouth At Bedtime, Disp: 90 tablet, Rfl: 3     traZODone (DESYREL) 50 MG tablet, TAKE 3 TABLETS BY MOUTH AT BEDTIME, Disp: 90 tablet, Rfl: 11     olopatadine HCl (PATADAY) 0.2 % SOLN, Place 1 drop into both eyes daily (Patient not taking: Reported on 11/5/2018), Disp: 2.5 mL, Rfl: 3     Allergies   Allergen Reactions     Meloxicam      Diarrhea, vomiting     Primidone Other (See Comments)     Felt like motion sickness     Tramadol      Diarrhea, vomiting      Adhesive Tape Rash     Durabond only     Allergy Rash     Dermabond  Anesthesia IV set misc       Tegaderm Transparent Dressing (Informational Only) Rash        PHYSICAL EXAM:  HEAD, NECK AND TRUNK PATTERN:   Head Tremor: Observed - no-no tremor  Head & Neck Extension: Present - Slight  Sub-Occipital Extension: Present - Slight  Forward Head: Present - Slight   Head & Neck Lateral Bend: Left  Shoulder Elevation: Right    HPI:    No changes in medical condition.     We reviewed the recommended safety guidelines for  Botox from any vaccine injection, such as the seasonal flu vaccine, by a minimum of 10-14 days  with Lilli Steven. She acknowledged understanding.    RESPONSE TO PREVIOUS TREATMENT:    Lilli Steven received 150 units of Botox on 08/15/2018.    Problems following the previous series of neurotoxin injections included:  No problems reported    BENEFITS BY PATIENT REPORT:  Pain Improvement: Yes.  Percent Improvement: unable to quantify in % but reports significant improvement  Duration of Benefit:  10-12  weeks.     Dystonia and tremor Improvement: Yes.  Percent Improvement: unable to quantify in %, but reports significant improvement Duration of Benefit:  10 weeks.    BOTULINUM NEUROTOXIN INJECTION PROCEDURES:    VERIFICATION OF PATIENT IDENTIFICATION AND PROCEDURE     Initials   Patient Name da   Patient  da   Procedure Verified by: [facility]     Prior to the start of the procedure and with procedural staff participation, I verbally confirmed the patient s identity using two indicators, relevant allergies, that the procedure was appropriate and matched the consent or emergent situation, and that the correct equipment/implants were available. Immediately prior to starting the procedure I conducted the Time Out with the procedural staff and re-confirmed the patient s name, procedure, and site/side. (The Joint Commission universal protocol was followed.)  Yes    Sedation (Moderate or Deep): None      Above assessments performed by:  Michelle Orellana MD, PM&R PGY4    Hermes Frias MD    INDICATION/S FOR PROCEDURE/S:  Lilli Steven is a 67-year-old patient with dystonia affecting the  head, neck and shoulder girdle musculature secondary to a diagnosis of cervical dystonia with associated  pain, tremor, spasms, loss of joint motion, loss of volitional motor control and difficulty with activities of daily living.      Her baseline symptoms have been recalcitrant to oral medications and conservative therapy.  She is here today for an injection of Botox.       GOAL OF PROCEDURE:  The goal of this  procedure is to increase active range of motion, improve volitional motor control and decrease pain  associated with dystonic movements, spasticity and tremor.    TOTAL DOSE ADMINISTERED:  Dose Administered:  150 units Botox    Diluent Used:  Preservative Free Normal Saline  Total Volume of Diluent Used:  1.50 ml  Lot # /C3 with Expiration Date:  05/2021  NDC #: Botox 100u ([address]-75)    Medication guide was offered to patient and was declined.    CONSENT:  The risks, benefits, and treatment options were discussed with Lilli Steven and she agreed to proceed.      Written consent was obtained by [facility]     EQUIPMENT USED:  Needle-37mm stimulating/recording  EMG/NCS Machine     SKIN PREPARATION:  Skin preparation was performed using an alcohol wipe.     GUIDANCE DESCRIPTION:  Electro-myographic guidance was necessary throughout the procedure to accurately identify all areas of dystonic and spastic muscles while avoiding injection of non-dystonic muscles and non-spastic muscles, neighboring nerves and nearby vascular structures.     AREA/MUSCLE INJECTED:  150 Units Botox  1. HEAD & NECK MUSCLES: 150 units Botox = Total Dose, 1:1 Dilution                 Right Mid-Trapezius - 10 units of Botox at 1 site/s.   Left Mid-Trapezius - 10 units of Botox at 1 site/s.      Right Splenius Capitus - 15 units of Botox at 2 site/s.   Left Splenius Capitus - 10 units of Botox at 2 site/s.      Right Levator Scapulae - 30 units of Botox at 1 site/s (shoulder muscles).   Left Levator Scapulae - 20 units of Botox at 1 site/s (shoulder muscles).      Right Inferior Obliquus Capitis - 10 units of Botox at 1 site/s.   Left Inferior Obliquus Capitis - 15 units of at 1 site/s.      Right Sternal head of the Sternocleidomastoid - 15 units of Botox at 1 site/s.               Left Sternal head of the Sternocleidomastoid - 15 units of Botox at 1 site/s.        RESPONSE TO PROCEDURE:  Lilli Steven tolerated the procedure well and  there were no immediate complications.  She was allowed to recover for an appropriate period of time and was discharged home in stable condition.    FOLLOW UP:  Lilli Steven was asked to follow up by phone in 7-14 days with Erin Combs PT, Care Coordinator or Mikki Rojas RN, Care Coordinator, to report her response to this series of injections.  Based on the patient's previous response to this therapy, Lilli Steven was rescheduled for the next series of injections in 12 weeks.    PLAN (Medication Changes, Therapy Orders, Work or Disability Issues, etc.): Will monitor response to today's injections and report.    There were 25 units of Botox as unavoidable waste for this patient.

## 2018-11-08 ENCOUNTER — HOSPITAL ENCOUNTER (OUTPATIENT)
Dept: MAMMOGRAPHY | Facility: CLINIC | Age: 68
Discharge: HOME | End: 2018-11-08
Attending: FAMILY MEDICINE | Admitting: FAMILY MEDICINE
Payer: COMMERCIAL

## 2018-11-08 DIAGNOSIS — Z12.31 VISIT FOR SCREENING MAMMOGRAM: ICD-10-CM

## 2018-11-08 PROCEDURE — 77067 SCR MAMMO BI INCL CAD: CPT

## 2018-11-13 ENCOUNTER — OFFICE VISIT (OUTPATIENT)
Dept: FAMILY MEDICINE | Facility: CLINIC | Age: 68
End: 2018-11-13
Payer: COMMERCIAL

## 2018-11-13 VITALS
OXYGEN SATURATION: 98 % | HEART RATE: 91 BPM | HEIGHT: 62 IN | WEIGHT: 169 LBS | DIASTOLIC BLOOD PRESSURE: 83 MMHG | TEMPERATURE: 97.8 F | SYSTOLIC BLOOD PRESSURE: 136 MMHG | BODY MASS INDEX: 31.1 KG/M2

## 2018-11-13 DIAGNOSIS — S30.1XXA ABDOMINAL WALL SEROMA, INITIAL ENCOUNTER: ICD-10-CM

## 2018-11-13 DIAGNOSIS — Z01.818 PREOP GENERAL PHYSICAL EXAM: Primary | ICD-10-CM

## 2018-11-13 PROCEDURE — 99214 OFFICE O/P EST MOD 30 MIN: CPT | Performed by: FAMILY MEDICINE

## 2018-11-13 NOTE — PROGRESS NOTES
"  [facility]  [address]  [facility] [address] [phone]  Dept: [phone]    PRE-OP EVALUATION:  Today's date: 2018    Lilli Steven (: 1950) presents for pre-operative evaluation assessment as requested by Dr. Harrell.  She requires evaluation and anesthesia risk assessment prior to undergoing surgery/procedure for treatment of sarcoma .    Fax number for surgical facility: [facility]-notes available electronically  Primary Physician: Vargas Chaudhry  Type of Anesthesia Anticipated: General    Patient has a Health Care Directive or Living Will:  {YES/NO:373881::\"NO\"}    Preop Questions 11/10/2018   Who is doing your surgery? Dr Harrell   What are you having done? Remove sarcoma   Date of Surgery/Procedure: 2018   Facility or Hospital where procedure/surgery will be performed: [facility]   1.  Do you have a history of Heart attack, stroke, stent, coronary bypass surgery, or other heart surgery? No   2.  Do you ever have any pain or discomfort in your chest? No   3.  Do you have a history of  Heart Failure? No   4.   Are you troubled by shortness of breath when:  walking on a level surface, or up a slight hill, or at night? No   5.  Do you currently have a cold, bronchitis or other respiratory infection? No   6.  Do you have a cough, shortness of breath, or wheezing? No   7.  Do you sometimes get pains in the calves of your legs when you walk? No   8. Do you or anyone in your family have previous history of blood clots? No   9.  Do you or does anyone in your family have a serious bleeding problem such as prolonged bleeding following surgeries or cuts? No   10. Have you ever had problems with anemia or been told to take iron pills? No   11. Have you had any abnormal blood loss such as black, tarry or bloody stools, or abnormal vaginal bleeding? No   12. Have you ever had a blood transfusion? UNKNOWN - ***   13. Have you or any of your relatives " ever had problems with anesthesia? No   14. Do you have sleep apnea, excessive snoring or daytime drowsiness? No   15. Do you have any prosthetic heart valves? No   16. Do you have prosthetic joints? No   17. Is there any chance that you may be pregnant? No         HPI:     HPI related to upcoming procedure: ***      {. 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     Priority: Medium     Overview:   Created by Conversion       Wears glasses 09/06/2012     Priority: Medium     Neck pain 09/06/2012     Priority: Medium     Seasonal allergies 09/06/2012     Priority: Medium     Spring months       Osteopenia 05/17/2011     Priority: Medium     DEXA 2011  T -1 Lumbar spine  Right hip T -1.8  Left hip T -1.9       Urinary incontinence 08/04/2006     Priority: Medium     Spasmodic torticollis 07/19/1988     Priority: Medium     Botox injection        Past Medical History:   Diagnosis Date     Diarrhea 9/6/2012     Hypercholesterolemia 9/6/2012     Neck pain 9/6/2012     PONV (postoperative nausea and vomiting)      Seasonal allergies 9/6/2012     Snores 9/6/2012     Tremor      Wears glasses 9/6/2012     Past Surgical History:   Procedure Laterality Date     COSMETIC ABDOMINOPLASTY MODIFIED N/A 2/16/2017    Procedure: COSMETIC ABDOMINOPLASTY MODIFIED;  Surgeon: Inés Harrell MD;  Location:  OR     HERNIORRHAPHY EPIGASTRIC N/A 2/16/2017    Procedure: HERNIORRHAPHY EPIGASTRIC;  Surgeon: Joey Sargent MD;  Location:  OR     HERNIORRHAPHY UMBILICAL N/A 2/16/2017    Procedure: HERNIORRHAPHY UMBILICAL;  Surgeon: Joey Sargent MD;  Location:  OR      "IMPLANT STIMULATOR SACRAL NERVE STAGE ONE      for bladder incontinence, failed; battery is dead     INCISION AND DRAINAGE ABDOMEN WASHOUT, COMBINED N/A 6/2/2017    Procedure: COMBINED INCISION AND DRAINAGE ABDOMEN WASHOUT;  EVACUATION OF ABDOMINAL WALL SEROMA;  Surgeon: Inés Harrell MD;  Location:  OR     IRRIGATION AND DEBRIDEMENT TRUNK, COMBINED N/A 3/29/2017    Procedure: COMBINED IRRIGATION AND DEBRIDEMENT TRUNK;  Surgeon: Inés Harrell MD;  Location:  SD     REVISE SCAR TRUNK N/A 3/21/2018    Procedure: REVISE SCAR TRUNK;  ABDOMINAL SCAR REVISION.;  Surgeon: Inés Harrell MD;  Location:  SD     TONSILLECTOMY       Current Outpatient Prescriptions   Medication Sig Dispense Refill     mometasone (ELOCON) 0.1 % cream Apply sparingly to affected area twice daily as needed.  Do not apply to face. 45 g 0     OnabotulinumtoxinA (BOTOX IJ) Inject 150 Units into the muscle once Lot # /C3 with Expiration Date: 03/2021       RESTASIS MULTIDOSE 0.05 % ophthalmic emulsion Place 1 drop into both eyes 2 times daily  3     simvastatin (ZOCOR) 20 MG tablet Take 1 tablet (20 mg) by mouth At Bedtime 90 tablet 3     traZODone (DESYREL) 50 MG tablet TAKE 3 TABLETS BY MOUTH AT BEDTIME 90 tablet 11     OTC products: {OTC ANALGESICS:389899}    Allergies   Allergen Reactions     Meloxicam      Diarrhea, vomiting     Primidone Other (See Comments)     Felt like motion sickness     Tramadol      Diarrhea, vomiting      Adhesive Tape Rash     Durabond only     Allergy Rash     Dermabond  Anesthesia IV set misc       Tegaderm Transparent Dressing (Informational Only) Rash      Latex Allergy: {YES/NO WITH DEFAULT:200124::\"NO\"}    Social History   Substance Use Topics     Smoking status: Former Smoker     Packs/day: 1.00     Types: Cigarettes     Quit date: 1/1/1985     Smokeless tobacco: Never Used     Alcohol use 0.0 oz/week     0 Standard drinks or equivalent per week      Comment: 0-3 " "DRINKS/WEEK     History   Drug Use No       REVIEW OF SYSTEMS:   {ROS Preop Choices:809963}    EXAM:   LMP  (LMP Unknown)  {EXAM Preop Choices:280297}    DIAGNOSTICS:   {DIAGNOSTIC FOR PREOP:073327}    Recent Labs   Lab Test  06/02/17   2140  03/27/17   0910  02/16/17   0710   HGB   --    --   14.4   NA   --   142   --    POTASSIUM   --   4.6   --    CR  0.90  0.76   --         IMPRESSION:   {PREOP REASONS:779141::\"Reason for surgery/procedure: ***\",\"Diagnosis/reason for consult: ***\"}    The proposed surgical procedure is considered {HIGH=major cardiovascular or procedures requiring prolonged anesthesia >4 hours or large fluid shifts;    INTERMEDIATE=abdominal, most orthopedic and intrathoracic surgery; LOW= endoscopy, cataract and breast surgery:181934} risk.    REVISED CARDIAC RISK INDEX  The patient has the following serious cardiovascular risks for perioperative complications such as (MI, PE, VFib and 3  AV Block):  {PREOP REVISED CARDIAC INDEX (RCI):036512:p:\"No serious cardiac risks\"}  INTERPRETATION: {REVISED CARDIAC RISK INTERPRETATION:345591}    The patient has the following additional risks for perioperative complications:  {Additional perioperative risks:027540:p:\"No identified additional risks\"}      ICD-10-CM    1. Preop general physical exam Z01.818        RECOMMENDATIONS:     {IMPORTANT - Conditions - complete carefully!!:081753}    {IMPORTANT - Medications:729929::\"--Patient is to take all scheduled medications on the day of surgery EXCEPT for modifications listed below.\"}    {IMPORTANT - Approval:344108:p:\"APPROVAL GIVEN to proceed with proposed procedure, without further diagnostic evaluation\"}       Signed Electronically by: Vargas Chaudhry MD    Copy of this evaluation report is provided to requesting physician.    [facility] Preop Guidelines    Revised Cardiac Risk Index  "

## 2018-11-13 NOTE — MR AVS SNAPSHOT
After Visit Summary   11/13/2018    Lilli Steven    MRN: [id number]           Patient Information     Date Of Birth          1950        Visit Information        Provider Department      11/13/2018 4:30 PM Vargas Chaudhry MD [facility]        Today's Diagnoses     Preop general physical exam    -  1    Abdominal wall seroma, initial encounter (H)          Care Instructions      Before Your Surgery      Call your surgeon if there is any change in your health. This includes signs of a cold or flu (such as a sore throat, runny nose, cough, rash or fever).    Do not smoke, drink alcohol or take over the counter medicine (unless your surgeon or primary care doctor tells you to) for the 24 hours before and after surgery.    If you take prescribed drugs: Follow your doctor s orders about which medicines to take and which to stop until after surgery.    Eating and drinking prior to surgery: follow the instructions from your surgeon    Take a shower or bath the night before surgery. Use the soap your surgeon gave you to gently clean your skin. If you do not have soap from your surgeon, use your regular soap. Do not shave or scrub the surgery site.  Wear clean pajamas and have clean sheets on your bed.           Follow-ups after your visit        Follow-up notes from your care team     Return in about 1 year (around 11/13/2019) for Physical Exam.      Your next 10 appointments already scheduled     Nov 29, 2018   Procedure with Inés Harrell MD   [facility] (--)    [address], [address]  [facility] [address]   [phone]            Jan 16, 2019  3:40 PM CST   (Arrive by 3:25 PM)   Return Botox with Hermes Frias MD   [facility] ([facility])    [address]  3rd Floor  [facility] [id number] [phone]            Mar 25, 2019  3:40 PM CDT   (Arrive by 3:25 PM)  "  Return Botox with Hermes Frias MD   [facility] ([facility])    [address]  3rd [address]  [facility] [address] [phone]              Who to contact     If you have questions or need follow up information about today's clinic visit or your schedule please contact [facility] directly at [phone].  Normal or non-critical lab and imaging results will be communicated to you by [employer] Tri[employer]art, letter or phone within 4 business days after the clinic has received the results. If you do not hear from us within 7 days, please contact the clinic through iCADt or phone. If you have a critical or abnormal lab result, we will notify you by phone as soon as possible.  Submit refill requests through "[employer]" or call your pharmacy and they will forward the refill request to us. Please allow 3 business days for your refill to be completed.          Additional Information About Your Visit        BTC TripharJibo Information     "[employer] gives you secure access to your electronic health record. If you see a primary care provider, you can also send messages to your care team and make appointments. If you have questions, please call your primary care clinic.  If you do not have a primary care provider, please call [phone] and they will assist you.        Care EveryWhere ID     This is your Care EveryWhere ID. This could be used by other organizations to access your [place] medical records  [id number]        Your Vitals Were     Pulse Temperature Height Last Period Pulse Oximetry BMI (Body Mass Index)    91 97.8  F (36.6  C) (Oral) 5' 2.35\" (1.584 m) (LMP Unknown) 98% 30.56 kg/m2       Blood Pressure from Last 3 Encounters:   11/13/18 136/83   11/05/18 125/80   08/15/18 133/55    Weight from Last 3 Encounters:   11/13/18 169 lb (76.7 kg)   11/05/18 172 lb 8 oz (78.2 kg)   08/15/18 165 lb (74.8 kg)              Today, you had the following     No " orders found for display       Primary Care Provider Office Phone # Fax #    Vargas Ata Chaudhry [phone] [address] [address]        Equal Access to Services     RAJWINDER TEJEDA : Zana plascencia ku mikael Sovivi, waaxda luqadaha, qaybta kaalmada adeegyada, shakeel donaldson laDaytonfaizan carrillo. So [facility] [phone].    ATENCIÓN: Si habla español, tiene a sweet disposición servicios gratuitos de asistencia lingüística. Llame al [phone].    We comply with applicable federal civil rights laws and [place] laws. We do not discriminate on the basis of race, color, national origin, age, disability, sex, sexual orientation, or gender identity.            Thank you!     Thank you for choosing [facility]  for your care. Our goal is always to provide you with excellent care. Hearing back from our patients is one way we can continue to improve our services. Please take a few minutes to complete the written survey that you may receive in the mail after your visit with us. Thank you!             Your Updated Medication List - Protect others around you: Learn how to safely use, store and throw away your medicines at www.disposemymeds.org.          This list is accurate as of 11/13/18 11:59 PM.  Always use your most recent med list.                   Brand Name Dispense Instructions for use Diagnosis    BOTOX IJ      Inject 150 Units into the muscle once Lot # /C3 with Expiration Date: 03/2021        mometasone 0.1 % cream    ELOCON    45 g    Apply sparingly to affected area twice daily as needed.  Do not apply to face.    Dermatitis       RESTASIS MULTIDOSE 0.05 % ophthalmic emulsion   Generic drug:  cycloSPORINE      Place 1 drop into both eyes 2 times daily        simvastatin 20 MG tablet    ZOCOR    90 tablet    Take 1 tablet (20 mg) by mouth At Bedtime    Hyperlipidemia LDL goal <160       traZODone 50 MG tablet    DESYREL    90 tablet    TAKE 3 TABLETS  BY MOUTH AT BEDTIME    Persistent insomnia

## 2018-11-13 NOTE — NURSING NOTE
"Chief Complaint   Patient presents with     Pre-Op Exam     /83  Pulse 91  Temp 97.8  F (36.6  C) (Oral)  Ht 5' 2.35\" (1.584 m)  Wt 169 lb (76.7 kg)  LMP  (LMP Unknown)  SpO2 98%  BMI 30.56 kg/m2 Estimated body mass index is 30.56 kg/(m^2) as calculated from the following:    Height as of this encounter: 5' 2.35\" (1.584 m).    Weight as of this encounter: 169 lb (76.7 kg).        Health Maintenance due pending provider review:  NONE    n/a    Nargis Brown CMA  "

## 2018-11-13 NOTE — PROGRESS NOTES
[facility]  [address] [place]  [facility] [address] [phone]  Dept: [phone]    PRE-OP EVALUATION:  Today's date: 2018    Lilli Steven (: 1950) presents for pre-operative evaluation assessment as requested by Dr. Harrell.  She requires evaluation and anesthesia risk assessment prior to undergoing surgery/procedure for treatment of sarcoma removal .    Fax number for surgical facility:  [facility]  Primary Physician: Vargas Chaudhry  Type of Anesthesia Anticipated: to be determined    Patient has a Health Care Directive or Living Will:  NO    Preop Questions 11/10/2018   Who is doing your surgery? Dr Harrell   What are you having done? Remove seroma/mass   Date of Surgery/Procedure: 2018   Facility or Hospital where procedure/surgery will be performed: [facility]   1.  Do you have a history of Heart attack, stroke, stent, coronary bypass surgery, or other heart surgery? No   2.  Do you ever have any pain or discomfort in your chest? No   3.  Do you have a history of  Heart Failure? No   4.   Are you troubled by shortness of breath when:  walking on a level surface, or up a slight hill, or at night? No   5.  Do you currently have a cold, bronchitis or other respiratory infection? No   6.  Do you have a cough, shortness of breath, or wheezing? No   7.  Do you sometimes get pains in the calves of your legs when you walk? No   8. Do you or anyone in your family have previous history of blood clots? No   9.  Do you or does anyone in your family have a serious bleeding problem such as prolonged bleeding following surgeries or cuts? No   10. Have you ever had problems with anemia or been told to take iron pills? No   11. Have you had any abnormal blood loss such as black, tarry or bloody stools, or abnormal vaginal bleeding? No   12. Have you ever had a blood transfusion? UNKNOWN - no   13. Have you or any of your relatives ever had problems with  anesthesia? No   14. Do you have sleep apnea, excessive snoring or daytime drowsiness? No   15. Do you have any prosthetic heart valves? No   16. Do you have prosthetic joints? No   17. Is there any chance that you may be pregnant? No         HPI:     HPI related to upcoming procedure: underwent a hernia repair approximately 1 year ago.  Her surgery was complicated by postoperative seromas and infection.  This necessitated secondary healing of a portion of her    See problem list for active medical problems.  Problems all longstanding and stable, except as noted/documented.  See ROS for pertinent symptoms related to these conditions.                                                                                                                                                          .    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     Priority: Medium     Overview:   Created by Conversion       Wears glasses 09/06/2012     Priority: Medium     Neck pain 09/06/2012     Priority: Medium     Seasonal allergies 09/06/2012     Priority: Medium     Spring months       Osteopenia 05/17/2011     Priority: Medium     DEXA 2011  T -1 Lumbar spine  Right hip T -1.8  Left hip T -1.9       Urinary incontinence 08/04/2006     Priority: Medium     Spasmodic torticollis 07/19/1988     Priority: Medium     Botox injection        Past Medical History:   Diagnosis Date     Diarrhea 9/6/2012     Hypercholesterolemia 9/6/2012     Neck pain 9/6/2012     PONV (postoperative nausea and vomiting)      Seasonal allergies 9/6/2012     Snores 9/6/2012     Tremor      Wears glasses 9/6/2012     Past Surgical History:    Procedure Laterality Date     COSMETIC ABDOMINOPLASTY MODIFIED N/A 2/16/2017    Procedure: COSMETIC ABDOMINOPLASTY MODIFIED;  Surgeon: Inés Harrell MD;  Location:  OR     HERNIORRHAPHY EPIGASTRIC N/A 2/16/2017    Procedure: HERNIORRHAPHY EPIGASTRIC;  Surgeon: Joey Sargent MD;  Location:  OR     HERNIORRHAPHY UMBILICAL N/A 2/16/2017    Procedure: HERNIORRHAPHY UMBILICAL;  Surgeon: Joey Sargent MD;  Location:  OR     IMPLANT STIMULATOR SACRAL NERVE STAGE ONE      for bladder incontinence, failed; battery is dead     INCISION AND DRAINAGE ABDOMEN WASHOUT, COMBINED N/A 6/2/2017    Procedure: COMBINED INCISION AND DRAINAGE ABDOMEN WASHOUT;  EVACUATION OF ABDOMINAL WALL SEROMA;  Surgeon: Inés Harrell MD;  Location:  OR     IRRIGATION AND DEBRIDEMENT TRUNK, COMBINED N/A 3/29/2017    Procedure: COMBINED IRRIGATION AND DEBRIDEMENT TRUNK;  Surgeon: Inés Harrell MD;  Location: [facility]     REVISE SCAR TRUNK N/A 3/21/2018    Procedure: REVISE SCAR TRUNK;  ABDOMINAL SCAR REVISION.;  Surgeon: Inés Harrell MD;  Location:  [facility]     TONSILLECTOMY       Current Outpatient Prescriptions   Medication Sig Dispense Refill     mometasone (ELOCON) 0.1 % cream Apply sparingly to affected area twice daily as needed.  Do not apply to face. 45 g 0     OnabotulinumtoxinA (BOTOX IJ) Inject 150 Units into the muscle once Lot # /C3 with Expiration Date: 03/2021       RESTASIS MULTIDOSE 0.05 % ophthalmic emulsion Place 1 drop into both eyes 2 times daily  3     simvastatin (ZOCOR) 20 MG tablet Take 1 tablet (20 mg) by mouth At Bedtime 90 tablet 3     traZODone (DESYREL) 50 MG tablet TAKE 3 TABLETS BY MOUTH AT BEDTIME 90 tablet 11     OTC products: None, except as noted above    Allergies   Allergen Reactions     Meloxicam      Diarrhea, vomiting     Primidone Other (See Comments)     Felt like motion sickness     Tramadol      Diarrhea, vomiting      Adhesive Tape Rash      "Durabond only     Allergy Rash     Dermabond  Anesthesia IV set misc       Tegaderm Transparent Dressing (Informational Only) Rash      Latex Allergy: NO, but adhesives as above    Social History   Substance Use Topics     Smoking status: Former Smoker     Packs/day: 1.00     Types: Cigarettes     Quit date: 1/1/1985     Smokeless tobacco: Never Used     Alcohol use 0.0 oz/week     0 Standard drinks or equivalent per week      Comment: 0-3 DRINKS/WEEK     History   Drug Use No       REVIEW OF SYSTEMS:   CONSTITUTIONAL: NEGATIVE for fever, chills, change in weight  INTEGUMENTARY/SKIN: NEGATIVE for worrisome rashes, moles or lesions  EYES: NEGATIVE for vision changes or irritation  ENT/MOUTH: NEGATIVE for ear, mouth and throat problems  RESP: NEGATIVE for significant cough or SOB  BREAST: NEGATIVE for masses, tenderness or discharge  CV: NEGATIVE for chest pain, palpitations or peripheral edema  GI: NEGATIVE for nausea, abdominal pain, heartburn, or change in bowel habits  : NEGATIVE for frequency, dysuria, or hematuria  MUSCULOSKELETAL: NEGATIVE for significant arthralgias or myalgia  NEURO: NEGATIVE for weakness, dizziness or paresthesias  ENDOCRINE: NEGATIVE for temperature intolerance, skin/hair changes  HEME: NEGATIVE for bleeding problems  PSYCHIATRIC: NEGATIVE for changes in mood or affect    EXAM:   /83  Pulse 91  Temp 97.8  F (36.6  C) (Oral)  Ht 5' 2.35\" (1.584 m)  Wt 169 lb (76.7 kg)  LMP  (LMP Unknown)  SpO2 98%  BMI 30.56 kg/m2    General Appearance: Pleasant, alert, in no acute respiratory distress.  Head Exam: Normal. Normocephalic, atraumatic. No sinus tenderness.  Eye Exam: Normal external eye, conjunctiva, lids. ELENA.  Ear Exam: Normal auditory canals and external ears. Non-tender.  Left TM-normal. Right TM-normal.  OroPharynx Exam: Dental hygiene adequate. Normal buccal mucosa. Normal pharynx.  Neck Exam: Supple, no masses or enlarged, tender nodes.  Thyroid Exam: No nodules or " enlargement or tenderness.  Chest/Respiratory Exam: Normal, comfortable, easy respirations. Chest wall normal. Lungs are clear to auscultation. No wheezing, crackles, or rhonchi.  Cardiovascular Exam: Regular rate and rhythm. No murmur, gallop, or rubs. No pedal edema.  Gastrointestinal Exam: Soft, non-tender, no masses or organomegaly.  Musculoskeletal Exam: Back is non-tender, full ROM of upper and lower extremities.  Skin: no rash, warm and dry.    Neurologic Exam: Nonfocal, head tremor, stable  Psychiatric Exam: Alert - appropriate, normal affect      DIAGNOSTICS:   No labs or EKG required for low risk surgery (cataract, skin procedure, breast biopsy, etc)    Recent Labs   Lab Test  06/02/17   2140  03/27/17   0910  02/16/17   0710   HGB   --    --   14.4   NA   --   142   --    POTASSIUM   --   4.6   --    CR  0.90  0.76   --      IMPRESSION:   Reason for surgery/procedure: scar revision, seroma  Diagnosis/reason for consult: routein    The proposed surgical procedure is considered LOW risk.    REVISED CARDIAC RISK INDEX  The patient has the following serious cardiovascular risks for perioperative complications such as (MI, PE, VFib and 3  AV Block):  No serious cardiac risks  INTERPRETATION: 0 risks: Class I (very low risk - 0.4% complication rate)    The patient has the following additional risks for perioperative complications:  No identified additional risks      ICD-10-CM    1. Preop general physical exam Z01.818    2. Abdominal wall seroma, initial encounter (H) T88.8XXA     T79.2XXA        RECOMMENDATIONS:       Cardiovascular Risk  Performs 4 METs exercise without symptoms      Pulmonary Risk  none      --Patient is on no significant chronic medications, can hold zocor    APPROVAL GIVEN to proceed with proposed procedure, without further diagnostic evaluation       Signed Electronically by: Vargas Chaudhry MD    Copy of this evaluation report is provided to requesting physicianMiko Maravilla  Preop Guidelines    Revised Cardiac Risk Index

## 2018-11-26 NOTE — H&P (VIEW-ONLY)
[facility]  [address] [place]  [facility] [address] [phone]  Dept: [phone]    PRE-OP EVALUATION:  Today's date: 2018    Lilli Steven (: 1950) presents for pre-operative evaluation assessment as requested by Dr. Harrell.  She requires evaluation and anesthesia risk assessment prior to undergoing surgery/procedure for treatment of sarcoma removal .    Fax number for surgical facility:  [facility]  Primary Physician: Vargas Chaudhry  Type of Anesthesia Anticipated: to be determined    Patient has a Health Care Directive or Living Will:  NO    Preop Questions 11/10/2018   Who is doing your surgery? Dr Harrell   What are you having done? Remove seroma/mass   Date of Surgery/Procedure: 2018   Facility or Hospital where procedure/surgery will be performed: [facility]   1.  Do you have a history of Heart attack, stroke, stent, coronary bypass surgery, or other heart surgery? No   2.  Do you ever have any pain or discomfort in your chest? No   3.  Do you have a history of  Heart Failure? No   4.   Are you troubled by shortness of breath when:  walking on a level surface, or up a slight hill, or at night? No   5.  Do you currently have a cold, bronchitis or other respiratory infection? No   6.  Do you have a cough, shortness of breath, or wheezing? No   7.  Do you sometimes get pains in the calves of your legs when you walk? No   8. Do you or anyone in your family have previous history of blood clots? No   9.  Do you or does anyone in your family have a serious bleeding problem such as prolonged bleeding following surgeries or cuts? No   10. Have you ever had problems with anemia or been told to take iron pills? No   11. Have you had any abnormal blood loss such as black, tarry or bloody stools, or abnormal vaginal bleeding? No   12. Have you ever had a blood transfusion? UNKNOWN - no   13. Have you or any of your relatives ever had problems with  anesthesia? No   14. Do you have sleep apnea, excessive snoring or daytime drowsiness? No   15. Do you have any prosthetic heart valves? No   16. Do you have prosthetic joints? No   17. Is there any chance that you may be pregnant? No         HPI:     HPI related to upcoming procedure: underwent a hernia repair approximately 1 year ago.  Her surgery was complicated by postoperative seromas and infection.  This necessitated secondary healing of a portion of her    See problem list for active medical problems.  Problems all longstanding and stable, except as noted/documented.  See ROS for pertinent symptoms related to these conditions.                                                                                                                                                          .    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     Priority: Medium     Overview:   Created by Conversion       Wears glasses 09/06/2012     Priority: Medium     Neck pain 09/06/2012     Priority: Medium     Seasonal allergies 09/06/2012     Priority: Medium     Spring months       Osteopenia 05/17/2011     Priority: Medium     DEXA 2011  T -1 Lumbar spine  Right hip T -1.8  Left hip T -1.9       Urinary incontinence 08/04/2006     Priority: Medium     Spasmodic torticollis 07/19/1988     Priority: Medium     Botox injection        Past Medical History:   Diagnosis Date     Diarrhea 9/6/2012     Hypercholesterolemia 9/6/2012     Neck pain 9/6/2012     PONV (postoperative nausea and vomiting)      Seasonal allergies 9/6/2012     Snores 9/6/2012     Tremor      Wears glasses 9/6/2012     Past Surgical History:    Procedure Laterality Date     COSMETIC ABDOMINOPLASTY MODIFIED N/A 2/16/2017    Procedure: COSMETIC ABDOMINOPLASTY MODIFIED;  Surgeon: Inés Harrell MD;  Location:  OR     HERNIORRHAPHY EPIGASTRIC N/A 2/16/2017    Procedure: HERNIORRHAPHY EPIGASTRIC;  Surgeon: Joey Sargent MD;  Location:  OR     HERNIORRHAPHY UMBILICAL N/A 2/16/2017    Procedure: HERNIORRHAPHY UMBILICAL;  Surgeon: Joey Sargent MD;  Location:  OR     IMPLANT STIMULATOR SACRAL NERVE STAGE ONE      for bladder incontinence, failed; battery is dead     INCISION AND DRAINAGE ABDOMEN WASHOUT, COMBINED N/A 6/2/2017    Procedure: COMBINED INCISION AND DRAINAGE ABDOMEN WASHOUT;  EVACUATION OF ABDOMINAL WALL SEROMA;  Surgeon: Inés Harrell MD;  Location:  OR     IRRIGATION AND DEBRIDEMENT TRUNK, COMBINED N/A 3/29/2017    Procedure: COMBINED IRRIGATION AND DEBRIDEMENT TRUNK;  Surgeon: Inés Harrell MD;  Location: [facility]     REVISE SCAR TRUNK N/A 3/21/2018    Procedure: REVISE SCAR TRUNK;  ABDOMINAL SCAR REVISION.;  Surgeon: Inés Harrell MD;  [facility]:  [facility]     TONSILLECTOMY       Current Outpatient Prescriptions   Medication Sig Dispense Refill     mometasone (ELOCON) 0.1 % cream Apply sparingly to affected area twice daily as needed.  Do not apply to face. 45 g 0     OnabotulinumtoxinA (BOTOX IJ) Inject 150 Units into the muscle once Lot # /C3 with Expiration Date: 03/2021       RESTASIS MULTIDOSE 0.05 % ophthalmic emulsion Place 1 drop into both eyes 2 times daily  3     simvastatin (ZOCOR) 20 MG tablet Take 1 tablet (20 mg) by mouth At Bedtime 90 tablet 3     traZODone (DESYREL) 50 MG tablet TAKE 3 TABLETS BY MOUTH AT BEDTIME 90 tablet 11     OTC products: None, except as noted above    Allergies   Allergen Reactions     Meloxicam      Diarrhea, vomiting     Primidone Other (See Comments)     Felt like motion sickness     Tramadol      Diarrhea, vomiting      Adhesive Tape Rash      "Durabond only     Allergy Rash     Dermabond  Anesthesia IV set misc       Tegaderm Transparent Dressing (Informational Only) Rash      Latex Allergy: NO, but adhesives as above    Social History   Substance Use Topics     Smoking status: Former Smoker     Packs/day: 1.00     Types: Cigarettes     Quit date: 1/1/1985     Smokeless tobacco: Never Used     Alcohol use 0.0 oz/week     0 Standard drinks or equivalent per week      Comment: 0-3 DRINKS/WEEK     History   Drug Use No       REVIEW OF SYSTEMS:   CONSTITUTIONAL: NEGATIVE for fever, chills, change in weight  INTEGUMENTARY/SKIN: NEGATIVE for worrisome rashes, moles or lesions  EYES: NEGATIVE for vision changes or irritation  ENT/MOUTH: NEGATIVE for ear, mouth and throat problems  RESP: NEGATIVE for significant cough or SOB  BREAST: NEGATIVE for masses, tenderness or discharge  CV: NEGATIVE for chest pain, palpitations or peripheral edema  GI: NEGATIVE for nausea, abdominal pain, heartburn, or change in bowel habits  : NEGATIVE for frequency, dysuria, or hematuria  MUSCULOSKELETAL: NEGATIVE for significant arthralgias or myalgia  NEURO: NEGATIVE for weakness, dizziness or paresthesias  ENDOCRINE: NEGATIVE for temperature intolerance, skin/hair changes  HEME: NEGATIVE for bleeding problems  PSYCHIATRIC: NEGATIVE for changes in mood or affect    EXAM:   /83  Pulse 91  Temp 97.8  F (36.6  C) (Oral)  Ht 5' 2.35\" (1.584 m)  Wt 169 lb (76.7 kg)  LMP  (LMP Unknown)  SpO2 98%  BMI 30.56 kg/m2    General Appearance: Pleasant, alert, in no acute respiratory distress.  Head Exam: Normal. Normocephalic, atraumatic. No sinus tenderness.  Eye Exam: Normal external eye, conjunctiva, lids. ELENA.  Ear Exam: Normal auditory canals and external ears. Non-tender.  Left TM-normal. Right TM-normal.  OroPharynx Exam: Dental hygiene adequate. Normal buccal mucosa. Normal pharynx.  Neck Exam: Supple, no masses or enlarged, tender nodes.  Thyroid Exam: No nodules or " enlargement or tenderness.  Chest/Respiratory Exam: Normal, comfortable, easy respirations. Chest wall normal. Lungs are clear to auscultation. No wheezing, crackles, or rhonchi.  Cardiovascular Exam: Regular rate and rhythm. No murmur, gallop, or rubs. No pedal edema.  Gastrointestinal Exam: Soft, non-tender, no masses or organomegaly.  Musculoskeletal Exam: Back is non-tender, full ROM of upper and lower extremities.  Skin: no rash, warm and dry.    Neurologic Exam: Nonfocal, head tremor, stable  Psychiatric Exam: Alert - appropriate, normal affect      DIAGNOSTICS:   No labs or EKG required for low risk surgery (cataract, skin procedure, breast biopsy, etc)    Recent Labs   Lab Test  06/02/17   2140  03/27/17   0910  02/16/17   0710   HGB   --    --   14.4   NA   --   142   --    POTASSIUM   --   4.6   --    CR  0.90  0.76   --      IMPRESSION:   Reason for surgery/procedure: scar revision, seroma  Diagnosis/reason for consult: routein    The proposed surgical procedure is considered LOW risk.    REVISED CARDIAC RISK INDEX  The patient has the following serious cardiovascular risks for perioperative complications such as (MI, PE, VFib and 3  AV Block):  No serious cardiac risks  INTERPRETATION: 0 risks: Class I (very low risk - 0.4% complication rate)    The patient has the following additional risks for perioperative complications:  No identified additional risks      ICD-10-CM    1. Preop general physical exam Z01.818    2. Abdominal wall seroma, initial encounter (H) T88.8XXA     T79.2XXA        RECOMMENDATIONS:       Cardiovascular Risk  Performs 4 METs exercise without symptoms      Pulmonary Risk  none      --Patient is on no significant chronic medications, can hold zocor    APPROVAL GIVEN to proceed with proposed procedure, without further diagnostic evaluation       Signed Electronically by: Vargas Chaudhry MD    Copy of this evaluation report is provided to requesting physicianMiko Maravilla  Preop Guidelines    Revised Cardiac Risk Index

## 2018-11-29 ENCOUNTER — SURGERY (OUTPATIENT)
Age: 68
End: 2018-11-29

## 2018-11-29 ENCOUNTER — ANESTHESIA EVENT (OUTPATIENT)
Dept: SURGERY | Facility: CLINIC | Age: 68
End: 2018-11-29
Payer: COMMERCIAL

## 2018-11-29 ENCOUNTER — ANESTHESIA (OUTPATIENT)
Dept: SURGERY | Facility: CLINIC | Age: 68
End: 2018-11-29
Payer: COMMERCIAL

## 2018-11-29 ENCOUNTER — HOSPITAL ENCOUNTER (OUTPATIENT)
Facility: CLINIC | Age: 68
Discharge: HOME | End: 2018-11-29
Attending: PLASTIC SURGERY | Admitting: PLASTIC SURGERY
Payer: COMMERCIAL

## 2018-11-29 VITALS
RESPIRATION RATE: 16 BRPM | WEIGHT: 168.3 LBS | OXYGEN SATURATION: 95 % | HEIGHT: 63 IN | BODY MASS INDEX: 29.82 KG/M2 | TEMPERATURE: 97.3 F | DIASTOLIC BLOOD PRESSURE: 94 MMHG | SYSTOLIC BLOOD PRESSURE: 141 MMHG

## 2018-11-29 PROCEDURE — 25000128 H RX IP 250 OP 636: Performed by: PLASTIC SURGERY

## 2018-11-29 PROCEDURE — 36000052 ZZH SURGERY LEVEL 2 EA 15 ADDTL MIN: Performed by: PLASTIC SURGERY

## 2018-11-29 PROCEDURE — 71000027 ZZH RECOVERY PHASE 2 EACH 15 MINS: Performed by: PLASTIC SURGERY

## 2018-11-29 PROCEDURE — 25000125 ZZHC RX 250: Performed by: ANESTHESIOLOGY

## 2018-11-29 PROCEDURE — 25000125 ZZHC RX 250: Performed by: PLASTIC SURGERY

## 2018-11-29 PROCEDURE — 25000128 H RX IP 250 OP 636: Performed by: ANESTHESIOLOGY

## 2018-11-29 PROCEDURE — 36000050 ZZH SURGERY LEVEL 2 1ST 30 MIN: Performed by: PLASTIC SURGERY

## 2018-11-29 PROCEDURE — 71000013 ZZH RECOVERY PHASE 1 LEVEL 1 EA ADDTL HR: Performed by: PLASTIC SURGERY

## 2018-11-29 PROCEDURE — 25000128 H RX IP 250 OP 636: Performed by: NURSE ANESTHETIST, CERTIFIED REGISTERED

## 2018-11-29 PROCEDURE — 25000132 ZZH RX MED GY IP 250 OP 250 PS 637: Performed by: PLASTIC SURGERY

## 2018-11-29 PROCEDURE — 37000008 ZZH ANESTHESIA TECHNICAL FEE, 1ST 30 MIN: Performed by: PLASTIC SURGERY

## 2018-11-29 PROCEDURE — 37000009 ZZH ANESTHESIA TECHNICAL FEE, EACH ADDTL 15 MIN: Performed by: PLASTIC SURGERY

## 2018-11-29 PROCEDURE — 40000170 ZZH STATISTIC PRE-PROCEDURE ASSESSMENT II: Performed by: PLASTIC SURGERY

## 2018-11-29 PROCEDURE — 25000125 ZZHC RX 250: Performed by: NURSE ANESTHETIST, CERTIFIED REGISTERED

## 2018-11-29 PROCEDURE — 27210794 ZZH OR GENERAL SUPPLY STERILE: Performed by: PLASTIC SURGERY

## 2018-11-29 PROCEDURE — 71000012 ZZH RECOVERY PHASE 1 LEVEL 1 FIRST HR: Performed by: PLASTIC SURGERY

## 2018-11-29 RX ORDER — ONDANSETRON 2 MG/ML
4 INJECTION INTRAMUSCULAR; INTRAVENOUS EVERY 30 MIN PRN
Status: DISCONTINUED | OUTPATIENT
Start: 2018-11-29 | End: 2018-11-29 | Stop reason: HOSPADM

## 2018-11-29 RX ORDER — CEFAZOLIN SODIUM 1 G/3ML
1 INJECTION, POWDER, FOR SOLUTION INTRAMUSCULAR; INTRAVENOUS SEE ADMIN INSTRUCTIONS
Status: DISCONTINUED | OUTPATIENT
Start: 2018-11-29 | End: 2018-11-29 | Stop reason: HOSPADM

## 2018-11-29 RX ORDER — NALOXONE HYDROCHLORIDE 0.4 MG/ML
.1-.4 INJECTION, SOLUTION INTRAMUSCULAR; INTRAVENOUS; SUBCUTANEOUS
Status: DISCONTINUED | OUTPATIENT
Start: 2018-11-29 | End: 2018-11-29 | Stop reason: HOSPADM

## 2018-11-29 RX ORDER — HYDROMORPHONE HYDROCHLORIDE 1 MG/ML
.3-.5 INJECTION, SOLUTION INTRAMUSCULAR; INTRAVENOUS; SUBCUTANEOUS EVERY 10 MIN PRN
Status: DISCONTINUED | OUTPATIENT
Start: 2018-11-29 | End: 2018-11-29 | Stop reason: HOSPADM

## 2018-11-29 RX ORDER — SODIUM CHLORIDE, SODIUM LACTATE, POTASSIUM CHLORIDE, CALCIUM CHLORIDE 600; 310; 30; 20 MG/100ML; MG/100ML; MG/100ML; MG/100ML
INJECTION, SOLUTION INTRAVENOUS CONTINUOUS
Status: DISCONTINUED | OUTPATIENT
Start: 2018-11-29 | End: 2018-11-29 | Stop reason: HOSPADM

## 2018-11-29 RX ORDER — ONDANSETRON 4 MG/1
4 TABLET, ORALLY DISINTEGRATING ORAL EVERY 30 MIN PRN
Status: DISCONTINUED | OUTPATIENT
Start: 2018-11-29 | End: 2018-11-29 | Stop reason: HOSPADM

## 2018-11-29 RX ORDER — MEPERIDINE HYDROCHLORIDE 25 MG/ML
12.5 INJECTION INTRAMUSCULAR; INTRAVENOUS; SUBCUTANEOUS
Status: DISCONTINUED | OUTPATIENT
Start: 2018-11-29 | End: 2018-11-29 | Stop reason: HOSPADM

## 2018-11-29 RX ORDER — PROPOFOL 10 MG/ML
INJECTION, EMULSION INTRAVENOUS PRN
Status: DISCONTINUED | OUTPATIENT
Start: 2018-11-29 | End: 2018-11-29

## 2018-11-29 RX ORDER — FENTANYL CITRATE 50 UG/ML
25-50 INJECTION, SOLUTION INTRAMUSCULAR; INTRAVENOUS
Status: DISCONTINUED | OUTPATIENT
Start: 2018-11-29 | End: 2018-11-29 | Stop reason: HOSPADM

## 2018-11-29 RX ORDER — ACETAMINOPHEN 500 MG
1000 TABLET ORAL ONCE
Status: COMPLETED | OUTPATIENT
Start: 2018-11-29 | End: 2018-11-29

## 2018-11-29 RX ORDER — BUPIVACAINE HYDROCHLORIDE 2.5 MG/ML
INJECTION, SOLUTION INFILTRATION; PERINEURAL PRN
Status: DISCONTINUED | OUTPATIENT
Start: 2018-11-29 | End: 2018-11-29 | Stop reason: HOSPADM

## 2018-11-29 RX ORDER — PROPOFOL 10 MG/ML
INJECTION, EMULSION INTRAVENOUS CONTINUOUS PRN
Status: DISCONTINUED | OUTPATIENT
Start: 2018-11-29 | End: 2018-11-29

## 2018-11-29 RX ORDER — MAGNESIUM HYDROXIDE 1200 MG/15ML
LIQUID ORAL PRN
Status: DISCONTINUED | OUTPATIENT
Start: 2018-11-29 | End: 2018-11-29 | Stop reason: HOSPADM

## 2018-11-29 RX ORDER — LIDOCAINE HYDROCHLORIDE 20 MG/ML
INJECTION, SOLUTION INFILTRATION; PERINEURAL PRN
Status: DISCONTINUED | OUTPATIENT
Start: 2018-11-29 | End: 2018-11-29

## 2018-11-29 RX ORDER — CEFAZOLIN SODIUM 2 G/100ML
2 INJECTION, SOLUTION INTRAVENOUS
Status: COMPLETED | OUTPATIENT
Start: 2018-11-29 | End: 2018-11-29

## 2018-11-29 RX ORDER — SCOLOPAMINE TRANSDERMAL SYSTEM 1 MG/1
1 PATCH, EXTENDED RELEASE TRANSDERMAL
Status: DISCONTINUED | OUTPATIENT
Start: 2018-11-29 | End: 2018-11-29 | Stop reason: HOSPADM

## 2018-11-29 RX ORDER — IBUPROFEN 600 MG/1
600 TABLET, FILM COATED ORAL ONCE
Status: COMPLETED | OUTPATIENT
Start: 2018-11-29 | End: 2018-11-29

## 2018-11-29 RX ORDER — DEXAMETHASONE SODIUM PHOSPHATE 4 MG/ML
INJECTION, SOLUTION INTRA-ARTICULAR; INTRALESIONAL; INTRAMUSCULAR; INTRAVENOUS; SOFT TISSUE PRN
Status: DISCONTINUED | OUTPATIENT
Start: 2018-11-29 | End: 2018-11-29

## 2018-11-29 RX ORDER — BUPIVACAINE HYDROCHLORIDE 2.5 MG/ML
INJECTION, SOLUTION EPIDURAL; INFILTRATION; INTRACAUDAL
Status: DISCONTINUED
Start: 2018-11-29 | End: 2018-11-29 | Stop reason: HOSPADM

## 2018-11-29 RX ADMIN — PHENYLEPHRINE HYDROCHLORIDE 100 MCG: 10 INJECTION, SOLUTION INTRAMUSCULAR; INTRAVENOUS; SUBCUTANEOUS at 13:04

## 2018-11-29 RX ADMIN — IBUPROFEN 600 MG: 600 TABLET, FILM COATED ORAL at 15:11

## 2018-11-29 RX ADMIN — PHENYLEPHRINE HYDROCHLORIDE 100 MCG: 10 INJECTION, SOLUTION INTRAMUSCULAR; INTRAVENOUS; SUBCUTANEOUS at 12:55

## 2018-11-29 RX ADMIN — PHENYLEPHRINE HYDROCHLORIDE 100 MCG: 10 INJECTION, SOLUTION INTRAMUSCULAR; INTRAVENOUS; SUBCUTANEOUS at 13:00

## 2018-11-29 RX ADMIN — SODIUM CHLORIDE, POTASSIUM CHLORIDE, SODIUM LACTATE AND CALCIUM CHLORIDE: 600; 310; 30; 20 INJECTION, SOLUTION INTRAVENOUS at 12:38

## 2018-11-29 RX ADMIN — PHENYLEPHRINE HYDROCHLORIDE 100 MCG: 10 INJECTION, SOLUTION INTRAMUSCULAR; INTRAVENOUS; SUBCUTANEOUS at 13:22

## 2018-11-29 RX ADMIN — ONDANSETRON 4 MG: 2 INJECTION INTRAMUSCULAR; INTRAVENOUS at 12:45

## 2018-11-29 RX ADMIN — MIDAZOLAM 2 MG: 1 INJECTION INTRAMUSCULAR; INTRAVENOUS at 12:39

## 2018-11-29 RX ADMIN — ACETAMINOPHEN 1000 MG: 500 TABLET, FILM COATED ORAL at 11:35

## 2018-11-29 RX ADMIN — PROPOFOL 200 MG: 10 INJECTION, EMULSION INTRAVENOUS at 12:45

## 2018-11-29 RX ADMIN — LIDOCAINE HYDROCHLORIDE 100 MG: 20 INJECTION, SOLUTION INFILTRATION; PERINEURAL at 12:45

## 2018-11-29 RX ADMIN — PHENYLEPHRINE HYDROCHLORIDE 100 MCG: 10 INJECTION, SOLUTION INTRAMUSCULAR; INTRAVENOUS; SUBCUTANEOUS at 13:19

## 2018-11-29 RX ADMIN — SODIUM CHLORIDE 1000 ML: 900 IRRIGANT IRRIGATION at 12:58

## 2018-11-29 RX ADMIN — DEXAMETHASONE SODIUM PHOSPHATE 4 MG: 4 INJECTION, SOLUTION INTRA-ARTICULAR; INTRALESIONAL; INTRAMUSCULAR; INTRAVENOUS; SOFT TISSUE at 12:45

## 2018-11-29 RX ADMIN — PHENYLEPHRINE HYDROCHLORIDE 100 MCG: 10 INJECTION, SOLUTION INTRAMUSCULAR; INTRAVENOUS; SUBCUTANEOUS at 13:02

## 2018-11-29 RX ADMIN — PHENYLEPHRINE HYDROCHLORIDE 100 MCG: 10 INJECTION, SOLUTION INTRAMUSCULAR; INTRAVENOUS; SUBCUTANEOUS at 13:15

## 2018-11-29 RX ADMIN — PROPOFOL 150 MCG/KG/MIN: 10 INJECTION, EMULSION INTRAVENOUS at 12:45

## 2018-11-29 RX ADMIN — PHENYLEPHRINE HYDROCHLORIDE 100 MCG: 10 INJECTION, SOLUTION INTRAMUSCULAR; INTRAVENOUS; SUBCUTANEOUS at 13:12

## 2018-11-29 RX ADMIN — CEFAZOLIN SODIUM 2 G: 2 INJECTION, SOLUTION INTRAVENOUS at 12:48

## 2018-11-29 RX ADMIN — BUPIVACAINE HYDROCHLORIDE 10 ML: 2.5 INJECTION, SOLUTION EPIDURAL; INFILTRATION; INTRACAUDAL; PERINEURAL at 13:25

## 2018-11-29 RX ADMIN — FENTANYL CITRATE 50 MCG: 50 INJECTION, SOLUTION INTRAMUSCULAR; INTRAVENOUS at 12:45

## 2018-11-29 RX ADMIN — PHENYLEPHRINE HYDROCHLORIDE 100 MCG: 10 INJECTION, SOLUTION INTRAMUSCULAR; INTRAVENOUS; SUBCUTANEOUS at 12:51

## 2018-11-29 RX ADMIN — PHENYLEPHRINE HYDROCHLORIDE 100 MCG: 10 INJECTION, SOLUTION INTRAMUSCULAR; INTRAVENOUS; SUBCUTANEOUS at 13:10

## 2018-11-29 RX ADMIN — SCOPALAMINE 1 PATCH: 1 PATCH, EXTENDED RELEASE TRANSDERMAL at 11:34

## 2018-11-29 RX ADMIN — FENTANYL CITRATE 50 MCG: 50 INJECTION, SOLUTION INTRAMUSCULAR; INTRAVENOUS at 12:48

## 2018-11-29 RX ADMIN — PHENYLEPHRINE HYDROCHLORIDE 100 MCG: 10 INJECTION, SOLUTION INTRAMUSCULAR; INTRAVENOUS; SUBCUTANEOUS at 12:58

## 2018-11-29 RX ADMIN — PHENYLEPHRINE HYDROCHLORIDE 100 MCG: 10 INJECTION, SOLUTION INTRAMUSCULAR; INTRAVENOUS; SUBCUTANEOUS at 13:08

## 2018-11-29 RX ADMIN — PHENYLEPHRINE HYDROCHLORIDE 100 MCG: 10 INJECTION, SOLUTION INTRAMUSCULAR; INTRAVENOUS; SUBCUTANEOUS at 13:06

## 2018-11-29 RX ADMIN — PHENYLEPHRINE HYDROCHLORIDE 100 MCG: 10 INJECTION, SOLUTION INTRAMUSCULAR; INTRAVENOUS; SUBCUTANEOUS at 12:48

## 2018-11-29 ASSESSMENT — LIFESTYLE VARIABLES: TOBACCO_USE: 0

## 2018-11-29 ASSESSMENT — ENCOUNTER SYMPTOMS: DYSRHYTHMIAS: 0

## 2018-11-29 NOTE — ANESTHESIA POSTPROCEDURE EVALUATION
Patient: Lilli Steven    Procedure(s):  EVACUATION OF ABDOMINAL WALL SEROMA    Diagnosis:CHRONIC SEROMA  Diagnosis Additional Information: No value filed.    Anesthesia Type:  General, LMA    Note:  Anesthesia Post Evaluation    Patient location during evaluation: PACU  Patient participation: Able to fully participate in evaluation  Level of consciousness: awake  Pain management: adequate  Cardiovascular status: acceptable  Respiratory status: acceptable  Hydration status: acceptable  PONV: none     Anesthetic complications: None          Last vitals:  Vitals:    11/29/18 1116 11/29/18 1345 11/29/18 1400   BP: 146/86 123/77 132/84   Resp: 16 10 21   Temp: 36  C (96.8  F)  35.8  C (96.4  F)   SpO2: 97% 100% 97%         Electronically Signed By: ARACELIS HDZ  November 29, 2018  2:20 PM

## 2018-11-29 NOTE — ANESTHESIA PREPROCEDURE EVALUATION
Anesthesia Evaluation     . Pt has had prior anesthetic.     History of anesthetic complications   - PONV        ROS/MED HX    ENT/Pulmonary:      (-) tobacco use and sleep apnea   Neurologic:       Cardiovascular:     (+) Dyslipidemia, ----. : . . . :. .      (-) arrhythmias, irregular heartbeat/palpitations and ICD   METS/Exercise Tolerance:     Hematologic:         Musculoskeletal:         GI/Hepatic:        (-) GERD   Renal/Genitourinary:         Endo:         Psychiatric:         Infectious Disease:         Malignancy:         Other:                     Physical Exam  Normal systems: cardiovascular and pulmonary    Airway   Mallampati: II  TM distance: >3 FB  Neck ROM: full    Dental   Comment: native    Cardiovascular       Pulmonary                     Anesthesia Plan      History & Physical Review  History and physical reviewed and following examination; no interval change.    ASA Status:  1 .    NPO Status:  > 8 hours    Plan for General and LMA with Propofol induction. Maintenance will be TIVA.    PONV prophylaxis:  Ondansetron (or other 5HT-3), Scopolamine patch and Dexamethasone or Solumedrol       Postoperative Care      Consents                          .

## 2018-11-29 NOTE — OR NURSING
PNDS met, po per I&O sheet. Pt dressed, up in recliner and transported to Phase 2. J/P drain secured by belt  Patient stated that she is familiar with J/P drains.

## 2018-11-29 NOTE — IP AVS SNAPSHOT
MRN:[id number]                      After Visit Summary   11/29/2018    Lilli Steven    MRN: [id number]           Thank you!     Thank you for choosing [facility] for your care. Our goal is always to provide you with excellent care. Hearing back from our patients is one way we can continue to improve our services. Please take a few minutes to complete the written survey that you may receive in the mail after you visit with us. Thank you!        Patient Information     Date Of Birth          1950        About your hospital stay     You were admitted on:  November 29, 2018 [facility]    You were discharged on:  November 29, 2018        Reason for your hospital stay       Surgery.                  Who to Call     For medical emergencies, please call 911.  For non-urgent questions about your medical care, please call your primary care provider or clinic, [phone]  For questions related to your surgery, please call your surgery clinic        Attending Provider     Provider Inés Moyer MD Plastic Surgery       Primary Care Provider Office Phone # Fax #    Vargas Ata Chaudhry [phone]      After Care Instructions     Discharge Instructions       Follow up with Dr. Harrell in 2 weeks.            Wound care and dressings       Remove dressing 24-48 hours after surgery.  Then may shower with incisions uncovered. Apply Aquaphor to incisions daily. No restrictions on arm movements. Avoid heavy lifting or strenuous exercise for 2 weeks. Strip drains daily, record output daily. May use OTC ibuprofen/tylenol for discomfort.                  Your next 10 appointments already scheduled     Jan 16, 2019  3:40 PM CST   (Arrive by 3:25 PM)   Return Botox with Hermes Frias MD   [facility] ([facility])    [address] [place] [address]  Floor  [facility] [address] [phone]            Mar 25, 2019  3:40 PM CDT   (Arrive by 3:25 PM)   Return Botox with Hermes Frias MD   [facility] ([facility])    [address] [facility]  3rd [facility] [id number] [phone]              Further instructions from your care team       Same Day Surgery Discharge Instructions for  Sedation and General Anesthesia       It's not unusual to feel dizzy, light-headed or faint for up to 24 hours after surgery or while taking pain medication.  If you have these symptoms: sit for a few minutes before standing and have someone assist you when you get up to walk or use the bathroom.      You should rest and relax for the next 24 hours. We recommend you make arrangements to have an adult stay with you for at least 24 hours after your discharge.  Avoid hazardous and strenuous activity.      DO NOT DRIVE any vehicle or operate mechanical equipment for 24 hours following the end of your surgery.  Even though you may feel normal, your reactions may be affected by the medication you have received.      Do not drink alcoholic beverages for 24 hours following surgery.       Slowly progress to your regular diet as you feel able. It's not unusual to feel nauseated and/or vomit after receiving anesthesia.  If you develop these symptoms, drink clear liquids (apple juice, ginger ale, broth, 7-up, etc. ) until you feel better.  If your nausea and vomiting persists for 24 hours, please notify your surgeon.        All narcotic pain medications, along with inactivity and anesthesia, can cause constipation. Drinking plenty of liquids and increasing fiber intake will help.      For any questions of a medical nature, call your surgeon.      Do not make important decisions for 24 hours.      If you had general anesthesia, you may have a sore throat for a couple of days related to the breathing tube used during  surgery.  You may use Cepacol lozenges to help with this discomfort.  If it worsens or if you develop a fever, contact your surgeon.       If you feel your pain is not well managed with the pain medications prescribed by your surgeon, please contact your surgeon's office to let them know so they can address your concerns.           **If you have questions or concerns about your procedure,  call  at [phone]**      Today you were given 1000 mg of Tylenol at 11:35 am. The recommended daily maximum dose is 4000 mg.       Information for Patients Discharging with a Transderm Scopolamine Patch       Dry mouth is a common side effect.    Drowsiness is another common side effect especially when combined with pain medication.  Please avoid activities that require mental alertness such as driving a car or making important legal decisions.    Since Scopolamine can cause temporary dilation of the pupils and blurred vision if it comes in contact with the eyes; be sure to wash your hands thoroughly with soap and water immediately after handling the patch.   When you remove your patch, please stick it to a tissue or paper towel for disposal.      Remove the patch immediately and contact a physician in the unlikely event that you experience symptoms of acute glaucoma (pain and reddening of the eyes, accompanied by dilated pupils).    Remove the patch if you develop any difficulties urinating.  If you cannot urinate after removing your patch, please notify your surgeon.    Remove the patch 24 hours after surgery.        Reasons to contact your surgeon:    1. Signs of possible infection: Check your incision daily for redness, swelling, warmth, red streaks or foul drainage.   2. Elevated temperature.  3. Pain not controlled with pain medication and/or rest.   4. Uncontrolled nausea or vomiting.  5. Any questions or concerns.      Oniel Linares Drain  Home Care Instructions    What is a Oniel Linares (RITCHIE) Drain?  This is a  small tube that connects to a bulb.  Its gentle suction removes extra fluid from a surgical wound.  Your doctor will remove the tube when the amount of fluid decreases.  The color and amount of fluid varies.  Right after surgery the fluid is bright red.  Over time, it changes to light pink and may become clear or the color of straw.    How should I care for my tube site?    Keep the skin around the tube dry.  Check with your doctor about how to shower.  You may need to cover the site with plastic when you shower.  Or, it may be okay to let the site get wet and put on a clean bandage after you shower.      If the bandage gets wet, you will need to change it.  How should I care for the bulb?    Keep the bulb compressed at all times except while you empty it.     Attach the bulb to your clothing with tape and a safety pin.    Try to empty the bulb at the same time every day.  Empty the bulb at least once a day, or when the bulb becomes half full.  If it becomes too full, there will not be enough suction.    To empty the bulb:    Wash your hands.    Open the bulb cap.    Drain the fluid from the bulb into the measuring cup.  If you have two drains, use two cups.      Clean the mouth of the bulb with an alcohol wipe if your nurse told you to.    Squeeze the bulb (fold it in half before you close the bulb cap) If it does not stay compressed, call your nurse or clinic.    Write the amount of drainage on the drainage record (see back page).  If you have two drains, write the amount for each bulb.    Flush the drainage down the toilet.  Rinse the measuring cup.    Wash your hands.    When should I call my doctor?   Call your doctor if:    You have a fever over 101 F (38.3 C), taken under the tongue.     The drainage increases or smells bad.    The skin around your tube has increased redness, swelling, warmth or pain.    You have pus or fluid leaking at the tube site.    Your stitches break.    You think the tube is not  "draining.    The tube falls out.    You have any problems or concerns.    Your drainage record:    Empty your bulb at least once per day or when 1/2 to 1/3 full.  Write down the date, time and amount of drainage in each bulb.   Bring this record to each clinic visit.    Date Time Bulb 1: amount of  Drainage in (ml or cc) Bulb 2: amount of drainage (in ml or cc) Notes                                                        Pending Results     No orders found from 11/27/2018 to 11/30/2018.            Admission Information     Date & Time Provider Department Dept. Phone    11/29/2018 Inés Harrell MD [facility] Same Day Surgery [phone]      Your Vitals Were     Blood Pressure Temperature Respirations Height Weight Last Period    132/84 96.4  F (35.8  C) 21 1.6 m (5' 3\") 76.3 kg (168 lb 4.8 oz) (LMP Unknown)    Pulse Oximetry BMI (Body Mass Index)                97% 29.81 kg/m2          MyChart Information     [facility] gives you secure access to your electronic health record. If you see a primary care provider, you can also send messages to your care team and make appointments. If you have questions, please call your primary care clinic.  If you do not have a primary care provider, please call [phone] and they will assist you.        Care EveryWhere ID     This is your Care EveryWhere ID. This could be used by other organizations to access your [facility] medical records  [id number]        Equal Access to Services     RAJWINDER TEJEDA : Hadii thais Romero, waaxda luqadaha, qaybta kaalmada meagan, shakeel carrillo. So [facility] [phone].    ATENCIÓN: Si habla español, tiene a sweet disposición servicios gratuitos de asistencia lingüística. Llame al [phone].    We comply with applicable federal civil rights laws and [place] laws. We do not discriminate on the basis of race, color, national origin, age, disability, sex, sexual orientation, or gender " identity.               Review of your medicines      CONTINUE these medicines which have NOT CHANGED        Dose / Directions    BOTOX IJ        Dose:  150 Units   Inject 150 Units into the muscle once Lot # /C3 with Expiration Date: 03/2021   Refills:  0       mometasone 0.1 % external cream   Commonly known as:  ELOCON   Used for:  Dermatitis        Apply sparingly to affected area twice daily as needed.  Do not apply to face.   Quantity:  45 g   Refills:  0       RESTASIS MULTIDOSE 0.05 % ophthalmic emulsion   Generic drug:  cycloSPORINE        Dose:  1 drop   Place 1 drop into both eyes 2 times daily   Refills:  3       simvastatin 20 MG tablet   Commonly known as:  ZOCOR   Used for:  Hyperlipidemia LDL goal <160        Dose:  20 mg   Take 1 tablet (20 mg) by mouth At Bedtime   Quantity:  90 tablet   Refills:  3       traZODone 50 MG tablet   Commonly known as:  DESYREL   Used for:  Persistent insomnia        TAKE 3 TABLETS BY MOUTH AT BEDTIME   Quantity:  90 tablet   Refills:  11                Protect others around you: Learn how to safely use, store and throw away your medicines at www.disposemymeds.org.             Medication List: This is a list of all your medications and when to take them. Check marks below indicate your daily home schedule. Keep this list as a reference.      Medications           Morning Afternoon Evening Bedtime As Needed    BOTOX IJ   Inject 150 Units into the muscle once Lot # /C3 with Expiration Date: 03/2021                                mometasone 0.1 % external cream   Commonly known as:  ELOCON   Apply sparingly to affected area twice daily as needed.  Do not apply to face.                                RESTASIS MULTIDOSE 0.05 % ophthalmic emulsion   Place 1 drop into both eyes 2 times daily   Generic drug:  cycloSPORINE                                simvastatin 20 MG tablet   Commonly known as:  ZOCOR   Take 1 tablet (20 mg) by mouth At Bedtime                                 traZODone 50 MG tablet   Commonly known as:  DESYREL   TAKE 3 TABLETS BY MOUTH AT BEDTIME

## 2018-11-29 NOTE — ANESTHESIA CARE TRANSFER NOTE
Patient: Lilli Steven    Procedure(s):  EVACUATION OF ABDOMINAL WALL SEROMA    Diagnosis: CHRONIC SEROMA  Diagnosis Additional Information: No value filed.    Anesthesia Type:   General, LMA     Note:  Airway :Face Mask  Patient transferred to:PACU  Handoff Report: Identifed the Patient, Identified the Reponsible Provider, Reviewed the pertinent medical history, Discussed the surgical course, Reviewed Intra-OP anesthesia mangement and issues during anesthesia, Set expectations for post-procedure period and Allowed opportunity for questions and acknowledgement of understanding      Vitals: (Last set prior to Anesthesia Care Transfer)    CRNA VITALS  11/29/2018 1308 - 11/29/2018 1355      11/29/2018             Pulse: 82    SpO2: 98 %    Resp Rate (set): 10                Electronically Signed By: SIERRA Terry CRNA  November 29, 2018  1:55 PM

## 2018-11-29 NOTE — BRIEF OP NOTE
[facility] Brief Operative Note    Pre-operative diagnosis: CHRONIC SEROMA   Post-operative diagnosis chronic abdominal wall seroma     Procedure: Procedure(s):  EVACUATION OF ABDOMINAL WALL SEROMA   Surgeon(s): Surgeon(s) and Role:     * Inés Harrell MD - Primary   Estimated blood loss: 5 mL    Specimens: * No specimens in log *   Findings: Dense/fibrous seroma wall

## 2018-11-29 NOTE — DISCHARGE INSTRUCTIONS
Same Day Surgery Discharge Instructions for  Sedation and General Anesthesia       It's not unusual to feel dizzy, light-headed or faint for up to 24 hours after surgery or while taking pain medication.  If you have these symptoms: sit for a few minutes before standing and have someone assist you when you get up to walk or use the bathroom.      You should rest and relax for the next 24 hours. We recommend you make arrangements to have an adult stay with you for at least 24 hours after your discharge.  Avoid hazardous and strenuous activity.      DO NOT DRIVE any vehicle or operate mechanical equipment for 24 hours following the end of your surgery.  Even though you may feel normal, your reactions may be affected by the medication you have received.      Do not drink alcoholic beverages for 24 hours following surgery.       Slowly progress to your regular diet as you feel able. It's not unusual to feel nauseated and/or vomit after receiving anesthesia.  If you develop these symptoms, drink clear liquids (apple juice, ginger ale, broth, 7-up, etc. ) until you feel better.  If your nausea and vomiting persists for 24 hours, please notify your surgeon.        All narcotic pain medications, along with inactivity and anesthesia, can cause constipation. Drinking plenty of liquids and increasing fiber intake will help.      For any questions of a medical nature, call your surgeon.      Do not make important decisions for 24 hours.      If you had general anesthesia, you may have a sore throat for a couple of days related to the breathing tube used during surgery.  You may use Cepacol lozenges to help with this discomfort.  If it worsens or if you develop a fever, contact your surgeon.       If you feel your pain is not well managed with the pain medications prescribed by your surgeon, please contact your surgeon's office to let them know so they can address your concerns.           **If you have questions or concerns about  your procedure,  call  at [phone]**      Today you were given 1000 mg of Tylenol at 11:35 am. The recommended daily maximum dose is 4000 mg.       Information for Patients Discharging with a Transderm Scopolamine Patch       Dry mouth is a common side effect.    Drowsiness is another common side effect especially when combined with pain medication.  Please avoid activities that require mental alertness such as driving a car or making important legal decisions.    Since Scopolamine can cause temporary dilation of the pupils and blurred vision if it comes in contact with the eyes; be sure to wash your hands thoroughly with soap and water immediately after handling the patch.   When you remove your patch, please stick it to a tissue or paper towel for disposal.      Remove the patch immediately and contact a physician in the unlikely event that you experience symptoms of acute glaucoma (pain and reddening of the eyes, accompanied by dilated pupils).    Remove the patch if you develop any difficulties urinating.  If you cannot urinate after removing your patch, please notify your surgeon.    Remove the patch 24 hours after surgery.        Reasons to contact your surgeon:    1. Signs of possible infection: Check your incision daily for redness, swelling, warmth, red streaks or foul drainage.   2. Elevated temperature.  3. Pain not controlled with pain medication and/or rest.   4. Uncontrolled nausea or vomiting.  5. Any questions or concerns.      Oniel Linares Drain  Home Care Instructions    What is a Oniel Linares (RITCHIE) Drain?  This is a small tube that connects to a bulb.  Its gentle suction removes extra fluid from a surgical wound.  Your doctor will remove the tube when the amount of fluid decreases.  The color and amount of fluid varies.  Right after surgery the fluid is bright red.  Over time, it changes to light pink and may become clear or the color of straw.    How should I care for my tube  site?    Keep the skin around the tube dry.  Check with your doctor about how to shower.  You may need to cover the site with plastic when you shower.  Or, it may be okay to let the site get wet and put on a clean bandage after you shower.      If the bandage gets wet, you will need to change it.  How should I care for the bulb?    Keep the bulb compressed at all times except while you empty it.     Attach the bulb to your clothing with tape and a safety pin.    Try to empty the bulb at the same time every day.  Empty the bulb at least once a day, or when the bulb becomes half full.  If it becomes too full, there will not be enough suction.    To empty the bulb:    Wash your hands.    Open the bulb cap.    Drain the fluid from the bulb into the measuring cup.  If you have two drains, use two cups.      Clean the mouth of the bulb with an alcohol wipe if your nurse told you to.    Squeeze the bulb (fold it in half before you close the bulb cap) If it does not stay compressed, call your nurse or clinic.    Write the amount of drainage on the drainage record (see back page).  If you have two drains, write the amount for each bulb.    Flush the drainage down the toilet.  Rinse the measuring cup.    Wash your hands.    When should I call my doctor?   Call your doctor if:    You have a fever over 101 F (38.3 C), taken under the tongue.     The drainage increases or smells bad.    The skin around your tube has increased redness, swelling, warmth or pain.    You have pus or fluid leaking at the tube site.    Your stitches break.    You think the tube is not draining.    The tube falls out.    You have any problems or concerns.    Your drainage record:    Empty your bulb at least once per day or when 1/2 to 1/3 full.  Write down the date, time and amount of drainage in each bulb.   Bring this record to each clinic visit.    Date Time Bulb 1: amount of  Drainage in (ml or cc) Bulb 2: amount of drainage (in ml or cc) Notes

## 2018-11-29 NOTE — IP AVS SNAPSHOT
[facility] Same Day Surgery    [address]    [place] [address]    Phone:  [phone]    Fax:  [phone]                                       After Visit Summary   11/29/2018    Lilli Steven    MRN: [id number]           After Visit Summary Signature Page     I have received my discharge instructions, and my questions have been answered. I have discussed any challenges I see with this plan with the nurse or doctor.    ..........................................................................................................................................  Patient/Patient Representative Signature      ..........................................................................................................................................  Patient Representative Print Name and Relationship to Patient    ..................................................               ................................................  Date                                   Time    ..........................................................................................................................................  Reviewed by Signature/Title    ...................................................              ..............................................  Date                                               Time          22EPIC Rev 08/18

## 2018-12-01 NOTE — OP NOTE
PLASTIC SURGERY OPERATIVE NOTE  Patient Name:  Lilli Steven     Date of Service:  11/29/2018     PREOPERATIVE DIAGNOSES:   1.  CHRONIC ABDOMINAL WALL SEROMA     POSTOPERATIVE DIAGNOSES:   1.  CHRONIC  ABDOMINAL WALL SEROMA       SURGEON:  Inés Harrell MD   OR STAFF:  Circulator: Teena Quinn, RN  Scrub Person: King[place]aphalong HENRY; Christie Long     ANESTHESIA:  General     ESTIMATED BLOOD LOSS:  5 mL    SPECIMEN(S):  * No specimens in log *     PROCEDURES:   1.  Evacuation chronic abdominal wall seroma    DESCRIPTION OF PROCEDURE:  Patient was marked for incisions and taken to the operating room.  General anesthesia was administered.  The abdominal area was prepped and draped in a sterile manner.  An incision was made through the previous abdominal scar and dissection was carried down to underlying abdominal wall.  Dissection was carried cephalad to the margins of the seroma cavity was encountered.  There is dense scarring associated with this and therefore a secondary incision was made over the seroma itself.  Dissection was carried down to underlying seroma and its dense capsule.  The anterior capsule was removed.  Hemostasis was achieved.  The posterior capsule was scored to allow for smooth contours.  A 10 Latvian RITCHIE drain was placed and secured with 3-0 nylon suture.  Each incision was reapproximated after 2-0 PDS and superficial fascia followed by interrupted 3-0 Monocryl in the subcutaneous tissue and a running 4-0 subcuticular Monocryl suture.  Xeroform and a light dressing were applied.    IMPLANTS:  * No implants in log *    FINDINGS: Chronic abdominal wall seroma with very dense capsule and minimal seroma fluid.    Inés Harrell MD  Plastic Surgery  [facility]  [phone] (office)

## 2019-01-08 ENCOUNTER — PATIENT OUTREACH (OUTPATIENT)
Dept: CARE COORDINATION | Facility: CLINIC | Age: 69
End: 2019-01-08

## 2019-01-10 ENCOUNTER — MEDICAL CORRESPONDENCE (OUTPATIENT)
Dept: HEALTH INFORMATION MANAGEMENT | Facility: CLINIC | Age: 69
End: 2019-01-10

## 2019-01-10 ENCOUNTER — TRANSFERRED RECORDS (OUTPATIENT)
Dept: HEALTH INFORMATION MANAGEMENT | Facility: CLINIC | Age: 69
End: 2019-01-10

## 2019-01-16 ENCOUNTER — OFFICE VISIT (OUTPATIENT)
Dept: PHYSICAL MEDICINE AND REHAB | Facility: CLINIC | Age: 69
End: 2019-01-16
Payer: COMMERCIAL

## 2019-01-16 ENCOUNTER — RECORDS - HEALTHEAST (OUTPATIENT)
Dept: ADMINISTRATIVE | Facility: OTHER | Age: 69
End: 2019-01-16

## 2019-01-16 VITALS
OXYGEN SATURATION: 95 % | TEMPERATURE: 97.8 F | DIASTOLIC BLOOD PRESSURE: 83 MMHG | HEART RATE: 89 BPM | SYSTOLIC BLOOD PRESSURE: 136 MMHG

## 2019-01-16 DIAGNOSIS — G24.1 DYSTONIA, TORSION, FRAGMENTS OF: ICD-10-CM

## 2019-01-16 DIAGNOSIS — G24.3 SPASMODIC TORTICOLLIS: Primary | ICD-10-CM

## 2019-01-16 ASSESSMENT — PAIN SCALES - GENERAL: PAINLEVEL: MODERATE PAIN (4)

## 2019-01-16 NOTE — LETTER
1/16/2019       RE: Lilli Steven  [address]  [facility] [address]     Dear Colleague,    Thank you for referring your patient, Lilli Steven, to the [facility] PHYSICAL MEDICINE AND REHABILITATION at [facility]. Please see a copy of my visit note below.    BOTULINUM TOXIN PROCEDURE NOTE    Chief Complaint   Patient presents with     Dystonia     [facility] RETURN BOTOX Cervical dystonia       /83 (BP Location: Left arm, Patient Position: Chair, Cuff Size: Adult Regular)   Pulse 89   Temp 97.8  F (36.6  C) (Oral)   LMP  (LMP Unknown)   SpO2 95%       Current Outpatient Medications:      mometasone (ELOCON) 0.1 % cream, Apply sparingly to affected area twice daily as needed.  Do not apply to face., Disp: 45 g, Rfl: 0     OnabotulinumtoxinA (BOTOX IJ), Inject 150 Units into the muscle once Lot # /C3 with Expiration Date: 03/2021, Disp: , Rfl:      RESTASIS MULTIDOSE 0.05 % ophthalmic emulsion, Place 1 drop into both eyes 2 times daily, Disp: , Rfl: 3     simvastatin (ZOCOR) 20 MG tablet, Take 1 tablet (20 mg) by mouth At Bedtime, Disp: 90 tablet, Rfl: 3     traZODone (DESYREL) 50 MG tablet, TAKE 3 TABLETS BY MOUTH AT BEDTIME, Disp: 90 tablet, Rfl: 11     Allergies   Allergen Reactions     Meloxicam      Diarrhea, vomiting     Primidone Other (See Comments)     Felt like motion sickness     Tramadol      Diarrhea, vomiting      Adhesive Tape Rash     Durabond only     Allergy Rash     Dermabond  Anesthesia IV set misc       Tegaderm Transparent Dressing (Informational Only) Rash     With long exposure        PHYSICAL EXAM:  HEAD, NECK AND TRUNK PATTERN:   Head Tremor: Observed - no-no tremor present  Head & Neck Extension: Present - Slight  Sub-Occipital Extension: Present - Slight  Forward Head: Present - Slight   Head & Neck Lateral Bend: Left  Shoulder Elevation: Right    HPI:    Patient reports the following new medical problems since last visit:  2018 had surgery to evacuate abdominal wall seroma. States seroma fluid has returned and has significantly worsened.    We reviewed the recommended safety guidelines for  Botox from any vaccine injection, such as the seasonal flu vaccine, by a minimum of 10-14 days with Lilli Steven. She acknowledged understanding.    RESPONSE TO PREVIOUS TREATMENT:    Lilli Steven received 150 units of Botox on 2018.    Problems following the previous series of neurotoxin injections included:  No problems reported    BENEFITS BY PATIENT REPORT:  Pain Improvement: Yes.  Percent Improvement: unable to quantify in % but reports significant improvement  Duration of Benefit:  10-12  weeks.     Dystonia and tremor Improvement: Yes.  Percent Improvement: unable to quantify in %, but reports significant overall improvement Duration of Benefit:  10 weeks.       BOTULINUM NEUROTOXIN INJECTION PROCEDURES:    VERIFICATION OF PATIENT IDENTIFICATION AND PROCEDURE     Initials   Patient Name lmd   Patient  lmd   Procedure Verified by: lmd     Prior to the start of the procedure and with procedural staff participation, I verbally confirmed the patient s identity using two indicators, relevant allergies, that the procedure was appropriate and matched the consent or emergent situation, and that the correct equipment/implants were available. Immediately prior to starting the procedure I conducted the Time Out with the procedural staff and re-confirmed the patient s name, procedure, and site/side. (The Joint Commission universal protocol was followed.)  Yes    Sedation (Moderate or Deep): None      Above assessments performed by:  Mikki Sotelo RN Care Coordinator    Hermes Frias MD    INDICATION/S FOR PROCEDURE/S:  Lilli Steven is a 68-year-old patient with dystonia affecting the  head, neck and shoulder girdle musculature secondary to a diagnosis of cervical dystonia with associated  pain, tremor,  spasms, loss of joint motion, loss of volitional motor control and difficulty with activities of daily living.      Her baseline symptoms have been recalcitrant to oral medications and conservative therapy.  She is here today for an injection of Botox.       GOAL OF PROCEDURE:  The goal of this procedure is to increase active range of motion, improve volitional motor control and decrease pain  associated with dystonic movements, spasticity and tremor.     TOTAL DOSE ADMINISTERED:  Dose Administered:  150 units Botox    Diluent Used:  Preservative Free Normal Saline  Total Volume of Diluent Used:  1.50 ml  Lot # /C3 with Expiration Date:  7/2021  NDC #: Botox 100u ([address]-61)    Medication guide was offered to patient and was declined.    CONSENT:  The risks, benefits, and treatment options were discussed with Lilli Steven and she agreed to proceed.      Written consent was obtained by lmd.     EQUIPMENT USED:  Needle-37mm stimulating/recording  EMG/NCS Machine     SKIN PREPARATION:  Skin preparation was performed using an alcohol wipe.     GUIDANCE DESCRIPTION:  Electro-myographic guidance was necessary throughout the procedure to accurately identify all areas of dystonic and spastic muscles while avoiding injection of non-dystonic muscles and non-spastic muscles, neighboring nerves and nearby vascular structures.     AREA/MUSCLE INJECTED:  150 Units Botox    1. HEAD & NECK MUSCLES: 150 units Botox = Total Dose, 1:1 Dilution                 Right Mid-Trapezius - 10 units of Botox at 1 site/s.   Left Mid-Trapezius - 10 units of Botox at 1 site/s.      Right Splenius Capitus - 15 units of Botox at 2 site/s.   Left Splenius Capitus - 10 units of Botox at 2 site/s.      Right Levator Scapulae - 30 units of Botox at 1 site/s (shoulder muscles).   Left Levator Scapulae - 20 units of Botox at 1 site/s (shoulder muscles).      Right Inferior Obliquus Capitis - 10 units of Botox at 1 site/s.   Left Inferior  Obliquus Capitis - 15 units of at 1 site/s.      Right Sternal head of the Sternocleidomastoid - 15 units of Botox at 1 site/s.               Left Sternal head of the Sternocleidomastoid - 15 units of Botox at 1 site/s.    RESPONSE TO PROCEDURE:  Lilli Steven tolerated the procedure well and there were no immediate complications.  She was allowed to recover for an appropriate period of time and was discharged home in stable condition.    FOLLOW UP:  Lilli Steven was asked to follow up by phone in 7-14 days with Erin Combs PT, Care Coordinator or Mikki Rojas RN, Care Coordinator, to report her response to this series of injections.  Based on the patient's previous response to this therapy, Lilli Steven was rescheduled for the next series of injections in 12 weeks.    PLAN (Medication Changes, Therapy Orders, Work or Disability Issues, etc.): Will monitor response to today's injections and report.    There were 50 units of Botox as unavoidable waste for this patient.    Again, thank you for allowing me to participate in the care of your patient.      Sincerely,    Hermes Frias MD

## 2019-01-16 NOTE — PROGRESS NOTES
BOTULINUM TOXIN PROCEDURE NOTE    Chief Complaint   Patient presents with     Dystonia     [facility] RETURN BOTOX Cervical dystonia       /83 (BP Location: Left arm, Patient Position: Chair, Cuff Size: Adult Regular)   Pulse 89   Temp 97.8  F (36.6  C) (Oral)   LMP  (LMP Unknown)   SpO2 95%       Current Outpatient Medications:      mometasone (ELOCON) 0.1 % cream, Apply sparingly to affected area twice daily as needed.  Do not apply to face., Disp: 45 g, Rfl: 0     OnabotulinumtoxinA (BOTOX IJ), Inject 150 Units into the muscle once Lot # /C3 with Expiration Date: 03/2021, Disp: , Rfl:      RESTASIS MULTIDOSE 0.05 % ophthalmic emulsion, Place 1 drop into both eyes 2 times daily, Disp: , Rfl: 3     simvastatin (ZOCOR) 20 MG tablet, Take 1 tablet (20 mg) by mouth At Bedtime, Disp: 90 tablet, Rfl: 3     traZODone (DESYREL) 50 MG tablet, TAKE 3 TABLETS BY MOUTH AT BEDTIME, Disp: 90 tablet, Rfl: 11     Allergies   Allergen Reactions     Meloxicam      Diarrhea, vomiting     Primidone Other (See Comments)     Felt like motion sickness     Tramadol      Diarrhea, vomiting      Adhesive Tape Rash     Durabond only     Allergy Rash     Dermabond  Anesthesia IV set misc       Tegaderm Transparent Dressing (Informational Only) Rash     With long exposure        PHYSICAL EXAM:  HEAD, NECK AND TRUNK PATTERN:   Head Tremor: Observed - no-no tremor present  Head & Neck Extension: Present - Slight  Sub-Occipital Extension: Present - Slight  Forward Head: Present - Slight   Head & Neck Lateral Bend: Left  Shoulder Elevation: Right    HPI:    Patient reports the following new medical problems since last visit: Nov 29, 2018 had surgery to evacuate abdominal wall seroma. States seroma fluid has returned and has significantly worsened.    We reviewed the recommended safety guidelines for  Botox from any vaccine injection, such as the seasonal flu vaccine, by a minimum of 10-14 days with Lilli Steven. She  acknowledged understanding.    RESPONSE TO PREVIOUS TREATMENT:    Lilli Steven received 150 units of Botox on 2018.    Problems following the previous series of neurotoxin injections included:  No problems reported    BENEFITS BY PATIENT REPORT:  Pain Improvement: Yes.  Percent Improvement: unable to quantify in % but reports significant improvement  Duration of Benefit:  10-12  weeks.     Dystonia and tremor Improvement: Yes.  Percent Improvement: unable to quantify in %, but reports significant overall improvement Duration of Benefit:  10 weeks.       BOTULINUM NEUROTOXIN INJECTION PROCEDURES:    VERIFICATION OF PATIENT IDENTIFICATION AND PROCEDURE     Initials   Patient Name lmd   Patient  lmd   Procedure Verified by: lmd     Prior to the start of the procedure and with procedural staff participation, I verbally confirmed the patient s identity using two indicators, relevant allergies, that the procedure was appropriate and matched the consent or emergent situation, and that the correct equipment/implants were available. Immediately prior to starting the procedure I conducted the Time Out with the procedural staff and re-confirmed the patient s name, procedure, and site/side. (The Joint Commission universal protocol was followed.)  Yes    Sedation (Moderate or Deep): None      Above assessments performed by:  Mikki Sotelo RN Care Coordinator    Hermes Frias MD    INDICATION/S FOR PROCEDURE/S:  Lilli Steven is a 68-year-old patient with dystonia affecting the  head, neck and shoulder girdle musculature secondary to a diagnosis of cervical dystonia with associated  pain, tremor, spasms, loss of joint motion, loss of volitional motor control and difficulty with activities of daily living.      Her baseline symptoms have been recalcitrant to oral medications and conservative therapy.  She is here today for an injection of Botox.       GOAL OF PROCEDURE:  The goal of this procedure is to  increase active range of motion, improve volitional motor control and decrease pain  associated with dystonic movements, spasticity and tremor.     TOTAL DOSE ADMINISTERED:  Dose Administered:  150 units Botox    Diluent Used:  Preservative Free Normal Saline  Total Volume of Diluent Used:  1.50 ml  Lot # /C3 with Expiration Date:  7/2021  NDC #: Botox 100u ([address]-45)    Medication guide was offered to patient and was declined.    CONSENT:  The risks, benefits, and treatment options were discussed with Lilli Steven and she agreed to proceed.      Written consent was obtained by lmd.     EQUIPMENT USED:  Needle-37mm stimulating/recording  EMG/NCS Machine     SKIN PREPARATION:  Skin preparation was performed using an alcohol wipe.     GUIDANCE DESCRIPTION:  Electro-myographic guidance was necessary throughout the procedure to accurately identify all areas of dystonic and spastic muscles while avoiding injection of non-dystonic muscles and non-spastic muscles, neighboring nerves and nearby vascular structures.     AREA/MUSCLE INJECTED:  150 Units Botox    1. HEAD & NECK MUSCLES: 150 units Botox = Total Dose, 1:1 Dilution                 Right Mid-Trapezius - 10 units of Botox at 1 site/s.   Left Mid-Trapezius - 10 units of Botox at 1 site/s.      Right Splenius Capitus - 15 units of Botox at 2 site/s.   Left Splenius Capitus - 10 units of Botox at 2 site/s.      Right Levator Scapulae - 30 units of Botox at 1 site/s (shoulder muscles).   Left Levator Scapulae - 20 units of Botox at 1 site/s (shoulder muscles).      Right Inferior Obliquus Capitis - 10 units of Botox at 1 site/s.   Left Inferior Obliquus Capitis - 15 units of at 1 site/s.      Right Sternal head of the Sternocleidomastoid - 15 units of Botox at 1 site/s.               Left Sternal head of the Sternocleidomastoid - 15 units of Botox at 1 site/s.    RESPONSE TO PROCEDURE:  Lilli Steven tolerated the procedure well and there were no  immediate complications.  She was allowed to recover for an appropriate period of time and was discharged home in stable condition.    FOLLOW UP:  Lilli Steven was asked to follow up by phone in 7-14 days with Erin Combs PT, Care Coordinator or Mikki Rojas RN, Care Coordinator, to report her response to this series of injections.  Based on the patient's previous response to this therapy, Lilli Steven was rescheduled for the next series of injections in 12 weeks.    PLAN (Medication Changes, Therapy Orders, Work or Disability Issues, etc.): Will monitor response to today's injections and report.    There were 50 units of Botox as unavoidable waste for this patient.

## 2019-01-17 ENCOUNTER — TELEPHONE (OUTPATIENT)
Dept: INTERVENTIONAL RADIOLOGY/VASCULAR | Facility: CLINIC | Age: 69
End: 2019-01-17

## 2019-01-17 NOTE — TELEPHONE ENCOUNTER
Interventional Radiology   Interventional Radiology at [facility] has been requested to perform a possible abdominal wall seroma drain placement from Dr Harrell.  Lilli Steven is a 68 year old woman with a history of torticollis, genetic torsion dystonia, neck pain and a umbilical hernia repair in 2/2017 c/b incision and drainage X 2 in 2017 for continued drainage, trunk scar revision 3/21/18 and another I and D of her abdomen in 11/2018.  She has started to notice fluid in her abdomen again and Dr Harrell has requested an ultrasound and possible drain placement if there is fluid.    Reviewed imaging and clinical information with Radiology staff Dr Govea who approved the possible drain placement with US guidance.   Central scheduling has contacted the patient and schedule Ultrasound first at 1300 on Friday 1/18/19 and then possible drain placement to follow if necessary.      Thanks [facility] Interventional Radiology CNP ([phone]) (phone [phone])

## 2019-01-18 ENCOUNTER — HOSPITAL ENCOUNTER (OUTPATIENT)
Facility: CLINIC | Age: 69
Discharge: HOME | End: 2019-01-18
Admitting: RADIOLOGY
Payer: COMMERCIAL

## 2019-01-18 ENCOUNTER — HOSPITAL ENCOUNTER (OUTPATIENT)
Dept: ULTRASOUND IMAGING | Facility: CLINIC | Age: 69
End: 2019-01-18
Attending: RADIOLOGY | Admitting: RADIOLOGY
Payer: COMMERCIAL

## 2019-01-18 VITALS
RESPIRATION RATE: 18 BRPM | TEMPERATURE: 96.5 F | BODY MASS INDEX: 30.3 KG/M2 | HEIGHT: 63 IN | SYSTOLIC BLOOD PRESSURE: 184 MMHG | HEART RATE: 18 BPM | WEIGHT: 171 LBS | OXYGEN SATURATION: 93 % | DIASTOLIC BLOOD PRESSURE: 97 MMHG

## 2019-01-18 DIAGNOSIS — L90.5 SCAR CONDITIONS AND FIBROSIS OF SKIN: ICD-10-CM

## 2019-01-18 DIAGNOSIS — S30.1XXS ABDOMINAL WALL SEROMA, SEQUELA: Primary | ICD-10-CM

## 2019-01-18 DIAGNOSIS — E65 LOCALIZED ADIPOSITY: ICD-10-CM

## 2019-01-18 DIAGNOSIS — L76.34 POSTPROCEDURAL SEROMA OF SKIN AND SUBCUTANEOUS TISSUE FOLLOWING OTHER PROCEDURE: ICD-10-CM

## 2019-01-18 LAB
INR PPP: 0.9 (ref 0.86–1.14)
PLATELET # BLD AUTO: 279 10E9/L (ref 150–450)

## 2019-01-18 PROCEDURE — 36415 COLL VENOUS BLD VENIPUNCTURE: CPT

## 2019-01-18 PROCEDURE — 85610 PROTHROMBIN TIME: CPT | Performed by: RADIOLOGY

## 2019-01-18 PROCEDURE — 40000863 ZZH STATISTIC RADIOLOGY XRAY, US, CT, MAR, NM

## 2019-01-18 PROCEDURE — 25000125 ZZHC RX 250: Performed by: RADIOLOGY

## 2019-01-18 PROCEDURE — 85049 AUTOMATED PLATELET COUNT: CPT | Performed by: RADIOLOGY

## 2019-01-18 PROCEDURE — 87070 CULTURE OTHR SPECIMN AEROBIC: CPT | Performed by: RADIOLOGY

## 2019-01-18 PROCEDURE — 76942 ECHO GUIDE FOR BIOPSY: CPT

## 2019-01-18 RX ORDER — DEXTROSE MONOHYDRATE 25 G/50ML
25-50 INJECTION, SOLUTION INTRAVENOUS
Status: DISCONTINUED | OUTPATIENT
Start: 2019-01-18 | End: 2019-01-18 | Stop reason: HOSPADM

## 2019-01-18 RX ORDER — LIDOCAINE HYDROCHLORIDE 10 MG/ML
10 INJECTION, SOLUTION EPIDURAL; INFILTRATION; INTRACAUDAL; PERINEURAL ONCE
Status: COMPLETED | OUTPATIENT
Start: 2019-01-18 | End: 2019-01-18

## 2019-01-18 RX ORDER — LIDOCAINE 40 MG/G
CREAM TOPICAL
Status: DISCONTINUED | OUTPATIENT
Start: 2019-01-18 | End: 2019-01-18 | Stop reason: HOSPADM

## 2019-01-18 RX ORDER — NICOTINE POLACRILEX 4 MG
15-30 LOZENGE BUCCAL
Status: DISCONTINUED | OUTPATIENT
Start: 2019-01-18 | End: 2019-01-18 | Stop reason: HOSPADM

## 2019-01-18 RX ADMIN — LIDOCAINE HYDROCHLORIDE 10 ML: 10 INJECTION, SOLUTION EPIDURAL; INFILTRATION; INTRACAUDAL; PERINEURAL at 14:20

## 2019-01-18 ASSESSMENT — MIFFLIN-ST. JEOR: SCORE: 1274.78

## 2019-01-18 NOTE — IP AVS SNAPSHOT
[place] [address] [place] [address]  Phone:  [phone]                                    After Visit Summary   1/18/2019    Lilli Steven    MRN: [id number]           After Visit Summary Signature Page    I have received my discharge instructions, and my questions have been answered. I have discussed any challenges I see with this plan with the nurse or doctor.    ..........................................................................................................................................  Patient/Patient Representative Signature      ..........................................................................................................................................  Patient Representative Print Name and Relationship to Patient    ..................................................               ................................................  Date                                   Time    ..........................................................................................................................................  Reviewed by Signature/Title    ...................................................              ..............................................  Date                                               Time          22EPIC Rev 08/18

## 2019-01-18 NOTE — IP AVS SNAPSHOT
MRN:[id number]                      After Visit Summary   1/18/2019    Lilli Steven    MRN: [id number]           Visit Information        Department      1/18/2019 12:28 PM [facility]          Review of your medicines      UNREVIEWED medicines. Ask your doctor about these medicines       Dose / Directions   BOTOX IJ      Dose:  150 Units  Inject 150 Units into the muscle once Lot # /C3 with Expiration Date: 03/2021  Refills:  0     mometasone 0.1 % external cream  Commonly known as:  ELOCON  Used for:  Dermatitis      Apply sparingly to affected area twice daily as needed.  Do not apply to face.  Quantity:  45 g  Refills:  0     RESTASIS MULTIDOSE 0.05 % ophthalmic emulsion  Generic drug:  cycloSPORINE      Dose:  1 drop  Place 1 drop into both eyes 2 times daily  Refills:  3     simvastatin 20 MG tablet  Commonly known as:  ZOCOR  Used for:  Hyperlipidemia LDL goal <160      Dose:  20 mg  Take 1 tablet (20 mg) by mouth At Bedtime  Quantity:  90 tablet  Refills:  3     traZODone 50 MG tablet  Commonly known as:  DESYREL  Used for:  Persistent insomnia      TAKE 3 TABLETS BY MOUTH AT BEDTIME  Quantity:  90 tablet  Refills:  11              Protect others around you: Learn how to safely use, store and throw away your medicines at www.disposemymeds.org.       Follow-ups after your visit       Your next 10 appointments already scheduled    Jan 18, 2019  2:00 PM CST  (Arrive by 12:30 PM)  US DRAIN OF SEROMA/HEMATOMA/ABSCESS/CYST with JAMAUS4,  IMAGING NURSE,  BODY RAD,  BODY RAD  [facility]trasound ([facility]) [address] [place] [id number] [phone]   How do I prepare for my exam? (Food and drink instructions) No Food and Drink Restrictions.  How do I prepare for my exam? (Other instructions) IF YOUR DOCTOR ALSO PRESCRIBED SEDATION DURING THE EXAM (medicine to help you relax): You will receive separate instructions about  driving, eating, and additional tests that may be necessary prior to your exam day.  What should I wear: Wear comfortable clothes.  How long does the exam take: Aspiration (no sedation treatment takes about an hour.  For paracentesis, thoracentesis or sedation plan to spend at least three hours at the hospital.  What should I bring: Bring a list of your medicines, including vitamins, minerals and over-the-counter drugs. It is safest to leave personal items at home.  Do I need a :  No  is needed.  What do I need to tell my doctor: Tell your doctor in advance: * If you are or may be pregnant. * If you are taking Coumadin (or any other blood thinners) 5 days prior to the exam for any special instructions. * If you are diabetic to determine if your insulin needs have to be adjusted for the exam.  What should I do after the exam: Take it easy the rest of the day. You can return to normal activities the next day.  What is this test: This test uses a long, thin needle or tube to remove tissue, fluid, or other cells from your body. Pictures from an ultrasound will guide the needle to the right place. (Ultrasound uses sound waves to create pictures of the body on a video screen. You will not feel the sound waves.)  * A biopsy removes tissue or other cells from the body; we send the tissue or cells to a lab for testing. * Paracentesis removes fluid from the belly (abdomen) to relieve pressure, to test the fluid or both. * Thoracentesis removes fluid from the sac around the lungs to relieve pressure, to test the fluid or both. * Aspiration removes fluid from any part of the body, then the fluid is tested for disease or infection.  Who should I call with questions: If you have any questions, please call the Imaging Department where you will have your exam. Directions, parking instructions, and other information is available on our website, [email or web address].   Mar 25, 2019  3:40 PM CDT  (Arrive by 3:25  PM)  Return Botox with Hermes Frias MD  [facility] ([facility]) [address]  [address] [facility] [address]  [phone]   Jun 20, 2019  3:50 PM CDT  (Arrive by 3:35 PM)  Return Botox with Cassidy Ramirez MD  [facility] ([facility]) [address]  [address] [facility] [address] [phone]      Care Instructions       Further instructions from your care team       Abscess Drain Discharge Instructions     After you go home:      You may resume your normal diet    Have an adult stay with you for 6 hours if you received sedation       For 24 hours - due to the sedation you received:    Relax and take it easy    Do NOT make any important or legal decisions    Do NOT drive or operate machines at home or at work    Do NOT drink alcohol    Care of Puncture Site:      For the first 48 hrs, check your puncture site every couple hours while you are awake     Check the tube site twice a day for signs of infection    Keep the dressing around the tube dry    Change the dressing every 2-3 days if it is guaze/tape. If there is a Stay Fix dressing intact - this should be changed weekly.    You may shower but do not get the dressing wet. Place a waterproof cover over the dressing (such as plastic wrap). Change the dressing if it becomes wet.    No tub baths, whirlpools or swimming until the tube is removed     Activity       You may go back to normal activity in 24 hours    Wait 48 hours before lifting, straining, exercise or other strenuous activity    Medicines:      You may resume  medications    Resume your Warfarin/Coumadin at your regular dose today. Follow up with your provider to have your INR rechecked    Resume your Platelet Inhibitors and Aspirin tomorrow at your regular dose    For minor pain, you may take Acetaminophen (Tylenol) or Ibuprofen (Advil)         "         Call the provider who ordered this procedure if:      Increased pain or a large or growing hard lump around the site    Blood or fluid is draining from the site    The site is red, swollen, hot or tender    Chills or a fever greater than 101 F (38 C)    Pain that is getting worse    Any questions or concerns    Call  911 or go to the Emergency Room if:      Severe pain or trouble breathing    Bleeding that you cannot control    Other Instructions:      If the tube falls out - cover the opening with gauze & tape    Record drainage output amounts & bring sheet to return appointments    Take your temperature daily    If you have questions call:          [facility] Radiology Dept @ [phone]        The provider who performed your procedure was Dr. Goyal.            Additional Information About Your Visit       [employer] Information    [employer] gives you secure access to your electronic health record. If you see a primary care provider, you can also send messages to your care team and make appointments. If you have questions, please call your primary care clinic.  If you do not have a primary care provider, please call [phone] and they will assist you.       Care EveryWhere ID    This is your Care EveryWhere ID. This could be used by other organizations to access your [facility] medical records  [id number]       Your Vitals Were     Blood Pressure   147/91            Pulse   18            Temperature   96.5  F (35.8  C) (Oral)          Height   1.6 m (5' 3\")          Weight   77.6 kg (171 lb)             Last Period    (LMP Unknown)    BMI (Body Mass Index)   30.29 kg/m           Primary Care Provider Office Phone # Fax #    Vargas Ata Chaudhry [phone]      Equal Access to Services    [facility] : Hadmar lazoo Sovivi, waaxda luqadaha, qaybta kaalshakeel burgos. So [facility] [phone].    ATENCIÓN: Si mathieu haynes, " tiene a sweet disposición servicios gratuitos de asistencia lingüística. Debbie cuenca [phone].    We comply with applicable federal civil rights laws and [place] laws. We do not discriminate on the basis of race, color, national origin, age, disability, sex, sexual orientation, or gender identity.           Thank you!    Thank you for choosing [facility] for your care. Our goal is always to provide you with excellent care. Hearing back from our patients is one way we can continue to improve our services. Please take a few minutes to complete the written survey that you may receive in the mail after you visit with us. Thank you!            Medication List      ASK your doctor about these medications          Morning Afternoon Evening Bedtime As Needed    BOTOX IJ  INSTRUCTIONS:  Inject 150 Units into the muscle once Lot # /C3 with Expiration Date: 03/2021                     mometasone 0.1 % external cream  Also known as:  ELOCON  INSTRUCTIONS:  Apply sparingly to affected area twice daily as needed.  Do not apply to face.                     RESTASIS MULTIDOSE 0.05 % ophthalmic emulsion  INSTRUCTIONS:  Place 1 drop into both eyes 2 times daily  Generic drug:  cycloSPORINE                     simvastatin 20 MG tablet  Also known as:  ZOCOR  INSTRUCTIONS:  Take 1 tablet (20 mg) by mouth At Bedtime                     traZODone 50 MG tablet  Also known as:  DESYREL  INSTRUCTIONS:  TAKE 3 TABLETS BY MOUTH AT BEDTIME

## 2019-01-18 NOTE — PROCEDURES
RADIOLOGY POST PROCEDURE NOTE    Patient name: Lilli Steven  MRN: [id number]  : 1950    Pre-procedure diagnosis: post operative seroma.   Post-procedure diagnosis: Same    Procedure Date/Time: 2019  4:12 PM  Procedure: ultrasound guided drain placement.   Estimated blood loss: None  Specimen(s) collected with description: 60 ml serous fluid.     The patient tolerated the procedure well with no immediate complications.  Significant findings:none    See imaging dictation for procedural details.    Provider name: Marylu Goyal  Assistant(s):None

## 2019-01-18 NOTE — PROGRESS NOTES
Arrived to [facility] ambulatory from [facility].  Here for drain placement.  Pre procedure POC reviewed with patient.  All questions and concerns addressed.  Requesting culture of fluid.  Advised patient to discuss with Dr. Jackson prior to procedure.  Slightly hypertensive.  Denies pain.  Drove self here.  She does not have anyone available to drive her home or stay with her at this time. States she does not want any sedation.       1355Discharge/AVS instructions given and reviewed with patient.  She verbalizes understanding.

## 2019-01-18 NOTE — PROGRESS NOTES
1417 labs good per protocol. Procedure explained by dr sprague and pt consented. VSS. Denies pain.   1431 time out was done. Pt tolerated drain placement with local lidocaine, no sedation iv. Drain right upper quadrant, with adelaide bulb to drain, left in place, drained 60cc clear gilda fluid. Sent to lab. Pt denies pain. VSS. Site sutured and has 4x4 over and tegederm as this is best drsg for pt with her allergic reaction to any stronger adhesive. Back to care suites. Per dr sprague, she will have NP call pt for follow up appt in a few weeks.   1500 pt back in [facility]. VSS, bp higher. Denies pain. Pt drinking fluids. Refused food. Iv d/c'd. Pt given lots of drsg, tegederm, paper tape, measuring cups, coban to secure adelaide drain and site, for her to take home. Pt given drainage record keeping sheet and drain tube home care instruction sheet. Already has discharge instructions from erin, but will reprint avs as NP antoinette is adding to it. Pt shown how to drain and resuction adelaide bulb. Pt has had drains before and understands. Per dr sprague pt ok to discharge. Await antoinette np putting in her discharge instructions to reprint and post orders.  1528 post orders released. No new instructions to reprint on avs, hand wrote antoinette NP phone number on her discharge paper work for her. Denies pain. Site CDI.

## 2019-01-18 NOTE — DISCHARGE INSTRUCTIONS
Abscess Drain Discharge Instructions     After you go home:      You may resume your normal diet    Have an adult stay with you for 6 hours if you received sedation       For 24 hours - due to the sedation you received:    Relax and take it easy    Do NOT make any important or legal decisions    Do NOT drive or operate machines at home or at work    Do NOT drink alcohol    Care of Puncture Site:      For the first 48 hrs, check your puncture site every couple hours while you are awake     Check the tube site twice a day for signs of infection    Keep the dressing around the tube dry    Change the dressing every 2-3 days if it is guaze/tape. If there is a Stay Fix dressing intact - this should be changed weekly.    You may shower but do not get the dressing wet. Place a waterproof cover over the dressing (such as plastic wrap). Change the dressing if it becomes wet.    No tub baths, whirlpools or swimming until the tube is removed     Activity       You may go back to normal activity in 24 hours    Wait 48 hours before lifting, straining, exercise or other strenuous activity    Medicines:      You may resume  medications    Resume your Warfarin/Coumadin at your regular dose today. Follow up with your provider to have your INR rechecked    Resume your Platelet Inhibitors and Aspirin tomorrow at your regular dose    For minor pain, you may take Acetaminophen (Tylenol) or Ibuprofen (Advil)                 Call the provider who ordered this procedure if:      Increased pain or a large or growing hard lump around the site    Blood or fluid is draining from the site    The site is red, swollen, hot or tender    Chills or a fever greater than 101 F (38 C)    Pain that is getting worse    Any questions or concerns    Call  911 or go to the Emergency Room if:      Severe pain or trouble breathing    Bleeding that you cannot control    Other Instructions:      If the tube falls out - cover the opening with gauze &  tape    Record drainage output amounts & bring sheet to return appointments    Take your temperature daily    If you have questions call:  Or Problems Call [facility] [phone]          [facility] Radiology Dept @ [phone]        The provider who performed your procedure was Dr. Goyal.

## 2019-01-22 ENCOUNTER — TELEPHONE (OUTPATIENT)
Dept: OTHER | Facility: CLINIC | Age: 69
End: 2019-01-22

## 2019-01-22 ENCOUNTER — TELEPHONE (OUTPATIENT)
Dept: INTERVENTIONAL RADIOLOGY/VASCULAR | Facility: CLINIC | Age: 69
End: 2019-01-22

## 2019-01-22 NOTE — TELEPHONE ENCOUNTER
Interventional Radiology     Received a call from Lilli alarcon who had a pigtail catheter placed into a persistent abdominal seroma on Friday 1/18/19 who had some questions.    Overall she is doing well. Initially she measured 60 mL and since then it has been 20 mL daily. The color of the fluid was reddish and is now serous. She has been measuring the output from the RITCHIE measurements and hadn't seen the measurements on the specimen cup. The measurements on the specimen cup are hard to see but are there and she will look closer when she gets home. She also wanted to make sure that it was ok to flush the fluid down the toilet which I reassured it was ok.      She will be coming back in 2 weeks for a sinogram in IR and possible sclerosing and she confirmed the plan.     A future order has been placed for a sinogram. She will need a call to help set up the appointment.     Thanks [facility] Interventional Radiology CNP ([phone]) (phone [phone])

## 2019-01-23 LAB
BACTERIA SPEC CULT: NO GROWTH
SPECIMEN SOURCE: NORMAL

## 2019-01-25 ENCOUNTER — TELEPHONE (OUTPATIENT)
Dept: INTERVENTIONAL RADIOLOGY/VASCULAR | Facility: CLINIC | Age: 69
End: 2019-01-25

## 2019-01-25 ENCOUNTER — TELEPHONE (OUTPATIENT)
Dept: FAMILY MEDICINE | Facility: CLINIC | Age: 69
End: 2019-01-25

## 2019-01-25 ENCOUNTER — OFFICE VISIT (OUTPATIENT)
Dept: FAMILY MEDICINE | Facility: CLINIC | Age: 69
End: 2019-01-25
Payer: COMMERCIAL

## 2019-01-25 VITALS
HEIGHT: 63 IN | WEIGHT: 171.3 LBS | OXYGEN SATURATION: 96 % | TEMPERATURE: 97.7 F | SYSTOLIC BLOOD PRESSURE: 144 MMHG | BODY MASS INDEX: 30.35 KG/M2 | HEART RATE: 88 BPM | DIASTOLIC BLOOD PRESSURE: 83 MMHG

## 2019-01-25 DIAGNOSIS — L08.9 WOUND INFECTION: Primary | ICD-10-CM

## 2019-01-25 DIAGNOSIS — L30.9 DERMATITIS: ICD-10-CM

## 2019-01-25 DIAGNOSIS — T14.8XXA WOUND INFECTION: Primary | ICD-10-CM

## 2019-01-25 PROCEDURE — 99214 OFFICE O/P EST MOD 30 MIN: CPT | Performed by: FAMILY MEDICINE

## 2019-01-25 RX ORDER — MOMETASONE FUROATE 1 MG/G
CREAM TOPICAL
Qty: 45 G | Refills: 0 | Status: SHIPPED | OUTPATIENT
Start: 2019-01-25 | End: 2022-01-13

## 2019-01-25 ASSESSMENT — MIFFLIN-ST. JEOR: SCORE: 1276.14

## 2019-01-25 NOTE — TELEPHONE ENCOUNTER
"Interventional Radiology    Received a call from Lilli alarcon who had a pigtail catheter placed into a persistent abdominal seroma on Friday 1/18/19. I last spoke with her on 1/22.     She said her tube site was \"infected\". Last night she noticed a pain at the drain site, took off the dressing and noted it was red around the tube. She put some cream on the site and re dressed it. She didn't know what to do so she went to her PCP. She denies fever, drainage at the site. Yesterday she had some red in the drainage but she also stated she was more active and sometimes her drain would get pulled on during regular activities. Output is < 15 mL daily.     I called and asked if she could send a picture of it. Often it is the stitches that irritate and inflame the skin and that is what it appeared to be after looking at the picture. I suggested putting a piece of tape lower on the tube as a stabilizer but she is allergic to tape. She uses tegaderm for dressings and will use tegaderm to stabilize the tube so if it gets pulled on the stress won't be right at the skin or stitch site. We also reviewed putting some gauze under the drain and leaving some slack.    She was comfortable with the above and I asked that she call me on Monday 1/28 and update her progress. She wanted to get in sooner than 2/4 if possible also.     Thanks [facility] Interventional Radiology CNP ([phone]) (phone [phone])        "

## 2019-01-25 NOTE — NURSING NOTE
"Chief Complaint   Patient presents with     Hospital F/U     /83   Pulse 88   Temp 97.7  F (36.5  C) (Oral)   Ht 1.6 m (5' 3\")   Wt 77.7 kg (171 lb 4.8 oz)   LMP  (LMP Unknown)   SpO2 96%   BMI 30.34 kg/m   Estimated body mass index is 30.34 kg/m  as calculated from the following:    Height as of this encounter: 1.6 m (5' 3\").    Weight as of this encounter: 77.7 kg (171 lb 4.8 oz).  Medication Reconciliation: complete      Health Maintenance that is potentially due pending provider review:  Colonoscopy/FIT    Pt will schedule Colonoscopy/FIT appt.    TESFAYE Chaidez  "

## 2019-01-25 NOTE — TELEPHONE ENCOUNTER
Spoke with patient.  Had a drain put in after surgery and she thinks it's now infected.  Has put 2 calls into surgeons office. First call they took message but haven't call her back.  2nd call they asked her to call PCP.    Patient frustrated with what she says is the lack of concern from surgeons office and the nurses there  Scheduled patient to see LS today at 12:20    Miya Valdovinos RN

## 2019-01-25 NOTE — TELEPHONE ENCOUNTER
Reason for Call:  Other Symptom and medication request    Detailed comments: The patient had a drain put in after surgery and now it is infected and she wants an RX sent to the pharmacy     [facility] [address] [place] [address]      Phone Number Patient can be reached at: Cell number on file:    Telephone Information:   Mobile [phone]       Best Time: anytime    Can we leave a detailed message on this number? YES    Call taken on 1/25/2019 at 11:33 AM by Akosua Mccray

## 2019-01-25 NOTE — PROGRESS NOTES
SUBJECTIVE:   Lilli Steven is a 68 year old female who presents to clinic today for the following health issues:          Hospital Follow-up Visit: has had surgery in that area a month ago and now had it drained a week ago and a drain was placed for two weeks, she has noticed redness around the incision and is worried it might be infected . She called the surgeon's office and did not get a reply , was told by the surgeon's nurse to see her PCP. She denies any fevers no chills.   The liquid in the bad that is draining also appeared red and murky.    Hospital/Nursing Home/IP Rehab Facility: [facility]  Date of Admission: 1/18/19  Date of Discharge: 1/18/19  Reason(s) for Admission: abdominal wall seroma, sequela - possible wound infection             Problems taking medications regularly:  None       Medication changes since discharge: None       Problems adhering to non-medication therapy:  None    Summary of hospitalization:  [facility] discharge summary reviewed  Diagnostic Tests/Treatments reviewed.  Follow up needed: none  Other Healthcare Providers Involved in Patient s Care:         None  Update since discharge: improved.     Post Discharge Medication Reconciliation: discharge medications reconciled, continue medications without change.  Plan of care communicated with patient     Coding guidelines for this visit:  Type of Medical   Decision Making Face-to-Face Visit       within 7 Days of discharge Face-to-Face Visit        within 14 days of discharge   Moderate Complexity [id number]   High Complexity 34266 [id number]            2) she hs had many reactions to topical products in the past like Tegaderm, so not sure if the redness around the wound could be related to sensitivity or reaction to the drainitself- she had some Elocon 0.1 % cream that she applied externally today and seems that the redness is less now .    Problem list and histories reviewed & adjusted, as  indicated.  Additional history: as documented    Patient Active Problem List   Diagnosis     Spasmodic torticollis     Wears glasses     Neck pain     Seasonal allergies     Genetic torsion dystonia     Osteopenia     Hyperlipidemia LDL goal <160     Tear of lateral cartilage or meniscus of knee, current     Urinary incontinence     Past Surgical History:   Procedure Laterality Date     COSMETIC ABDOMINOPLASTY MODIFIED N/A 2017    Procedure: COSMETIC ABDOMINOPLASTY MODIFIED;  Surgeon: Inés Harrell MD;  Location:  OR     HERNIORRHAPHY EPIGASTRIC N/A 2017    Procedure: HERNIORRHAPHY EPIGASTRIC;  Surgeon: Joey Sargent MD;  Location:  OR     HERNIORRHAPHY UMBILICAL N/A 2017    Procedure: HERNIORRHAPHY UMBILICAL;  Surgeon: Joey Sargent MD;  Location:  OR     IMPLANT STIMULATOR SACRAL NERVE STAGE ONE      for bladder incontinence, failed; battery is dead     INCISION AND DRAINAGE ABDOMEN WASHOUT, COMBINED N/A 2017    Procedure: COMBINED INCISION AND DRAINAGE ABDOMEN WASHOUT;  EVACUATION OF ABDOMINAL WALL SEROMA;  Surgeon: Inés Harrell MD;  Location:  OR     IRRIGATION AND DEBRIDEMENT TRUNK, COMBINED N/A 3/29/2017    Procedure: COMBINED IRRIGATION AND DEBRIDEMENT TRUNK;  Surgeon: Inés Harrell MD;  Location:  SD     IRRIGATION AND DEBRIDEMENT TRUNK, COMBINED N/A 2018    Procedure: EVACUATION OF ABDOMINAL WALL SEROMA;  Surgeon: Inés Harrell MD;  Location: [facility]     REVISE SCAR TRUNK N/A 3/21/2018    Procedure: REVISE SCAR TRUNK;  ABDOMINAL SCAR REVISION.;  Surgeon: Inés Harrell MD;  Location: [facility]     TONSILLECTOMY         Social History     Tobacco Use     Smoking status: Former Smoker     Packs/day: 1.00     Types: Cigarettes     Last attempt to quit: 1985     Years since quittin.0     Smokeless tobacco: Never Used   Substance Use Topics     Alcohol use: Yes     Alcohol/week: 0.0 oz     Comment: 0-3  DRINKS/WEEK     Family History   Problem Relation Age of Onset     Cancer Father         49 yrs old - bladder     Dementia Mother         she is living in [facility] home in [facility]     Breast Cancer Mother         70s     Heart Disease Maternal Grandfather         disease     Hypertension Maternal Grandfather      High cholesterol Maternal Grandfather      Heart Disease Paternal Grandmother         disease     Hypertension Paternal Grandmother      High cholesterol Paternal Grandmother      Heart Disease Maternal Grandmother         disease     Hypertension Paternal Grandfather      High cholesterol Paternal Grandfather      Diabetes No family hx of      Coronary Artery Disease No family hx of      Hyperlipidemia No family hx of      Cerebrovascular Disease No family hx of      Colon Cancer No family hx of      Prostate Cancer No family hx of      Other Cancer No family hx of      Depression No family hx of      Anxiety Disorder No family hx of      Mental Illness No family hx of      Substance Abuse No family hx of      Anesthesia Reaction No family hx of      Asthma No family hx of      Osteoporosis No family hx of      Genetic Disorder No family hx of      Thyroid Disease No family hx of      Obesity No family hx of      Unknown/Adopted No family hx of          Current Outpatient Medications   Medication Sig Dispense Refill     amoxicillin-clavulanate (AUGMENTIN) 875-125 MG tablet Take 1 tablet by mouth 2 times daily for 10 days 20 tablet 0     mometasone (ELOCON) 0.1 % external cream Apply sparingly to affected area twice daily as needed.  Do not apply to face. 45 g 0     OnabotulinumtoxinA (BOTOX IJ) Inject 150 Units into the muscle once Lot # /C3 with Expiration Date: 03/2021       RESTASIS MULTIDOSE 0.05 % ophthalmic emulsion Place 1 drop into both eyes 2 times daily  3     simvastatin (ZOCOR) 20 MG tablet Take 1 tablet (20 mg) by mouth At Bedtime 90 tablet 3     traZODone (DESYREL) 50 MG tablet TAKE  "3 TABLETS BY MOUTH AT BEDTIME 90 tablet 11     Allergies   Allergen Reactions     Meloxicam      Diarrhea, vomiting     Primidone Other (See Comments)     Felt like motion sickness     Tramadol      Diarrhea, vomiting      Adhesive Tape Rash     Durabond only     Allergy Rash     Dermabond  Anesthesia IV set misc       Tegaderm Transparent Dressing (Informational Only) Rash     With long exposure     Recent Labs   Lab Test 06/02/17  2140 03/27/17  0910   LDL  --  106*   HDL  --  54   TRIG  --  151*   ALT  --  19   CR 0.90 0.76   GFRESTIMATED 63 76   GFRESTBLACK 76 >90   GFR Calc     POTASSIUM  --  4.6      BP Readings from Last 3 Encounters:   01/25/19 144/83   01/18/19 (!) 184/97   01/16/19 136/83    Wt Readings from Last 3 Encounters:   01/25/19 77.7 kg (171 lb 4.8 oz)   01/18/19 77.6 kg (171 lb)   11/29/18 76.3 kg (168 lb 4.8 oz)                  Labs reviewed in EPIC    Reviewed and updated as needed this visit by clinical staff       Reviewed and updated as needed this visit by Provider         ROS:  Constitutional, HEENT, cardiovascular, pulmonary, GI, , musculoskeletal, neuro, skin, endocrine and psych systems are negative, except as otherwise noted.    OBJECTIVE:     /83   Pulse 88   Temp 97.7  F (36.5  C) (Oral)   Ht 1.6 m (5' 3\")   Wt 77.7 kg (171 lb 4.8 oz)   LMP  (LMP Unknown)   SpO2 96%   BMI 30.34 kg/m    Body mass index is 30.34 kg/m .  GENERAL: healthy, alert and no distress  EYES: Eyes grossly normal to inspection, PERRL and conjunctivae and sclerae normal  NECK: no adenopathy, no asymmetry, masses, or scars and thyroid normal to palpation  RESP: lungs clear to auscultation - no rales, rhonchi or wheezes  CV: regular rate and rhythm, normal S1 S2, no S3 or S4, no murmur, click or rub, no peripheral edema and peripheral pulses strong  ABDOMEN: soft, nontender, no hepatosplenomegaly, no masses and bowel sounds normal EXCEPT there is a drain in the right mid abdomen, " lateral of the umbilicus , draining in a plastic bag with small amount of liquid , yellowish brown in color. The skin around the incision is red, but there is no tenderness with palpation in the abdominal area , no rebound, no referred pain and no guarding, no palpable masses  MS: no gross musculoskeletal defects noted, no edema    Diagnostic Test Results:  No results found for this or any previous visit (from the past 24 hour(s)).    ASSESSMENT/PLAN:       1. Wound infection  We discussed that it is possible she has a skin reaction but also with the change in the draining liquid color, I would Rx Abx for the weekend, she will call the surgeon on Monday to be seen, see below .  - amoxicillin-clavulanate (AUGMENTIN) 875-125 MG tablet; Take 1 tablet by mouth 2 times daily for 10 days  Dispense: 20 tablet; Refill: 0    2. Dermatitis  She can continue with the topical Elocon as it has been helpful and she has had many skin reactions to products in the past including to Tegaderm. If any fevers chills , nausea vomiting , trouble having B M , would need to be seen,  - mometasone (ELOCON) 0.1 % external cream; Apply sparingly to affected area twice daily as needed.  Do not apply to face.  Dispense: 45 g; Refill: 0    RTC if no improving or worsening.  Pt is aware  and comfortable with the current plan.      Teresa Cooney MD  [facility]

## 2019-01-29 ENCOUNTER — TELEPHONE (OUTPATIENT)
Dept: INTERVENTIONAL RADIOLOGY/VASCULAR | Facility: CLINIC | Age: 69
End: 2019-01-29

## 2019-01-29 NOTE — TELEPHONE ENCOUNTER
Interventional Radiology (1/29/19 at 1130-Late entry)    Lilli called today to give an update on her progress. She is doing ok. The tube site feels better, less red but she does have a slight rash around the site. Outputs have continued < 15 mL daily.     She would like to get in sooner to have the drain checked and possible sclerosed but after further discussion (This has been a recurrent, persistent seroma for a couple years so the longer she lets it drain the better. There is no rush at this point)     She will wait to come back on the scheduled appointment time on Monday 2/4/19. She will also call if she has any issues or other questions or concerns.     Thanks [facility] Interventional Radiology CNP ([phone]) (phone [phone])

## 2019-01-30 ENCOUNTER — TELEPHONE (OUTPATIENT)
Dept: INTERVENTIONAL RADIOLOGY/VASCULAR | Facility: CLINIC | Age: 69
End: 2019-01-30

## 2019-01-30 NOTE — TELEPHONE ENCOUNTER
Interventional Radiology     Lilli called today with questions about her abdominal drain. She is flying out of town next Thursday 2/7/19 and was wondering if she could do that with her drain in place, whether she needed a note from her medical provider.     She was informed she could fly with the drain in place and did not need a note.     Her outputs continue less than 15 mL daily and since she is going out of town it could help to start sclerosing sooner in case the drain comes out prior to her trip.     She is scheduled for tomorrow at 11 in IR, arriving to care suites at 1030. No sedation or IV needed. She will have a sinogram in IR with possible sclerosing. Possibly betadine flushes at home also.     Discussed with Dr Long who is here tomorrow.     Thanks [facility] Interventional Radiology CNP ([phone]) (phone [phone])

## 2019-01-31 ENCOUNTER — APPOINTMENT (OUTPATIENT)
Dept: INTERVENTIONAL RADIOLOGY/VASCULAR | Facility: CLINIC | Age: 69
End: 2019-01-31
Attending: RADIOLOGY
Payer: COMMERCIAL

## 2019-01-31 ENCOUNTER — HOSPITAL ENCOUNTER (OUTPATIENT)
Facility: CLINIC | Age: 69
Discharge: HOME | End: 2019-01-31
Attending: RADIOLOGY | Admitting: RADIOLOGY
Payer: COMMERCIAL

## 2019-01-31 VITALS
OXYGEN SATURATION: 98 % | SYSTOLIC BLOOD PRESSURE: 152 MMHG | DIASTOLIC BLOOD PRESSURE: 76 MMHG | TEMPERATURE: 96.6 F | RESPIRATION RATE: 18 BRPM | HEART RATE: 89 BPM

## 2019-01-31 DIAGNOSIS — S30.1XXS ABDOMINAL WALL SEROMA, SEQUELA: ICD-10-CM

## 2019-01-31 PROCEDURE — 49185 SCLEROTX FLUID COLLECTION: CPT

## 2019-01-31 PROCEDURE — 40000854 ZZH STATISTIC SIMPLE TUBE INSERTION/CHARGE, PORT, CATH, FISTULOGRAM

## 2019-01-31 PROCEDURE — 20500 NJX SINUS TRACT THERAPEUTIC: CPT

## 2019-01-31 PROCEDURE — 27210995 ZZH RX 272: Performed by: NURSE PRACTITIONER

## 2019-01-31 PROCEDURE — 20501 NJX SINUS TRACT DIAGNOSTIC: CPT

## 2019-01-31 PROCEDURE — C1769 GUIDE WIRE: HCPCS

## 2019-01-31 RX ORDER — NICOTINE POLACRILEX 4 MG
15-30 LOZENGE BUCCAL
Status: DISCONTINUED | OUTPATIENT
Start: 2019-01-31 | End: 2019-01-31 | Stop reason: HOSPADM

## 2019-01-31 RX ORDER — DEXTROSE MONOHYDRATE 25 G/50ML
25-50 INJECTION, SOLUTION INTRAVENOUS
Status: DISCONTINUED | OUTPATIENT
Start: 2019-01-31 | End: 2019-01-31 | Stop reason: HOSPADM

## 2019-01-31 RX ORDER — LIDOCAINE HYDROCHLORIDE 10 MG/ML
INJECTION, SOLUTION INFILTRATION; PERINEURAL
Status: DISCONTINUED
Start: 2019-01-31 | End: 2019-01-31 | Stop reason: HOSPADM

## 2019-01-31 RX ORDER — LIDOCAINE 40 MG/G
CREAM TOPICAL
Status: DISCONTINUED | OUTPATIENT
Start: 2019-01-31 | End: 2019-01-31 | Stop reason: HOSPADM

## 2019-01-31 RX ADMIN — ALCOHOL 5 ML: 0.98 INJECTION INTRASPINAL at 12:13

## 2019-01-31 NOTE — IP AVS SNAPSHOT
MRN:[id number]                      After Visit Summary   1/31/2019    Lilli Steven    MRN: [id number]           Visit Information        Department      1/31/2019 10:55 AM [facility]          Review of your medicines      UNREVIEWED medicines. Ask your doctor about these medicines       Dose / Directions   amoxicillin-clavulanate 875-125 MG tablet  Commonly known as:  AUGMENTIN  Used for:  Wound infection      Dose:  1 tablet  Take 1 tablet by mouth 2 times daily for 10 days  Quantity:  20 tablet  Refills:  0     * BOTOX IJ      Dose:  150 Units  Inject 150 Units into the muscle once Lot # /C3 with Expiration Date: 03/2021  Refills:  0     * BOTOX IJ  Ask about: Should I take this medication?      Dose:  150 Units  Inject 150 Units as directed once /C3  Exp 05/2021  Refills:  0     mometasone 0.1 % external cream  Commonly known as:  ELOCON  Used for:  Dermatitis      Apply sparingly to affected area twice daily as needed.  Do not apply to face.  Quantity:  45 g  Refills:  0     RESTASIS MULTIDOSE 0.05 % ophthalmic emulsion  Generic drug:  cycloSPORINE      Dose:  1 drop  Place 1 drop into both eyes 2 times daily  Refills:  3     simvastatin 20 MG tablet  Commonly known as:  ZOCOR  Used for:  Hyperlipidemia LDL goal <160      Dose:  20 mg  Take 1 tablet (20 mg) by mouth At Bedtime  Quantity:  90 tablet  Refills:  3     traZODone 50 MG tablet  Commonly known as:  DESYREL  Used for:  Persistent insomnia      TAKE 3 TABLETS BY MOUTH AT BEDTIME  Quantity:  90 tablet  Refills:  11         * This list has 2 medication(s) that are the same as other medications prescribed for you. Read the directions carefully, and ask your doctor or other care provider to review them with you.                  Protect others around you: Learn how to safely use, store and throw away your medicines at www.disposemymeds.org.       Follow-ups after your visit       Your next 10 appointments  already scheduled    Mar 25, 2019  3:40 PM CDT  (Arrive by 3:25 PM)  Return Botox with Hermes Frias MD  [facility] ([facility]) [address] [place] [id number]  [phone]   Jun 20, 2019  3:50 PM CDT  (Arrive by 3:35 PM)  Return Botox with Cassidy Ramirez MD  [facility] ([facility]) [address] [place] [id number]  [phone]      Care Instructions       Further instructions from your care team       Drainage Tube Removal Discharge Instructions     After you go home:      You may resume your normal diet    Care of Insertion Site:      Please change the dressing daily until healed, but more often if dressing becomes wet or dirty. It is not unusual to have drainage from the opening until the site is completely healed.     You may remove the dressing when the site is completely healed     You may shower with the dressing kept dry with plastic until healed     No tub baths, whirlpools or swimming until your puncture site has fully healed    Activity:      Normal activity as tolerated, no restrictions from previous.     Medicines:      You may resume all medications               Call the provider who ordered this procedure if:      The site is red, swollen, hot or tender    There is foul-smelling drainage from the tube site    You have pain that is getting worse or that does not improve with pain medication    You have chills or a fever greater than 101 F (38 C)    If the leaking continues for more than 3 days    Any questions or concerns    Call  911 or go to the Emergency Room if you have:      Severe pain or trouble breathing    Bleeding that you cannot control      If you have questions call:          [facility] Radiology Dept @ [phone]      The provider who performed your procedure was Dr Lantigua  Ethan                  Additional Information About Your Visit       [employer] Information    [employer] gives you secure access to your electronic health record. If you see a primary care provider, you can also send messages to your care team and make appointments. If you have questions, please call your primary care clinic.  If you do not have a primary care provider, please call [phone] and they will assist you.       Care EveryWhere ID    This is your Care EveryWhere ID. This could be used by other organizations to access your [facility] medical records  [id number]       Your Vitals Were     Blood Pressure   152/76 (BP Location: Right arm)          Pulse   89          Temperature   96.6  F (35.9  C) (Oral)          Respirations   18          Last Period    (LMP Unknown)             Pulse Oximetry   98%          Primary Care Provider Office Phone # Fax #    Vargas Ata Chaudhry [phone]      Equal Access to Services    [facility]: Hadii thais fuentes hadasho Soomaali, waaxda luqadaha, qaybta kaalmada adeegyada, shakeel stevens hayfaizan prado . So [facility] [phone].    ATENCIÓN: Si habla español, tiene a sweet disposición servicios gratuitos de asistencia lingüística. Llame al [phone].    We comply with applicable federal civil rights laws and [place] laws. We do not discriminate on the basis of race, color, national origin, age, disability, sex, sexual orientation, or gender identity.           Thank you!    Thank you for choosing [facility] for your care. Our goal is always to provide you with excellent care. Hearing back from our patients is one way we can continue to improve our services. Please take a few minutes to complete the written survey that you may receive in the mail after you visit with us. Thank you!            Medication List      ASK your doctor about these medications          Morning Afternoon Evening Bedtime As Needed    amoxicillin-clavulanate 875-125 MG  tablet  Also known as:  AUGMENTIN  INSTRUCTIONS:  Take 1 tablet by mouth 2 times daily for 10 days                     * BOTOX IJ  INSTRUCTIONS:  Inject 150 Units into the muscle once Lot # /C3 with Expiration Date: 03/2021                     * BOTOX IJ  INSTRUCTIONS:  Inject 150 Units as directed once /C3  Exp 05/2021  Ask about: Should I take this medication?                     mometasone 0.1 % external cream  Also known as:  ELOCON  INSTRUCTIONS:  Apply sparingly to affected area twice daily as needed.  Do not apply to face.                     RESTASIS MULTIDOSE 0.05 % ophthalmic emulsion  INSTRUCTIONS:  Place 1 drop into both eyes 2 times daily  Generic drug:  cycloSPORINE                     simvastatin 20 MG tablet  Also known as:  ZOCOR  INSTRUCTIONS:  Take 1 tablet (20 mg) by mouth At Bedtime                     traZODone 50 MG tablet  Also known as:  DESYREL  INSTRUCTIONS:  TAKE 3 TABLETS BY MOUTH AT BEDTIME                        * This list has 2 medication(s) that are the same as other medications prescribed for you. Read the directions carefully, and ask your doctor or other care provider to review them with you.

## 2019-01-31 NOTE — PROGRESS NOTES
Please see procedure note for evaluation of drain and plan.     After explaining the drain removal, answering questions, aspirating 5 mL of pink fluid the drain was removed intact without pain. Gauze and Tegaderm was placed over the opening.     Drain instructions in the discharge instruction area.     Thanks [facility] Interventional Radiology CNP ([phone]) (phone [phone])

## 2019-01-31 NOTE — DISCHARGE INSTRUCTIONS
Drainage Tube Removal Discharge Instructions     After you go home:      You may resume your normal diet    Care of Insertion Site:      Please change the dressing daily until healed, but more often if dressing becomes wet or dirty. It is not unusual to have drainage from the opening until the site is completely healed.     You may remove the dressing when the site is completely healed     You may shower with the dressing kept dry with plastic until healed     No tub baths, whirlpools or swimming until your puncture site has fully healed    Activity:      Normal activity as tolerated, no restrictions from previous.     Medicines:      You may resume all medications               Call the provider who ordered this procedure if:      The site is red, swollen, hot or tender    There is foul-smelling drainage from the tube site    You have pain that is getting worse or that does not improve with pain medication    You have chills or a fever greater than 101 F (38 C)    If the leaking continues for more than 3 days    Any questions or concerns    Call  911 or go to the Emergency Room if you have:      Severe pain or trouble breathing    Bleeding that you cannot control      If you have questions call:          [facility] Radiology Dept @ [phone]      The provider who performed your procedure was Dr Grzegorz Long

## 2019-01-31 NOTE — PROGRESS NOTES
Stable post drain removal. Denies pain, discharge instructions reviewed and given. Abdominal site covered with 2x2 and tegaderm by Melissa ISRAEL.PMiko

## 2019-01-31 NOTE — IP AVS SNAPSHOT
[place] [address] [place] [address]  Phone:  [phone]                                    After Visit Summary   1/31/2019    Lilli Steven    MRN: [id number]           After Visit Summary Signature Page    I have received my discharge instructions, and my questions have been answered. I have discussed any challenges I see with this plan with the nurse or doctor.    ..........................................................................................................................................  Patient/Patient Representative Signature      ..........................................................................................................................................  Patient Representative Print Name and Relationship to Patient    ..................................................               ................................................  Date                                   Time    ..........................................................................................................................................  Reviewed by Signature/Title    ...................................................              ..............................................  Date                                               Time          22EPIC Rev 08/18

## 2019-03-03 DIAGNOSIS — G47.00 PERSISTENT INSOMNIA: ICD-10-CM

## 2019-03-04 RX ORDER — TRAZODONE HYDROCHLORIDE 50 MG/1
TABLET, FILM COATED ORAL
Qty: 90 TABLET | Refills: 0 | Status: SHIPPED | OUTPATIENT
Start: 2019-03-04 | End: 2019-04-02

## 2019-03-04 NOTE — TELEPHONE ENCOUNTER
"Medication is being filled for 1 time refill only due to:  Patient needs to be seen because Due for physical. Last 7/28/17.   Has been seen only for pre-op x 2 since and acute issues.    Miya Valdovinos RN    Requested Prescriptions   Pending Prescriptions Disp Refills     traZODone (DESYREL) 50 MG tablet [Pharmacy Med Name: TRAZODONE 50 MG TABLET] 90 tablet 0     Sig: TAKE 3 TABLETS BY MOUTH AT BEDTIME    Serotonin Modulators Passed - 3/3/2019  8:29 AM       Passed - Recent (12 mo) or future (30 days) visit within the authorizing provider's specialty    Patient had office visit in the last 12 months or has a visit in the next 30 days with authorizing provider or within the authorizing provider's specialty.  See \"Patient Info\" tab in inbasket, or \"Choose Columns\" in Meds & Orders section of the refill encounter.             Passed - Medication is active on med list       Passed - Patient is age 18 or older       Passed - No active pregnancy on record       Passed - No positive pregnancy test in past 12 months            "

## 2019-03-06 ENCOUNTER — TELEPHONE (OUTPATIENT)
Dept: INTERVENTIONAL RADIOLOGY/VASCULAR | Facility: CLINIC | Age: 69
End: 2019-03-06

## 2019-03-06 NOTE — TELEPHONE ENCOUNTER
Interventional Radiology     Lilli Good called today with some questions. She is still feeling as swollen as she had when we last saw her on 1/31/19. At that time the fluid collection that was seen was so small and superficial IR could not effectively sclerose the area. The drain was removed and Lilli Good was instructed to follow up with Dr Harrell for further care if needed. There has been no decrease in the abdominal swelling.     I recommended today that she follow up with Dr Harrell as she may order further imaging to see if there is a fluid collection that is larger and deeper that could be drained and for further care instructions as needed.      She verbalized she would do that.    Thanks [facility] Interventional Radiology CNP ([phone]) (phone [phone])

## 2019-03-18 ENCOUNTER — TELEPHONE (OUTPATIENT)
Dept: FAMILY MEDICINE | Facility: CLINIC | Age: 69
End: 2019-03-18

## 2019-03-18 ENCOUNTER — OFFICE VISIT (OUTPATIENT)
Dept: FAMILY MEDICINE | Facility: CLINIC | Age: 69
End: 2019-03-18
Payer: COMMERCIAL

## 2019-03-18 VITALS
HEART RATE: 87 BPM | SYSTOLIC BLOOD PRESSURE: 133 MMHG | BODY MASS INDEX: 30.48 KG/M2 | TEMPERATURE: 98.2 F | RESPIRATION RATE: 16 BRPM | HEIGHT: 63 IN | DIASTOLIC BLOOD PRESSURE: 75 MMHG | OXYGEN SATURATION: 93 % | WEIGHT: 172 LBS

## 2019-03-18 DIAGNOSIS — Z01.818 PREOP GENERAL PHYSICAL EXAM: Primary | ICD-10-CM

## 2019-03-18 DIAGNOSIS — Z12.11 SPECIAL SCREENING FOR MALIGNANT NEOPLASMS, COLON: ICD-10-CM

## 2019-03-18 DIAGNOSIS — S30.1XXA ABDOMINAL WALL SEROMA, INITIAL ENCOUNTER: ICD-10-CM

## 2019-03-18 PROCEDURE — 99214 OFFICE O/P EST MOD 30 MIN: CPT | Performed by: FAMILY MEDICINE

## 2019-03-18 ASSESSMENT — MIFFLIN-ST. JEOR: SCORE: 1274.32

## 2019-03-18 NOTE — TELEPHONE ENCOUNTER
Team,   Patient coming in for preop today     Next 5 appointments (look out 90 days)    Mar 18, 2019 12:30 PM CDT  Pre-Op physical with Vargas Chaudhry MD  [facility] ([facility]) [address] [facility] [address] [phone]        See below # to fax preop notes to once completed  Thanks,  Dasha RYAN RN

## 2019-03-18 NOTE — NURSING NOTE
"Chief Complaint   Patient presents with     Pre-Op Exam     /75   Pulse 87   Temp 98.2  F (36.8  C) (Oral)   Resp 16   Ht 1.6 m (5' 3\")   Wt 78 kg (172 lb)   LMP  (LMP Unknown)   SpO2 93%   BMI 30.47 kg/m   Estimated body mass index is 30.47 kg/m  as calculated from the following:    Height as of this encounter: 1.6 m (5' 3\").    Weight as of this encounter: 78 kg (172 lb).  bp completed using cuff size: large       Health Maintenance addressed:  Colonoscopy/FIT    Number given to pt.     Valentina Garza MA     "

## 2019-03-18 NOTE — TELEPHONE ENCOUNTER
Reason for Call:  Pre-Op info Needed    Detailed comments: Please Fax over ALL Pre-Op info to [facility]  Fax # [phone]      Call taken on 3/18/2019 at 11:50 AM by Sotero Hines

## 2019-03-18 NOTE — PROGRESS NOTES
[facility]  [address]  [facility] [address] [phone]  Dept: [phone]    PRE-OP EVALUATION:  Today's date: 3/18/2019    Lilli Steven (: 1950) presents for pre-operative evaluation assessment as requested by Dr. Harrell.  She requires evaluation and anesthesia risk assessment prior to undergoing surgery/procedure for treatment of clear seroma .    Fax number for surgical facility: [phone]  Primary Physician: Vargas Chaudhry  Type of Anesthesia Anticipated: to be determined    Patient has a Health Care Directive or Living Will:  NO    Preop Questions 3/18/2019   Who is doing your surgery? ivonne   What are you having done? clear seroma   Date of Surgery/Procedure: 3/20/2019   Facility or Hospital where procedure/surgery will be performed: [facility]   1.  Do you have a history of Heart attack, stroke, stent, coronary bypass surgery, or other heart surgery? No   2.  Do you ever have any pain or discomfort in your chest? No   3.  Do you have a history of  Heart Failure? No   4.   Are you troubled by shortness of breath when:  walking on a level surface, or up a slight hill, or at night? No   5.  Do you currently have a cold, bronchitis or other respiratory infection? No   6.  Do you have a cough, shortness of breath, or wheezing? No   7.  Do you sometimes get pains in the calves of your legs when you walk? No   8. Do you or anyone in your family have previous history of blood clots? No   9.  Do you or does anyone in your family have a serious bleeding problem such as prolonged bleeding following surgeries or cuts? No   10. Have you ever had problems with anemia or been told to take iron pills? No   11. Have you had any abnormal blood loss such as black, tarry or bloody stools, or abnormal vaginal bleeding? No   12. Have you ever had a blood transfusion? YES -    13. Have you or any of your relatives ever had problems with anesthesia? YES -    14.  Do you have sleep apnea, excessive snoring or daytime drowsiness? No   15. Do you have any prosthetic heart valves? No   16. Do you have prosthetic joints? No   17. Is there any chance that you may be pregnant? No       HPI:     HPI related to upcoming procedure: has a seroma, and it was drained 4 times.  Usually comes back within 2-3 weeks  Has it drained percutaneously, but this did not do it either    See problem list for active medical problems.  Problems all longstanding and stable, except as noted/documented.  See ROS for pertinent symptoms related to these conditions.                                                                                                                                                          .    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     Priority: Medium     Overview:   Created by Conversion       Wears glasses 09/06/2012     Priority: Medium     Neck pain 09/06/2012     Priority: Medium     Seasonal allergies 09/06/2012     Priority: Medium     Spring months       Osteopenia 05/17/2011     Priority: Medium     DEXA 2011  T -1 Lumbar spine  Right hip T -1.8  Left hip T -1.9       Urinary incontinence 08/04/2006     Priority: Medium     Spasmodic torticollis 07/19/1988     Priority: Medium     Botox injection        Past Medical History:   Diagnosis Date     Diarrhea 9/6/2012     Hypercholesterolemia 9/6/2012     Neck pain 9/6/2012     PONV (postoperative nausea and vomiting)      Seasonal allergies 9/6/2012     Snores 9/6/2012     Tremor      Wears glasses 9/6/2012     Past Surgical History:   Procedure Laterality Date     COSMETIC ABDOMINOPLASTY MODIFIED N/A  2/16/2017    Procedure: COSMETIC ABDOMINOPLASTY MODIFIED;  Surgeon: Inés Harrell MD;  Location:  OR     HERNIORRHAPHY EPIGASTRIC N/A 2/16/2017    Procedure: HERNIORRHAPHY EPIGASTRIC;  Surgeon: Joey Sargent MD;  Location:  OR     HERNIORRHAPHY UMBILICAL N/A 2/16/2017    Procedure: HERNIORRHAPHY UMBILICAL;  Surgeon: Joey Sargent MD;  Location:  OR     IMPLANT STIMULATOR SACRAL NERVE STAGE ONE      for bladder incontinence, failed; battery is dead     INCISION AND DRAINAGE ABDOMEN WASHOUT, COMBINED N/A 6/2/2017    Procedure: COMBINED INCISION AND DRAINAGE ABDOMEN WASHOUT;  EVACUATION OF ABDOMINAL WALL SEROMA;  Surgeon: Inés Harrell MD;  Location:  OR     IR SINOGRAM INJECTION DIAGNOSTIC  1/31/2019     IRRIGATION AND DEBRIDEMENT TRUNK, COMBINED N/A 3/29/2017    Procedure: COMBINED IRRIGATION AND DEBRIDEMENT TRUNK;  Surgeon: Inés Harrell MD;  Location:  SD     IRRIGATION AND DEBRIDEMENT TRUNK, COMBINED N/A 11/29/2018    Procedure: EVACUATION OF ABDOMINAL WALL SEROMA;  Surgeon: Inés Harrell MD;  Location:  SD     REVISE SCAR TRUNK N/A 3/21/2018    Procedure: REVISE SCAR TRUNK;  ABDOMINAL SCAR REVISION.;  Surgeon: Inés Harrell MD;  Location:  SD     TONSILLECTOMY       Current Outpatient Medications   Medication Sig Dispense Refill     mometasone (ELOCON) 0.1 % external cream Apply sparingly to affected area twice daily as needed.  Do not apply to face. 45 g 0     OnabotulinumtoxinA (BOTOX IJ) Inject 150 Units into the muscle once Lot # /C3 with Expiration Date: 03/2021       RESTASIS MULTIDOSE 0.05 % ophthalmic emulsion Place 1 drop into both eyes 2 times daily  3     simvastatin (ZOCOR) 20 MG tablet Take 1 tablet (20 mg) by mouth At Bedtime 90 tablet 3     traZODone (DESYREL) 50 MG tablet TAKE 3 TABLETS BY MOUTH AT BEDTIME 90 tablet 0     OTC products: None, except as noted above    Allergies   Allergen Reactions     Meloxicam   "    Diarrhea, vomiting     Primidone Other (See Comments)     Felt like motion sickness     Tramadol      Diarrhea, vomiting      Adhesive Tape Rash     Durabond only     Allergy Rash     Dermabond  Anesthesia IV set misc       Tegaderm Transparent Dressing (Informational Only) Rash     With long exposure      Latex Allergy: NO    Social History     Tobacco Use     Smoking status: Former Smoker     Packs/day: 1.00     Types: Cigarettes     Last attempt to quit: 1985     Years since quittin.2     Smokeless tobacco: Never Used   Substance Use Topics     Alcohol use: Yes     Alcohol/week: 0.0 oz     Comment: 0-3 DRINKS/WEEK     History   Drug Use No       REVIEW OF SYSTEMS:   CONSTITUTIONAL: NEGATIVE for fever, chills, change in weight  INTEGUMENTARY/SKIN: NEGATIVE for worrisome rashes, moles or lesions  EYES: NEGATIVE for vision changes or irritation  ENT/MOUTH: NEGATIVE for ear, mouth and throat problems  RESP: NEGATIVE for significant cough or SOB  BREAST: NEGATIVE for masses, tenderness or discharge  CV: NEGATIVE for chest pain, palpitations or peripheral edema  GI: NEGATIVE for nausea, abdominal pain, heartburn, or change in bowel habits  : NEGATIVE for frequency, dysuria, or hematuria  MUSCULOSKELETAL: NEGATIVE for significant arthralgias or myalgia  NEURO: NEGATIVE for weakness, dizziness or paresthesias  ENDOCRINE: NEGATIVE for temperature intolerance, skin/hair changes  HEME: NEGATIVE for bleeding problems  PSYCHIATRIC: NEGATIVE for changes in mood or affect    EXAM:   /75   Pulse 87   Temp 98.2  F (36.8  C) (Oral)   Resp 16   Ht 1.6 m (5' 3\")   Wt 78 kg (172 lb)   LMP  (LMP Unknown)   SpO2 93%   BMI 30.47 kg/m      General Appearance: Pleasant, alert, in no acute respiratory distress.  Head Exam: Normal. Normocephalic, atraumatic. No sinus tenderness.  Eye Exam: Normal external eye, conjunctiva, lids. ELENA.  Ear Exam: Normal auditory canals and external ears. Non-tender.  Left " TM-normal. Right TM-normal.  OroPharynx Exam: Dental hygiene adequate. Normal buccal mucosa. Normal pharynx.  Neck Exam: Supple, no masses or enlarged, tender nodes.  Thyroid Exam: No nodules or enlargement or tenderness.  Chest/Respiratory Exam: Normal, comfortable, easy respirations. Chest wall normal. Lungs are clear to auscultation. No wheezing, crackles, or rhonchi.  Cardiovascular Exam: Regular rate and rhythm. No murmur, gallop, or rubs. No pedal edema.  Gastrointestinal Exam: Soft, non-tender, no masses or organomegaly.  Musculoskeletal Exam: Back is non-tender, full ROM of upper and lower extremities.  Skin: no rash, warm and dry.  Abd seroma, soft 5cm  Neurologic Exam: Nonfocal, no tremor. Normal gait.  Psychiatric Exam: Alert - appropriate, normal affect      DIAGNOSTICS:   No labs or EKG required for low risk surgery (cataract, skin procedure, breast biopsy, etc)    Recent Labs   Lab Test 01/18/19  1311 06/02/17  2140 03/27/17  0910 02/16/17  0710   HGB  --   --   --  14.4     --   --   --    INR 0.90  --   --   --    NA  --   --  142  --    POTASSIUM  --   --  4.6  --    CR  --  0.90 0.76  --      IMPRESSION:   Reason for surgery/procedure: seroma  Diagnosis/reason for consult:     The proposed surgical procedure is considered LOW risk.    REVISED CARDIAC RISK INDEX  The patient has the following serious cardiovascular risks for perioperative complications such as (MI, PE, VFib and 3  AV Block):  No serious cardiac risks  INTERPRETATION: 0 risks: Class I (very low risk - 0.4% complication rate)    The patient has the following additional risks for perioperative complications:  No identified additional risks      ICD-10-CM    1. Preop general physical exam Z01.818    2. Abdominal wall seroma, initial encounter (H) T88.8XXA     T79.2XXA              RECOMMENDATIONS:     Cardiovascular Risk  Performs 4 METs exercise without symptoms (Climb a flight of stairs) .     Pulmonary Risk  No  concerns      --Patient is to take all scheduled medications on the day of surgery     APPROVAL GIVEN to proceed with proposed procedure, without further diagnostic evaluation       Signed Electronically by: Vargas Chaudhry MD    Copy of this evaluation report is provided to requesting physician.    [place] Preop Guidelines    Revised Cardiac Risk Index

## 2019-03-20 ENCOUNTER — HOSPITAL PATHOLOGY (OUTPATIENT)
Dept: OTHER | Facility: CLINIC | Age: 69
End: 2019-03-20

## 2019-03-21 LAB — COPATH REPORT: NORMAL

## 2019-03-25 ENCOUNTER — OFFICE VISIT (OUTPATIENT)
Dept: PHYSICAL MEDICINE AND REHAB | Facility: CLINIC | Age: 69
End: 2019-03-25
Payer: COMMERCIAL

## 2019-03-25 ENCOUNTER — RECORDS - HEALTHEAST (OUTPATIENT)
Dept: ADMINISTRATIVE | Facility: OTHER | Age: 69
End: 2019-03-25

## 2019-03-25 DIAGNOSIS — G24.1 DYSTONIA, TORSION, FRAGMENTS OF: ICD-10-CM

## 2019-03-25 DIAGNOSIS — G24.3 SPASMODIC TORTICOLLIS: Primary | ICD-10-CM

## 2019-03-25 NOTE — LETTER
3/25/2019       RE: Lilli Steven  [address]  [facility] [address]     Dear Colleague,    Thank you for referring your patient, Lilli Steven, to the [facility] PHYSICAL MEDICINE AND REHABILITATION at [facility]. Please see a copy of my visit note below.    BOTULINUM TOXIN PROCEDURE NOTE    No chief complaint on file.      LMP  (LMP Unknown)       Current Outpatient Medications:      mometasone (ELOCON) 0.1 % external cream, Apply sparingly to affected area twice daily as needed.  Do not apply to face., Disp: 45 g, Rfl: 0     OnabotulinumtoxinA (BOTOX IJ), Inject 150 Units into the muscle once Lot # /C3 with Expiration Date: 03/2021, Disp: , Rfl:      RESTASIS MULTIDOSE 0.05 % ophthalmic emulsion, Place 1 drop into both eyes 2 times daily, Disp: , Rfl: 3     simvastatin (ZOCOR) 20 MG tablet, Take 1 tablet (20 mg) by mouth At Bedtime, Disp: 90 tablet, Rfl: 3     traZODone (DESYREL) 50 MG tablet, TAKE 3 TABLETS BY MOUTH AT BEDTIME, Disp: 90 tablet, Rfl: 0     Allergies   Allergen Reactions     Meloxicam      Diarrhea, vomiting     Primidone Other (See Comments)     Felt like motion sickness     Tramadol      Diarrhea, vomiting      Adhesive Tape Rash     Durabond only     Allergy Rash     Dermabond  Anesthesia IV set misc       Tegaderm Transparent Dressing (Informational Only) Rash     With long exposure        PHYSICAL EXAM:  HEAD, NECK AND TRUNK PATTERN:   Head Tremor: Observed - no-no tremor present  Head & Neck Extension: Present - Slight  Sub-Occipital Extension: Present - Slight  Forward Head: Present - Slight   Head & Neck Lateral Bend: Left  Shoulder Elevation: Right    HPI:    Patient reports the following new medical problems since last visit: 03/20/19 had surgery to evacuate abdominal wall seroma. States seroma fluid has returned and has significantly worsened.    We reviewed the recommended safety guidelines for  Botox from any  vaccine injection, such as the seasonal flu vaccine, by a minimum of 10-14 days with Lilli Steven. She acknowledged understanding.    RESPONSE TO PREVIOUS TREATMENT:    Lilli Steven received 150 units of Botox on 2019  Problems following the previous series of neurotoxin injections included:  No problems reported    BENEFITS BY PATIENT REPORT:  Pain Improvement: Yes.  Percent Improvement: unable to quantify in % but reports significant improvement  Duration of Benefit:  10-12  weeks.     Dystonia and tremor Improvement: Yes.  Percent Improvement: unable to quantify in %, but reports significant overall improvement Duration of Benefit:  10 weeks.       BOTULINUM NEUROTOXIN INJECTION PROCEDURES:    VERIFICATION OF PATIENT IDENTIFICATION AND PROCEDURE     Initials   Patient Name scp   Patient  scp   Procedure Verified by: [facility]     Prior to the start of the procedure and with procedural staff participation, I verbally confirmed the patient s identity using two indicators, relevant allergies, that the procedure was appropriate and matched the consent or emergent situation, and that the correct equipment/implants were available. Immediately prior to starting the procedure I conducted the Time Out with the procedural staff and re-confirmed the patient s name, procedure, and site/side. (The Joint Commission universal protocol was followed.)  Yes    Sedation (Moderate or Deep): None      Above assessments performed by:  Resident/Fellow         Zoila Bauman MD          The attending provider was present for the entire procedure documented below.      Hermes Frias MD    INDICATION/S FOR PROCEDURE/S:  Lilli Steven is a 68-year-old patient with dystonia affecting the  head, neck and shoulder girdle musculature secondary to a diagnosis of cervical dystonia with associated  pain, tremor, spasms, loss of joint motion, loss of volitional motor control and difficulty with activities of daily living.      Her  baseline symptoms have been recalcitrant to oral medications and conservative therapy.  She is here today for an injection of Botox.       GOAL OF PROCEDURE:  The goal of this procedure is to increase active range of motion, improve volitional motor control and decrease pain  associated with dystonic movements, spasticity and tremor.     TOTAL DOSE ADMINISTERED:  Dose Administered:  150 units Botox    Diluent Used:  Preservative Free Normal Saline  Total Volume of Diluent Used:  1.50 ml  Lot # /C3 with Expiration Date:  09/2021  NDC #: Botox 100u ([address]-33)    Medication guide was offered to patient and was declined.    Was there drug waste? Yes  Amount of drug waste (mL): 50 units of Botox.  Reason for waste:  Single use vial  Multi-dose vial: No      CONSENT:  The risks, benefits, and treatment options were discussed with Lilli Steven and she agreed to proceed.      Written consent was obtained by [facility].    EQUIPMENT USED:  Needle-37mm stimulating/recording  EMG/NCS Machine     SKIN PREPARATION:  Skin preparation was performed using an alcohol wipe.     GUIDANCE DESCRIPTION:  Electro-myographic guidance was necessary throughout the procedure to accurately identify all areas of dystonic and spastic muscles while avoiding injection of non-dystonic muscles and non-spastic muscles, neighboring nerves and nearby vascular structures.     AREA/MUSCLE INJECTED:  150 Units Botox    1. HEAD & NECK MUSCLES: 150 units Botox = Total Dose, 1:1 Dilution                 Right Mid-Trapezius - 10 units of Botox at 1 site/s.   Left Mid-Trapezius - 10 units of Botox at 1 site/s.      Right Splenius Capitus - 15 units of Botox at 2 site/s.   Left Splenius Capitus - 10 units of Botox at 2 site/s.      Right Levator Scapulae - 30 units of Botox at 1 site/s (shoulder muscles).   Left Levator Scapulae - 20 units of Botox at 1 site/s (shoulder muscles).      Right Inferior Obliquus Capitis - 10 units of Botox at 1 site/s.    Left Inferior Obliquus Capitis - 15 units of at 1 site/s.      Right Sternal head of the Sternocleidomastoid - 15 units of Botox at 1 site/s.               Left Sternal head of the Sternocleidomastoid - 15 units of Botox at 1 site/s.    RESPONSE TO PROCEDURE:  Lilli Steven tolerated the procedure well and there were no immediate complications.  She was allowed to recover for an appropriate period of time and was discharged home in stable condition.    FOLLOW UP:  Lilli Steven was asked to follow up by phone in 7-14 days with Erin Combs PT, Care Coordinator or Mikki Rojas RN, Care Coordinator, to report her response to this series of injections.  Based on the patient's previous response to this therapy, Lilli Steven was rescheduled for the next series of injections in 12 weeks.    PLAN (Medication Changes, Therapy Orders, Work or Disability Issues, etc.): Will monitor response to today's injections and report.      Again, thank you for allowing me to participate in the care of your patient.      Sincerely,    Hermes Frias MD

## 2019-03-25 NOTE — PROGRESS NOTES
BOTULINUM TOXIN PROCEDURE NOTE    No chief complaint on file.      LMP  (LMP Unknown)       Current Outpatient Medications:      mometasone (ELOCON) 0.1 % external cream, Apply sparingly to affected area twice daily as needed.  Do not apply to face., Disp: 45 g, Rfl: 0     OnabotulinumtoxinA (BOTOX IJ), Inject 150 Units into the muscle once Lot # /C3 with Expiration Date: 03/2021, Disp: , Rfl:      RESTASIS MULTIDOSE 0.05 % ophthalmic emulsion, Place 1 drop into both eyes 2 times daily, Disp: , Rfl: 3     simvastatin (ZOCOR) 20 MG tablet, Take 1 tablet (20 mg) by mouth At Bedtime, Disp: 90 tablet, Rfl: 3     traZODone (DESYREL) 50 MG tablet, TAKE 3 TABLETS BY MOUTH AT BEDTIME, Disp: 90 tablet, Rfl: 0     Allergies   Allergen Reactions     Meloxicam      Diarrhea, vomiting     Primidone Other (See Comments)     Felt like motion sickness     Tramadol      Diarrhea, vomiting      Adhesive Tape Rash     Durabond only     Allergy Rash     Dermabond  Anesthesia IV set misc       Tegaderm Transparent Dressing (Informational Only) Rash     With long exposure        PHYSICAL EXAM:  HEAD, NECK AND TRUNK PATTERN:   Head Tremor: Observed - no-no tremor present  Head & Neck Extension: Present - Slight  Sub-Occipital Extension: Present - Slight  Forward Head: Present - Slight   Head & Neck Lateral Bend: Left  Shoulder Elevation: Right    HPI:    Patient reports the following new medical problems since last visit: 03/20/19 had surgery to evacuate abdominal wall seroma. States seroma fluid has returned and has significantly worsened.    We reviewed the recommended safety guidelines for  Botox from any vaccine injection, such as the seasonal flu vaccine, by a minimum of 10-14 days with Lilli Steven. She acknowledged understanding.    RESPONSE TO PREVIOUS TREATMENT:    Lilli Steven received 150 units of Botox on 01/16/2019  Problems following the previous series of neurotoxin injections included:  No problems  reported    BENEFITS BY PATIENT REPORT:  Pain Improvement: Yes.  Percent Improvement: unable to quantify in % but reports significant improvement  Duration of Benefit:  10-12  weeks.     Dystonia and tremor Improvement: Yes.  Percent Improvement: unable to quantify in %, but reports significant overall improvement Duration of Benefit:  10 weeks.       BOTULINUM NEUROTOXIN INJECTION PROCEDURES:    VERIFICATION OF PATIENT IDENTIFICATION AND PROCEDURE     Initials   Patient Name scp   Patient  scp   Procedure Verified by: [facility]     Prior to the start of the procedure and with procedural staff participation, I verbally confirmed the patient s identity using two indicators, relevant allergies, that the procedure was appropriate and matched the consent or emergent situation, and that the correct equipment/implants were available. Immediately prior to starting the procedure I conducted the Time Out with the procedural staff and re-confirmed the patient s name, procedure, and site/side. (The Joint Commission universal protocol was followed.)  Yes    Sedation (Moderate or Deep): None      Above assessments performed by:  Resident/Fellow         Zoila Bauman MD          The attending provider was present for the entire procedure documented below.      Hermes Frias MD    INDICATION/S FOR PROCEDURE/S:  Lilli Steven is a 68-year-old patient with dystonia affecting the  head, neck and shoulder girdle musculature secondary to a diagnosis of cervical dystonia with associated  pain, tremor, spasms, loss of joint motion, loss of volitional motor control and difficulty with activities of daily living.      Her baseline symptoms have been recalcitrant to oral medications and conservative therapy.  She is here today for an injection of Botox.       GOAL OF PROCEDURE:  The goal of this procedure is to increase active range of motion, improve volitional motor control and decrease pain  associated with dystonic movements,  spasticity and tremor.     TOTAL DOSE ADMINISTERED:  Dose Administered:  150 units Botox    Diluent Used:  Preservative Free Normal Saline  Total Volume of Diluent Used:  1.50 ml  Lot # /C3 with Expiration Date:  09/2021  NDC #: Botox 100u ([address]-02)    Medication guide was offered to patient and was declined.    Was there drug waste? Yes  Amount of drug waste (mL): 50 units of Botox.  Reason for waste:  Single use vial  Multi-dose vial: No    Zoila Bauman MD  March 25, 2019      CONSENT:  The risks, benefits, and treatment options were discussed with Lilli Steven and she agreed to proceed.      Written consent was obtained by [facility].    EQUIPMENT USED:  Needle-37mm stimulating/recording  EMG/NCS Machine     SKIN PREPARATION:  Skin preparation was performed using an alcohol wipe.     GUIDANCE DESCRIPTION:  Electro-myographic guidance was necessary throughout the procedure to accurately identify all areas of dystonic and spastic muscles while avoiding injection of non-dystonic muscles and non-spastic muscles, neighboring nerves and nearby vascular structures.     AREA/MUSCLE INJECTED:  150 Units Botox    1. HEAD & NECK MUSCLES: 150 units Botox = Total Dose, 1:1 Dilution                 Right Mid-Trapezius - 10 units of Botox at 1 site/s.   Left Mid-Trapezius - 10 units of Botox at 1 site/s.      Right Splenius Capitus - 15 units of Botox at 2 site/s.   Left Splenius Capitus - 10 units of Botox at 2 site/s.      Right Levator Scapulae - 30 units of Botox at 1 site/s (shoulder muscles).   Left Levator Scapulae - 20 units of Botox at 1 site/s (shoulder muscles).      Right Inferior Obliquus Capitis - 10 units of Botox at 1 site/s.   Left Inferior Obliquus Capitis - 15 units of at 1 site/s.      Right Sternal head of the Sternocleidomastoid - 15 units of Botox at 1 site/s.               Left Sternal head of the Sternocleidomastoid - 15 units of Botox at 1 site/s.    RESPONSE TO PROCEDURE:  Lilli Steven  tolerated the procedure well and there were no immediate complications.  She was allowed to recover for an appropriate period of time and was discharged home in stable condition.    FOLLOW UP:  Lilli Steven was asked to follow up by phone in 7-14 days with Erin Combs PT, Care Coordinator or Mikki Rojas RN, Care Coordinator, to report her response to this series of injections.  Based on the patient's previous response to this therapy, Lilli Steven was rescheduled for the next series of injections in 12 weeks.    PLAN (Medication Changes, Therapy Orders, Work or Disability Issues, etc.): Will monitor response to today's injections and report.

## 2019-04-02 DIAGNOSIS — G47.00 PERSISTENT INSOMNIA: ICD-10-CM

## 2019-04-02 NOTE — TELEPHONE ENCOUNTER
"One month supply sent 3/4/19  Last seen 3/19/19 for pre-op only    Last routine physical 7/28/17  [facility] message sent to patient.  Miya Valdovinos RN    Requested Prescriptions   Pending Prescriptions Disp Refills     traZODone (DESYREL) 50 MG tablet [Pharmacy Med Name: TRAZODONE 50 MG TABLET] 90 tablet 0     Sig: TAKE 3 TABLETS BY MOUTH EVERY DAY AT BEDTIME.    Serotonin Modulators Passed - 4/2/2019  1:43 AM       Passed - Recent (12 mo) or future (30 days) visit within the authorizing provider's specialty    Patient had office visit in the last 12 months or has a visit in the next 30 days with authorizing provider or within the authorizing provider's specialty.  See \"Patient Info\" tab in inbasket, or \"Choose Columns\" in Meds & Orders section of the refill encounter.             Passed - Medication is active on med list       Passed - Patient is age 18 or older       Passed - No active pregnancy on record       Passed - No positive pregnancy test in past 12 months              "

## 2019-04-04 NOTE — TELEPHONE ENCOUNTER
JF,   [facility] for patient to call and schedule appt  See below messages  No response from patient to our outreach  Patient read MyChart but didn't schedule  Please advise on further refill  Thanks,  Dasha RYAN RN

## 2019-04-08 RX ORDER — TRAZODONE HYDROCHLORIDE 50 MG/1
TABLET, FILM COATED ORAL
Qty: 90 TABLET | Refills: 0 | Status: SHIPPED | OUTPATIENT
Start: 2019-04-08 | End: 2019-05-05

## 2019-04-18 ENCOUNTER — TRANSFERRED RECORDS (OUTPATIENT)
Dept: HEALTH INFORMATION MANAGEMENT | Facility: CLINIC | Age: 69
End: 2019-04-18

## 2019-05-02 ENCOUNTER — HOSPITAL ENCOUNTER (OUTPATIENT)
Facility: CLINIC | Age: 69
Discharge: HOME | End: 2019-05-02
Attending: SPECIALIST | Admitting: SPECIALIST
Payer: COMMERCIAL

## 2019-05-02 VITALS
HEIGHT: 63 IN | BODY MASS INDEX: 28.35 KG/M2 | RESPIRATION RATE: 23 BRPM | SYSTOLIC BLOOD PRESSURE: 130 MMHG | WEIGHT: 160 LBS | HEART RATE: 84 BPM | OXYGEN SATURATION: 95 % | DIASTOLIC BLOOD PRESSURE: 91 MMHG

## 2019-05-02 DIAGNOSIS — E78.5 HYPERLIPIDEMIA LDL GOAL <160: ICD-10-CM

## 2019-05-02 LAB — COLONOSCOPY: NORMAL

## 2019-05-02 PROCEDURE — G0500 MOD SEDAT ENDO SERVICE >5YRS: HCPCS | Performed by: SPECIALIST

## 2019-05-02 PROCEDURE — 25000128 H RX IP 250 OP 636: Performed by: SPECIALIST

## 2019-05-02 PROCEDURE — 45378 DIAGNOSTIC COLONOSCOPY: CPT | Performed by: SPECIALIST

## 2019-05-02 PROCEDURE — G0121 COLON CA SCRN NOT HI RSK IND: HCPCS | Performed by: SPECIALIST

## 2019-05-02 PROCEDURE — 25800030 ZZH RX IP 258 OP 636: Performed by: SPECIALIST

## 2019-05-02 RX ORDER — SIMVASTATIN 20 MG
TABLET ORAL
Qty: 30 TABLET | Refills: 0 | Status: SHIPPED | OUTPATIENT
Start: 2019-05-02 | End: 2019-05-30

## 2019-05-02 RX ORDER — LIDOCAINE 40 MG/G
CREAM TOPICAL
Status: DISCONTINUED | OUTPATIENT
Start: 2019-05-02 | End: 2019-05-02 | Stop reason: HOSPADM

## 2019-05-02 RX ORDER — ONDANSETRON 2 MG/ML
4 INJECTION INTRAMUSCULAR; INTRAVENOUS
Status: COMPLETED | OUTPATIENT
Start: 2019-05-02 | End: 2019-05-02

## 2019-05-02 RX ORDER — SODIUM CHLORIDE 9 MG/ML
INJECTION, SOLUTION INTRAVENOUS CONTINUOUS PRN
Status: DISCONTINUED | OUTPATIENT
Start: 2019-05-02 | End: 2019-05-02 | Stop reason: HOSPADM

## 2019-05-02 RX ORDER — FENTANYL CITRATE 50 UG/ML
INJECTION, SOLUTION INTRAMUSCULAR; INTRAVENOUS PRN
Status: DISCONTINUED | OUTPATIENT
Start: 2019-05-02 | End: 2019-05-02 | Stop reason: HOSPADM

## 2019-05-02 ASSESSMENT — MIFFLIN-ST. JEOR: SCORE: 1219.89

## 2019-05-02 NOTE — TELEPHONE ENCOUNTER
"Simvastatin   Last Written Prescription Date:  03/13/2018  Last Fill Quantity: 90,  # refills: 3   Last office visit: 3/18/2019 with prescribing provider:  Yes    Future Office Visit:      Requested Prescriptions   Pending Prescriptions Disp Refills     simvastatin (ZOCOR) 20 MG tablet [Pharmacy Med Name: SIMVASTATIN 20 MG TABLET] 90 tablet 2     Sig: TAKE 1 TABLET BY MOUTH AT BEDTIME       Statins Protocol Failed - 5/2/2019  1:30 AM        Failed - LDL on file in past 12 months     Recent Labs   Lab Test 03/27/17  0910   *             Passed - No abnormal creatine kinase in past 12 months     No lab results found.             Passed - Recent (12 mo) or future (30 days) visit within the authorizing provider's specialty     Patient had office visit in the last 12 months or has a visit in the next 30 days with authorizing provider or within the authorizing provider's specialty.  See \"Patient Info\" tab in inbasket, or \"Choose Columns\" in Meds & Orders section of the refill encounter.              Passed - Medication is active on med list        Passed - Patient is age 18 or older        Passed - No active pregnancy on record        Passed - No positive pregnancy test in past 12 months          "

## 2019-05-02 NOTE — TELEPHONE ENCOUNTER
Medication is being filled for 1 time refill only due to:  Patient needs to be seen because due for physical.  Due for physical.  Fasting labs last done 3/2107    Has only been seen past year for pre-op x 2 and acute issue.  Miya Valdovinos RN

## 2019-05-05 DIAGNOSIS — G47.00 PERSISTENT INSOMNIA: ICD-10-CM

## 2019-05-06 NOTE — TELEPHONE ENCOUNTER
"Due for physical  Left non detailed VM for pt asking that they callback and schedule physical   Dasha RYAN RN    Last Written Prescription Date:  4/8/2019  Last Fill Quantity: 90,  # refills: 0   Last office visit: 3/18/2019 with prescribing provider:     Future Office Visit:    Requested Prescriptions   Pending Prescriptions Disp Refills     traZODone (DESYREL) 50 MG tablet [Pharmacy Med Name: TRAZODONE 50 MG TABLET] 90 tablet 0     Sig: TAKE 3 TABLETS BY MOUTH EVERY DAY AT BEDTIME.       Serotonin Modulators Passed - 5/5/2019  8:27 AM        Passed - Recent (12 mo) or future (30 days) visit within the authorizing provider's specialty     Patient had office visit in the last 12 months or has a visit in the next 30 days with authorizing provider or within the authorizing provider's specialty.  See \"Patient Info\" tab in inbasket, or \"Choose Columns\" in Meds & Orders section of the refill encounter.              Passed - Medication is active on med list        Passed - Patient is age 18 or older        Passed - No active pregnancy on record        Passed - No positive pregnancy test in past 12 months          "

## 2019-05-07 RX ORDER — TRAZODONE HYDROCHLORIDE 50 MG/1
TABLET, FILM COATED ORAL
Qty: 90 TABLET | Refills: 0 | Status: SHIPPED | OUTPATIENT
Start: 2019-05-07 | End: 2019-06-16

## 2019-05-07 NOTE — TELEPHONE ENCOUNTER
Next 5 appointments (look out 90 days)    May 17, 2019 11:30 AM CDT  PHYSICAL with Vargas Chaudhry MD  [facility] ([facility]) [address]  [facility] [id number] [phone]        Medication is being filled for 1 time refill only due to:  upcoming appt   Dasha RYAN RN

## 2019-05-17 ENCOUNTER — OFFICE VISIT (OUTPATIENT)
Dept: FAMILY MEDICINE | Facility: CLINIC | Age: 69
End: 2019-05-17
Payer: COMMERCIAL

## 2019-05-17 VITALS
RESPIRATION RATE: 16 BRPM | BODY MASS INDEX: 32.28 KG/M2 | HEART RATE: 96 BPM | TEMPERATURE: 98.4 F | WEIGHT: 171 LBS | DIASTOLIC BLOOD PRESSURE: 90 MMHG | SYSTOLIC BLOOD PRESSURE: 140 MMHG | OXYGEN SATURATION: 96 % | HEIGHT: 61 IN

## 2019-05-17 DIAGNOSIS — Z00.00 ROUTINE HISTORY AND PHYSICAL EXAMINATION OF ADULT: Primary | ICD-10-CM

## 2019-05-17 DIAGNOSIS — S30.1XXA ABDOMINAL WALL SEROMA, INITIAL ENCOUNTER: ICD-10-CM

## 2019-05-17 DIAGNOSIS — Z13.1 DIABETES MELLITUS SCREENING: ICD-10-CM

## 2019-05-17 DIAGNOSIS — Z23 NEED FOR VACCINATION: ICD-10-CM

## 2019-05-17 DIAGNOSIS — Z13.220 LIPID SCREENING: ICD-10-CM

## 2019-05-17 LAB
CHOLEST SERPL-MCNC: 198 MG/DL
GLUCOSE SERPL-MCNC: 97 MG/DL (ref 70–99)
HDLC SERPL-MCNC: 55 MG/DL
LDLC SERPL CALC-MCNC: 100 MG/DL
NONHDLC SERPL-MCNC: 143 MG/DL
TRIGL SERPL-MCNC: 217 MG/DL

## 2019-05-17 PROCEDURE — 80061 LIPID PANEL: CPT | Performed by: FAMILY MEDICINE

## 2019-05-17 PROCEDURE — 90732 PPSV23 VACC 2 YRS+ SUBQ/IM: CPT | Performed by: FAMILY MEDICINE

## 2019-05-17 PROCEDURE — 82947 ASSAY GLUCOSE BLOOD QUANT: CPT | Performed by: FAMILY MEDICINE

## 2019-05-17 PROCEDURE — 90471 IMMUNIZATION ADMIN: CPT | Performed by: FAMILY MEDICINE

## 2019-05-17 PROCEDURE — 99213 OFFICE O/P EST LOW 20 MIN: CPT | Mod: 25 | Performed by: FAMILY MEDICINE

## 2019-05-17 PROCEDURE — 99397 PER PM REEVAL EST PAT 65+ YR: CPT | Mod: 25 | Performed by: FAMILY MEDICINE

## 2019-05-17 PROCEDURE — 36415 COLL VENOUS BLD VENIPUNCTURE: CPT | Performed by: FAMILY MEDICINE

## 2019-05-17 ASSESSMENT — MIFFLIN-ST. JEOR: SCORE: 1241.99

## 2019-05-17 ASSESSMENT — ACTIVITIES OF DAILY LIVING (ADL): CURRENT_FUNCTION: NO ASSISTANCE NEEDED

## 2019-05-17 NOTE — NURSING NOTE
"Chief Complaint   Patient presents with     Physical     initial /87 (BP Location: Right arm, Cuff Size: Adult Regular)   Pulse 96   Temp 98.4  F (36.9  C) (Oral)   Resp 16   Ht 1.556 m (5' 1.25\")   Wt 77.6 kg (171 lb)   LMP  (LMP Unknown)   SpO2 96%   BMI 32.05 kg/m   Estimated body mass index is 32.05 kg/m  as calculated from the following:    Height as of this encounter: 1.556 m (5' 1.25\").    Weight as of this encounter: 77.6 kg (171 lb).  BP completed using cuff size: regular.  R arm      Health Maintenance that is potentially due pending provider review:  NONE    n/a    Rio Yeung ma  "

## 2019-05-17 NOTE — PATIENT INSTRUCTIONS
Patient Education   Personalized Prevention Plan  You are due for the preventive services outlined below.  Your care team is available to assist you in scheduling these services.  If you have already completed any of these items, please share that information with your care team to update in your medical record.  Health Maintenance Due   Topic Date Due     Discuss Advance Directive Planning  03/18/2005     Zoster (Shingles) Vaccine (2 of 3) 01/29/2016     Pneumococcal Vaccine (2 of 2 - PPSV23) 03/27/2018     Annual Wellness Visit  07/28/2018     PHQ-2  01/01/2019     FALL RISK ASSESSMENT  06/05/2019       Exercise for a Healthier Heart     Exercise with a friend. When activity is fun, you're more likely to stick with it.   You may wonder how you can improve the health of your heart. If you re thinking about exercise, you re on the right track. You don t need to become an athlete, but you do need a certain amount of brisk exercise to help strengthen your heart. If you have been diagnosed with a heart condition, your doctor may recommend exercise to help stabilize your condition. To help make exercise a habit, choose safe, fun activities.  Be sure to check with your healthcare provider before starting an exercise program.   Why exercise?  Exercising regularly offers many healthy rewards. It can help you do all of the following:    Improve your blood cholesterol level to help prevent further heart trouble    Lower your blood pressure to help prevent a stroke or heart attack    Control diabetes, or reduce your risk of getting this disease    Improve your heart and lung function    Reach and maintain a healthy weight    Make your muscles stronger and more limber so you can stay active    Prevent falls and fractures by slowing the loss of bone mass (osteoporosis)    Manage stress better    Reduce your blood pressure    Improve your sense of self and your body image  Exercise tips  Ease into your routine. Set small  goals. Then build on them.  Exercise on most days. Aim for a total of 150 or more minutes of moderate to  vigorous intensity activity each week. Consider 40 minutes, 3 to 4 times a week. For best results, activity should last for 40 minutes on average. It is OK to work up to the 40 minute period over time. Examples of moderate-intensity activity is walking 1 mile in 15 minutes or 30 to 45 minutes of yard work.  Step up your daily activity level. Along with your exercise program, try being more active throughout the day. Walk instead of drive. Do more household tasks or yard work.  Choose one or more activities you enjoy. Walking is one of the easiest things you can do. You can also try swimming, riding a bike, dancing, or taking an exercise class.  Stop exercising and call your doctor if you:    Have chest pain or feel dizzy or lightheaded    Feel burning, tightness, pressure, or heaviness in your chest, neck, shoulders, back, or arms    Have unusual shortness of breath    Have increased joint or muscle pain    Have palpitations or an irregular heartbeat   Date Last Reviewed: 5/1/2016 2000-2018 [employer]. [address] [place] [address], [place] [address]. All rights reserved. This information is not intended as a substitute for professional medical care. Always follow your healthcare professional's instructions.

## 2019-05-17 NOTE — PROGRESS NOTES
"SUBJECTIVE:   Lilli Steven is a 69 year old female who presents for Preventive Visit.      Are you in the first 12 months of your Medicare coverage?  No    Healthy Habits:     In general, how would you rate your overall health?  Good    Frequency of exercise:  1 day/week    Duration of exercise:  15-30 minutes    Do you usually eat at least 4 servings of fruit and vegetables a day, include whole grains    & fiber and avoid regularly eating high fat or \"junk\" foods?  Yes    Taking medications regularly:  Yes    Medication side effects:  None    Ability to successfully perform activities of daily living:  No assistance needed    Home Safety:  Lack of grab bars in the bathroom    Hearing Impairment:  No hearing concerns    In the past 6 months, have you been bothered by leaking of urine?  No    In general, how would you rate your overall mental or emotional health?  Excellent      PHQ-2 Total Score: 0    Additional concerns today:  Yes    Do you feel safe in your environment? Yes    Do you have a Health Care Directive? No: Advance care planning was reviewed with patient; patient declined at this time.    Fall risk       Cognitive Screening   1) Repeat 3 items (Leader, Season, Table)    2) Clock draw:   3) 3 item recall: Recalls 3 objects  Results: NORMAL clock, 1-2 items recalled: COGNITIVE IMPAIRMENT LESS LIKELY    Mini-CogTM Copyright S Sandeep. Licensed by the author for use in [facility]; reprinted with permission ([email or web address]). All rights reserved.      Snores on occassion    Reviewed and updated as needed this visit by clinical staff  Tobacco  Allergies  Meds         Reviewed and updated as needed this visit by Provider        Social History     Tobacco Use     Smoking status: Former Smoker     Packs/day: 1.00     Types: Cigarettes     Last attempt to quit: 1985     Years since quittin.3     Smokeless tobacco: Never Used   Substance Use Topics     Alcohol use: Yes     Alcohol/week: " 0.0 oz     Comment: 0-3 DRINKS/WEEK     If you drink alcohol do you typically have >3 drinks per day or >7 drinks per week? No    Alcohol Use 5/17/2019   Prescreen: >3 drinks/day or >7 drinks/week? No   Prescreen: >3 drinks/day or >7 drinks/week? -   No flowsheet data found.      in addition to health maintenance patient would like to discuss the following problem:    has a seroma, and it was drained 4 times.  Usually comes back within 2-3 weeks  Has it drained percutaneously, but this did not do it either    More recently It had gotten infected again, and even when contacting the surgeron they were slow to help her  finally some pus was coming out, had to go to a different ER     It was at a health [facility] which strangely insurance did not pay for  Got a referral to a plastic surgeon there for a second opinion but now can't do that  Would be interested in another attempt via IR to drain and scarify the seroma    Abdominal seroma and postsurgical scar with some hernia there as well perhaps    Somewhat firm nontender    Gave her a new referral for interventional radiology to consider that approach again but we had a good discussion about how multiple attempts to resolve this have not really change the outcome and so I do not know about the wisdom of continuing to try to get rid of this  More and more drastic measures may result in increased risk for infections and complications which may not be worth it    PROBLEMS TO ADD ON...    Current providers sharing in care for this patient include:   Patient Care Team:  Vargas Chaudhry MD as PCP - General (Family Practice)  Hermes Frias MD as MD (Physical Medicine and Rehab)  Vargas Chaudhry MD as Assigned PCP  Erin Combs PT as Physical Therapist (Physical Medicine and Rehab)  Mikki Sotelo, RN as Clinic Care Coordinator (Physical Medicine and Rehab)    The following health maintenance items are reviewed in Epic and correct as of  today:  Health Maintenance   Topic Date Due     ADVANCE DIRECTIVE PLANNING Q5 YRS  03/18/2005     ZOSTER IMMUNIZATION (2 of 3) 01/29/2016     PNEUMOCOCCAL IMMUNIZATION 65+ LOW/MEDIUM RISK (2 of 2 - PPSV23) 03/27/2018     MEDICARE ANNUAL WELLNESS VISIT  07/28/2018     PHQ-2  01/01/2019     FALL RISK ASSESSMENT  06/05/2019     DEXA Q2 YR  08/15/2019     MAMMO SCREEN Q2 YR (SYSTEM ASSIGNED)  11/08/2020     LIPID SCREEN Q5 YR FEMALE (SYSTEM ASSIGNED)  03/27/2022     DTAP/TDAP/TD IMMUNIZATION (2 - Td) 03/27/2027     COLON CANCER SCREEN (SYSTEM ASSIGNED)  05/02/2029     INFLUENZA VACCINE  Completed     HEPATITIS C SCREENING  Addressed     IPV IMMUNIZATION  Aged Out     MENINGITIS IMMUNIZATION  Aged Out     Lab work is in process  Labs reviewed in EPIC  BP Readings from Last 3 Encounters:   05/17/19 140/90   05/02/19 (!) 130/91   03/18/19 133/75    Wt Readings from Last 3 Encounters:   05/17/19 77.6 kg (171 lb)   05/02/19 72.6 kg (160 lb)   03/18/19 78 kg (172 lb)                  Patient Active Problem List   Diagnosis     Spasmodic torticollis     Wears glasses     Neck pain     Seasonal allergies     Genetic torsion dystonia     Osteopenia     Hyperlipidemia LDL goal <160     Tear of lateral cartilage or meniscus of knee, current     Urinary incontinence     Past Surgical History:   Procedure Laterality Date     COLONOSCOPY N/A 5/2/2019    Procedure: COLONOSCOPY;  Surgeon: Martin Ocampo MD;  Location:  GI     COSMETIC ABDOMINOPLASTY MODIFIED N/A 2/16/2017    Procedure: COSMETIC ABDOMINOPLASTY MODIFIED;  Surgeon: Inés Harrell MD;  Location:  OR     HERNIORRHAPHY EPIGASTRIC N/A 2/16/2017    Procedure: HERNIORRHAPHY EPIGASTRIC;  Surgeon: Joey Sargent MD;  Location:  OR     HERNIORRHAPHY UMBILICAL N/A 2/16/2017    Procedure: HERNIORRHAPHY UMBILICAL;  Surgeon: Joey Sargent MD;  Location:  OR     IMPLANT STIMULATOR SACRAL NERVE STAGE ONE      for bladder incontinence, failed; battery  is dead     INCISION AND DRAINAGE ABDOMEN WASHOUT, COMBINED N/A 2017    Procedure: COMBINED INCISION AND DRAINAGE ABDOMEN WASHOUT;  EVACUATION OF ABDOMINAL WALL SEROMA;  Surgeon: Inés Harrell MD;  Location: [facility] OR     IR SINOGRAM INJECTION DIAGNOSTIC  2019     IRRIGATION AND DEBRIDEMENT TRUNK, COMBINED N/A 3/29/2017    Procedure: COMBINED IRRIGATION AND DEBRIDEMENT TRUNK;  Surgeon: Inés Harrell MD;  Location:  [facility]     IRRIGATION AND DEBRIDEMENT TRUNK, COMBINED N/A 2018    Procedure: EVACUATION OF ABDOMINAL WALL SEROMA;  Surgeon: Inés Harrell MD;  Location: [facility]     REVISE SCAR TRUNK N/A 3/21/2018    Procedure: REVISE SCAR TRUNK;  ABDOMINAL SCAR REVISION.;  Surgeon: Inés Harrell MD;  Location: [facility]     TONSILLECTOMY         Social History     Tobacco Use     Smoking status: Former Smoker     Packs/day: 1.00     Types: Cigarettes     Last attempt to quit: 1985     Years since quittin.3     Smokeless tobacco: Never Used   Substance Use Topics     Alcohol use: Yes     Alcohol/week: 0.0 oz     Comment: 0-3 DRINKS/WEEK     Family History   Problem Relation Age of Onset     Cancer Father         49 yrs old - bladder     Dementia Mother         she is living in [facility] in [facility]     Breast Cancer Mother         70s     Heart Disease Maternal Grandfather         disease     Hypertension Maternal Grandfather      High cholesterol Maternal Grandfather      Heart Disease Paternal Grandmother         disease     Hypertension Paternal Grandmother      High cholesterol Paternal Grandmother      Heart Disease Maternal Grandmother         disease     Hypertension Paternal Grandfather      High cholesterol Paternal Grandfather      Diabetes No family hx of      Coronary Artery Disease No family hx of      Hyperlipidemia No family hx of      Cerebrovascular Disease No family hx of      Colon Cancer No family hx of      Prostate Cancer No family hx of       Other Cancer No family hx of      Depression No family hx of      Anxiety Disorder No family hx of      Mental Illness No family hx of      Substance Abuse No family hx of      Anesthesia Reaction No family hx of      Asthma No family hx of      Osteoporosis No family hx of      Genetic Disorder No family hx of      Thyroid Disease No family hx of      Obesity No family hx of      Unknown/Adopted No family hx of          Current Outpatient Medications   Medication Sig Dispense Refill     mometasone (ELOCON) 0.1 % external cream Apply sparingly to affected area twice daily as needed.  Do not apply to face. 45 g 0     OnabotulinumtoxinA (BOTOX IJ) Inject 150 Units into the muscle once Lot # /C3 with Expiration Date: 03/2021       RESTASIS MULTIDOSE 0.05 % ophthalmic emulsion Place 1 drop into both eyes 2 times daily  3     simvastatin (ZOCOR) 20 MG tablet TAKE 1 TABLET BY MOUTH AT BEDTIME 30 tablet 0     traZODone (DESYREL) 50 MG tablet TAKE 3 TABLETS BY MOUTH EVERY DAY AT BEDTIME. 90 tablet 0     Allergies   Allergen Reactions     Meloxicam      Diarrhea, vomiting     Primidone Other (See Comments)     Felt like motion sickness     Tramadol      Diarrhea, vomiting      Adhesive Tape Rash     Durabond only     Allergy Rash     Dermabond  Anesthesia IV set misc       Tegaderm Transparent Dressing (Informational Only) Rash     With long exposure     Pneumonia Vaccine:had 13, 23 today    Review of Systems  CONSTITUTIONAL: NEGATIVE for fever, chills, change in weight  INTEGUMENTARY/SKIN: NEGATIVE for worrisome rashes, moles or lesions  EYES: NEGATIVE for vision changes or irritation  ENT/MOUTH: NEGATIVE for ear, mouth and throat problems  RESP: NEGATIVE for significant cough or SOB  BREAST: NEGATIVE for masses, tenderness or discharge  CV: NEGATIVE for chest pain, palpitations or peripheral edema  GI: NEGATIVE for nausea, abdominal pain, heartburn, or change in bowel habits  : NEGATIVE for frequency, dysuria, or  "hematuria  MUSCULOSKELETAL: NEGATIVE for significant arthralgias or myalgia  NEURO: NEGATIVE for weakness, dizziness or paresthesias  ENDOCRINE: NEGATIVE for temperature intolerance, skin/hair changes  HEME: NEGATIVE for bleeding problems  PSYCHIATRIC: NEGATIVE for changes in mood or affect    OBJECTIVE:   /87 (BP Location: Right arm, Cuff Size: Adult Regular)   Pulse 96   Temp 98.4  F (36.9  C) (Oral)   Resp 16   Ht 1.556 m (5' 1.25\")   Wt 77.6 kg (171 lb)   LMP  (LMP Unknown)   SpO2 96%   BMI 32.05 kg/m   Estimated body mass index is 32.05 kg/m  as calculated from the following:    Height as of this encounter: 1.556 m (5' 1.25\").    Weight as of this encounter: 77.6 kg (171 lb).  Physical Exam       General Appearance: Pleasant, alert, in no acute respiratory distress.  Head Exam: Normal. Normocephalic, atraumatic. No sinus tenderness.  Eye Exam: Normal external eye, conjunctiva, lids. ELENA.  Ear Exam: Normal auditory canals and external ears. Non-tender.  Left TM-normal. Right TM-normal.  OroPharynx Exam: Dental hygiene adequate. Normal buccal mucosa. Normal pharynx.  Neck Exam: Supple, no masses or enlarged, tender nodes.  Thyroid Exam: No nodules or enlargement or tenderness.  Chest/Respiratory Exam: Normal, comfortable, easy respirations. Chest wall normal. Lungs are clear to auscultation. No wheezing, crackles, or rhonchi.  Cardiovascular Exam: Regular rate and rhythm. No murmur, gallop, or rubs. No pedal edema.  Gastrointestinal Exam: Soft, non-tender, no masses or organomegaly.  Musculoskeletal Exam: Back is non-tender, full ROM of upper and lower extremities.  Skin: no rash, warm and dry.    Neurologic Exam: Nonfocal, no tremor. Normal gait.  Psychiatric Exam: Alert - appropriate, normal affect      ASSESSMENT / PLAN:       ICD-10-CM    1. Routine history and physical examination of adult Z00.00 Lipid Profile     Glucose   2. Abdominal wall seroma, initial encounter (H) T88.8XXA IR REFERRAL " "   T79.2XXA    3. Need for vaccination Z23 Pneumococcal vaccine 23 valent PPSV23  (Pneumovax) [24056]     ADMIN MEDICARE: Pneumococcal Vaccine ()   4. Lipid screening Z13.220 Lipid Profile   5. Diabetes mellitus screening Z13.1 Glucose       End of Life Planning:  Patient currently has an advanced directive: Yes.  Practitioner is supportive of decision.    COUNSELING:  Reviewed preventive health counseling, as reflected in patient instructions       Regular exercise       Healthy diet/nutrition       Bladder control       Colon cancer screening       ([place])menopause management    Estimated body mass index is 32.05 kg/m  as calculated from the following:    Height as of this encounter: 1.556 m (5' 1.25\").    Weight as of this encounter: 77.6 kg (171 lb).    Weight management plan: Discussed healthy diet and exercise guidelines     reports that she quit smoking about 34 years ago. Her smoking use included cigarettes. She smoked 1.00 pack per day. She has never used smokeless tobacco.      Appropriate preventive services were discussed with this patient, including applicable screening as appropriate for cardiovascular disease, diabetes, osteopenia/osteoporosis, and glaucoma.  As appropriate for age/gender, discussed screening for colorectal cancer, prostate cancer, breast cancer, and cervical cancer. Checklist reviewing preventive services available has been given to the patient.    Reviewed patients plan of care and provided an AVS. The Basic Care Plan (routine screening as documented in Health Maintenance) for Lilli meets the Care Plan requirement. This Care Plan has been established and reviewed with the Patient.    Counseling Resources:  ATP IV Guidelines  Pooled Cohorts Equation Calculator  Breast Cancer Risk Calculator  FRAX Risk Assessment  ICSI Preventive Guidelines  Dietary Guidelines for Americans, 2010  USDA's MyPlate  ASA Prophylaxis  Lung CA Screening    Vargas Ata Chaudhry MD  [facility] " CLINIC    Identified Health Risks:    She is at risk for lack of exercise and has been provided with information to increase physical activity for the benefit of her well-being.

## 2019-05-30 DIAGNOSIS — E78.5 HYPERLIPIDEMIA LDL GOAL <160: ICD-10-CM

## 2019-05-30 RX ORDER — SIMVASTATIN 20 MG
TABLET ORAL
Qty: 90 TABLET | Refills: 3 | Status: SHIPPED | OUTPATIENT
Start: 2019-05-30 | End: 2020-06-12

## 2019-05-30 NOTE — TELEPHONE ENCOUNTER
"Prescription approved per [facility] Refill Protocol.  Miya Valdovinos RN    Requested Prescriptions   Signed Prescriptions Disp Refills    simvastatin (ZOCOR) 20 MG tablet 90 tablet 3     Sig: TAKE 1 TABLET BY MOUTH EVERYDAY AT BEDTIME       Statins Protocol Passed - 5/30/2019  1:14 AM        Passed - LDL on file in past 12 months     Recent Labs   Lab Test 05/17/19  1229   *             Passed - No abnormal creatine kinase in past 12 months     No lab results found.             Passed - Recent (12 mo) or future (30 days) visit within the authorizing provider's specialty     Patient had office visit in the last 12 months or has a visit in the next 30 days with authorizing provider or within the authorizing provider's specialty.  See \"Patient Info\" tab in inbasket, or \"Choose Columns\" in Meds & Orders section of the refill encounter.              Passed - Medication is active on med list        Passed - Patient is age 18 or older        Passed - No active pregnancy on record        Passed - No positive pregnancy test in past 12 months          "

## 2019-06-03 ENCOUNTER — TELEPHONE (OUTPATIENT)
Dept: FAMILY MEDICINE | Facility: CLINIC | Age: 69
End: 2019-06-03

## 2019-06-03 DIAGNOSIS — L98.9 SKIN LESION: Primary | ICD-10-CM

## 2019-06-03 NOTE — TELEPHONE ENCOUNTER
Lilli Good left a voicemail msg on the referral line requesting a referral to Dr Russ, dermatologist, she has an appointment on Wed 6/5. She said she has seen him in the past. This would be an out of network referral since she has [employer] and is assigned the [facility] network. I tried to call her but received her vm so I don't know what her diagnosis is. I don't see that she has received this referral in the past. (Lady in referrals covering for Inés)

## 2019-06-05 NOTE — TELEPHONE ENCOUNTER
VERONIQUE - Spoke with patient and she states that she does not have a diagnosis but wants to be seen by dermatology for a dark spot on cheek concern. Please advise.    Thanks!  TESFAYE Chaidez

## 2019-06-13 ENCOUNTER — OFFICE VISIT (OUTPATIENT)
Dept: FAMILY MEDICINE | Facility: CLINIC | Age: 69
End: 2019-06-13
Payer: COMMERCIAL

## 2019-06-13 ENCOUNTER — THERAPY VISIT (OUTPATIENT)
Dept: PHYSICAL THERAPY | Facility: CLINIC | Age: 69
End: 2019-06-13
Payer: COMMERCIAL

## 2019-06-13 VITALS
OXYGEN SATURATION: 100 % | HEART RATE: 87 BPM | DIASTOLIC BLOOD PRESSURE: 78 MMHG | RESPIRATION RATE: 17 BRPM | WEIGHT: 171 LBS | BODY MASS INDEX: 32.28 KG/M2 | SYSTOLIC BLOOD PRESSURE: 139 MMHG | TEMPERATURE: 98.5 F | HEIGHT: 61 IN

## 2019-06-13 DIAGNOSIS — M54.50 ACUTE MIDLINE LOW BACK PAIN WITHOUT SCIATICA: Primary | ICD-10-CM

## 2019-06-13 DIAGNOSIS — M54.9 BACK PAIN: Primary | ICD-10-CM

## 2019-06-13 PROCEDURE — 97530 THERAPEUTIC ACTIVITIES: CPT | Mod: GP | Performed by: PHYSICAL THERAPIST

## 2019-06-13 PROCEDURE — 99213 OFFICE O/P EST LOW 20 MIN: CPT | Performed by: PHYSICIAN ASSISTANT

## 2019-06-13 PROCEDURE — 97161 PT EVAL LOW COMPLEX 20 MIN: CPT | Mod: GP | Performed by: PHYSICAL THERAPIST

## 2019-06-13 ASSESSMENT — MIFFLIN-ST. JEOR: SCORE: 1241.99

## 2019-06-13 NOTE — PROGRESS NOTES
"Subjective     Lilli Steven is a 69 year old female who presents to clinic today for the following health issues:    HPI   Patient states that she thinks she pulled a muscle in her lower back about one month ago- this area of her lower back (right at her tail-bone area) hurts the most whenever she first gets out of bed in the morning.          Back Pain       Duration: 1 month        Specific cause: lifting    Description:   Location of pain: low back   Character of pain: sharp and dull ache  Pain radiation:none  New numbness or weakness in legs, not attributed to pain:  no     Intensity:  moderate    History:   Pain interferes with job: Not applicable  History of back problems: recurrent self limited episodes of low back pain in the past  Any previous MRI or X-rays: None  Sees a specialist for back pain:  No  Therapies tried without relief: Physical Therapy    Alleviating factors:   Improved by: Physical Therapy      Precipitating factors:  Worsened by: Lifting          Accompanying Signs & Symptoms:  Risk of Fracture:  Age >64  Risk of Cauda Equina:  None  Risk of Infection:  None  Risk of Cancer:  None  Risk of Ankylosing Spondylitis:  Onset at age <35, male, AND morning back stiffness. no                  BP Readings from Last 3 Encounters:   06/20/19 147/83   06/13/19 139/78   05/17/19 140/90    Wt Readings from Last 3 Encounters:   06/13/19 77.6 kg (171 lb)   05/17/19 77.6 kg (171 lb)   05/02/19 72.6 kg (160 lb)                      Reviewed and updated as needed this visit by Provider         Review of Systems   ROS COMP: Constitutional, HEENT, cardiovascular, pulmonary, gi and gu systems are negative, except as otherwise noted.      Objective    /78   Pulse 87   Temp 98.5  F (36.9  C) (Oral)   Resp 17   Ht 1.556 m (5' 1.25\")   Wt 77.6 kg (171 lb)   LMP  (LMP Unknown)   SpO2 100%   BMI 32.05 kg/m    Body mass index is 32.05 kg/m .  Physical Exam   GENERAL: alert and no distress  EYES: Eyes " "grossly normal to inspection  RESP: lungs clear to auscultation - no rales, rhonchi or wheezes  CV: regular rate and rhythm, normal S1 S2, no S3 or S4, no murmur, click or rub, no peripheral edema and peripheral pulses strong  MS: good active ROM in lumbar spine.  Negative straight leg raise.   SKIN: no suspicious lesions or rashes    Diagnostic Test Results:  Labs reviewed in Epic        Assessment & Plan     1. Acute midline low back pain without sciatica  Has done some PHYSICAL THERAPY in past with success.  - [facility] PT, HAND, AND CHIROPRACTIC REFERRAL; Future     BMI:   Estimated body mass index is 32.05 kg/m  as calculated from the following:    Height as of this encounter: 1.556 m (5' 1.25\").    Weight as of this encounter: 77.6 kg (171 lb).               No follow-ups on file.    Viktor Whitaker PA-C  [facility]      "

## 2019-06-13 NOTE — NURSING NOTE
"Chief Complaint   Patient presents with     Musculoskeletal Problem     pulled muscle in back     /78   Pulse 87   Temp 98.5  F (36.9  C) (Oral)   Resp 17   Ht 1.556 m (5' 1.25\")   Wt 77.6 kg (171 lb)   LMP  (LMP Unknown)   SpO2 100%   BMI 32.05 kg/m   Estimated body mass index is 32.05 kg/m  as calculated from the following:    Height as of this encounter: 1.556 m (5' 1.25\").    Weight as of this encounter: 77.6 kg (171 lb).  Medication Reconciliation: complete        Health Maintenance Due Pending Provider Review:  NONE    n/a    Jumana Fountain MA  [facility]  [phone]  "

## 2019-06-16 DIAGNOSIS — G47.00 PERSISTENT INSOMNIA: ICD-10-CM

## 2019-06-17 RX ORDER — TRAZODONE HYDROCHLORIDE 50 MG/1
TABLET, FILM COATED ORAL
Qty: 270 TABLET | Refills: 2 | Status: SHIPPED | OUTPATIENT
Start: 2019-06-17 | End: 2020-03-16

## 2019-06-17 NOTE — TELEPHONE ENCOUNTER
Prescription approved per [facility] Refill Protocol.  Dasha RYAN RN

## 2019-06-17 NOTE — TELEPHONE ENCOUNTER
"Last Written Prescription Date:  5/7/2019  Last Fill Quantity: 90,  # refills: 0   Last office visit: No previous visit found with prescribing provider:  Isidoro   Future Office Visit:      Requested Prescriptions   Pending Prescriptions Disp Refills     traZODone (DESYREL) 50 MG tablet [Pharmacy Med Name: TRAZODONE 50 MG TABLET] 90 tablet 0     Sig: TAKE 3 TABLETS BY MOUTH EVERY DAY AT BEDTIME.       Serotonin Modulators Passed - 6/16/2019  8:48 AM        Passed - Recent (12 mo) or future (30 days) visit within the authorizing provider's specialty     Patient had office visit in the last 12 months or has a visit in the next 30 days with authorizing provider or within the authorizing provider's specialty.  See \"Patient Info\" tab in inbasket, or \"Choose Columns\" in Meds & Orders section of the refill encounter.              Passed - Medication is active on med list        Passed - Patient is age 18 or older        Passed - No active pregnancy on record        Passed - No positive pregnancy test in past 12 months          "

## 2019-06-20 ENCOUNTER — RECORDS - HEALTHEAST (OUTPATIENT)
Dept: ADMINISTRATIVE | Facility: OTHER | Age: 69
End: 2019-06-20

## 2019-06-20 ENCOUNTER — OFFICE VISIT (OUTPATIENT)
Dept: PHYSICAL MEDICINE AND REHAB | Facility: CLINIC | Age: 69
End: 2019-06-20
Payer: COMMERCIAL

## 2019-06-20 VITALS
SYSTOLIC BLOOD PRESSURE: 147 MMHG | DIASTOLIC BLOOD PRESSURE: 83 MMHG | TEMPERATURE: 97.6 F | OXYGEN SATURATION: 98 % | HEART RATE: 82 BPM

## 2019-06-20 DIAGNOSIS — G24.3 SPASMODIC TORTICOLLIS: Primary | ICD-10-CM

## 2019-06-20 DIAGNOSIS — G24.1 GENETIC TORSION DYSTONIA: ICD-10-CM

## 2019-06-20 ASSESSMENT — PAIN SCALES - GENERAL: PAINLEVEL: SEVERE PAIN (6)

## 2019-06-20 NOTE — PROGRESS NOTES
BOTULINUM TOXIN PROCEDURE NOTE    Chief Complaint   Patient presents with     RECHECK     [facility] RETURN BOTOX     /83   Pulse 82   Temp 97.6  F (36.4  C) (Oral)   LMP  (LMP Unknown)   SpO2 98%     Current Outpatient Medications:      mometasone (ELOCON) 0.1 % external cream, Apply sparingly to affected area twice daily as needed.  Do not apply to face., Disp: 45 g, Rfl: 0     OnabotulinumtoxinA (BOTOX IJ), Inject 150 Units into the muscle once Lot # /C3 with Expiration Date: 03/2021, Disp: , Rfl:      RESTASIS MULTIDOSE 0.05 % ophthalmic emulsion, Place 1 drop into both eyes 2 times daily, Disp: , Rfl: 3     simvastatin (ZOCOR) 20 MG tablet, TAKE 1 TABLET BY MOUTH EVERYDAY AT BEDTIME, Disp: 90 tablet, Rfl: 3     traZODone (DESYREL) 50 MG tablet, TAKE 3 TABLETS BY MOUTH EVERY DAY AT BEDTIME., Disp: 270 tablet, Rfl: 2     Allergies   Allergen Reactions     Meloxicam      Diarrhea, vomiting     Primidone Other (See Comments)     Felt like motion sickness     Tramadol      Diarrhea, vomiting      Adhesive Tape Rash     Durabond only     Allergy Rash     Dermabond  Anesthesia IV set misc       Tegaderm Transparent Dressing (Informational Only) Rash     With long exposure        PHYSICAL EXAM:  HEAD, NECK AND TRUNK PATTERN:   Head Tremor: Observed - no-no tremor present  Head & Neck Extension: Present - Slight  Sub-Occipital Extension: Present - Slight  Forward Head: Present - Slight   Head & Neck Lateral Bend: Left  Shoulder Elevation: Right    HPI:    Patient denies new medical diagnoses, illnesses, hospitalizations, emergency room visits, and injuries since the previous injection with botulinum neurotoxin.     We reviewed the recommended safety guidelines for  Botox from any vaccine injection, such as the seasonal flu vaccine, by a minimum of 10-14 days with Lilli Steven. She acknowledged understanding.    RESPONSE TO PREVIOUS TREATMENT:    Lilli Steven received 150 units of Botox on  2019.    Problems following the previous series of neurotoxin injections included:  No problems reported    BENEFITS BY PATIENT REPORT:  Pain Improvement: Yes.  Percent Improvement: unable to quantify in % but reports significant improvement Duration of Benefit:  10-12 weeks.     Dystonia and tremor Improvement: Yes.  Percent Improvement: unable to quantify in %, but reports significant overall improvement Duration of Benefit:  10 weeks followed by a gradual reduction in benefit.       BOTULINUM NEUROTOXIN INJECTION PROCEDURES:    VERIFICATION OF PATIENT IDENTIFICATION AND PROCEDURE     Initials   Patient Name lmd   Patient  lmd   Procedure Verified by: fareed     Prior to the start of the procedure and with procedural staff participation, I verbally confirmed the patient s identity using two indicators, relevant allergies, that the procedure was appropriate and matched the consent or emergent situation, and that the correct equipment/implants were available. Immediately prior to starting the procedure I conducted the Time Out with the procedural staff and re-confirmed the patient s name, procedure, and site/side. (The Joint Commission universal protocol was followed.)  Yes    Sedation (Moderate or Deep): None      Above assessments performed by:    Cassidy Ramirez MD      INDICATION/S FOR PROCEDURE/S:  Lilli Steven is a 69-year-old patient with dystonia affecting the  head, neck and shoulder girdle musculature secondary to a diagnosis of cervical dystonia with associated  pain, tremor, spasms, loss of joint motion, loss of volitional motor control and difficulty with activities of daily living.      Her baseline symptoms have been recalcitrant to oral medications and conservative therapy.  She is here today for an injection of Botox.       GOAL OF PROCEDURE:  The goal of this procedure is to increase active range of motion, improve volitional motor control and decrease pain  associated with  dystonic movements, spasticity and tremor.     TOTAL DOSE ADMINISTERED:  Dose Administered:  150 units Botox    Diluent Used:  Preservative Free Normal Saline  Total Volume of Diluent Used:  1.5 ml  Lot # /C3 with Expiration Date:  11/2021  NDC #: Botox 100u ([address]-25)    Medication guide was offered to patient and was declined.    Was there drug waste? Yes  Amount of drug waste (mL): 50 units of Botox.  Reason for waste:  Single use vial  Multi-dose vial: No    Cassidy Ramirez MD  June 20, 2019      CONSENT:  The risks, benefits, and treatment options were discussed with Lilli Steven and she agreed to proceed.    Written consent was obtained by [facility].    EQUIPMENT USED:  Needle-37mm stimulating/recording  EMG/NCS Machine     SKIN PREPARATION:  Skin preparation was performed using an alcohol wipe.     GUIDANCE DESCRIPTION:  Electro-myographic guidance was necessary throughout the procedure to accurately identify all areas of dystonic and spastic muscles while avoiding injection of non-dystonic muscles and non-spastic muscles, neighboring nerves and nearby vascular structures.     AREA/MUSCLE INJECTED:  150 UNITS BOTOX = TOTAL DOSE    1. HEAD & NECK MUSCLES: 150 units Botox = Total Dose                Right Mid-Trapezius - 10 units of Botox at 1 site/s.   Left Mid-Trapezius - 10 units of Botox at 1 site/s.      Right Splenius Capitus - 15 units of Botox at 2 site/s.   Left Splenius Capitus - 10 units of Botox at 2 site/s.      Right Levator Scapulae - 30 units of Botox at 1 site/s (shoulder muscles).   Left Levator Scapulae - 20 units of Botox at 1 site/s (shoulder muscles).      Right Inferior Obliquus Capitis - 10 units of Botox at 1 site/s.   Left Inferior Obliquus Capitis - 15 units of at 1 site/s.      Right Sternal head of the Sternocleidomastoid - 15 units of Botox at 1 site/s.               Left Sternal head of the Sternocleidomastoid - 15 units of Botox at 1 site/s.      RESPONSE TO  PROCEDURE:  Lilli Steven tolerated the procedure well and there were no immediate complications.  She was allowed to recover for an appropriate period of time and was discharged home in stable condition.    FOLLOW UP:  Lilli Steven was asked to follow up by phone in 7-14 days with Erin Combs PT, Care Coordinator or Mikki Rojas RN, Care Coordinator, to report her response to this series of injections.  Based on the patient's previous response to this therapy, Lilli Steven was rescheduled for the next series of injections in 12 weeks.    PLAN (Medication Changes, Therapy Orders, Work or Disability Issues, etc.): Will monitor response to today's injections and report.

## 2019-06-20 NOTE — LETTER
6/20/2019       RE: Lilli Steven  [address]  [facility] [place] [facility] [place] [address]     Dear Colleague,    Thank you for referring your patient, Lilli Steven, to the [facility] PHYSICAL MEDICINE AND REHABILITATION at [facility]. Please see a copy of my visit note below.    BOTULINUM TOXIN PROCEDURE NOTE    Chief Complaint   Patient presents with     RECHECK     [facility] RETURN BOTOX     /83   Pulse 82   Temp 97.6  F (36.4  C) (Oral)   LMP  (LMP Unknown)   SpO2 98%     Current Outpatient Medications:      mometasone (ELOCON) 0.1 % external cream, Apply sparingly to affected area twice daily as needed.  Do not apply to face., Disp: 45 g, Rfl: 0     OnabotulinumtoxinA (BOTOX IJ), Inject 150 Units into the muscle once Lot # /C3 with Expiration Date: 03/2021, Disp: , Rfl:      RESTASIS MULTIDOSE 0.05 % ophthalmic emulsion, Place 1 drop into both eyes 2 times daily, Disp: , Rfl: 3     simvastatin (ZOCOR) 20 MG tablet, TAKE 1 TABLET BY MOUTH EVERYDAY AT BEDTIME, Disp: 90 tablet, Rfl: 3     traZODone (DESYREL) 50 MG tablet, TAKE 3 TABLETS BY MOUTH EVERY DAY AT BEDTIME., Disp: 270 tablet, Rfl: 2     Allergies   Allergen Reactions     Meloxicam      Diarrhea, vomiting     Primidone Other (See Comments)     Felt like motion sickness     Tramadol      Diarrhea, vomiting      Adhesive Tape Rash     Durabond only     Allergy Rash     Dermabond  Anesthesia IV set misc       Tegaderm Transparent Dressing (Informational Only) Rash     With long exposure        PHYSICAL EXAM:  HEAD, NECK AND TRUNK PATTERN:   Head Tremor: Observed - no-no tremor present  Head & Neck Extension: Present - Slight  Sub-Occipital Extension: Present - Slight  Forward Head: Present - Slight   Head & Neck Lateral Bend: Left  Shoulder Elevation: Right    HPI:    Patient denies new medical diagnoses, illnesses, hospitalizations, emergency room visits, and injuries since the previous injection with  botulinum neurotoxin.     We reviewed the recommended safety guidelines for  Botox from any vaccine injection, such as the seasonal flu vaccine, by a minimum of 10-14 days with Lilli Steven. She acknowledged understanding.    RESPONSE TO PREVIOUS TREATMENT:    Lilli Steven received 150 units of Botox on 2019.    Problems following the previous series of neurotoxin injections included:  No problems reported    BENEFITS BY PATIENT REPORT:  Pain Improvement: Yes.  Percent Improvement: unable to quantify in % but reports significant improvement Duration of Benefit:  10-12 weeks.     Dystonia and tremor Improvement: Yes.  Percent Improvement: unable to quantify in %, but reports significant overall improvement Duration of Benefit:  10 weeks followed by a gradual reduction in benefit.       BOTULINUM NEUROTOXIN INJECTION PROCEDURES:    VERIFICATION OF PATIENT IDENTIFICATION AND PROCEDURE     Initials   Patient Name lmd   Patient  lmd   Procedure Verified by: fareed     Prior to the start of the procedure and with procedural staff participation, I verbally confirmed the patient s identity using two indicators, relevant allergies, that the procedure was appropriate and matched the consent or emergent situation, and that the correct equipment/implants were available. Immediately prior to starting the procedure I conducted the Time Out with the procedural staff and re-confirmed the patient s name, procedure, and site/side. (The Joint Commission universal protocol was followed.)  Yes    Sedation (Moderate or Deep): None      Above assessments performed by:    Cassidy Ramirez MD      INDICATION/S FOR PROCEDURE/S:  Lilli Steven is a 69-year-old patient with dystonia affecting the  head, neck and shoulder girdle musculature secondary to a diagnosis of cervical dystonia with associated  pain, tremor, spasms, loss of joint motion, loss of volitional motor control and difficulty with activities of  daily living.      Her baseline symptoms have been recalcitrant to oral medications and conservative therapy.  She is here today for an injection of Botox.       GOAL OF PROCEDURE:  The goal of this procedure is to increase active range of motion, improve volitional motor control and decrease pain  associated with dystonic movements, spasticity and tremor.     TOTAL DOSE ADMINISTERED:  Dose Administered:  150 units Botox    Diluent Used:  Preservative Free Normal Saline  Total Volume of Diluent Used:  1.5 ml  Lot # /C3 with Expiration Date:  11/2021  NDC #: Botox 100u ([address]-41)    Medication guide was offered to patient and was declined.    Was there drug waste? Yes  Amount of drug waste (mL): 50 units of Botox.  Reason for waste:  Single use vial  Multi-dose vial: No    Cassidy Ramirez MD  June 20, 2019      CONSENT:  The risks, benefits, and treatment options were discussed with Lilli Steven and she agreed to proceed.    Written consent was obtained by [facility].    EQUIPMENT USED:  Needle-37mm stimulating/recording  EMG/NCS Machine     SKIN PREPARATION:  Skin preparation was performed using an alcohol wipe.     GUIDANCE DESCRIPTION:  Electro-myographic guidance was necessary throughout the procedure to accurately identify all areas of dystonic and spastic muscles while avoiding injection of non-dystonic muscles and non-spastic muscles, neighboring nerves and nearby vascular structures.     AREA/MUSCLE INJECTED:  150 UNITS BOTOX = TOTAL DOSE    1. HEAD & NECK MUSCLES: 150 units Botox = Total Dose                Right Mid-Trapezius - 10 units of Botox at 1 site/s.   Left Mid-Trapezius - 10 units of Botox at 1 site/s.      Right Splenius Capitus - 15 units of Botox at 2 site/s.   Left Splenius Capitus - 10 units of Botox at 2 site/s.      Right Levator Scapulae - 30 units of Botox at 1 site/s (shoulder muscles).   Left Levator Scapulae - 20 units of Botox at 1 site/s (shoulder muscles).      Right  Inferior Obliquus Capitis - 10 units of Botox at 1 site/s.   Left Inferior Obliquus Capitis - 15 units of at 1 site/s.      Right Sternal head of the Sternocleidomastoid - 15 units of Botox at 1 site/s.               Left Sternal head of the Sternocleidomastoid - 15 units of Botox at 1 site/s.      RESPONSE TO PROCEDURE:  Lilli Steven tolerated the procedure well and there were no immediate complications.  She was allowed to recover for an appropriate period of time and was discharged home in stable condition.    FOLLOW UP:  Lilli Steven was asked to follow up by phone in 7-14 days with Erin Combs PT, Care Coordinator or Mikki Rojas RN, Care Coordinator, to report her response to this series of injections.  Based on the patient's previous response to this therapy, Lilli Steven was rescheduled for the next series of injections in 12 weeks.    PLAN (Medication Changes, Therapy Orders, Work or Disability Issues, etc.): Will monitor response to today's injections and report.    Again, thank you for allowing me to participate in the care of your patient.      Sincerely,    Cassidy Ramirez MD
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
## 2019-08-20 ENCOUNTER — OFFICE VISIT (OUTPATIENT)
Dept: FAMILY MEDICINE | Facility: CLINIC | Age: 69
End: 2019-08-20
Payer: COMMERCIAL

## 2019-08-20 VITALS
RESPIRATION RATE: 14 BRPM | BODY MASS INDEX: 30.42 KG/M2 | DIASTOLIC BLOOD PRESSURE: 84 MMHG | OXYGEN SATURATION: 98 % | WEIGHT: 171.7 LBS | HEART RATE: 87 BPM | HEIGHT: 63 IN | SYSTOLIC BLOOD PRESSURE: 124 MMHG

## 2019-08-20 DIAGNOSIS — R30.0 DYSURIA: Primary | ICD-10-CM

## 2019-08-20 LAB
ALBUMIN UR-MCNC: NEGATIVE MG/DL
APPEARANCE UR: CLEAR
BILIRUB UR QL STRIP: NEGATIVE
COLOR UR AUTO: YELLOW
GLUCOSE UR STRIP-MCNC: NEGATIVE MG/DL
HGB UR QL STRIP: NEGATIVE
KETONES UR STRIP-MCNC: NEGATIVE MG/DL
LEUKOCYTE ESTERASE UR QL STRIP: ABNORMAL
MUCOUS THREADS #/AREA URNS LPF: PRESENT /LPF
NITRATE UR QL: NEGATIVE
NON-SQ EPI CELLS #/AREA URNS LPF: ABNORMAL /LPF
PH UR STRIP: 5.5 PH (ref 5–7)
RBC #/AREA URNS AUTO: ABNORMAL /HPF
SOURCE: ABNORMAL
SP GR UR STRIP: 1.02 (ref 1–1.03)
UROBILINOGEN UR STRIP-ACNC: 0.2 EU/DL (ref 0.2–1)
WBC #/AREA URNS AUTO: ABNORMAL /HPF

## 2019-08-20 PROCEDURE — 81001 URINALYSIS AUTO W/SCOPE: CPT | Performed by: FAMILY MEDICINE

## 2019-08-20 PROCEDURE — 99213 OFFICE O/P EST LOW 20 MIN: CPT | Performed by: FAMILY MEDICINE

## 2019-08-20 ASSESSMENT — MIFFLIN-ST. JEOR: SCORE: 1272.96

## 2019-08-20 ASSESSMENT — PAIN SCALES - GENERAL: PAINLEVEL: NO PAIN (0)

## 2019-08-20 NOTE — PROGRESS NOTES
"Subjective     Lilli Steven is a 69 year old female who presents to clinic today for the following health issues:    HPI   URINARY TRACT SYMPTOMS      Duration: 3 WEEKS    Description  frequency    Intensity:  moderate    Accompanying signs and symptoms:  Fever/chills: no   Flank pain no   Nausea and vomiting: no   Vaginal symptoms: none  Abdominal/Pelvic Pain: no     History  History of frequent UTI's: no   History of kidney stones: no   Sexually Active: no   Possibility of pregnancy: No    Precipitating or alleviating factors: None    Therapies tried and outcome: course of antibiotics -finished     PROBLEMS TO ADD ON...Lilli had symptoms of burning urine a few weeks ago. Was prescribed antibiotic on virtual and  finished 5 day course of nitrofurantoin twice daily > 48 hrs ago.she is here to get the urine checked again as this Saturday while on antibiotic noted more burning in urine after drinking tea. Feels she may have a rash in the perineum that responds to the steroid cream- has not used it yet-. No blood in urine. No vaginal discharge or vaginal itching.      BP Readings from Last 3 Encounters:   08/20/19 124/84   06/20/19 147/83   06/13/19 139/78    Wt Readings from Last 3 Encounters:   08/20/19 77.9 kg (171 lb 11.2 oz)   06/13/19 77.6 kg (171 lb)   05/17/19 77.6 kg (171 lb)                    PROBLEMS TO ADD ON...  Reviewed and updated as needed this visit by Provider         Review of Systems   ROS COMP: Constitutional, HEENT, cardiovascular, pulmonary, GI, , musculoskeletal, neuro, skin, endocrine and psych systems are negative, except as otherwise noted.      Objective    /84 (BP Location: Right arm, Patient Position: Sitting, Cuff Size: Adult Large)   Pulse 87   Resp 14   Ht 1.6 m (5' 3\")   Wt 77.9 kg (171 lb 11.2 oz)   LMP  (LMP Unknown)   SpO2 98%   BMI 30.42 kg/m    Body mass index is 30.42 kg/m .  Physical Exam   GENERAL: healthy, alert and no distress  RESP: lungs clear to " auscultation - no rales, rhonchi or wheezes  CV: regular rates and rhythm  ABDOMEN: soft, nontender, no hepatosplenomegaly, no masses and bowel sounds normal  MS: no gross musculoskeletal defects noted, no edema    Diagnostic Test Results:  Labs reviewed in Epic  Results for orders placed or performed in visit on 08/20/19 (from the past 24 hour(s))   UA with Microscopic reflex to Culture   Result Value Ref Range    Color Urine Yellow     Appearance Urine Clear     Glucose Urine Negative NEG^Negative mg/dL    Bilirubin Urine Negative NEG^Negative    Ketones Urine Negative NEG^Negative mg/dL    Specific Gravity Urine 1.020 1.003 - 1.035    pH Urine 5.5 5.0 - 7.0 pH    Protein Albumin Urine Negative NEG^Negative mg/dL    Urobilinogen Urine 0.2 0.2 - 1.0 EU/dL    Nitrite Urine Negative NEG^Negative    Blood Urine Negative NEG^Negative    Leukocyte Esterase Urine Trace (A) NEG^Negative    Source Midstream Urine     WBC Urine 0 - 5 OTO5^0 - 5 /HPF    RBC Urine O - 2 OTO2^O - 2 /HPF    Squamous Epithelial /LPF Urine Few FEW^Few /LPF    Mucous Urine Present (A) NEG^Negative /LPF           Assessment & Plan   1. Dysuria  - UA with Microscopic reflex to Culture  -urine is clear , no signs of acute infection.  She has been staying well hydrated, finished 5 day course of nitrofurantoin twice daily > 48 hrs ago.  Reassurance given - and advised to monitor symptoms.  And follow up as needed           Return in about 1 week (around 8/27/2019) for concerns,unresolved.    Nesha Su MD  [facility]

## 2019-09-09 ENCOUNTER — OFFICE VISIT (OUTPATIENT)
Dept: PHYSICAL MEDICINE AND REHAB | Facility: CLINIC | Age: 69
End: 2019-09-09
Payer: COMMERCIAL

## 2019-09-09 ENCOUNTER — RECORDS - HEALTHEAST (OUTPATIENT)
Dept: ADMINISTRATIVE | Facility: OTHER | Age: 69
End: 2019-09-09

## 2019-09-09 VITALS
HEART RATE: 91 BPM | SYSTOLIC BLOOD PRESSURE: 142 MMHG | OXYGEN SATURATION: 95 % | DIASTOLIC BLOOD PRESSURE: 81 MMHG | TEMPERATURE: 97.7 F | RESPIRATION RATE: 16 BRPM

## 2019-09-09 DIAGNOSIS — G24.3 SPASMODIC TORTICOLLIS: Primary | ICD-10-CM

## 2019-09-09 DIAGNOSIS — G24.1 GENETIC TORSION DYSTONIA: ICD-10-CM

## 2019-09-09 ASSESSMENT — PAIN SCALES - GENERAL: PAINLEVEL: MILD PAIN (2)

## 2019-09-09 NOTE — PROGRESS NOTES
BOTULINUM TOXIN PROCEDURE NOTE    Chief Complaint   Patient presents with     RECHECK     [facility] RETURN BOTOX      BP (!) 142/81 (BP Location: Left arm, Patient Position: Chair, Cuff Size: Adult Regular)   Pulse 91   Temp 97.7  F (36.5  C) (Oral)   Resp 16   LMP  (LMP Unknown)   SpO2 95%     Current Outpatient Medications:      mometasone (ELOCON) 0.1 % external cream, Apply sparingly to affected area twice daily as needed.  Do not apply to face., Disp: 45 g, Rfl: 0     OnabotulinumtoxinA (BOTOX IJ), Inject 150 Units into the muscle once Lot # /C3 with Expiration Date: 03/2021, Disp: , Rfl:      RESTASIS MULTIDOSE 0.05 % ophthalmic emulsion, Place 1 drop into both eyes 2 times daily, Disp: , Rfl: 3     simvastatin (ZOCOR) 20 MG tablet, TAKE 1 TABLET BY MOUTH EVERYDAY AT BEDTIME, Disp: 90 tablet, Rfl: 3     traZODone (DESYREL) 50 MG tablet, TAKE 3 TABLETS BY MOUTH EVERY DAY AT BEDTIME., Disp: 270 tablet, Rfl: 2    Current Facility-Administered Medications:      botulinum toxin type A (BOTOX) 100 units injection 150 Units, 150 Units, Intramuscular, Once, Standal, Cassidy New MD     Allergies   Allergen Reactions     Meloxicam      Diarrhea, vomiting     Primidone Other (See Comments)     Felt like motion sickness     Tramadol      Diarrhea, vomiting      Adhesive Tape Rash     Durabond only     Allergy Rash     Dermabond  Anesthesia IV set misc          PHYSICAL EXAM:    HEAD, NECK AND TRUNK PATTERN:   Head Tremor: Observed - no-no tremor present  Head & Neck Extension: Present - Slight  Sub-Occipital Extension: Present - Slight  Forward Head: Present - Slight   Head & Neck Lateral Bend: Left  Shoulder Elevation: Right    HPI:    Patient denies new medical diagnoses, illnesses, hospitalizations, emergency room visits, and injuries since the previous injection with botulinum neurotoxin.     We reviewed the recommended safety guidelines for  Botox from any vaccine injection, such as the seasonal flu  vaccine, by a minimum of 10-14 days with Lilli Steven. She acknowledged understanding.    RESPONSE TO PREVIOUS TREATMENT:    Lilli Steven received 150 units of Botox on 2019.    Problems following the previous series of neurotoxin injections included:  No problems reported      BENEFITS BY PATIENT REPORT:  Pain Improvement: Yes.  Percent Improvement: unable to quantify in % but reports significant improvement Duration of Benefit:  10-12 weeks.     Dystonia and tremor Improvement: Yes.  Percent Improvement: unable to quantify in %, but reports significant overall improvement Duration of Benefit:  10 weeks followed by a gradual reduction in benefit.       BOTULINUM NEUROTOXIN INJECTION PROCEDURES:    VERIFICATION OF PATIENT IDENTIFICATION AND PROCEDURE     Initials   Patient Name lmd   Patient  lmd   Procedure Verified by: lmd     Prior to the start of the procedure and with procedural staff participation, I verbally confirmed the patient s identity using two indicators, relevant allergies, that the procedure was appropriate and matched the consent or emergent situation, and that the correct equipment/implants were available. Immediately prior to starting the procedure I conducted the Time Out with the procedural staff and re-confirmed the patient s name, procedure, and site/side. (The Joint Commission universal protocol was followed.)  Yes    Sedation (Moderate or Deep): None      Above assessments performed by:    Cassidy Ramirez MD      INDICATION/S FOR PROCEDURE/S:  Lilli Steven is a 69-year-old patient with dystonia affecting the  head, neck and shoulder girdle musculature secondary to a diagnosis of cervical dystonia with associated  pain, tremor, spasms, loss of joint motion, loss of volitional motor control and difficulty with activities of daily living.      Her baseline symptoms have been recalcitrant to oral medications and conservative therapy.  She is here today for an  injection of Botox.       GOAL OF PROCEDURE:  The goal of this procedure is to increase active range of motion, improve volitional motor control and decrease pain  associated with dystonic movements, spasticity and tremor.     TOTAL DOSE ADMINISTERED:  Dose Administered:  150 units Botox    Diluent Used:  Preservative Free Normal Saline  Total Volume of Diluent Used:  1.5 ml  Lot # /C3 with Expiration Date: 03/2021  NDC #: Botox 100u ([address]-21)    Medication guide was offered to patient and was declined.    Was there drug waste? Yes  Amount of drug waste (mL): 50 units of Botox.  Reason for waste:  Single use vial  Multi-dose vial: No    Cassidy Ramirez MD  September 9, 2019      CONSENT:  The risks, benefits, and treatment options were discussed with Lilli Steven and she agreed to proceed.    Written consent was obtained by [facility].    EQUIPMENT USED:  Needle-37mm stimulating/recording  EMG/NCS Machine     SKIN PREPARATION:  Skin preparation was performed using an alcohol wipe.     GUIDANCE DESCRIPTION:  Electro-myographic guidance was necessary throughout the procedure to accurately identify all areas of dystonic and spastic muscles while avoiding injection of non-dystonic muscles and non-spastic muscles, neighboring nerves and nearby vascular structures.     AREA/MUSCLE INJECTED:  150 UNITS BOTOX = TOTAL DOSE    1. HEAD & NECK MUSCLES: 150 units Botox = Total Dose                Right Mid-Trapezius - 10 units of Botox at 1 site/s.   Left Mid-Trapezius - 10 units of Botox at 1 site/s.      Right Splenius Capitus - 15 units of Botox at 2 site/s.   Left Splenius Capitus - 10 units of Botox at 2 site/s.      Right Levator Scapulae - 30 units of Botox at 1 site/s (shoulder muscles).   Left Levator Scapulae - 20 units of Botox at 1 site/s (shoulder muscles).      Right Inferior Obliquus Capitis - 10 units of Botox at 1 site/s.   Left Inferior Obliquus Capitis - 15 units of at 1 site/s.      Right  Sternal head of the Sternocleidomastoid - 15 units of Botox at 1 site/s.               Left Sternal head of the Sternocleidomastoid - 15 units of Botox at 1 site/s.      RESPONSE TO PROCEDURE:  Lilli Steven tolerated the procedure well and there were no immediate complications.  She was allowed to recover for an appropriate period of time and was discharged home in stable condition.    FOLLOW UP:  Lilli Steven was asked to follow up by phone in 7-14 days with Erin Combs PT, Care Coordinator or Mikki Rojas RN, Care Coordinator, to report her response to this series of injections.  Based on the patient's previous response to this therapy, Lilli Steven was rescheduled for the next series of injections in 12 weeks.    PLAN (Medication Changes, Therapy Orders, Work or Disability Issues, etc.): Will monitor response to today's injections and report.

## 2019-09-09 NOTE — LETTER
9/9/2019       RE: Lilli Steven  [address]  [place] [address]     Dear Colleague,    Thank you for referring your patient, Lilli Steven, to the [facility] at [facility]. Please see a copy of my visit note below.    BOTULINUM TOXIN PROCEDURE NOTE    Chief Complaint   Patient presents with     RECHECK     [facility] RETURN BOTOX      BP (!) 142/81 (BP Location: Left arm, Patient Position: Chair, Cuff Size: Adult Regular)   Pulse 91   Temp 97.7  F (36.5  C) (Oral)   Resp 16   LMP  (LMP Unknown)   SpO2 95%     Current Outpatient Medications:      mometasone (ELOCON) 0.1 % external cream, Apply sparingly to affected area twice daily as needed.  Do not apply to face., Disp: 45 g, Rfl: 0     OnabotulinumtoxinA (BOTOX IJ), Inject 150 Units into the muscle once Lot # /C3 with Expiration Date: 03/2021, Disp: , Rfl:      RESTASIS MULTIDOSE 0.05 % ophthalmic emulsion, Place 1 drop into both eyes 2 times daily, Disp: , Rfl: 3     simvastatin (ZOCOR) 20 MG tablet, TAKE 1 TABLET BY MOUTH EVERYDAY AT BEDTIME, Disp: 90 tablet, Rfl: 3     traZODone (DESYREL) 50 MG tablet, TAKE 3 TABLETS BY MOUTH EVERY DAY AT BEDTIME., Disp: 270 tablet, Rfl: 2    Current Facility-Administered Medications:      botulinum toxin type A (BOTOX) 100 units injection 150 Units, 150 Units, Intramuscular, Once, Standal, Cassidy New MD     Allergies   Allergen Reactions     Meloxicam      Diarrhea, vomiting     Primidone Other (See Comments)     Felt like motion sickness     Tramadol      Diarrhea, vomiting      Adhesive Tape Rash     Durabond only     Allergy Rash     Dermabond  Anesthesia IV set misc          PHYSICAL EXAM:    HEAD, NECK AND TRUNK PATTERN:   Head Tremor: Observed - no-no tremor present  Head & Neck Extension: Present - Slight  Sub-Occipital Extension: Present - Slight  Forward Head: Present - Slight   Head & Neck Lateral Bend:  Left  Shoulder Elevation: Right    HPI:    Patient denies new medical diagnoses, illnesses, hospitalizations, emergency room visits, and injuries since the previous injection with botulinum neurotoxin.     We reviewed the recommended safety guidelines for  Botox from any vaccine injection, such as the seasonal flu vaccine, by a minimum of 10-14 days with Lilli Steven. She acknowledged understanding.    RESPONSE TO PREVIOUS TREATMENT:    Lilli Steven received 150 units of Botox on 2019.    Problems following the previous series of neurotoxin injections included:  No problems reported    BENEFITS BY PATIENT REPORT:  Pain Improvement: Yes.  Percent Improvement: unable to quantify in % but reports significant improvement Duration of Benefit:  10-12 weeks.     Dystonia and tremor Improvement: Yes.  Percent Improvement: unable to quantify in %, but reports significant overall improvement Duration of Benefit:  10 weeks followed by a gradual reduction in benefit.     BOTULINUM NEUROTOXIN INJECTION PROCEDURES:    VERIFICATION OF PATIENT IDENTIFICATION AND PROCEDURE     Initials   Patient Name lmd   Patient  lmd   Procedure Verified by: lmd     Prior to the start of the procedure and with procedural staff participation, I verbally confirmed the patient s identity using two indicators, relevant allergies, that the procedure was appropriate and matched the consent or emergent situation, and that the correct equipment/implants were available. Immediately prior to starting the procedure I conducted the Time Out with the procedural staff and re-confirmed the patient s name, procedure, and site/side. (The Joint Commission universal protocol was followed.)  Yes    Sedation (Moderate or Deep): None    Above assessments performed by:    Cassidy Ramirez MD    INDICATION/S FOR PROCEDURE/S:  Lilli Steven is a 69-year-old patient with dystonia affecting the  head, neck and shoulder girdle  musculature secondary to a diagnosis of cervical dystonia with associated  pain, tremor, spasms, loss of joint motion, loss of volitional motor control and difficulty with activities of daily living.      Her baseline symptoms have been recalcitrant to oral medications and conservative therapy.  She is here today for an injection of Botox.       GOAL OF PROCEDURE:  The goal of this procedure is to increase active range of motion, improve volitional motor control and decrease pain  associated with dystonic movements, spasticity and tremor.     TOTAL DOSE ADMINISTERED:  Dose Administered:  150 units Botox    Diluent Used:  Preservative Free Normal Saline  Total Volume of Diluent Used:  1.5 ml  Lot # /C3 with Expiration Date: 03/2021  NDC #: Botox 100u ([address]-84)    Medication guide was offered to patient and was declined.    Was there drug waste? Yes  Amount of drug waste (mL): 50 units of Botox.  Reason for waste:  Single use vial  Multi-dose vial: No    Cassidy Ramirez MD  September 9, 2019    CONSENT:  The risks, benefits, and treatment options were discussed with Lilli Steven and she agreed to proceed.    Written consent was obtained by [facility].    EQUIPMENT USED:  Needle-37mm stimulating/recording  EMG/NCS Machine     SKIN PREPARATION:  Skin preparation was performed using an alcohol wipe.     GUIDANCE DESCRIPTION:  Electro-myographic guidance was necessary throughout the procedure to accurately identify all areas of dystonic and spastic muscles while avoiding injection of non-dystonic muscles and non-spastic muscles, neighboring nerves and nearby vascular structures.     AREA/MUSCLE INJECTED:  150 UNITS BOTOX = TOTAL DOSE    1. HEAD & NECK MUSCLES: 150 units Botox = Total Dose                Right Mid-Trapezius - 10 units of Botox at 1 site/s.   Left Mid-Trapezius - 10 units of Botox at 1 site/s.      Right Splenius Capitus - 15 units of Botox at 2 site/s.   Left Splenius Capitus - 10 units of  Botox at 2 site/s.      Right Levator Scapulae - 30 units of Botox at 1 site/s (shoulder muscles).   Left Levator Scapulae - 20 units of Botox at 1 site/s (shoulder muscles).      Right Inferior Obliquus Capitis - 10 units of Botox at 1 site/s.   Left Inferior Obliquus Capitis - 15 units of at 1 site/s.      Right Sternal head of the Sternocleidomastoid - 15 units of Botox at 1 site/s.               Left Sternal head of the Sternocleidomastoid - 15 units of Botox at 1 site/s.    RESPONSE TO PROCEDURE:  Lilli Steven tolerated the procedure well and there were no immediate complications.  She was allowed to recover for an appropriate period of time and was discharged home in stable condition.    FOLLOW UP:  Lilli Steven was asked to follow up by phone in 7-14 days with Erin Combs PT, Care Coordinator or Mikki Rojas RN, Care Coordinator, to report her response to this series of injections.  Based on the patient's previous response to this therapy, Lilli Steven was rescheduled for the next series of injections in 12 weeks.    PLAN (Medication Changes, Therapy Orders, Work or Disability Issues, etc.): Will monitor response to today's injections and report.    Again, thank you for allowing me to participate in the care of your patient.      Sincerely,    Cassidy Ramirez MD

## 2019-09-27 ENCOUNTER — VIRTUAL VISIT (OUTPATIENT)
Dept: FAMILY MEDICINE | Facility: CLINIC | Age: 69
End: 2019-09-27

## 2019-09-27 ENCOUNTER — OFFICE VISIT (OUTPATIENT)
Dept: FAMILY MEDICINE | Facility: CLINIC | Age: 69
End: 2019-09-27
Payer: COMMERCIAL

## 2019-09-27 VITALS
DIASTOLIC BLOOD PRESSURE: 81 MMHG | HEART RATE: 100 BPM | BODY MASS INDEX: 32 KG/M2 | OXYGEN SATURATION: 98 % | RESPIRATION RATE: 14 BRPM | HEIGHT: 61 IN | SYSTOLIC BLOOD PRESSURE: 125 MMHG | WEIGHT: 169.5 LBS

## 2019-09-27 DIAGNOSIS — G89.29 CHRONIC MIDLINE LOW BACK PAIN WITHOUT SCIATICA: ICD-10-CM

## 2019-09-27 DIAGNOSIS — Z98.890 PONV (POSTOPERATIVE NAUSEA AND VOMITING): ICD-10-CM

## 2019-09-27 DIAGNOSIS — M54.50 CHRONIC MIDLINE LOW BACK PAIN WITHOUT SCIATICA: ICD-10-CM

## 2019-09-27 DIAGNOSIS — T83.110A: ICD-10-CM

## 2019-09-27 DIAGNOSIS — S49.90XS: Primary | ICD-10-CM

## 2019-09-27 DIAGNOSIS — Z01.818 PREOP GENERAL PHYSICAL EXAM: Primary | ICD-10-CM

## 2019-09-27 DIAGNOSIS — R11.2 PONV (POSTOPERATIVE NAUSEA AND VOMITING): ICD-10-CM

## 2019-09-27 LAB — HGB BLD-MCNC: 15 G/DL (ref 11.7–15.7)

## 2019-09-27 PROCEDURE — 36415 COLL VENOUS BLD VENIPUNCTURE: CPT | Performed by: FAMILY MEDICINE

## 2019-09-27 PROCEDURE — 85018 HEMOGLOBIN: CPT | Performed by: FAMILY MEDICINE

## 2019-09-27 PROCEDURE — 99214 OFFICE O/P EST MOD 30 MIN: CPT | Performed by: FAMILY MEDICINE

## 2019-09-27 ASSESSMENT — PAIN SCALES - GENERAL: PAINLEVEL: NO PAIN (0)

## 2019-09-27 ASSESSMENT — MIFFLIN-ST. JEOR: SCORE: 1231.23

## 2019-09-27 NOTE — PROGRESS NOTES
[facility]  [address]  [facility] [address] [phone]  Dept: [phone]    PRE-OP EVALUATION:  Today's date: 2019    Lilli Steven (: 1950) presents for pre-operative evaluation assessment as requested by Dr. Enrique Lambert.  She requires evaluation and anesthesia risk assessment prior to undergoing surgery/procedure for treatment of Removal of sacral implant .    Fax number for surgical facility: [facility] ([place])  Primary Physician: Vargas Chaudhry  Type of Anesthesia Anticipated: to be determined    Patient has a Health Care Directive or Living Will:  NO    Preop Questions 2019   Who is doing your surgery? Dr Enrique Lambert   What are you having done? Removal of sacral implant   Date of Surgery/Procedure: 10/23/2019   Facility or Hospital where procedure/surgery will be performed: [facility]   1.  Do you have a history of Heart attack, stroke, stent, coronary bypass surgery, or other heart surgery? No   2.  Do you ever have any pain or discomfort in your chest? No   3.  Do you have a history of  Heart Failure? No   4.   Are you troubled by shortness of breath when:  walking on a level surface, or up a slight hill, or at night? No   5.  Do you currently have a cold, bronchitis or other respiratory infection? No   6.  Do you have a cough, shortness of breath, or wheezing? No   7.  Do you sometimes get pains in the calves of your legs when you walk? No   8. Do you or anyone in your family have previous history of blood clots? No   9.  Do you or does anyone in your family have a serious bleeding problem such as prolonged bleeding following surgeries or cuts? No   10. Have you ever had problems with anemia or been told to take iron pills? No   11. Have you had any abnormal blood loss such as black, tarry or bloody stools, or abnormal vaginal bleeding? No   12. Have you ever had a blood transfusion? YES - at birth maybe   13. Have you or any of  your relatives ever had problems with anesthesia? YES - vomiting   14. Do you have sleep apnea, excessive snoring or daytime drowsiness? No   15. Do you have any prosthetic heart valves? No   16. Do you have prosthetic joints? No   17. Is there any chance that you may be pregnant? No         HPI:     HPI related to upcoming procedure: years ago had a stimulator put in and now she would like to have it removed because it no longer functions  Bladder trouble never got better anyways      See problem list for active medical problems.  Problems all longstanding and stable, except as noted/documented.  See ROS for pertinent symptoms related to these conditions.    HYPERLIPIDEMIA - Patient has a long history of significant Hyperlipidemia requiring medication for treatment with recent good control. Patient reports no problems or side effects with the medication.       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     Priority: Medium     Overview:   Created by Conversion       Wears glasses 09/06/2012     Priority: Medium     Neck pain 09/06/2012     Priority: Medium     Seasonal allergies 09/06/2012     Priority: Medium     Spring months       Osteopenia 05/17/2011     Priority: Medium     DEXA 2011  T -1 Lumbar spine  Right hip T -1.8  Left hip T -1.9       Urinary incontinence 08/04/2006     Priority: Medium     Spasmodic torticollis 07/19/1988     Priority: Medium     Botox injection        Past Medical History:   Diagnosis Date     Diarrhea 9/6/2012     Hypercholesterolemia 9/6/2012     Neck pain 9/6/2012     PONV (postoperative nausea and vomiting)      Seasonal allergies 9/6/2012     Snores 9/6/2012      Tremor      Wears glasses 9/6/2012     Past Surgical History:   Procedure Laterality Date     COLONOSCOPY N/A 5/2/2019    Procedure: COLONOSCOPY;  Surgeon: Martin Ocampo MD;  Location:  GI     COSMETIC ABDOMINOPLASTY MODIFIED N/A 2/16/2017    Procedure: COSMETIC ABDOMINOPLASTY MODIFIED;  Surgeon: Inés Harrell MD;  Location:  OR     HERNIORRHAPHY EPIGASTRIC N/A 2/16/2017    Procedure: HERNIORRHAPHY EPIGASTRIC;  Surgeon: Joey Sargent MD;  Location:  OR     HERNIORRHAPHY UMBILICAL N/A 2/16/2017    Procedure: HERNIORRHAPHY UMBILICAL;  Surgeon: Joey Sargent MD;  Location:  OR     IMPLANT STIMULATOR SACRAL NERVE STAGE ONE      for bladder incontinence, failed; battery is dead     INCISION AND DRAINAGE ABDOMEN WASHOUT, COMBINED N/A 6/2/2017    Procedure: COMBINED INCISION AND DRAINAGE ABDOMEN WASHOUT;  EVACUATION OF ABDOMINAL WALL SEROMA;  Surgeon: Inés Harrell MD;  Location:  OR     IR SINOGRAM INJECTION DIAGNOSTIC  1/31/2019     IRRIGATION AND DEBRIDEMENT TRUNK, COMBINED N/A 3/29/2017    Procedure: COMBINED IRRIGATION AND DEBRIDEMENT TRUNK;  Surgeon: Inés Harrell MD;  Location:  SD     IRRIGATION AND DEBRIDEMENT TRUNK, COMBINED N/A 11/29/2018    Procedure: EVACUATION OF ABDOMINAL WALL SEROMA;  Surgeon: Inés Harrell MD;  Location:  SD     REVISE SCAR TRUNK N/A 3/21/2018    Procedure: REVISE SCAR TRUNK;  ABDOMINAL SCAR REVISION.;  Surgeon: Inés Harrell MD;  Location:  SD     TONSILLECTOMY       Current Outpatient Medications   Medication Sig Dispense Refill     mometasone (ELOCON) 0.1 % external cream Apply sparingly to affected area twice daily as needed.  Do not apply to face. 45 g 0     OnabotulinumtoxinA (BOTOX IJ) Inject 150 Units into the muscle once Lot # /C3 with Expiration Date: 03/2021       RESTASIS MULTIDOSE 0.05 % ophthalmic emulsion Place 1 drop into both eyes 2 times daily  3     simvastatin (ZOCOR) 20 MG  "tablet TAKE 1 TABLET BY MOUTH EVERYDAY AT BEDTIME 90 tablet 3     traZODone (DESYREL) 50 MG tablet TAKE 3 TABLETS BY MOUTH EVERY DAY AT BEDTIME. 270 tablet 2     OTC products: none    Allergies   Allergen Reactions     Meloxicam      Diarrhea, vomiting     Primidone Other (See Comments)     Felt like motion sickness     Tramadol      Diarrhea, vomiting      Adhesive Tape Rash     Durabond only     Allergy Rash     Dermabond  Anesthesia IV set misc        Latex Allergy: NO    Social History     Tobacco Use     Smoking status: Former Smoker     Packs/day: 1.00     Types: Cigarettes     Last attempt to quit: 1985     Years since quittin.7     Smokeless tobacco: Never Used   Substance Use Topics     Alcohol use: Yes     Alcohol/week: 0.0 standard drinks     Comment: 0-3 DRINKS/WEEK     History   Drug Use No       REVIEW OF SYSTEMS:     Constitutional: no recent illness, no fevers/sweats/chills  Eyes: No vision changes or eye irritation  Ears/Nose/Throat: No runny nose, sore throat or ear pain  CV: no palpitations, no chest pain, no lower extremity swelling.  Resp: no shortness of breath, wheezing, or cough.  GI: no nausea/vomiting/diarrhea, no black or bloody stools  : no burning or urgency with urination  Skin: no rash  Musculoskeletal: no joint pain, muscle pain, weakness  Psych: no depression, no concerns about anxiety  Neuro: no new headaches, dizziness, numbness, or tingling    EXAM:   /81 (BP Location: Left arm, Patient Position: Sitting, Cuff Size: Adult Large)   Pulse 100   Resp 14   Ht 1.549 m (5' 1\")   Wt 76.9 kg (169 lb 8 oz)   LMP  (LMP Unknown)   SpO2 98%   BMI 32.03 kg/m      General Appearance: Pleasant, alert, in no acute respiratory distress.  Head Exam: Normal. Normocephalic, atraumatic. No sinus tenderness.  Eye Exam: Normal external eye, conjunctiva, lids. ELENA.  Ear Exam: Normal auditory canals and external ears. Non-tender.  Left TM-normal. Right TM-normal.  OroPharynx Exam: " Dental hygiene adequate. Normal buccal mucosa. Normal pharynx.  Neck Exam: Supple, no masses or enlarged, tender nodes.  Thyroid Exam: No nodules or enlargement or tenderness.  Chest/Respiratory Exam: Normal, comfortable, easy respirations. Chest wall normal. Lungs are clear to auscultation. No wheezing, crackles, or rhonchi.  Cardiovascular Exam: Regular rate and rhythm. No murmur, gallop, or rubs. No pedal edema.  Gastrointestinal Exam: Soft, non-tender, no masses or organomegaly.  Musculoskeletal Exam: Back is non-tender, full ROM of upper and lower extremities.  Skin: no rash, warm and dry.    Neurologic Exam: Nonfocal, no tremor. Normal gait.  Psychiatric Exam: Alert - appropriate, normal affect    DIAGNOSTICS:   No labs or EKG required for low risk surgery (cataract, skin procedure, breast biopsy, etc)    Recent Labs   Lab Test 01/18/19  1311 06/02/17  2140 03/27/17  0910 02/16/17  0710   HGB  --   --   --  14.4     --   --   --    INR 0.90  --   --   --    NA  --   --  142  --    POTASSIUM  --   --  4.6  --    CR  --  0.90 0.76  --       Results for orders placed or performed in visit on 09/27/19   Hemoglobin   Result Value Ref Range    Hemoglobin 15.0 11.7 - 15.7 g/dL         IMPRESSION:   Reason for surgery/procedure: implant removal  Diagnosis/reason for consult: routine    The proposed surgical procedure is considered LOW risk.    REVISED CARDIAC RISK INDEX  The patient has the following serious cardiovascular risks for perioperative complications such as (MI, PE, VFib and 3  AV Block):  No serious cardiac risks  INTERPRETATION: 0 risks: Class I (very low risk - 0.4% complication rate)    The patient has the following additional risks for perioperative complications:  No identified additional risks      ICD-10-CM    1. Preop general physical exam Z01.818    2. Breakdown (mechanical) of urinary electronic stimulator device, initial encounter (H) T83.110A    3. PONV (postoperative nausea and  vomiting) R11.2     Z98.890        RECOMMENDATIONS:       Cardiovascular Risk  Performs 4 METs exercise without symptoms (Walk on level ground at 15 minutes per mile (4 miles/hour)) .       Pulmonary Risk  None    --Patient is on no high risk chronic medications    APPROVAL GIVEN to proceed with proposed procedure, without further diagnostic evaluation       Signed Electronically by: Vargas Chaudhry MD    Copy of this evaluation report is provided to requesting physician.    [facility] Preop Guidelines    Revised Cardiac Risk Index

## 2019-10-03 ENCOUNTER — HEALTH MAINTENANCE LETTER (OUTPATIENT)
Age: 69
End: 2019-10-03

## 2019-10-04 NOTE — TELEPHONE ENCOUNTER
RECORDS RECEIVED FROM: Chronic low back pain and referred by Dr. Vargas Chaudhry, per patient.  No surgery and no imaging, per patient.   DATE RECEIVED: Nov 5, 2019    NOTES STATUS DETAILS   OFFICE NOTE from referring provider Internal 9/27/19 Dr. Chaudhry   OFFICE NOTE from other specialist N/A    DISCHARGE SUMMARY from hospital N/A    DISCHARGE REPORT from the ER N/A    OPERATIVE REPORT N/A    MEDICATION LIST Internal    IMPLANT RECORD/STICKER N/A    LABS     CBC/DIFF N/A    CULTURES N/A    INJECTIONS DONE IN RADIOLOGY N/A    MRI N/A    CT SCAN N/A    XRAYS (IMAGES & REPORTS) Internal 2017   TUMOR     PATHOLOGY  Slides & report N/A

## 2019-10-09 NOTE — TELEPHONE ENCOUNTER
RECORDS RECEIVED FROM: DX: Elbow pain and referred by Dr. Vargas Chaudhry, per patient.  No surgery and no imaging, per patient.   DATE RECEIVED: 11.5.19   NOTES STATUS DETAILS   OFFICE NOTE from referring provider Internal 9.27.19 Dr. Chaudhry   OFFICE NOTE from other specialist N/A    DISCHARGE SUMMARY from hospital N/A    DISCHARGE REPORT from the ER N/A    OPERATIVE REPORT N/A    MEDICATION LIST Internal    IMPLANT RECORD/STICKER N/A    LABS     CBC/DIFF N/A    CULTURES N/A    INJECTIONS DONE IN RADIOLOGY N/A    MRI N/A    CT SCAN N/A    XRAYS (IMAGES & REPORTS) N/A    TUMOR     PATHOLOGY  Slides & report N/A

## 2019-10-18 ENCOUNTER — TRANSFERRED RECORDS (OUTPATIENT)
Dept: HEALTH INFORMATION MANAGEMENT | Facility: CLINIC | Age: 69
End: 2019-10-18

## 2019-11-04 ENCOUNTER — TELEPHONE (OUTPATIENT)
Dept: ORTHOPEDICS | Facility: CLINIC | Age: 69
End: 2019-11-04

## 2019-11-04 ENCOUNTER — OFFICE VISIT (OUTPATIENT)
Dept: FAMILY MEDICINE | Facility: CLINIC | Age: 69
End: 2019-11-04
Payer: COMMERCIAL

## 2019-11-04 ENCOUNTER — TELEPHONE (OUTPATIENT)
Dept: FAMILY MEDICINE | Facility: CLINIC | Age: 69
End: 2019-11-04

## 2019-11-04 VITALS
HEART RATE: 88 BPM | TEMPERATURE: 97.9 F | OXYGEN SATURATION: 95 % | DIASTOLIC BLOOD PRESSURE: 66 MMHG | HEIGHT: 61 IN | RESPIRATION RATE: 17 BRPM | BODY MASS INDEX: 32.28 KG/M2 | SYSTOLIC BLOOD PRESSURE: 144 MMHG | WEIGHT: 171 LBS

## 2019-11-04 DIAGNOSIS — T82.7XXA: Primary | ICD-10-CM

## 2019-11-04 DIAGNOSIS — M54.50 LUMBAR PAIN: Primary | ICD-10-CM

## 2019-11-04 PROCEDURE — 99213 OFFICE O/P EST LOW 20 MIN: CPT | Performed by: FAMILY MEDICINE

## 2019-11-04 RX ORDER — CEPHALEXIN 500 MG/1
500 CAPSULE ORAL 3 TIMES DAILY
Qty: 30 CAPSULE | Refills: 0 | Status: SHIPPED | OUTPATIENT
Start: 2019-11-04 | End: 2020-09-23

## 2019-11-04 ASSESSMENT — MIFFLIN-ST. JEOR: SCORE: 1238.03

## 2019-11-04 NOTE — PROGRESS NOTES
"Subjective     Lilli Steven is a 69 year old female who presents to clinic today for the following health issues:    HPI   Concern - Per patient left hand infection   Onset: more than a week ago     Description:   Pt states that she may have infection on hand from an IV attempt     Intensity: mild, moderate    Progression of Symptoms:  intermittent    Accompanying Signs & Symptoms:  Discoloration and swelling     Previous history of similar problem:   no    Precipitating factors:   Worsened by: nothing     Alleviating factors:  Improved by: nothing     Therapies Tried and outcome: none         ROS: As per HPI.  Constitutional: no recent illness, no fevers/sweats/chills      Allergies, reviewed:     Allergies   Allergen Reactions     Meloxicam      Diarrhea, vomiting     Primidone Other (See Comments)     Felt like motion sickness     Tramadol      Diarrhea, vomiting      Adhesive Tape Rash     Durabond only     Allergy Rash     Dermabond  Anesthesia IV set misc        Med list prior to vist:  mometasone (ELOCON) 0.1 % external cream, Apply sparingly to affected area twice daily as needed.  Do not apply to face.  OnabotulinumtoxinA (BOTOX IJ), Inject 150 Units into the muscle once Lot # /[id number] with Expiration Date: 03/2021  RESTASIS MULTIDOSE 0.05 % ophthalmic emulsion, Place 1 drop into both eyes 2 times daily  simvastatin (ZOCOR) 20 MG tablet, TAKE 1 TABLET BY MOUTH EVERYDAY AT BEDTIME  traZODone (DESYREL) 50 MG tablet, TAKE 3 TABLETS BY MOUTH EVERY DAY AT BEDTIME.    botulinum toxin type A (BOTOX) 100 units injection 150 Units  botulinum toxin type A (BOTOX) 100 units injection 150 Units      Medications reviewed and updated    Objective:  BP (!) 144/66   Pulse 88   Temp 97.9  F (36.6  C) (Oral)   Resp 17   Ht 1.549 m (5' 1\")   Wt 77.6 kg (171 lb)   LMP  (LMP Unknown)   SpO2 95%   BMI 32.31 kg/m    General Appearance: Pleasant, alert, WN/WD in no acute respiratory distress.  Cardiovascular Exam: No " edema or vascular insufficiency.  Skin: Swelling redness but no discharge or purulence and in left hand        ASSESSMENT/PLAN:    ICD-10-CM    1. Infection of intravenous catheter, initial encounter (H) T82.7XXA cephALEXin (KEFLEX) 500 MG capsule      Former IV site with development of redness warmth suggestive of localized cellulitis  Start cephalexin    Reviewed possibility of developing infections due to resistant bacteria so let us know within a couple of days if this does not the seem to be improving    Asked pt. to return to clinic in 1 week(s) if symptoms do not improve.    Vargas Chaudhry MD, MD MPH

## 2019-11-04 NOTE — TELEPHONE ENCOUNTER
Needs to reschedule appt with Dr. Gallegos. Needs to have MRI before she see's him. MRI order in system. Per Dariusz

## 2019-11-04 NOTE — TELEPHONE ENCOUNTER
Reason for call:  Patient reporting a symptom    Symptom or request: left hand--vein is green, brown, red around edges and now spreading    Duration (how long have symptoms been present): 2 weeks ago    Have you been treated for this before? No    Additional comments: Lilli calling in concerned with her hand that the nurse where she had her surgery @[facility] was placing IV but didn't work. Her hand is turning different colors.     Phone Number patient can be reached at:  Cell number on file:    Telephone Information:   Mobile [phone]       Best Time:  any    Can we leave a detailed message on this number:  YES    Call taken on 11/4/2019 at 8:40 AM by Dante Meza

## 2019-11-04 NOTE — TELEPHONE ENCOUNTER
"Pt had an IV attempted on back of her hand during a surgery more then a week ago. Pt had no sysmptoms at this site for a week and then woke up on friday AM with \"huge swelling,\" which has since gone down a \"alittle.\"  Whole back of hand, into arm is green and brown with red around the boarders per pt.  Added pt in to 9:30 today.    Caroline Harmon RN    "

## 2019-11-04 NOTE — NURSING NOTE
"Chief Complaint   Patient presents with     Hand Problem     BP (!) 144/66   Pulse 88   Temp 97.9  F (36.6  C) (Oral)   Resp 17   Ht 1.549 m (5' 1\")   Wt 77.6 kg (171 lb)   LMP  (LMP Unknown)   SpO2 95%   BMI 32.31 kg/m   Estimated body mass index is 32.31 kg/m  as calculated from the following:    Height as of this encounter: 1.549 m (5' 1\").    Weight as of this encounter: 77.6 kg (171 lb).  bp completed using cuff size: large       Health Maintenance addressed:  BP was high, used pink card, recheck manually    Possibly completing today per provider review.    Valentina Garza, DANIEL, MA     "

## 2019-11-05 ENCOUNTER — PRE VISIT (OUTPATIENT)
Dept: ORTHOPEDICS | Facility: CLINIC | Age: 69
End: 2019-11-05

## 2019-11-05 ENCOUNTER — ANCILLARY PROCEDURE (OUTPATIENT)
Dept: GENERAL RADIOLOGY | Facility: CLINIC | Age: 69
End: 2019-11-05
Attending: FAMILY MEDICINE
Payer: COMMERCIAL

## 2019-11-05 ENCOUNTER — OFFICE VISIT (OUTPATIENT)
Dept: ORTHOPEDICS | Facility: CLINIC | Age: 69
End: 2019-11-05
Payer: COMMERCIAL

## 2019-11-05 VITALS
WEIGHT: 171 LBS | DIASTOLIC BLOOD PRESSURE: 66 MMHG | SYSTOLIC BLOOD PRESSURE: 144 MMHG | BODY MASS INDEX: 32.28 KG/M2 | HEIGHT: 61 IN | HEART RATE: 88 BPM

## 2019-11-05 DIAGNOSIS — M25.521 BILATERAL ELBOW JOINT PAIN: Primary | ICD-10-CM

## 2019-11-05 DIAGNOSIS — M25.521 CHRONIC ELBOW PAIN, RIGHT: ICD-10-CM

## 2019-11-05 DIAGNOSIS — M25.522 BILATERAL ELBOW JOINT PAIN: Primary | ICD-10-CM

## 2019-11-05 DIAGNOSIS — M25.522 BILATERAL ELBOW JOINT PAIN: ICD-10-CM

## 2019-11-05 DIAGNOSIS — M25.521 BILATERAL ELBOW JOINT PAIN: ICD-10-CM

## 2019-11-05 DIAGNOSIS — M79.89 MASS OF SOFT TISSUE OF UPPER ARM: ICD-10-CM

## 2019-11-05 DIAGNOSIS — G89.29 CHRONIC ELBOW PAIN, RIGHT: ICD-10-CM

## 2019-11-05 ASSESSMENT — MIFFLIN-ST. JEOR: SCORE: 1238.03

## 2019-11-05 NOTE — Clinical Note
Thank you for allowing me to see your patient in [facility] Medicine Clinic.  Please see the attached copy of our visit.Sincerely,Cruz Carvalho MD

## 2019-11-05 NOTE — PROGRESS NOTES
"Sports Medicine Clinic Visit    PCP: Vargas Chaudhry    Lilli Steven is a 69 year old female who is seen  in consultation at the request of Dr. Chaudhry presenting with bilateral elbow pain. (Right is worse),  Righthanded.  Uncomfortable when opening wine bottles.    Has had elbow elbow pain since she fell a few years ago. She thought the pain would improve over time, however it has stayed pretty consistent. Has not had any specific appointments for this issue until now due to other health concerns.    When asked where pain is, she points to a soft tissue deformity on the anterior aspect of her right elbow in the antecubital space. This \"bump\" is quite TTP and has a slightly smaller deformity on the left side.    Injury: Fell a couple years ago and had a FOOSH mechanism.     Location of Pain: right elbow  Duration of Pain: 2 year(s)  Rating of Pain: 5/10  Pain is better with: Nothing  Pain is worse with: elbow flexion  Treatment so far consists of: Nothing  Prior History of related problems: No prior elbow or arm injuries or issues    BP (!) 144/66   Pulse 88   Ht 1.549 m (5' 1\")   Wt 77.6 kg (171 lb)   LMP  (LMP Unknown)   BMI 32.31 kg/m         PMH:  Past Medical History:   Diagnosis Date     Arthritis right knee     Diarrhea 9/6/2012     Hypercholesterolemia 9/6/2012     Neck pain 9/6/2012     PONV (postoperative nausea and vomiting)      Seasonal allergies 9/6/2012     Snores 9/6/2012     Tremor      Wears glasses 9/6/2012       Active problem list:  Patient Active Problem List   Diagnosis     Spasmodic torticollis     Wears glasses     Neck pain     Seasonal allergies     Genetic torsion dystonia     Osteopenia     Hyperlipidemia LDL goal <160     Tear of lateral cartilage or meniscus of knee, current     Urinary incontinence     PONV (postoperative nausea and vomiting)       FH:  Family History   Problem Relation Age of Onset     Cancer Father         49 yrs old - bladder     Other Cancer " Father         bladder     Dementia Mother         she is living in [facility] in [facility]     Breast Cancer Mother         70s     Osteoporosis Mother      Heart Disease Maternal Grandfather         disease     Hypertension Maternal Grandfather      High cholesterol Maternal Grandfather      Heart Disease Paternal Grandmother         disease     Hypertension Paternal Grandmother      High cholesterol Paternal Grandmother      Heart Disease Maternal Grandmother         disease     Hypertension Paternal Grandfather      High cholesterol Paternal Grandfather      Hypertension Sister      Hypertension Sister      Osteoporosis Sister      Diabetes No family hx of      Coronary Artery Disease No family hx of      Hyperlipidemia No family hx of      Cerebrovascular Disease No family hx of      Colon Cancer No family hx of      Prostate Cancer No family hx of      Other Cancer No family hx of      Depression No family hx of      Anxiety Disorder No family hx of      Mental Illness No family hx of      Substance Abuse No family hx of      Anesthesia Reaction No family hx of      Asthma No family hx of      Osteoporosis No family hx of      Genetic Disorder No family hx of      Thyroid Disease No family hx of      Obesity No family hx of      Unknown/Adopted No family hx of        SH:  Social History     Socioeconomic History     Marital status: Single     Spouse name: Not on file     Number of children: Not on file     Years of education: Not on file     Highest education level: Not on file   Occupational History     Not on file   Social Needs     Financial resource strain: Not on file     Food insecurity:     Worry: Not on file     Inability: Not on file     Transportation needs:     Medical: Not on file     Non-medical: Not on file   Tobacco Use     Smoking status: Former Smoker     Packs/day: 0.50     Years: 10.00     Pack years: 5.00     Types: Cigarettes     Start date: 1/1/1968     Last attempt to quit: 1/1/1988      Years since quittin.8     Smokeless tobacco: Never Used   Substance and Sexual Activity     Alcohol use: Yes     Alcohol/week: 0.0 standard drinks     Comment: 3 - 4 drinks/week     Drug use: No     Sexual activity: Yes     Partners: Male     Birth control/protection: Post-menopausal   Lifestyle     Physical activity:     Days per week: Not on file     Minutes per session: Not on file     Stress: Not on file   Relationships     Social connections:     Talks on phone: Not on file     Gets together: Not on file     Attends Rastafari service: Not on file     Active member of club or organization: Not on file     Attends meetings of clubs or organizations: Not on file     Relationship status: Not on file     Intimate partner violence:     Fear of current or ex partner: Not on file     Emotionally abused: Not on file     Physically abused: Not on file     Forced sexual activity: Not on file   Other Topics Concern     Parent/sibling w/ CABG, MI or angioplasty before 65F 55M? No   Social History Narrative    Family History: Her father  at 49 of cancer. Her mother is alive at age 82. She has four sisters ages 56, 52, 50 and 42. She has one brother age 44. She has one daughter who is 24 yrs old and living with her at age 19. The patient is single. She is . She was  one year. Her ex- was an alcoholic.             She works at [employer].     She drinks three drinks per week. No smoking, quit 30 years ago.     Living in [facility] for the past 7-8 yrs.             Mother was Hungarian - Askenazi Judaism; both of her parents lived in [place].    Father was scotch-reid               MEDS:  See EMR, reviewed  ALL:  See EMR, reviewed    REVIEW OF SYSTEMS:  CONSTITUTIONAL:NEGATIVE for fever, chills, change in weight  INTEGUMENTARY/SKIN: NEGATIVE for worrisome rashes, moles or lesions  EYES: NEGATIVE for vision changes or irritation  ENT/MOUTH: NEGATIVE for ear, mouth and throat  problems  RESP:NEGATIVE for significant cough or SOB  BREAST: NEGATIVE for masses, tenderness or discharge  CV: NEGATIVE for chest pain, palpitations or peripheral edema  GI: NEGATIVE for nausea, abdominal pain, heartburn, or change in bowel habits  :NEGATIVE for frequency, dysuria, or hematuria  :NEGATIVE for frequency, dysuria, or hematuria  NEURO: NEGATIVE for weakness, dizziness or paresthesias  ENDOCRINE: NEGATIVE for temperature intolerance, skin/hair changes  HEME/ALLERGY/IMMUNE: NEGATIVE for bleeding problems  PSYCHIATRIC: NEGATIVE for changes in mood or affect        Objective: She has full flexion extension of both elbows without signs of contracture.  She does have a symmetrical soft tissue subcutaneous mass that can be felt in the medial aspect of the distal upper arm that is soft, well-circumscribed and mobile about 4 cm x 3 cm.  She indicates that this has been present ever since the fall that she had 5 years ago.  It was initially associated with a lot of bruising and then she said she had swelling in the circumscribed area.  It is nontender to the touch and the overlying skin is normal.  It is not associated with the joint.  She is nontender over the medial epicondyle, lateral epicondyle or olecranon of either elbow.  I can also not get specific tenderness at the distal biceps attachment and she will supinate against resistance with good strength and flex her elbow against resistance with good strength.  She is nontender over the distal triceps bilaterally.  She is without radicular discomfort into the forearm or hand.  She is alert not without numbness or tingling in the hand.  Distal pulses are intact.  She denies  radicular discomfort from the neck into the shoulder or upper arm bilaterally.  There are no impingement signs at the shoulders.    An x-ray of the bilateral elbow shows no signs of soft tissue calcification and no signs of significant degenerative change.  No bony abnormality is  noted.      Assessment bilateral elbow discomfort x5 years, right greater than left, etiology unknown.  Soft tissue mass right upper arm.  Rule out distal biceps tendon injury    Plan: An MRI of the right elbow is pending.  This is to evaluate the soft tissue mass and also the distal biceps attachment.  She will follow-up after the MRI to go over options.

## 2019-11-08 ENCOUNTER — ANCILLARY PROCEDURE (OUTPATIENT)
Dept: MRI IMAGING | Facility: CLINIC | Age: 69
End: 2019-11-08
Attending: ORTHOPAEDIC SURGERY
Payer: COMMERCIAL

## 2019-11-08 ENCOUNTER — ANCILLARY PROCEDURE (OUTPATIENT)
Dept: GENERAL RADIOLOGY | Facility: CLINIC | Age: 69
End: 2019-11-08
Attending: RADIOLOGY
Payer: COMMERCIAL

## 2019-11-08 DIAGNOSIS — M54.50 LUMBAR PAIN: ICD-10-CM

## 2019-11-08 DIAGNOSIS — T81.509A FOREIGN BODY ACCIDENTALLY LEFT DURING A PROCEDURE, INITIAL ENCOUNTER: ICD-10-CM

## 2019-11-11 DIAGNOSIS — M54.50 LUMBAR PAIN: Primary | ICD-10-CM

## 2019-11-12 ENCOUNTER — ANCILLARY PROCEDURE (OUTPATIENT)
Dept: GENERAL RADIOLOGY | Facility: CLINIC | Age: 69
End: 2019-11-12
Attending: ORTHOPAEDIC SURGERY
Payer: COMMERCIAL

## 2019-11-12 ENCOUNTER — OFFICE VISIT (OUTPATIENT)
Dept: ORTHOPEDICS | Facility: CLINIC | Age: 69
End: 2019-11-12
Payer: COMMERCIAL

## 2019-11-12 DIAGNOSIS — M54.50 LUMBAR PAIN: Primary | ICD-10-CM

## 2019-11-12 ASSESSMENT — ENCOUNTER SYMPTOMS
SPEECH CHANGE: 0
MEMORY LOSS: 0
TROUBLE SWALLOWING: 0
HEMATURIA: 0
JAUNDICE: 0
BOWEL INCONTINENCE: 0
RECTAL PAIN: 0
JOINT SWELLING: 0
CONSTIPATION: 0
ALTERED TEMPERATURE REGULATION: 0
BACK PAIN: 1
NUMBNESS: 0
FATIGUE: 0
WEIGHT GAIN: 0
ABDOMINAL PAIN: 1
NIGHT SWEATS: 0
HALLUCINATIONS: 0
CHILLS: 0
TASTE DISTURBANCE: 0
LOSS OF CONSCIOUSNESS: 0
POLYDIPSIA: 0
SMELL DISTURBANCE: 0
NECK MASS: 0
NECK PAIN: 1
EYE IRRITATION: 1
HEADACHES: 0
EYE WATERING: 0
DIFFICULTY URINATING: 0
TINGLING: 0
MYALGIAS: 1
HOARSE VOICE: 0
EYE REDNESS: 1
STIFFNESS: 0
MUSCLE CRAMPS: 0
INCREASED ENERGY: 0
MUSCLE WEAKNESS: 0
DIARRHEA: 1
FLANK PAIN: 0
ARTHRALGIAS: 0
POLYPHAGIA: 0
WEIGHT LOSS: 0
WEAKNESS: 0
DIZZINESS: 0
DYSURIA: 0
SORE THROAT: 0
SEIZURES: 0
EYE PAIN: 1
FEVER: 0
NAUSEA: 0
HEARTBURN: 1
TREMORS: 1
PARALYSIS: 0
DISTURBANCES IN COORDINATION: 0
SINUS CONGESTION: 1
DECREASED APPETITE: 0
SINUS PAIN: 0
BLOATING: 0
VOMITING: 0
BLOOD IN STOOL: 0
DOUBLE VISION: 0
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
## 2019-11-14 ENCOUNTER — ANCILLARY PROCEDURE (OUTPATIENT)
Dept: MRI IMAGING | Facility: CLINIC | Age: 69
End: 2019-11-14
Attending: FAMILY MEDICINE
Payer: COMMERCIAL

## 2019-11-14 DIAGNOSIS — M25.521 CHRONIC ELBOW PAIN, RIGHT: ICD-10-CM

## 2019-11-14 DIAGNOSIS — M79.89 MASS OF SOFT TISSUE OF UPPER ARM: ICD-10-CM

## 2019-11-14 DIAGNOSIS — G89.29 CHRONIC ELBOW PAIN, RIGHT: ICD-10-CM

## 2019-11-18 ENCOUNTER — OFFICE VISIT (OUTPATIENT)
Dept: ORTHOPEDICS | Facility: CLINIC | Age: 69
End: 2019-11-18
Payer: COMMERCIAL

## 2019-11-18 VITALS — BODY MASS INDEX: 32.28 KG/M2 | RESPIRATION RATE: 16 BRPM | HEIGHT: 61 IN | WEIGHT: 171 LBS

## 2019-11-18 DIAGNOSIS — M25.521 CHRONIC ELBOW PAIN, RIGHT: Primary | ICD-10-CM

## 2019-11-18 DIAGNOSIS — S46.211D RUPTURE OF RIGHT DISTAL BICEPS TENDON, SUBSEQUENT ENCOUNTER: ICD-10-CM

## 2019-11-18 DIAGNOSIS — G89.29 CHRONIC ELBOW PAIN, RIGHT: Primary | ICD-10-CM

## 2019-11-18 ASSESSMENT — MIFFLIN-ST. JEOR: SCORE: 1238.03

## 2019-11-18 NOTE — LETTER
11/18/2019      RE: Lilli Steven  [address]  [facility] [address]       November 18, 2019: Lilli Steven is a 69 year old female who is seen in f/u up for right elbow pain. She is here to review her MRI results on 11/14/19.       PMH:  Past Medical History:   Diagnosis Date     Arthritis right knee     Diarrhea 9/6/2012     Hypercholesterolemia 9/6/2012     Neck pain 9/6/2012     PONV (postoperative nausea and vomiting)      Seasonal allergies 9/6/2012     Snores 9/6/2012     Tremor      Wears glasses 9/6/2012       Active problem list:  Patient Active Problem List   Diagnosis     Spasmodic torticollis     Wears glasses     Neck pain     Seasonal allergies     Genetic torsion dystonia     Osteopenia     Hyperlipidemia LDL goal <160     Tear of lateral cartilage or meniscus of knee, current     Urinary incontinence     PONV (postoperative nausea and vomiting)       FH:  Family History   Problem Relation Age of Onset     Cancer Father         49 yrs old - bladder     Other Cancer Father         bladder     Dementia Mother         she is living in [facility] in [facility]     Breast Cancer Mother         70s     Osteoporosis Mother      Heart Disease Maternal Grandfather         disease     Hypertension Maternal Grandfather      High cholesterol Maternal Grandfather      Heart Disease Paternal Grandmother         disease     Hypertension Paternal Grandmother      High cholesterol Paternal Grandmother      Heart Disease Maternal Grandmother         disease     Hypertension Paternal Grandfather      High cholesterol Paternal Grandfather      Hypertension Sister      Hypertension Sister      Osteoporosis Sister      Diabetes No family hx of      Coronary Artery Disease No family hx of      Hyperlipidemia No family hx of      Cerebrovascular Disease No family hx of      Colon Cancer No family hx of      Prostate Cancer No family hx of      Other Cancer No family hx of      Depression No  family hx of      Anxiety Disorder No family hx of      Mental Illness No family hx of      Substance Abuse No family hx of      Anesthesia Reaction No family hx of      Asthma No family hx of      Osteoporosis No family hx of      Genetic Disorder No family hx of      Thyroid Disease No family hx of      Obesity No family hx of      Unknown/Adopted No family hx of        SH:  Social History     Socioeconomic History     Marital status: Single     Spouse name: Not on file     Number of children: Not on file     Years of education: Not on file     Highest education level: Not on file   Occupational History     Not on file   Social Needs     Financial resource strain: Not on file     Food insecurity:     Worry: Not on file     Inability: Not on file     Transportation needs:     Medical: Not on file     Non-medical: Not on file   Tobacco Use     Smoking status: Former Smoker     Packs/day: 0.50     Years: 10.00     Pack years: 5.00     Types: Cigarettes     Start date: 1968     Last attempt to quit: 1988     Years since quittin.9     Smokeless tobacco: Never Used   Substance and Sexual Activity     Alcohol use: Yes     Alcohol/week: 0.0 standard drinks     Comment: 3 - 4 drinks/week     Drug use: No     Sexual activity: Yes     Partners: Male     Birth control/protection: Post-menopausal   Lifestyle     Physical activity:     Days per week: Not on file     Minutes per session: Not on file     Stress: Not on file   Relationships     Social connections:     Talks on phone: Not on file     Gets together: Not on file     Attends Rastafarian service: Not on file     Active member of club or organization: Not on file     Attends meetings of clubs or organizations: Not on file     Relationship status: Not on file     Intimate partner violence:     Fear of current or ex partner: Not on file     Emotionally abused: Not on file     Physically abused: Not on file     Forced sexual activity: Not on file   Other Topics  Concern     Parent/sibling w/ CABG, MI or angioplasty before 65F 55M? No   Social History Narrative    Family History: Her father  at 49 of cancer. Her mother is alive at age 82. She has four sisters ages 56, 52, 50 and 42. She has one brother age 44. She has one daughter who is 24 yrs old and living with her at age 19. The patient is single. She is . She was  one year. Her ex- was an alcoholic.             She works at [employer].     She drinks three drinks per week. No smoking, quit 30 years ago.     Living in [facility] for the past 7-8 yrs.             Mother was Romanian - Askenazi Tenriism; both of her parents lived in [place].    Father was scotch-reid               MEDS:  See EMR, reviewed  ALL:  See EMR, reviewed    REVIEW OF SYSTEMS:  CONSTITUTIONAL:NEGATIVE for fever, chills, change in weight  INTEGUMENTARY/SKIN: NEGATIVE for worrisome rashes, moles or lesions  EYES: NEGATIVE for vision changes or irritation  ENT/MOUTH: NEGATIVE for ear, mouth and throat problems  RESP:NEGATIVE for significant cough or SOB  BREAST: NEGATIVE for masses, tenderness or discharge  CV: NEGATIVE for chest pain, palpitations or peripheral edema  GI: NEGATIVE for nausea, abdominal pain, heartburn, or change in bowel habits  :NEGATIVE for frequency, dysuria, or hematuria  :NEGATIVE for frequency, dysuria, or hematuria  NEURO: NEGATIVE for weakness, dizziness or paresthesias  ENDOCRINE: NEGATIVE for temperature intolerance, skin/hair changes  HEME/ALLERGY/IMMUNE: NEGATIVE for bleeding problems  PSYCHIATRIC: NEGATIVE for changes in mood or affect    Subjective: 69-year-old female follow-up MRI.  She had a bilateral upper extremity injury with a fall 4 years ago.  Her MRI of her right extremity suggests a chronic distal biceps tendon tear.  It does give her discomfort with daily activities, which is why she presented to the clinic for this discussion.  She will notice it with activities in the  kitchen such as opening jars or wine bottles.  She will feel discomfort in the area of the distal biceps in the forearm when she does so.    Objective: She has full flexion extension of both elbows without signs of contracture.  She does have a symmetrical soft tissue subcutaneous mass that can be felt in the medial aspect of the distal upper arm that is soft, well-circumscribed and mobile about 4 cm x 3 cm.   She is nontender over the medial epicondyle, lateral epicondyle or olecranon of either elbow.  I can also not get specific tenderness at the distal biceps attachment and she will supinate against resistance with good strength and flex her elbow against resistance with good strength.  She is nontender over the distal triceps bilaterally.  She is without radicular discomfort into the forearm or hand.  She is alert not without numbness or tingling in the hand.  Distal pulses are intact.  She denies  radicular discomfort from the neck into the shoulder or upper arm bilaterally.  There are no impingement signs at the shoulders.    Assessment: Distal biceps tendon tear, right upper extremity, chronic    Plan: I suspect she has had a similar injury with her left extremity as well, although this was not imaged by MRI.  She is not certain that she would like to proceed with surgery at this point.  She would like to talk to a surgeon to understand what her options are.  I discussed the possibility of a hand therapy referral, at least for a discussion of assistive devices for the household.  She declined this.  She would like to have a discussion with the surgeon.  I will have her see Dr. Lund or Dr. Goyal in consultation regarding the chronic distal biceps tendon tear.        Cruz Carvalho MD

## 2019-11-18 NOTE — PROGRESS NOTES
November 18, 2019: Lilli Steven is a 69 year old female who is seen in f/u up for right elbow pain. She is here to review her MRI results on 11/14/19.       PMH:  Past Medical History:   Diagnosis Date     Arthritis right knee     Diarrhea 9/6/2012     Hypercholesterolemia 9/6/2012     Neck pain 9/6/2012     PONV (postoperative nausea and vomiting)      Seasonal allergies 9/6/2012     Snores 9/6/2012     Tremor      Wears glasses 9/6/2012       Active problem list:  Patient Active Problem List   Diagnosis     Spasmodic torticollis     Wears glasses     Neck pain     Seasonal allergies     Genetic torsion dystonia     Osteopenia     Hyperlipidemia LDL goal <160     Tear of lateral cartilage or meniscus of knee, current     Urinary incontinence     PONV (postoperative nausea and vomiting)       FH:  Family History   Problem Relation Age of Onset     Cancer Father         49 yrs old - bladder     Other Cancer Father         bladder     Dementia Mother         she is living in [facility] in [facility]     Breast Cancer Mother         70s     Osteoporosis Mother      Heart Disease Maternal Grandfather         disease     Hypertension Maternal Grandfather      High cholesterol Maternal Grandfather      Heart Disease Paternal Grandmother         disease     Hypertension Paternal Grandmother      High cholesterol Paternal Grandmother      Heart Disease Maternal Grandmother         disease     Hypertension Paternal Grandfather      High cholesterol Paternal Grandfather      Hypertension Sister      Hypertension Sister      Osteoporosis Sister      Diabetes No family hx of      Coronary Artery Disease No family hx of      Hyperlipidemia No family hx of      Cerebrovascular Disease No family hx of      Colon Cancer No family hx of      Prostate Cancer No family hx of      Other Cancer No family hx of      Depression No family hx of      Anxiety Disorder No family hx of      Mental Illness No family hx of      Substance  Abuse No family hx of      Anesthesia Reaction No family hx of      Asthma No family hx of      Osteoporosis No family hx of      Genetic Disorder No family hx of      Thyroid Disease No family hx of      Obesity No family hx of      Unknown/Adopted No family hx of        SH:  Social History     Socioeconomic History     Marital status: Single     Spouse name: Not on file     Number of children: Not on file     Years of education: Not on file     Highest education level: Not on file   Occupational History     Not on file   Social Needs     Financial resource strain: Not on file     Food insecurity:     Worry: Not on file     Inability: Not on file     Transportation needs:     Medical: Not on file     Non-medical: Not on file   Tobacco Use     Smoking status: Former Smoker     Packs/day: 0.50     Years: 10.00     Pack years: 5.00     Types: Cigarettes     Start date: 1968     Last attempt to quit: 1988     Years since quittin.9     Smokeless tobacco: Never Used   Substance and Sexual Activity     Alcohol use: Yes     Alcohol/week: 0.0 standard drinks     Comment: 3 - 4 drinks/week     Drug use: No     Sexual activity: Yes     Partners: Male     Birth control/protection: Post-menopausal   Lifestyle     Physical activity:     Days per week: Not on file     Minutes per session: Not on file     Stress: Not on file   Relationships     Social connections:     Talks on phone: Not on file     Gets together: Not on file     Attends Religion service: Not on file     Active member of club or organization: Not on file     Attends meetings of clubs or organizations: Not on file     Relationship status: Not on file     Intimate partner violence:     Fear of current or ex partner: Not on file     Emotionally abused: Not on file     Physically abused: Not on file     Forced sexual activity: Not on file   Other Topics Concern     Parent/sibling w/ CABG, MI or angioplasty before 65F 55M? No   Social History Narrative     Family History: Her father  at 49 of cancer. Her mother is alive at age 82. She has four sisters ages 56, 52, 50 and 42. She has one brother age 44. She has one daughter who is 24 yrs old and living with her at age 19. The patient is single. She is . She was  one year. Her ex- was an alcoholic.             She works at [employer].     She drinks three drinks per week. No smoking, quit 30 years ago.     Living in [facility] for the past 7-8 yrs.             Mother was Hungarian - Askenazi Jehovah's witness; both of her parents lived in [place].    Father was scotch-Sami               MEDS:  See EMR, reviewed  ALL:  See EMR, reviewed    REVIEW OF SYSTEMS:  CONSTITUTIONAL:NEGATIVE for fever, chills, change in weight  INTEGUMENTARY/SKIN: NEGATIVE for worrisome rashes, moles or lesions  EYES: NEGATIVE for vision changes or irritation  ENT/MOUTH: NEGATIVE for ear, mouth and throat problems  RESP:NEGATIVE for significant cough or SOB  BREAST: NEGATIVE for masses, tenderness or discharge  CV: NEGATIVE for chest pain, palpitations or peripheral edema  GI: NEGATIVE for nausea, abdominal pain, heartburn, or change in bowel habits  :NEGATIVE for frequency, dysuria, or hematuria  :NEGATIVE for frequency, dysuria, or hematuria  NEURO: NEGATIVE for weakness, dizziness or paresthesias  ENDOCRINE: NEGATIVE for temperature intolerance, skin/hair changes  HEME/ALLERGY/IMMUNE: NEGATIVE for bleeding problems  PSYCHIATRIC: NEGATIVE for changes in mood or affect    Subjective: 69-year-old female follow-up MRI.  She had a bilateral upper extremity injury with a fall 4 years ago.  Her MRI of her right extremity suggests a chronic distal biceps tendon tear.  It does give her discomfort with daily activities, which is why she presented to the clinic for this discussion.  She will notice it with activities in the kitchen such as opening jars or wine bottles.  She will feel discomfort in the area of the distal biceps  in the forearm when she does so.    Objective: She has full flexion extension of both elbows without signs of contracture.  She does have a symmetrical soft tissue subcutaneous mass that can be felt in the medial aspect of the distal upper arm that is soft, well-circumscribed and mobile about 4 cm x 3 cm.   She is nontender over the medial epicondyle, lateral epicondyle or olecranon of either elbow.  I can also not get specific tenderness at the distal biceps attachment and she will supinate against resistance with good strength and flex her elbow against resistance with good strength.  She is nontender over the distal triceps bilaterally.  She is without radicular discomfort into the forearm or hand.  She is alert not without numbness or tingling in the hand.  Distal pulses are intact.  She denies  radicular discomfort from the neck into the shoulder or upper arm bilaterally.  There are no impingement signs at the shoulders.    Assessment: Distal biceps tendon tear, right upper extremity, chronic    Plan: I suspect she has had a similar injury with her left extremity as well, although this was not imaged by MRI.  She is not certain that she would like to proceed with surgery at this point.  She would like to talk to a surgeon to understand what her options are.  I discussed the possibility of a hand therapy referral, at least for a discussion of assistive devices for the household.  She declined this.  She would like to have a discussion with the surgeon.  I will have her see Dr. Lund or Dr. Goyal in consultation regarding the chronic distal biceps tendon tear.

## 2019-11-19 NOTE — TELEPHONE ENCOUNTER
DIAGNOSIS: Chronic elbow pain - Internal referral Deven JONAS    APPOINTMENT DATE: 12/6/19   NOTES STATUS DETAILS   OFFICE NOTE from referring provider Internal Referral note 11/18/19   OFFICE NOTE from other specialist N/A    DISCHARGE SUMMARY from hospital N/A    DISCHARGE REPORT from the ER N/A    OPERATIVE REPORT N/A    MEDICATION LIST N/A    IMPLANT RECORD/STICKER N/A    LABS     CBC/DIFF Internal    CULTURES N/A    INJECTIONS DONE IN RADIOLOGY N/A    MRI Internal MR RT Elbow 11/14/19    CT SCAN N/A    XRAYS (IMAGES & REPORTS) Internal XR Elbow Bilateral 11/5/19    TUMOR     PATHOLOGY  Slides & report N/A

## 2019-11-22 ENCOUNTER — HOSPITAL ENCOUNTER (OUTPATIENT)
Dept: MAMMOGRAPHY | Facility: CLINIC | Age: 69
Discharge: HOME | End: 2019-11-22
Attending: FAMILY MEDICINE | Admitting: FAMILY MEDICINE
Payer: COMMERCIAL

## 2019-11-22 DIAGNOSIS — Z12.31 VISIT FOR SCREENING MAMMOGRAM: ICD-10-CM

## 2019-11-22 PROCEDURE — 77063 BREAST TOMOSYNTHESIS BI: CPT

## 2019-12-05 ENCOUNTER — OFFICE VISIT (OUTPATIENT)
Dept: PHYSICAL MEDICINE AND REHAB | Facility: CLINIC | Age: 69
End: 2019-12-05
Payer: COMMERCIAL

## 2019-12-05 ENCOUNTER — RECORDS - HEALTHEAST (OUTPATIENT)
Dept: ADMINISTRATIVE | Facility: OTHER | Age: 69
End: 2019-12-05

## 2019-12-05 VITALS
RESPIRATION RATE: 16 BRPM | OXYGEN SATURATION: 95 % | TEMPERATURE: 97.6 F | DIASTOLIC BLOOD PRESSURE: 78 MMHG | HEART RATE: 89 BPM | SYSTOLIC BLOOD PRESSURE: 141 MMHG

## 2019-12-05 DIAGNOSIS — G24.3 SPASMODIC TORTICOLLIS: Primary | ICD-10-CM

## 2019-12-05 DIAGNOSIS — G24.1 GENETIC TORSION DYSTONIA: ICD-10-CM

## 2019-12-05 ASSESSMENT — PAIN SCALES - GENERAL: PAINLEVEL: SEVERE PAIN (6)

## 2019-12-05 NOTE — PROGRESS NOTES
BOTULINUM TOXIN PROCEDURE NOTE    Chief Complaint   Patient presents with     Botox     [facility] RETURN BOTOX 150 U CERVICAL DYSTONIA      BP (!) 141/78 (BP Location: Left arm, Patient Position: Chair, Cuff Size: Adult Regular)   Pulse 89   Temp 97.6  F (36.4  C) (Oral)   Resp 16   LMP  (LMP Unknown)   SpO2 95%     Current Outpatient Medications:      cephALEXin (KEFLEX) 500 MG capsule, Take 1 capsule (500 mg) by mouth 3 times daily, Disp: 30 capsule, Rfl: 0     mometasone (ELOCON) 0.1 % external cream, Apply sparingly to affected area twice daily as needed.  Do not apply to face., Disp: 45 g, Rfl: 0     OnabotulinumtoxinA (BOTOX IJ), Inject 150 Units into the muscle once Lot # /C3 with Expiration Date: 03/2021, Disp: , Rfl:      RESTASIS MULTIDOSE 0.05 % ophthalmic emulsion, Place 1 drop into both eyes 2 times daily, Disp: , Rfl: 3     simvastatin (ZOCOR) 20 MG tablet, TAKE 1 TABLET BY MOUTH EVERYDAY AT BEDTIME, Disp: 90 tablet, Rfl: 3     traZODone (DESYREL) 50 MG tablet, TAKE 3 TABLETS BY MOUTH EVERY DAY AT BEDTIME., Disp: 270 tablet, Rfl: 2    Current Facility-Administered Medications:      botulinum toxin type A (BOTOX) 100 units injection 150 Units, 150 Units, Intramuscular, Once, Cassidy Ramirez MD     botulinum toxin type A (BOTOX) 100 units injection 150 Units, 150 Units, Intramuscular, Once, Cassidy Ramirez MD     Allergies   Allergen Reactions     Meloxicam      Diarrhea, vomiting     Primidone Other (See Comments)     Felt like motion sickness     Tramadol      Diarrhea, vomiting      Adhesive Tape Rash     Durabond only     Allergy Rash     Dermabond  Anesthesia IV set misc          PHYSICAL EXAM:  HEAD, NECK AND TRUNK PATTERN:   Head Tremor: Observed - no-no tremor present  Head & Neck Extension: Present - Slight  Sub-Occipital Extension: Present - Slight  Forward Head: Present - Slight   Head & Neck Lateral Bend: Left  Shoulder Elevation: Right  Excessive pain at bilat traps.   Increased head tremor due to increased stress recently.    HPI:    Patient denies new medical diagnoses, illnesses, hospitalizations, emergency room visits, and injuries since the previous injection with botulinum neurotoxin.    We reviewed the recommended safety guidelines for  Botox from any vaccine injection, such as the seasonal flu vaccine, by a minimum of 10-14 days with Lilli Steven. She acknowledged understanding.    RESPONSE TO PREVIOUS TREATMENT:    Lilli Steven received 150 units of Botox on 2019.    Problems following the previous series of neurotoxin injections included:  No problems reported    BENEFITS BY PATIENT REPORT:  Pain Improvement: Yes.  Percent Improvement: unable to quantify in % but reports significant improvement Duration of Benefit:  10-12 weeks.     Dystonia and tremor Improvement: Yes.  Percent Improvement: unable to quantify in %, but reports significant overall improvement Duration of Benefit:  10 weeks followed by an abrupt reduction in benefit due to external stressors.    BOTULINUM NEUROTOXIN INJECTION PROCEDURES:    VERIFICATION OF PATIENT IDENTIFICATION AND PROCEDURE     Initials   Patient Name lmd   Patient  lmd   Procedure Verified by: lmd     Prior to the start of the procedure and with procedural staff participation, I verbally confirmed the patient s identity using two indicators, relevant allergies, that the procedure was appropriate and matched the consent or emergent situation, and that the correct equipment/implants were available. Immediately prior to starting the procedure I conducted the Time Out with the procedural staff and re-confirmed the patient s name, procedure, and site/side. (The Joint Commission universal protocol was followed.)  Yes    Sedation (Moderate or Deep): None    Above assessments performed by:  Mikki Sotelo RN Care Coordinator    Cassidy aRmirez MD    INDICATION/S FOR PROCEDURE/S:  Lilli Steven is a  69-year-old patient with dystonia affecting the  head, neck and shoulder girdle musculature secondary to a diagnosis of cervical dystonia with associated  pain, tremor, spasms, loss of joint motion, loss of volitional motor control and difficulty with activities of daily living.      Her baseline symptoms have been recalcitrant to oral medications and conservative therapy.  She is here today for an injection of Botox.       GOAL OF PROCEDURE:  The goal of this procedure is to increase active range of motion, improve volitional motor control and decrease pain  associated with dystonic movements, spasticity and tremor.    TOTAL DOSE ADMINISTERED:  Dose Administered:  150 units Botox    Diluent Used:  Preservative Free Normal Saline  Total Volume of Diluent Used:  1.50 ml  Lot # /C3 with Expiration Date:  5/2022  NDC #: Botox 100u ([address]-28)    Was there drug waste? Yes  Amount of drug waste (mL): 50 units Botox.  Reason for waste:  Single use vial  Multi-dose vial: No    Cassidy Ramirez MD  December 5, 2019     Medication guide was offered to patient and was declined.    CONSENT:  The risks, benefits, and treatment options were discussed with Lilli Steven and she agreed to proceed.      Written consent was obtained by lmd.     EQUIPMENT USED:  Needle-37mm stimulating/recording  EMG/NCS Machine     SKIN PREPARATION:  Skin preparation was performed using an alcohol wipe.     GUIDANCE DESCRIPTION:  Electro-myographic guidance was necessary throughout the procedure to accurately identify all areas of dystonic and spastic muscles while avoiding injection of non-dystonic muscles and non-spastic muscles, neighboring nerves and nearby vascular structures.     AREA/MUSCLE INJECTED:  150 UNITS BOTOX = TOTAL DOSE     1. HEAD & NECK MUSCLES: 150 units Botox = Total Dose                 Right Mid-Trapezius - 10 units of Botox at 1 site/s.   Left Mid-Trapezius - 10 units of Botox at 1 site/s.      Right Splenius  Capitus - 15 units of Botox at 2 site/s.   Left Splenius Capitus - 10 units of Botox at 2 site/s.      Right Levator Scapulae - 30 units of Botox at 1 site/s (shoulder muscles).   Left Levator Scapulae - 20 units of Botox at 1 site/s (shoulder muscles).      Right Inferior Obliquus Capitis - 10 units of Botox at 1 site/s.   Left Inferior Obliquus Capitis - 15 units of at 1 site/s.      Right Sternal head of the Sternocleidomastoid - 15 units of Botox at 1 site/s.               Left Sternal head of the Sternocleidomastoid - 15 units of Botox at 1 site/s.    RESPONSE TO PROCEDURE:  Lilli Steven tolerated the procedure well and there were no immediate complications.  She was allowed to recover for an appropriate period of time and was discharged home in stable condition.    FOLLOW UP:  Lilli Steven was asked to follow up by phone in 7-14 days with Erin Combs PT, Care Coordinator or Mikki Rojas RN, Care Coordinator, to report her response to this series of injections.  Based on the patient's previous response to this therapy, Lilli Steven was rescheduled for the next series of injections in 12 weeks.    PLAN (Medication Changes, Therapy Orders, Work or Disability Issues, etc.): Will monitor and report response to today's injections.

## 2019-12-05 NOTE — LETTER
12/5/2019       RE: Lilli Steven  [address]  [facility] [address]     Dear Colleague,    Thank you for referring your patient, Lilli Steven, to the [facility] PHYSICAL MEDICINE AND REHABILITATION at [facility]. Please see a copy of my visit note below.    BOTULINUM TOXIN PROCEDURE NOTE    Chief Complaint   Patient presents with     Botox     [facility] RETURN BOTOX 150 U CERVICAL DYSTONIA      BP (!) 141/78 (BP Location: Left arm, Patient Position: Chair, Cuff Size: Adult Regular)   Pulse 89   Temp 97.6  F (36.4  C) (Oral)   Resp 16   LMP  (LMP Unknown)   SpO2 95%     Current Outpatient Medications:      cephALEXin (KEFLEX) 500 MG capsule, Take 1 capsule (500 mg) by mouth 3 times daily, Disp: 30 capsule, Rfl: 0     mometasone (ELOCON) 0.1 % external cream, Apply sparingly to affected area twice daily as needed.  Do not apply to face., Disp: 45 g, Rfl: 0     OnabotulinumtoxinA (BOTOX IJ), Inject 150 Units into the muscle once Lot # /C3 with Expiration Date: 03/2021, Disp: , Rfl:      RESTASIS MULTIDOSE 0.05 % ophthalmic emulsion, Place 1 drop into both eyes 2 times daily, Disp: , Rfl: 3     simvastatin (ZOCOR) 20 MG tablet, TAKE 1 TABLET BY MOUTH EVERYDAY AT BEDTIME, Disp: 90 tablet, Rfl: 3     traZODone (DESYREL) 50 MG tablet, TAKE 3 TABLETS BY MOUTH EVERY DAY AT BEDTIME., Disp: 270 tablet, Rfl: 2    Current Facility-Administered Medications:      botulinum toxin type A (BOTOX) 100 units injection 150 Units, 150 Units, Intramuscular, Once, Cassidy Ramirez MD     botulinum toxin type A (BOTOX) 100 units injection 150 Units, 150 Units, Intramuscular, Once, Cassidy Ramirez MD     Allergies   Allergen Reactions     Meloxicam      Diarrhea, vomiting     Primidone Other (See Comments)     Felt like motion sickness     Tramadol      Diarrhea, vomiting      Adhesive Tape Rash     Durabond only     Allergy Rash     Dermabond  Anesthesia IV  set misc          PHYSICAL EXAM:  HEAD, NECK AND TRUNK PATTERN:   Head Tremor: Observed - no-no tremor present  Head & Neck Extension: Present - Slight  Sub-Occipital Extension: Present - Slight  Forward Head: Present - Slight   Head & Neck Lateral Bend: Left  Shoulder Elevation: Right  Excessive pain at bilat traps.  Increased head tremor due to increased stress recently.    HPI:    Patient denies new medical diagnoses, illnesses, hospitalizations, emergency room visits, and injuries since the previous injection with botulinum neurotoxin.    We reviewed the recommended safety guidelines for  Botox from any vaccine injection, such as the seasonal flu vaccine, by a minimum of 10-14 days with Lilli Steven. She acknowledged understanding.    RESPONSE TO PREVIOUS TREATMENT:    Lilli Steven received 150 units of Botox on 2019.    Problems following the previous series of neurotoxin injections included:  No problems reported    BENEFITS BY PATIENT REPORT:  Pain Improvement: Yes.  Percent Improvement: unable to quantify in % but reports significant improvement Duration of Benefit:  10-12 weeks.     Dystonia and tremor Improvement: Yes.  Percent Improvement: unable to quantify in %, but reports significant overall improvement Duration of Benefit:  10 weeks followed by an abrupt reduction in benefit due to external stressors.    BOTULINUM NEUROTOXIN INJECTION PROCEDURES:    VERIFICATION OF PATIENT IDENTIFICATION AND PROCEDURE     Initials   Patient Name lmd   Patient  lmd   Procedure Verified by: fareed     Prior to the start of the procedure and with procedural staff participation, I verbally confirmed the patient s identity using two indicators, relevant allergies, that the procedure was appropriate and matched the consent or emergent situation, and that the correct equipment/implants were available. Immediately prior to starting the procedure I conducted the Time Out with the procedural staff and  re-confirmed the patient s name, procedure, and site/side. (The Joint Commission universal protocol was followed.)  Yes    Sedation (Moderate or Deep): None    Above assessments performed by:  Mikki Sotelo RN Care Coordinator    Cassidy Ramirez MD    INDICATION/S FOR PROCEDURE/S:  Lilli Steven is a 69-year-old patient with dystonia affecting the  head, neck and shoulder girdle musculature secondary to a diagnosis of cervical dystonia with associated  pain, tremor, spasms, loss of joint motion, loss of volitional motor control and difficulty with activities of daily living.      Her baseline symptoms have been recalcitrant to oral medications and conservative therapy.  She is here today for an injection of Botox.       GOAL OF PROCEDURE:  The goal of this procedure is to increase active range of motion, improve volitional motor control and decrease pain  associated with dystonic movements, spasticity and tremor.    TOTAL DOSE ADMINISTERED:  Dose Administered:  150 units Botox    Diluent Used:  Preservative Free Normal Saline  Total Volume of Diluent Used:  1.50 ml  Lot # /C3 with Expiration Date:  5/2022  NDC #: Botox 100u ([address]-17)    Was there drug waste? Yes  Amount of drug waste (mL): 50 units Botox.  Reason for waste:  Single use vial  Multi-dose vial: No    Cassidy Ramirez MD  December 5, 2019     Medication guide was offered to patient and was declined.    CONSENT:  The risks, benefits, and treatment options were discussed with Lilli Steven and she agreed to proceed.      Written consent was obtained by lmd.     EQUIPMENT USED:  Needle-37mm stimulating/recording  EMG/NCS Machine     SKIN PREPARATION:  Skin preparation was performed using an alcohol wipe.     GUIDANCE DESCRIPTION:  Electro-myographic guidance was necessary throughout the procedure to accurately identify all areas of dystonic and spastic muscles while avoiding injection of non-dystonic muscles and  non-spastic muscles, neighboring nerves and nearby vascular structures.     AREA/MUSCLE INJECTED:  150 UNITS BOTOX = TOTAL DOSE     1. HEAD & NECK MUSCLES: 150 units Botox = Total Dose                 Right Mid-Trapezius - 10 units of Botox at 1 site/s.   Left Mid-Trapezius - 10 units of Botox at 1 site/s.      Right Splenius Capitus - 15 units of Botox at 2 site/s.   Left Splenius Capitus - 10 units of Botox at 2 site/s.      Right Levator Scapulae - 30 units of Botox at 1 site/s (shoulder muscles).   Left Levator Scapulae - 20 units of Botox at 1 site/s (shoulder muscles).      Right Inferior Obliquus Capitis - 10 units of Botox at 1 site/s.   Left Inferior Obliquus Capitis - 15 units of at 1 site/s.      Right Sternal head of the Sternocleidomastoid - 15 units of Botox at 1 site/s.               Left Sternal head of the Sternocleidomastoid - 15 units of Botox at 1 site/s.    RESPONSE TO PROCEDURE:  Lilli Steven tolerated the procedure well and there were no immediate complications.  She was allowed to recover for an appropriate period of time and was discharged home in stable condition.    FOLLOW UP:  Lilli Steven was asked to follow up by phone in 7-14 days with Erin Combs PT, Care Coordinator or Mikki Rojas RN, Care Coordinator, to report her response to this series of injections.  Based on the patient's previous response to this therapy, Lilli Steven was rescheduled for the next series of injections in 12 weeks.    PLAN (Medication Changes, Therapy Orders, Work or Disability Issues, etc.): Will monitor and report response to today's injections.    Again, thank you for allowing me to participate in the care of your patient.      Sincerely,    Cassidy Ramirez MD

## 2019-12-05 NOTE — NURSING NOTE
Chief Complaint   Patient presents with     Botox     [facility] RETURN BOTOX 150 U CERVICAL DYSTONIA     Kendra Martin CMA

## 2019-12-06 ENCOUNTER — PRE VISIT (OUTPATIENT)
Dept: ORTHOPEDICS | Facility: CLINIC | Age: 69
End: 2019-12-06

## 2019-12-06 ENCOUNTER — OFFICE VISIT (OUTPATIENT)
Dept: ORTHOPEDICS | Facility: CLINIC | Age: 69
End: 2019-12-06
Attending: FAMILY MEDICINE
Payer: COMMERCIAL

## 2019-12-06 DIAGNOSIS — S86.819A STRAIN OF DISTAL BICEPS FEMORIS TENDON: Primary | ICD-10-CM

## 2019-12-06 ASSESSMENT — ENCOUNTER SYMPTOMS
HEARTBURN: 1
CONSTIPATION: 0
BLOATING: 0
MUSCLE CRAMPS: 0
DIARRHEA: 1
BACK PAIN: 0
VOMITING: 0
JOINT SWELLING: 0
ABDOMINAL PAIN: 0
EYE WATERING: 0
ARTHRALGIAS: 1
MYALGIAS: 1
NAUSEA: 0
EYE PAIN: 1
NECK PAIN: 1
DOUBLE VISION: 0

## 2019-12-06 NOTE — LETTER
"12/6/2019       RE: Lilli Steven  [address]  [facility] [address]     Dear Colleague,    Thank you for referring your patient, Lilli Steven, to the [facility] ORTHOP[facility] at [facility]. Please see a copy of my visit note below.        Department of Orthopedic Surgery  Clinic Consultation Note    PATIENT NAME: Lilli Steven   MRN: [id number]  AGE: 69 year old  BMI: There is no height or weight on file to calculate BMI.  REFERRING PHYSICIAN: Cruz Carvalho      CHIEF COMPLAINT: RECHECK (Chronic right elbow pain)      HISTORY OF PRESENT ILLNESS:  Lilli Steven is a 69 year old female who presents for evaluation of right greater than left elbow pain.  Patient reports that 2 years ago she had a fall in which she landed on outstretched arms, denies any specific known injury or fracture resulting from this, but since that time has had lingering right greater than left elbow pain.  She describes her pain to be anterior, and points towards \"lump\" that she can palpate at the distal aspect of her arm which is tender.  She notes that following her initial injury, she did not seek any specific medical attention.  She states that she has had some increased discomfort and difficulty with activities such as opening jars or twisting a bottle opener or bottles of wine.  She denies any specific true weakness or limitations from her elbows other than the discomfort.  She works as a , denies any limitations from her elbows at work.  No reported distal numbness or tingling.  She is been seen by Dr. Carvalho for this, an MRI was performed as part of her work-up and read as a chronic appearing distal biceps rupture and was referred to our clinic for further evaluation and recommendations.    ALLERGIES: Meloxicam; Primidone; Tramadol; Adhesive tape; and Allergy    MEDICATIONS:     Current Outpatient Medications:      cephALEXin (KEFLEX) " 500 MG capsule, Take 1 capsule (500 mg) by mouth 3 times daily, Disp: 30 capsule, Rfl: 0     mometasone (ELOCON) 0.1 % external cream, Apply sparingly to affected area twice daily as needed.  Do not apply to face., Disp: 45 g, Rfl: 0     OnabotulinumtoxinA (BOTOX IJ), Inject 150 Units into the muscle once Lot # /C3 with Expiration Date: 03/2021, Disp: , Rfl:      RESTASIS MULTIDOSE 0.05 % ophthalmic emulsion, Place 1 drop into both eyes 2 times daily, Disp: , Rfl: 3     simvastatin (ZOCOR) 20 MG tablet, TAKE 1 TABLET BY MOUTH EVERYDAY AT BEDTIME, Disp: 90 tablet, Rfl: 3     traZODone (DESYREL) 50 MG tablet, TAKE 3 TABLETS BY MOUTH EVERY DAY AT BEDTIME., Disp: 270 tablet, Rfl: 2    Current Facility-Administered Medications:      botulinum toxin type A (BOTOX) 100 units injection 150 Units, 150 Units, Intramuscular, Once, Cassidy Ramirez MD     botulinum toxin type A (BOTOX) 100 units injection 150 Units, 150 Units, Intramuscular, Once, Cassidy Ramirez MD      MEDICAL HISTORY:   Past Medical History:   Diagnosis Date     Arthritis right knee     Diarrhea 9/6/2012     Hypercholesterolemia 9/6/2012     Neck pain 9/6/2012     PONV (postoperative nausea and vomiting)      Seasonal allergies 9/6/2012     Snores 9/6/2012     Tremor      Wears glasses 9/6/2012         SURGICAL HISTORY:   Past Surgical History:   Procedure Laterality Date     COLONOSCOPY N/A 5/2/2019    Procedure: COLONOSCOPY;  Surgeon: Martin Ocampo MD;  Location:  GI     COSMETIC ABDOMINOPLASTY MODIFIED N/A 2/16/2017    Procedure: COSMETIC ABDOMINOPLASTY MODIFIED;  Surgeon: Inés Harrell MD;  Location:  OR     HERNIORRHAPHY EPIGASTRIC N/A 2/16/2017    Procedure: HERNIORRHAPHY EPIGASTRIC;  Surgeon: Joey Sargent MD;  Location:  OR     HERNIORRHAPHY UMBILICAL N/A 2/16/2017    Procedure: HERNIORRHAPHY UMBILICAL;  Surgeon: Joey Sargent MD;  Location:  OR     IMPLANT STIMULATOR SACRAL NERVE STAGE ONE       for bladder incontinence, failed; battery is dead     INCISION AND DRAINAGE ABDOMEN WASHOUT, COMBINED N/A 6/2/2017    Procedure: COMBINED INCISION AND DRAINAGE ABDOMEN WASHOUT;  EVACUATION OF ABDOMINAL WALL SEROMA;  Surgeon: Inés Harrell MD;  Location: [facility] OR     IR SINOGRAM INJECTION DIAGNOSTIC  1/31/2019     IRRIGATION AND DEBRIDEMENT TRUNK, COMBINED N/A 3/29/2017    Procedure: COMBINED IRRIGATION AND DEBRIDEMENT TRUNK;  Surgeon: Inés Harrell MD;  Location:  [facility]     IRRIGATION AND DEBRIDEMENT TRUNK, COMBINED N/A 11/29/2018    Procedure: EVACUATION OF ABDOMINAL WALL SEROMA;  Surgeon: Inés Harrell MD;  Location:  [facility]     REVISE SCAR TRUNK N/A 3/21/2018    Procedure: REVISE SCAR TRUNK;  ABDOMINAL SCAR REVISION.;  Surgeon: Inés Harrell MD;  Location:  [facility]     TONSILLECTOMY           FAMILY HISTORY:   Family History   Problem Relation Age of Onset     Cancer Father         49 yrs old - bladder     Other Cancer Father         bladder     Dementia Mother         she is living in [facility] in [facility]     Breast Cancer Mother         70s     Osteoporosis Mother      Heart Disease Maternal Grandfather         disease     Hypertension Maternal Grandfather      High cholesterol Maternal Grandfather      Heart Disease Paternal Grandmother         disease     Hypertension Paternal Grandmother      High cholesterol Paternal Grandmother      Heart Disease Maternal Grandmother         disease     Hypertension Paternal Grandfather      High cholesterol Paternal Grandfather      Hypertension Sister      Hypertension Sister      Osteoporosis Sister      Diabetes No family hx of      Coronary Artery Disease No family hx of      Hyperlipidemia No family hx of      Cerebrovascular Disease No family hx of      Colon Cancer No family hx of      Prostate Cancer No family hx of      Other Cancer No family hx of      Depression No family hx of      Anxiety Disorder No family hx of       Mental Illness No family hx of      Substance Abuse No family hx of      Anesthesia Reaction No family hx of      Asthma No family hx of      Osteoporosis No family hx of      Genetic Disorder No family hx of      Thyroid Disease No family hx of      Obesity No family hx of      Unknown/Adopted No family hx of      SOCIAL HISTORY:   Social History     Tobacco Use     Smoking status: Former Smoker     Packs/day: 0.50     Years: 10.00     Pack years: 5.00     Types: Cigarettes     Start date: 1968     Last attempt to quit: 1988     Years since quittin.9     Smokeless tobacco: Never Used   Substance Use Topics     Alcohol use: Yes     Alcohol/week: 0.0 standard drinks     Comment: 3 - 4 drinks/week       PHYSICAL EXAMINATION:  LMP  (LMP Unknown)   Data Unavailable  There is no height or weight on file to calculate BMI.    General: awake, alert, cooperative, no apparent distress, appears stated age, baseline tremor present  HEENT: normocephalic, atraumatic  Respiratory: breathing non-labored, no wheezing  Cardiovascular: nontachycardic  Skin: no rashes or lesions  Neurological: A&Ox3, CN II-XII grossly intact  Pysch: appropriate affect     Musculoskeletal:  Right Upper Extremity: No obvious deformity, skin is intact, no significant swelling or ecchymosis.  No obvious subcutaneous masses palpable.  She does have discomfort with palpation over the distal aspect of the arm/biceps.  No significant pain with palpation to the medial lateral aspect of the elbow itself.  No posterior tenderness.  With resisted flexion and supination, she does have what is felt to be present biceps tendon with a positive hook test.  When compared to contralateral side this is symmetric in terms of palpation of the tendon, but does seem to be somewhat thin and or attenuated.  With resisted flexion she has full and symmetric strength at the elbow, does have mild loss of strength with resisted supination on the right compared to  the left.  She has full motion at the elbow flexion, extension, pronosupination.  Motor intact distally with finger flexion/extension/intrinsics/EPL, OK sign 5/5 strength. SILT ax/m/r/u nerve distributions. Radial pulse palpable, 2+.        IMAGING:   MRI right elbow obtained on 11/14/2019 was personally reviewed.  No obvious osseous abnormality.  No significant effusion.  No significant degenerative changes within the elbow.  When evaluating the biceps tendon, on the lateral imaging 3 mm cuts makes the interpretation/visualization of the biceps tendon somewhat challenging.  Unable to obviously follow tendon from tuberosity to biceps musculature.  Per radiology interpretation, there is evidence of retracted fibers/muscle belly with a small strand extending from the distal radial attachment thought to be scar rather than actual tendon.  These imaging findings do not necessarily correlate with clinical findings.      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    We discussed the nature of biceps tendon tendinosis/ruptures.  We discussed treatment options, nonoperative in the form of activity modification, anti-inflammatories, therapy.  We did discuss what a biceps repair would entail and the situation that she had a acute rupture, including a prolonged rehab course upwards of 6 months to a year.  While we do not feel that she would even need a surgery, she is also not interested in pursuing this significant of rehabilitation/surgery regardless following our discussion.  She is interested in pursuing nonoperative management, and as she had not previously done therapy wishes to pursue this.    1. Open for occupational therapy referral here to work on strengthening and range of  motion  2. Follow-up in 6 weeks for repeat clinical evaluation, if she is doing well with complete improvement she can cancel    Patient expressed a good understanding and agreement. All questions answered.      Patient was seen, discussed, and personally evaluated by Dr. Lund who agrees with the above assessment and plan.     Jas Lindquist MD  Orthopedic Surgery PGY-4     Again, thank you for allowing me to participate in the care of your patient.      Sincerely,    Joey Lund MD

## 2019-12-06 NOTE — PROGRESS NOTES
"    Department of Orthopedic Surgery  Clinic Consultation Note          PATIENT NAME: Lilli Steven   MRN: [id number]  AGE: 69 year old  BMI: There is no height or weight on file to calculate BMI.  REFERRING PHYSICIAN: Cruz Carvalho      CHIEF COMPLAINT: RECHECK (Chronic right elbow pain)      HISTORY OF PRESENT ILLNESS:  Lilli Steven is a 69 year old female who presents for evaluation of right greater than left elbow pain.  Patient reports that 2 years ago she had a fall in which she landed on outstretched arms, denies any specific known injury or fracture resulting from this, but since that time has had lingering right greater than left elbow pain.  She describes her pain to be anterior, and points towards \"lump\" that she can palpate at the distal aspect of her arm which is tender.  She notes that following her initial injury, she did not seek any specific medical attention.  She states that she has had some increased discomfort and difficulty with activities such as opening jars or twisting a bottle opener or bottles of wine.  She denies any specific true weakness or limitations from her elbows other than the discomfort.  She works as a , denies any limitations from her elbows at work.  No reported distal numbness or tingling.  She is been seen by Dr. Carvalho for this, an MRI was performed as part of her work-up and read as a chronic appearing distal biceps rupture and was referred to our clinic for further evaluation and recommendations.    ALLERGIES: Meloxicam; Primidone; Tramadol; Adhesive tape; and Allergy    MEDICATIONS:     Current Outpatient Medications:      cephALEXin (KEFLEX) 500 MG capsule, Take 1 capsule (500 mg) by mouth 3 times daily, Disp: 30 capsule, Rfl: 0     mometasone (ELOCON) 0.1 % external cream, Apply sparingly to affected area twice daily as needed.  Do not apply to face., Disp: 45 g, Rfl: 0     OnabotulinumtoxinA (BOTOX IJ), Inject 150 Units into the " muscle once Lot # /C3 with Expiration Date: 03/2021, Disp: , Rfl:      RESTASIS MULTIDOSE 0.05 % ophthalmic emulsion, Place 1 drop into both eyes 2 times daily, Disp: , Rfl: 3     simvastatin (ZOCOR) 20 MG tablet, TAKE 1 TABLET BY MOUTH EVERYDAY AT BEDTIME, Disp: 90 tablet, Rfl: 3     traZODone (DESYREL) 50 MG tablet, TAKE 3 TABLETS BY MOUTH EVERY DAY AT BEDTIME., Disp: 270 tablet, Rfl: 2    Current Facility-Administered Medications:      botulinum toxin type A (BOTOX) 100 units injection 150 Units, 150 Units, Intramuscular, Once, Cassidy Ramirez MD     botulinum toxin type A (BOTOX) 100 units injection 150 Units, 150 Units, Intramuscular, Once, Cassidy Ramirez MD      MEDICAL HISTORY:   Past Medical History:   Diagnosis Date     Arthritis right knee     Diarrhea 9/6/2012     Hypercholesterolemia 9/6/2012     Neck pain 9/6/2012     PONV (postoperative nausea and vomiting)      Seasonal allergies 9/6/2012     Snores 9/6/2012     Tremor      Wears glasses 9/6/2012         SURGICAL HISTORY:   Past Surgical History:   Procedure Laterality Date     COLONOSCOPY N/A 5/2/2019    Procedure: COLONOSCOPY;  Surgeon: Martin Ocampo MD;  Location:  GI     COSMETIC ABDOMINOPLASTY MODIFIED N/A 2/16/2017    Procedure: COSMETIC ABDOMINOPLASTY MODIFIED;  Surgeon: Inés Harrell MD;  Location:  OR     HERNIORRHAPHY EPIGASTRIC N/A 2/16/2017    Procedure: HERNIORRHAPHY EPIGASTRIC;  Surgeon: Joey Sargent MD;  Location:  OR     HERNIORRHAPHY UMBILICAL N/A 2/16/2017    Procedure: HERNIORRHAPHY UMBILICAL;  Surgeon: Joey Sargent MD;  Location:  OR     IMPLANT STIMULATOR SACRAL NERVE STAGE ONE      for bladder incontinence, failed; battery is dead     INCISION AND DRAINAGE ABDOMEN WASHOUT, COMBINED N/A 6/2/2017    Procedure: COMBINED INCISION AND DRAINAGE ABDOMEN WASHOUT;  EVACUATION OF ABDOMINAL WALL SEROMA;  Surgeon: Inés Harrell MD;  Location:  OR     IR SINOGRAM  INJECTION DIAGNOSTIC  1/31/2019     IRRIGATION AND DEBRIDEMENT TRUNK, COMBINED N/A 3/29/2017    Procedure: COMBINED IRRIGATION AND DEBRIDEMENT TRUNK;  Surgeon: Inés Harrell MD;  Location: [facility]     IRRIGATION AND DEBRIDEMENT TRUNK, COMBINED N/A 11/29/2018    Procedure: EVACUATION OF ABDOMINAL WALL SEROMA;  Surgeon: Inés Harrell MD;  Location: [facility]     REVISE SCAR TRUNK N/A 3/21/2018    Procedure: REVISE SCAR TRUNK;  ABDOMINAL SCAR REVISION.;  Surgeon: Inés Harrell MD;  Location: [facility]     TONSILLECTOMY           FAMILY HISTORY:   Family History   Problem Relation Age of Onset     Cancer Father         49 yrs old - bladder     Other Cancer Father         bladder     Dementia Mother         she is living in [facility] in [facility]     Breast Cancer Mother         70s     Osteoporosis Mother      Heart Disease Maternal Grandfather         disease     Hypertension Maternal Grandfather      High cholesterol Maternal Grandfather      Heart Disease Paternal Grandmother         disease     Hypertension Paternal Grandmother      High cholesterol Paternal Grandmother      Heart Disease Maternal Grandmother         disease     Hypertension Paternal Grandfather      High cholesterol Paternal Grandfather      Hypertension Sister      Hypertension Sister      Osteoporosis Sister      Diabetes No family hx of      Coronary Artery Disease No family hx of      Hyperlipidemia No family hx of      Cerebrovascular Disease No family hx of      Colon Cancer No family hx of      Prostate Cancer No family hx of      Other Cancer No family hx of      Depression No family hx of      Anxiety Disorder No family hx of      Mental Illness No family hx of      Substance Abuse No family hx of      Anesthesia Reaction No family hx of      Asthma No family hx of      Osteoporosis No family hx of      Genetic Disorder No family hx of      Thyroid Disease No family hx of      Obesity No family hx of       Unknown/Adopted No family hx of      SOCIAL HISTORY:   Social History     Tobacco Use     Smoking status: Former Smoker     Packs/day: 0.50     Years: 10.00     Pack years: 5.00     Types: Cigarettes     Start date: 1968     Last attempt to quit: 1988     Years since quittin.9     Smokeless tobacco: Never Used   Substance Use Topics     Alcohol use: Yes     Alcohol/week: 0.0 standard drinks     Comment: 3 - 4 drinks/week         PHYSICAL EXAMINATION:  LMP  (LMP Unknown)   Data Unavailable  There is no height or weight on file to calculate BMI.    General: awake, alert, cooperative, no apparent distress, appears stated age, baseline tremor present  HEENT: normocephalic, atraumatic  Respiratory: breathing non-labored, no wheezing  Cardiovascular: nontachycardic  Skin: no rashes or lesions  Neurological: A&Ox3, CN II-XII grossly intact  Pysch: appropriate affect     Musculoskeletal:  Right Upper Extremity: No obvious deformity, skin is intact, no significant swelling or ecchymosis.  No obvious subcutaneous masses palpable.  She does have discomfort with palpation over the distal aspect of the arm/biceps.  No significant pain with palpation to the medial lateral aspect of the elbow itself.  No posterior tenderness.  With resisted flexion and supination, she does have what is felt to be present biceps tendon with a positive hook test.  When compared to contralateral side this is symmetric in terms of palpation of the tendon, but does seem to be somewhat thin and or attenuated.  With resisted flexion she has full and symmetric strength at the elbow, does have mild loss of strength with resisted supination on the right compared to the left.  She has full motion at the elbow flexion, extension, pronosupination.  Motor intact distally with finger flexion/extension/intrinsics/EPL, OK sign 5/5 strength. SILT ax/m/r/u nerve distributions. Radial pulse palpable, 2+.        IMAGING:   MRI right elbow obtained on  11/14/2019 was personally reviewed.  No obvious osseous abnormality.  No significant effusion.  No significant degenerative changes within the elbow.  When evaluating the biceps tendon, on the lateral imaging 3 mm cuts makes the interpretation/visualization of the biceps tendon somewhat challenging.  Unable to obviously follow tendon from tuberosity to biceps musculature.  Per radiology interpretation, there is evidence of retracted fibers/muscle belly with a small strand extending from the distal radial attachment thought to be scar rather than actual tendon.  These imaging findings do not necessarily correlate with clinical findings.      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    We discussed the nature of biceps tendon tendinosis/ruptures.  We discussed treatment options, nonoperative in the form of activity modification, anti-inflammatories, therapy.  We did discuss what a biceps repair would entail and the situation that she had a acute rupture, including a prolonged rehab course upwards of 6 months to a year.  While we do not feel that she would even need a surgery, she is also not interested in pursuing this significant of rehabilitation/surgery regardless following our discussion.  She is interested in pursuing nonoperative management, and as she had not previously done therapy wishes to pursue this.    1. Open for occupational therapy referral here to work on strengthening and range of motion  2. Follow-up in 6 weeks for repeat clinical evaluation, if she is doing well with complete improvement she can cancel    Patient expressed a good understanding and agreement. All questions answered.        Patient was seen, discussed, and personally evaluated by Dr. Lund who  agrees with the above assessment and plan.     Jas Lindquist MD  Orthopedic Surgery PGY-4     Answers for HPI/ROS submitted by the patient on 12/6/2019   General Symptoms: No  Skin Symptoms: No  HENT Symptoms: No  EYE SYMPTOMS: Yes  HEART SYMPTOMS: No  LUNG SYMPTOMS: No  INTESTINAL SYMPTOMS: Yes  URINARY SYMPTOMS: No  GYNECOLOGIC SYMPTOMS: No  BREAST SYMPTOMS: No  SKELETAL SYMPTOMS: Yes  BLOOD SYMPTOMS: No  NERVOUS SYSTEM SYMPTOMS: No  MENTAL HEALTH SYMPTOMS: No  Eye pain: Yes  Vision loss: No  Dry eyes: Yes  Watery eyes: No  Eye bulging: No  Double vision: No  Flashing of lights: No  Heart burn or indigestion: Yes  Nausea: No  Vomiting: No  Abdominal pain: No  Bloating: No  Constipation: No  Diarrhea: Yes  Back pain: No  Muscle aches: Yes  Neck pain: Yes  Swollen joints: No  Joint pain: Yes  Bone pain: No  Muscle cramps: No  Patient was seen and examined with the resident.  I agree with the assessment and plan of care.

## 2019-12-06 NOTE — NURSING NOTE
Reason For Visit:   Chief Complaint   Patient presents with     RECHECK     Chronic right elbow pain       Primary MD: Vargas Chaudhry    Age: 69 year old    ?  No      LMP  (LMP Unknown)       Pain Assessment  Patient Currently in Pain: Denies(Only with activity)               QuickDASH Assessment  No flowsheet data found.       Current Outpatient Medications   Medication Sig Dispense Refill     cephALEXin (KEFLEX) 500 MG capsule Take 1 capsule (500 mg) by mouth 3 times daily 30 capsule 0     mometasone (ELOCON) 0.1 % external cream Apply sparingly to affected area twice daily as needed.  Do not apply to face. 45 g 0     OnabotulinumtoxinA (BOTOX IJ) Inject 150 Units into the muscle once Lot # /C3 with Expiration Date: 03/2021       RESTASIS MULTIDOSE 0.05 % ophthalmic emulsion Place 1 drop into both eyes 2 times daily  3     simvastatin (ZOCOR) 20 MG tablet TAKE 1 TABLET BY MOUTH EVERYDAY AT BEDTIME 90 tablet 3     traZODone (DESYREL) 50 MG tablet TAKE 3 TABLETS BY MOUTH EVERY DAY AT BEDTIME. 270 tablet 2       Allergies   Allergen Reactions     Meloxicam      Diarrhea, vomiting     Primidone Other (See Comments)     Felt like motion sickness     Tramadol      Diarrhea, vomiting      Adhesive Tape Rash     Durabond only     Allergy Rash     Dermabond  Anesthesia IV set misc         Sarah Beth Robertson, ATC

## 2020-01-15 ENCOUNTER — THERAPY VISIT (OUTPATIENT)
Dept: OCCUPATIONAL THERAPY | Facility: CLINIC | Age: 70
End: 2020-01-15
Attending: ORTHOPAEDIC SURGERY
Payer: COMMERCIAL

## 2020-01-15 DIAGNOSIS — M25.521 PAIN OF BOTH ELBOWS: Primary | ICD-10-CM

## 2020-01-15 DIAGNOSIS — M25.522 PAIN OF BOTH ELBOWS: Primary | ICD-10-CM

## 2020-01-15 DIAGNOSIS — S86.819A STRAIN OF DISTAL BICEPS FEMORIS TENDON: ICD-10-CM

## 2020-01-15 PROCEDURE — 97165 OT EVAL LOW COMPLEX 30 MIN: CPT | Mod: GO | Performed by: OCCUPATIONAL THERAPIST

## 2020-01-15 PROCEDURE — 97110 THERAPEUTIC EXERCISES: CPT | Mod: GO | Performed by: OCCUPATIONAL THERAPIST
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
## 2020-01-17 ENCOUNTER — OFFICE VISIT (OUTPATIENT)
Dept: ORTHOPEDICS | Facility: CLINIC | Age: 70
End: 2020-01-17
Payer: COMMERCIAL

## 2020-01-17 DIAGNOSIS — G89.29 CHRONIC PAIN OF BOTH KNEES: Primary | ICD-10-CM

## 2020-01-17 DIAGNOSIS — M25.561 CHRONIC PAIN OF BOTH KNEES: Primary | ICD-10-CM

## 2020-01-17 DIAGNOSIS — M25.562 CHRONIC PAIN OF BOTH KNEES: Primary | ICD-10-CM

## 2020-01-17 NOTE — PROGRESS NOTES
Follow-up right elbow pain.  She was able to go to one therapy visit.  She thinks it does help.  Overall the elbow is a little bit better.  No further numbness or tingling.  Not waking her up at night.    The past medical history was reviewed and documented in the chart.  This includes medications, surgeries, social history as well as review of systems.    Physical examination demonstrates some tenderness over the extensor musculature, just distal to the elbow.  She has very little tenderness today over the distal biceps.  Range of motion of the elbow and wrist are well-preserved.  Some swelling over the distal brachialis, medial side of the antecubital fossa area.  No signs of any infection.  No motor, no sensory deficits are noted.  No significant atrophy.  There is brisk capillary refill in all digits and a palpable radial pulse.  No overlying skin changes noted.    Impression: Right elbow distal biceps tendinopathy/extensor tendinopathy    Plan: Overall Lilli Good has improved I would not recommend any surgical treatment.  We discussed an ongoing home exercise program as well as continue with OT.  We discussed some activity modifications.  No further imaging indicated at this point.  I will plan on seeing her back on an as-needed basis.  She does have my contact information should the need arise.

## 2020-01-17 NOTE — LETTER
1/17/2020       RE: Lilli Steven  [address]  [facility] [address]     Dear Colleague,    Thank you for referring your patient, Lilli Steven, to the [facility]. Please see a copy of my visit note below.    Follow-up right elbow pain.  She was able to go to one therapy visit.  She thinks it does help.  Overall the elbow is a little bit better.  No further numbness or tingling.  Not waking her up at night.    The past medical history was reviewed and documented in the chart.  This includes medications, surgeries, social history as well as review of systems.    Physical examination demonstrates some tenderness over the extensor musculature, just distal to the elbow.  She has very little tenderness today over the distal biceps.  Range of motion of the elbow and wrist are well-preserved.  Some swelling over the distal brachialis, medial side of the antecubital fossa area.  No signs of any infection.  No motor, no sensory deficits are noted.  No significant atrophy.  There is brisk capillary refill in all digits and a palpable radial pulse.  No overlying skin changes noted.    Impression: Right elbow distal biceps tendinopathy/extensor tendinopathy    Plan: Overall Lilli Good has improved I would not recommend any surgical treatment.  We discussed an ongoing home exercise program as well as continue with OT.  We discussed some activity modifications.  No further imaging indicated at this point.  I will plan on seeing her back on an as-needed basis.  She does have my contact information should the need arise.    Again, thank you for allowing me to participate in the care of your patient.      Sincerely,    Joey Lund MD

## 2020-01-17 NOTE — NURSING NOTE
Reason For Visit:   Chief Complaint   Patient presents with     Follow Up     6 week follow up. Chronic right elbow pain. Patient stated that she was only able to go to therapy once due to a family death.        Pain Assessment  Patient Currently in Pain: Yes  Primary Pain Location: Elbow(Right )        Allergies   Allergen Reactions     Meloxicam      Diarrhea, vomiting     Primidone Other (See Comments)     Felt like motion sickness     Tramadol      Diarrhea, vomiting      Adhesive Tape Rash     Durabond only     Allergy Rash     Dermabond  Anesthesia IV set mismikki Cuevas LPN

## 2020-01-20 NOTE — TELEPHONE ENCOUNTER
DIAGNOSIS: Bilateral knee pain. Referred by Dr Lund.   APPOINTMENT DATE: 1/30   NOTES STATUS DETAILS   OFFICE NOTE from referring provider Internal  Joey Lund MD   OFFICE NOTE from other specialist Internal  Venkata Baca MD Kelly, Shannon, PT Frandson, Jared Matthew, MD         DISCHARGE SUMMARY from hospital N/A      DISCHARGE REPORT from the ER N/A    OPERATIVE REPORT N/A    MEDICATION LIST Internal    MRI N/A    CT SCAN N/A    XRAYS (IMAGES & REPORTS) Internal 7/16/16

## 2020-01-27 ENCOUNTER — THERAPY VISIT (OUTPATIENT)
Dept: OCCUPATIONAL THERAPY | Facility: CLINIC | Age: 70
End: 2020-01-27
Payer: COMMERCIAL

## 2020-01-27 DIAGNOSIS — M25.522 PAIN OF BOTH ELBOWS: ICD-10-CM

## 2020-01-27 DIAGNOSIS — M25.521 PAIN OF BOTH ELBOWS: ICD-10-CM

## 2020-01-27 PROCEDURE — 97110 THERAPEUTIC EXERCISES: CPT | Mod: GO | Performed by: OCCUPATIONAL THERAPIST

## 2020-01-27 PROCEDURE — 97140 MANUAL THERAPY 1/> REGIONS: CPT | Mod: GO | Performed by: OCCUPATIONAL THERAPIST

## 2020-01-27 NOTE — PROGRESS NOTES
SOAP note objective information for 1/27/2020.  Please refer to the daily flowsheet for treatment today, total treatment time and time spent performing 1:1 timed codes.       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    Post:  2 years    Precautions: tremor, gets botox for cervical dystonia, adhesives    Objective:  Pain Level (Scale 0-10)   1/15/2020   At Rest 0 to 4   At worst 4     Pain Description  Date 1/15/2020   Location Anterior elbow   Pain Quality Sharp   Frequency intermittent     Pain is worst  mainly with doing things   Exacerbated by  , turn, using a force through arm and hand   Relieved by Avoiding the bothersome activities   Progression Not improving     VISUAL INSPECTION:  DATE 1/15/2020 1/15/2020    Right Left   General posture of the upper extremity: [x]   Normal   []  Comments: [x]   Normal   []  Comments:   Joint alignment: [x]   Normal   []  Comments: [x]   Normal   []  Comments:   Color:  [x]   Normal   []  Comments: [x]   Normal   []  Comments:   Other observations:  Enlargement medial to biceps tendon, just prox to elbow crease on anterior side similar enlargement as the R side but smaller and less tender       Sensation   WNL throughout all nerve distributions; per patient report    Palpation:    1/15: Left elbow: tender to extensor and flexor wad, also MEP, not the LEP.  Right elbow: Tender to LEP, extensor wad, biceps insertaion area and just proximal. Also sore to the visible bump of the biceps tendon just proximal to the elbow crease at the anterior side. Tender to the MEP, also flexor wad.  1/27: R biceps tendon is tender near insertion, extensor wad as well    ROM  Pain Report: - none  + mild    ++ moderate    +++ severe   Elbow 1/15/2020 1/15/2020   AROM (PROM) R L   Extension 0 5   Flexion 145 145   Supination 60 65   Pronation 80 85       Manual Muscle Test - Elbow  Grade: 0-5/5 (5/5=Normal)  Pain Report:    + mild    ++ moderate    +++ severe       Grade:  0-5/5 (5/5=Normal) 1/15/2020  1/15/2020     Right Pain Left Pain   Elbow flexion: FA supinated 4-/5 0 4+/5 0   Elbow flexion: FA neutral 5/5 0 5/5 0   Elbow flexion: FA pronated 4/5 0 5/5 0   Elbow extension: FA supinated 5/5 0 5/5 0   Elbow extension: FA neutral 5/5 0 5/5 0   Elbow extension: FA pronated 5/5 0 5/5 0   FA supination 4/5 0 5/5 0   FA pronation 5/5 0 5/5 0            Manual Muscle Test  Grade: 0-5/5 (5/5=Normal) 1/15/2020    1/15/2020     Right Pain Left Pain   Wrist ext 5 0 5 0   Wrist flex 5 0 5 0   Wrist RD 5 0 5 0   Wrist Ud 5 0 5 0   Pain Report:  0-10/10  Strength:  Pain-free /Pinch Test    1/15/2020   Trials R L   Elbow down at side   51 47   Elbow extended 50 feels it a bit in  Distal area of the biceps muscle of the arm 45         Home Exercise Program:  See note    Next Visit:  See note

## 2020-01-29 DIAGNOSIS — M25.562 PAIN IN BOTH KNEES, UNSPECIFIED CHRONICITY: Primary | ICD-10-CM

## 2020-01-29 DIAGNOSIS — M25.561 PAIN IN BOTH KNEES, UNSPECIFIED CHRONICITY: Primary | ICD-10-CM

## 2020-01-30 ENCOUNTER — PRE VISIT (OUTPATIENT)
Dept: ORTHOPEDICS | Facility: CLINIC | Age: 70
End: 2020-01-30

## 2020-01-30 ENCOUNTER — ANCILLARY PROCEDURE (OUTPATIENT)
Dept: GENERAL RADIOLOGY | Facility: CLINIC | Age: 70
End: 2020-01-30
Attending: FAMILY MEDICINE
Payer: COMMERCIAL

## 2020-01-30 ENCOUNTER — OFFICE VISIT (OUTPATIENT)
Dept: ORTHOPEDICS | Facility: CLINIC | Age: 70
End: 2020-01-30
Attending: ORTHOPAEDIC SURGERY
Payer: COMMERCIAL

## 2020-01-30 VITALS — WEIGHT: 167 LBS | HEIGHT: 62 IN | BODY MASS INDEX: 30.73 KG/M2

## 2020-01-30 DIAGNOSIS — M25.561 PAIN IN BOTH KNEES, UNSPECIFIED CHRONICITY: ICD-10-CM

## 2020-01-30 DIAGNOSIS — M22.2X1 PATELLOFEMORAL PAIN SYNDROME OF BOTH KNEES: ICD-10-CM

## 2020-01-30 DIAGNOSIS — M22.2X2 PATELLOFEMORAL PAIN SYNDROME OF BOTH KNEES: ICD-10-CM

## 2020-01-30 DIAGNOSIS — M25.562 PAIN IN BOTH KNEES, UNSPECIFIED CHRONICITY: ICD-10-CM

## 2020-01-30 DIAGNOSIS — M17.10 PATELLOFEMORAL ARTHRITIS: Primary | ICD-10-CM

## 2020-01-30 ASSESSMENT — MIFFLIN-ST. JEOR: SCORE: 1235.76
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
## 2020-02-05 ENCOUNTER — THERAPY VISIT (OUTPATIENT)
Dept: OCCUPATIONAL THERAPY | Facility: CLINIC | Age: 70
End: 2020-02-05
Payer: COMMERCIAL

## 2020-02-05 DIAGNOSIS — M25.521 PAIN OF BOTH ELBOWS: Primary | ICD-10-CM

## 2020-02-05 DIAGNOSIS — M25.522 PAIN OF BOTH ELBOWS: Primary | ICD-10-CM

## 2020-02-05 PROCEDURE — 97140 MANUAL THERAPY 1/> REGIONS: CPT | Mod: GO | Performed by: OCCUPATIONAL THERAPIST

## 2020-02-05 PROCEDURE — 97110 THERAPEUTIC EXERCISES: CPT | Mod: GO | Performed by: OCCUPATIONAL THERAPIST

## 2020-02-05 NOTE — PROGRESS NOTES
Please refer to the daily flowsheet for treatment today, total treatment time and time spent performing 1:1 timed codes.

## 2020-02-24 ENCOUNTER — THERAPY VISIT (OUTPATIENT)
Dept: PHYSICAL THERAPY | Facility: CLINIC | Age: 70
End: 2020-02-24
Attending: FAMILY MEDICINE
Payer: COMMERCIAL

## 2020-02-24 DIAGNOSIS — M22.2X1 PATELLOFEMORAL PAIN SYNDROME OF BOTH KNEES: ICD-10-CM

## 2020-02-24 DIAGNOSIS — M25.561 BILATERAL KNEE PAIN: Primary | ICD-10-CM

## 2020-02-24 DIAGNOSIS — M25.562 BILATERAL KNEE PAIN: Primary | ICD-10-CM

## 2020-02-24 DIAGNOSIS — M22.2X2 PATELLOFEMORAL PAIN SYNDROME OF BOTH KNEES: ICD-10-CM

## 2020-02-24 DIAGNOSIS — M17.10 PATELLOFEMORAL ARTHRITIS: ICD-10-CM

## 2020-02-24 PROCEDURE — 97161 PT EVAL LOW COMPLEX 20 MIN: CPT | Mod: GP | Performed by: PHYSICAL THERAPIST

## 2020-02-24 PROCEDURE — 97110 THERAPEUTIC EXERCISES: CPT | Mod: GP | Performed by: PHYSICAL THERAPIST

## 2020-02-24 ASSESSMENT — ACTIVITIES OF DAILY LIVING (ADL)
SIT WITH YOUR KNEE BENT: ACTIVITY IS NOT DIFFICULT
GO DOWN STAIRS: ACTIVITY IS SOMEWHAT DIFFICULT
PAIN: THE SYMPTOM AFFECTS MY ACTIVITY SLIGHTLY
GIVING WAY, BUCKLING OR SHIFTING OF KNEE: THE SYMPTOM AFFECTS MY ACTIVITY SLIGHTLY
KNEE_ACTIVITY_OF_DAILY_LIVING_SUM: 50
WEAKNESS: THE SYMPTOM AFFECTS MY ACTIVITY MODERATELY
LIMPING: I DO NOT HAVE THE SYMPTOM
RAW_SCORE: 50
STIFFNESS: THE SYMPTOM AFFECTS MY ACTIVITY SLIGHTLY
KNEEL ON THE FRONT OF YOUR KNEE: ACTIVITY IS MINIMALLY DIFFICULT
RISE FROM A CHAIR: ACTIVITY IS SOMEWHAT DIFFICULT
HOW_WOULD_YOU_RATE_THE_CURRENT_FUNCTION_OF_YOUR_KNEE_DURING_YOUR_USUAL_DAILY_ACTIVITIES_ON_A_SCALE_FROM_0_TO_100_WITH_100_BEING_YOUR_LEVEL_OF_KNEE_FUNCTION_PRIOR_TO_YOUR_INJURY_AND_0_BEING_THE_INABILITY_TO_PERFORM_ANY_OF_YOUR_USUAL_DAILY_ACTIVITIES?: 75
AS_A_RESULT_OF_YOUR_KNEE_INJURY,_HOW_WOULD_YOU_RATE_YOUR_CURRENT_LEVEL_OF_DAILY_ACTIVITY?: NEARLY NORMAL
SQUAT: ACTIVITY IS SOMEWHAT DIFFICULT
HOW_WOULD_YOU_RATE_THE_OVERALL_FUNCTION_OF_YOUR_KNEE_DURING_YOUR_USUAL_DAILY_ACTIVITIES?: NEARLY NORMAL
KNEE_ACTIVITY_OF_DAILY_LIVING_SCORE: 71.43
SWELLING: I DO NOT HAVE THE SYMPTOM
STAND: ACTIVITY IS MINIMALLY DIFFICULT
GO UP STAIRS: ACTIVITY IS SOMEWHAT DIFFICULT
WALK: ACTIVITY IS MINIMALLY DIFFICULT
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
## 2020-02-27 ENCOUNTER — OFFICE VISIT (OUTPATIENT)
Dept: PHYSICAL MEDICINE AND REHAB | Facility: CLINIC | Age: 70
End: 2020-02-27
Payer: COMMERCIAL

## 2020-02-27 ENCOUNTER — RECORDS - HEALTHEAST (OUTPATIENT)
Dept: ADMINISTRATIVE | Facility: OTHER | Age: 70
End: 2020-02-27

## 2020-02-27 VITALS
DIASTOLIC BLOOD PRESSURE: 82 MMHG | RESPIRATION RATE: 16 BRPM | SYSTOLIC BLOOD PRESSURE: 132 MMHG | TEMPERATURE: 97.5 F | OXYGEN SATURATION: 94 % | HEART RATE: 97 BPM

## 2020-02-27 DIAGNOSIS — G24.1 GENETIC TORSION DYSTONIA: ICD-10-CM

## 2020-02-27 DIAGNOSIS — G24.3 SPASMODIC TORTICOLLIS: Primary | ICD-10-CM

## 2020-02-27 ASSESSMENT — PAIN SCALES - GENERAL: PAINLEVEL: SEVERE PAIN (6)

## 2020-02-27 NOTE — PROGRESS NOTES
BOTULINUM TOXIN PROCEDURE NOTE    Chief Complaint   Patient presents with     Dystonia     [facility] RETURN BOTOX      /82   Pulse 97   Temp 97.5  F (36.4  C)   Resp 16   LMP  (LMP Unknown)   SpO2 94%     Current Outpatient Medications:      cephALEXin (KEFLEX) 500 MG capsule, Take 1 capsule (500 mg) by mouth 3 times daily, Disp: 30 capsule, Rfl: 0     mometasone (ELOCON) 0.1 % external cream, Apply sparingly to affected area twice daily as needed.  Do not apply to face., Disp: 45 g, Rfl: 0     OnabotulinumtoxinA (BOTOX IJ), Inject 150 Units into the muscle once Lot # /C3 with Expiration Date: 03/2021, Disp: , Rfl:      RESTASIS MULTIDOSE 0.05 % ophthalmic emulsion, Place 1 drop into both eyes 2 times daily, Disp: , Rfl: 3     simvastatin (ZOCOR) 20 MG tablet, TAKE 1 TABLET BY MOUTH EVERYDAY AT BEDTIME, Disp: 90 tablet, Rfl: 3     traZODone (DESYREL) 50 MG tablet, TAKE 3 TABLETS BY MOUTH EVERY DAY AT BEDTIME., Disp: 270 tablet, Rfl: 2    Current Facility-Administered Medications:      botulinum toxin type A (BOTOX) 100 units injection 150 Units, 150 Units, Intramuscular, Once, StandCassidy cuenca MD     botulinum toxin type A (BOTOX) 100 units injection 150 Units, 150 Units, Intramuscular, Once, StandalCassidy MD     Allergies   Allergen Reactions     Meloxicam      Diarrhea, vomiting     Primidone Other (See Comments)     Felt like motion sickness     Tramadol      Diarrhea, vomiting      Adhesive Tape Rash     Durabond only     Allergy Rash     Dermabond  Anesthesia IV set misc          PHYSICAL EXAM:  HEAD, NECK AND TRUNK PATTERN:   Head Tremor: Observed - no-no tremor present  Head & Neck Extension: Present - Slight  Sub-Occipital Extension: Present - Slight  Forward Head: Present - Slight   Head & Neck Lateral Bend: Left  Shoulder Elevation: Right    HPI:    Patient denies new medical diagnoses, illnesses, hospitalizations, emergency room visits, and injuries since the previous  injection with botulinum neurotoxin.    We reviewed the recommended safety guidelines for  Botox from any vaccine injection, such as the seasonal flu vaccine, by a minimum of 10-14 days with Lilli Steven. She acknowledged understanding.    RESPONSE TO PREVIOUS TREATMENT:    Lilli Steven received 150 units of Botox on 2019.    Problems following the previous series of neurotoxin injections included:  No problems reported    BENEFITS BY PATIENT REPORT:  Pain Improvement: Yes.  Percent Improvement: unable to quantify in % but reports significant improvement Duration of Benefit:  10-12 weeks.     Dystonia and tremor Improvement: Yes.  Percent Improvement: unable to quantify in %, but reports significant overall improvement     BOTULINUM NEUROTOXIN INJECTION PROCEDURES:    VERIFICATION OF PATIENT IDENTIFICATION AND PROCEDURE     Initials   Patient Name lmd   Patient  lmd   Procedure Verified by: lmd     Prior to the start of the procedure and with procedural staff participation, I verbally confirmed the patient s identity using two indicators, relevant allergies, that the procedure was appropriate and matched the consent or emergent situation, and that the correct equipment/implants were available. Immediately prior to starting the procedure I conducted the Time Out with the procedural staff and re-confirmed the patient s name, procedure, and site/side. (The Joint Commission universal protocol was followed.)  Yes    Sedation (Moderate or Deep): None    Above assessments performed by:    Mikki Sotelo RN Care Coordinator    Cassidy Ramirez MD    INDICATION/S FOR PROCEDURE/S:  Lilli Steven is a 69-year-old patient with dystonia affecting the  head, neck and shoulder girdle musculature secondary to a diagnosis of cervical dystonia with associated  pain, tremor, spasms, loss of joint motion, loss of volitional motor control and difficulty with activities of daily living.      Her  baseline symptoms have been recalcitrant to oral medications and conservative therapy.  She is here today for an injection of Botox.       GOAL OF PROCEDURE:  The goal of this procedure is to increase active range of motion, improve volitional motor control and decrease pain  associated with dystonic movements, spasticity and tremor.    TOTAL DOSE ADMINISTERED:  Dose Administered:  150 units Botox    Diluent Used:  Preservative Free Normal Saline  Total Volume of Diluent Used:  1.5 ml  Lot # /C3 with Expiration Date:  8/2022  NDC #: Botox 100u ([address]-20)    Was there drug waste? Yes  Amount of drug waste (mL): 50 units Botox.  Reason for waste:  Single use vial  Multi-dose vial: No    Cassidy Ramirez MD  February 27, 2020      Medication guide was offered to patient and was declined.    CONSENT:  The risks, benefits, and treatment options were discussed with Lilli Steven and she agreed to proceed.      Written consent was obtained by lmd.     EQUIPMENT USED:  Needle-37mm stimulating/recording  EMG/NCS Machine     SKIN PREPARATION:  Skin preparation was performed using an alcohol wipe.     GUIDANCE DESCRIPTION:  Electro-myographic guidance was necessary throughout the procedure to accurately identify all areas of dystonic and spastic muscles while avoiding injection of non-dystonic muscles and non-spastic muscles, neighboring nerves and nearby vascular structures.     AREA/MUSCLE INJECTED:  150 UNITS BOTOX = TOTAL DOSE     1. HEAD & NECK MUSCLES: 150 units Botox = Total Dose, 1:1 Dilution                 Right Mid-Trapezius - 5 units of Botox at 1 site/s.   Left Mid-Trapezius - 10 units of Botox at 1 site/s.      Right Splenius Capitus - 25 units of Botox at 2 site/s.   Left Splenius Capitus - 5 units of Botox at 1 site/s.      Right Levator Scapulae - 30 units of Botox at 1 site/s (shoulder muscles).   Left Levator Scapulae - 20 units of Botox at 1 site/s (shoulder muscles).      Right Inferior  Obliquus Capitis - 20 units of Botox at 1 site/s.   Left Inferior Obliquus Capitis - 5 units of at 1 site/s.      Right Sternal head of the Sternocleidomastoid - 15 units of Botox at 1 site/s.               Left Sternal head of the Sternocleidomastoid - 15 units of Botox at 1 site/s.       RESPONSE TO PROCEDURE:  Lilli Steven tolerated the procedure well and there were no immediate complications.  She was allowed to recover for an appropriate period of time and was discharged home in stable condition.    FOLLOW UP:  Lilli Steven was asked to follow up by phone in 7-14 days with Erin Combs PT, Care Coordinator or Mikki Rojas RN, Care Coordinator, to report her response to this series of injections.  Based on the patient's previous response to this therapy, Lilli Steven was rescheduled for the next series of injections in 12 weeks.    PLAN (Medication Changes, Therapy Orders, Work or Disability Issues, etc.): Will monitor and report response to today's injections.

## 2020-02-27 NOTE — LETTER
2/27/2020       RE: Lilli Steven  [address]  [facility] [address]     Dear Colleague,    Thank you for referring your patient, Lilli Steven, to the [facility] PHYSICAL [facility]ICINE AND REHABILITATION at [facility]. Please see a copy of my visit note below.    BOTULINUM TOXIN PROCEDURE NOTE    Chief Complaint   Patient presents with     Dystonia     [facility] RETURN BOTOX      /82   Pulse 97   Temp 97.5  F (36.4  C)   Resp 16   LMP  (LMP Unknown)   SpO2 94%     Current Outpatient Medications:      cephALEXin (KEFLEX) 500 MG capsule, Take 1 capsule (500 mg) by mouth 3 times daily, Disp: 30 capsule, Rfl: 0     mometasone (ELOCON) 0.1 % external cream, Apply sparingly to affected area twice daily as needed.  Do not apply to face., Disp: 45 g, Rfl: 0     OnabotulinumtoxinA (BOTOX IJ), Inject 150 Units into the muscle once Lot # /C3 with Expiration Date: 03/2021, Disp: , Rfl:      RESTASIS MULTIDOSE 0.05 % ophthalmic emulsion, Place 1 drop into both eyes 2 times daily, Disp: , Rfl: 3     simvastatin (ZOCOR) 20 MG tablet, TAKE 1 TABLET BY MOUTH EVERYDAY AT BEDTIME, Disp: 90 tablet, Rfl: 3     traZODone (DESYREL) 50 MG tablet, TAKE 3 TABLETS BY MOUTH EVERY DAY AT BEDTIME., Disp: 270 tablet, Rfl: 2    Current Facility-Administered Medications:      botulinum toxin type A (BOTOX) 100 units injection 150 Units, 150 Units, Intramuscular, Once, StandalCassidy MD     botulinum toxin type A (BOTOX) 100 units injection 150 Units, 150 Units, Intramuscular, Once, StandalCassidy MD     Allergies   Allergen Reactions     Meloxicam      Diarrhea, vomiting     Primidone Other (See Comments)     Felt like motion sickness     Tramadol      Diarrhea, vomiting      Adhesive Tape Rash     Durabond only     Allergy Rash     Dermabond  Anesthesia IV set misc          PHYSICAL EXAM:  HEAD, NECK AND TRUNK PATTERN:   Head Tremor: Observed - no-no tremor  present  Head & Neck Extension: Present - Slight  Sub-Occipital Extension: Present - Slight  Forward Head: Present - Slight   Head & Neck Lateral Bend: Left  Shoulder Elevation: Right    HPI:    Patient denies new medical diagnoses, illnesses, hospitalizations, emergency room visits, and injuries since the previous injection with botulinum neurotoxin.    We reviewed the recommended safety guidelines for  Botox from any vaccine injection, such as the seasonal flu vaccine, by a minimum of 10-14 days with Lilli Steven. She acknowledged understanding.    RESPONSE TO PREVIOUS TREATMENT:    Lilli Steven received 150 units of Botox on 2019.    Problems following the previous series of neurotoxin injections included:  No problems reported    BENEFITS BY PATIENT REPORT:  Pain Improvement: Yes.  Percent Improvement: unable to quantify in % but reports significant improvement Duration of Benefit:  10-12 weeks.     Dystonia and tremor Improvement: Yes.  Percent Improvement: unable to quantify in %, but reports significant overall improvement     BOTULINUM NEUROTOXIN INJECTION PROCEDURES:    VERIFICATION OF PATIENT IDENTIFICATION AND PROCEDURE     Initials   Patient Name lmd   Patient  lmd   Procedure Verified by: lmd     Prior to the start of the procedure and with procedural staff participation, I verbally confirmed the patient s identity using two indicators, relevant allergies, that the procedure was appropriate and matched the consent or emergent situation, and that the correct equipment/implants were available. Immediately prior to starting the procedure I conducted the Time Out with the procedural staff and re-confirmed the patient s name, procedure, and site/side. (The Joint Commission universal protocol was followed.)  Yes    Sedation (Moderate or Deep): None    Above assessments performed by:    Mikki Sotelo RN Care Coordinator    Cassidy Ramirez MD    INDICATION/S FOR  PROCEDURE/S:  Lilli Steven is a 69-year-old patient with dystonia affecting the  head, neck and shoulder girdle musculature secondary to a diagnosis of cervical dystonia with associated  pain, tremor, spasms, loss of joint motion, loss of volitional motor control and difficulty with activities of daily living.      Her baseline symptoms have been recalcitrant to oral medications and conservative therapy.  She is here today for an injection of Botox.       GOAL OF PROCEDURE:  The goal of this procedure is to increase active range of motion, improve volitional motor control and decrease pain  associated with dystonic movements, spasticity and tremor.    TOTAL DOSE ADMINISTERED:  Dose Administered:  150 units Botox    Diluent Used:  Preservative Free Normal Saline  Total Volume of Diluent Used:  1.5 ml  Lot # /C3 with Expiration Date:  8/2022  NDC #: Botox 100u ([address]-88)    Was there drug waste? Yes  Amount of drug waste (mL): 50 units Botox.  Reason for waste:  Single use vial  Multi-dose vial: No    Cassidy Ramirez MD  February 27, 2020      Medication guide was offered to patient and was declined.    CONSENT:  The risks, benefits, and treatment options were discussed with Lilli Steven and she agreed to proceed.      Written consent was obtained by lmd.     EQUIPMENT USED:  Needle-37mm stimulating/recording  EMG/NCS Machine     SKIN PREPARATION:  Skin preparation was performed using an alcohol wipe.     GUIDANCE DESCRIPTION:  Electro-myographic guidance was necessary throughout the procedure to accurately identify all areas of dystonic and spastic muscles while avoiding injection of non-dystonic muscles and non-spastic muscles, neighboring nerves and nearby vascular structures.     AREA/MUSCLE INJECTED:  150 UNITS BOTOX = TOTAL DOSE     1. HEAD & NECK MUSCLES: 150 units Botox = Total Dose, 1:1 Dilution                 Right Mid-Trapezius - 5 units of Botox at 1 site/s.   Left  Mid-Trapezius - 10 units of Botox at 1 site/s.      Right Splenius Capitus - 25 units of Botox at 2 site/s.   Left Splenius Capitus - 5 units of Botox at 1 site/s.      Right Levator Scapulae - 30 units of Botox at 1 site/s (shoulder muscles).   Left Levator Scapulae - 20 units of Botox at 1 site/s (shoulder muscles).      Right Inferior Obliquus Capitis - 20 units of Botox at 1 site/s.   Left Inferior Obliquus Capitis - 5 units of at 1 site/s.      Right Sternal head of the Sternocleidomastoid - 15 units of Botox at 1 site/s.               Left Sternal head of the Sternocleidomastoid - 15 units of Botox at 1 site/s.       RESPONSE TO PROCEDURE:  Lilli Steven tolerated the procedure well and there were no immediate complications.  She was allowed to recover for an appropriate period of time and was discharged home in stable condition.    FOLLOW UP:  Lilli Steven was asked to follow up by phone in 7-14 days with Erin Combs PT, Care Coordinator or Mikki Rojas RN, Care Coordinator, to report her response to this series of injections.  Based on the patient's previous response to this therapy, Lilli Steven was rescheduled for the next series of injections in 12 weeks.    PLAN (Medication Changes, Therapy Orders, Work or Disability Issues, etc.): Will monitor and report response to today's injections.    Cassidy Ramirez MD

## 2020-03-04 ENCOUNTER — THERAPY VISIT (OUTPATIENT)
Dept: OCCUPATIONAL THERAPY | Facility: CLINIC | Age: 70
End: 2020-03-04
Payer: COMMERCIAL

## 2020-03-04 DIAGNOSIS — M25.522 PAIN OF BOTH ELBOWS: Primary | ICD-10-CM

## 2020-03-04 DIAGNOSIS — M25.521 PAIN OF BOTH ELBOWS: Primary | ICD-10-CM

## 2020-03-04 PROCEDURE — 97110 THERAPEUTIC EXERCISES: CPT | Mod: GO | Performed by: OCCUPATIONAL THERAPIST

## 2020-03-04 NOTE — PROGRESS NOTES
Discharge Note - Hand Therapy    Current Date:  3/4/2020    Initial Evaluation Date:  1/15/20  Reporting period is from 1/15/20 to 3/4/2020  Number of Visits:  4    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    Post:  2 years    Precautions: tremor, gets botox for cervical dystonia, adhesives      Subjective:   Subjective changes as noted by patient:  Feeling stronger. The bump is still there, sore to the touch but really not bothering her with activities.    Upper Extremity Functional Index Score:  SCORE:   Column Totals: /80: 80   (A lower score indicates greater disability.)    Objective:  Pain Level (Scale 0-10)   1/15/2020 3/4/2020     At Rest 0 to 4 0   At worst 4 2 (mild)     Pain Description  Date 1/15/2020   Location Anterior elbow   Pain Quality Sharp   Frequency intermittent     Pain is worst  mainly with doing things   Exacerbated by  , turn, using a force through arm and hand   Relieved by Avoiding the bothersome activities   Progression Not improving     VISUAL INSPECTION:  DATE 1/15/2020 1/15/2020    Right Left   General posture of the upper extremity: [x]   Normal   []  Comments: [x]   Normal   []  Comments:   Joint alignment: [x]   Normal   []  Comments: [x]   Normal   []  Comments:   Color:  [x]   Normal   []  Comments: [x]   Normal   []  Comments:   Other observations:  Enlargement medial to biceps tendon, just prox to elbow crease on anterior side similar enlargement as the R side but smaller and less tender       Sensation   WNL throughout all nerve distributions; per patient report    Palpation:    1/15: Left elbow: tender to extensor and flexor wad, also MEP, not the LEP.  Right elbow: Tender to LEP, extensor wad, biceps insertaion area and just proximal. Also sore to the visible bump of the biceps tendon just proximal to the elbow crease at the anterior side. Tender to the MEP, also flexor wad.  1/27: R biceps tendon is tender near insertion, extensor wad as  well  3/4/2020: same tenderness as prior. No tenderness or irritability of the LEP area noted    ROM  Pain Report: - none  + mild    ++ moderate    +++ severe   Elbow 1/15/2020 1/15/2020   AROM (PROM) R L   Extension 0 5   Flexion 145 145   Supination 60 65   Pronation 80 85       Manual Muscle Test - Elbow  Grade: 0-5/5 (5/5=Normal)  Pain Report:    + mild    ++ moderate    +++ severe       Grade: 0-5/5 (5/5=Normal) 1/15/2020  1/15/2020  3/4/2020       Right Pain Left Pain Right pain   Elbow flexion: FA supinated 4-/5 0 4+/5 0 4/5 0   Elbow flexion: FA neutral 5/5 0 5/5 0 4+/5 0   Elbow flexion: FA pronated 4/5 0 5/5 0 4+/5 0   Elbow extension: FA supinated 5/5 0 5/5 0     Elbow extension: FA neutral 5/5 0 5/5 0     Elbow extension: FA pronated 5/5 0 5/5 0     FA supination 4/5 0 5/5 0 4/5 0   FA pronation 5/5 0 5/5 0                Manual Muscle Test  Grade: 0-5/5 (5/5=Normal) 1/15/2020    1/15/2020     Right Pain Left Pain   Wrist ext 5 0 5 0   Wrist flex 5 0 5 0   Wrist RD 5 0 5 0   Wrist Ud 5 0 5 0   Pain Report:  0-10/10  Strength:  Pain-free /Pinch Test    1/15/2020 3/4/2020      Trials R L R L   Elbow down at side   51 47 52 50   Elbow extended 50 feels it a bit in  Distal area of the biceps muscle of the arm 45 53 55     Assessment:  Response to therapy has been improvement to:  Strength and self management of symptoms.  Appropriateness of Rx I have re-evaluated this patient and find that the nature, scope, duration and intensity of the therapy is appropriate for the medical condition of the patient.  Overall Assessment:  Patient is ready to be discharged from therapy and continue their home treatment program.  STG/LTG:  See goal sheet for details and updates.    Plan:  Frequency/Duration:  Discharge from Hand Therapy; continue home program.    Recommendations for Home Program - See flowsheet

## 2020-03-09 ENCOUNTER — THERAPY VISIT (OUTPATIENT)
Dept: PHYSICAL THERAPY | Facility: CLINIC | Age: 70
End: 2020-03-09
Payer: COMMERCIAL

## 2020-03-09 DIAGNOSIS — M17.10 PATELLOFEMORAL ARTHRITIS: ICD-10-CM

## 2020-03-09 DIAGNOSIS — M25.561 BILATERAL KNEE PAIN: ICD-10-CM

## 2020-03-09 DIAGNOSIS — M25.562 BILATERAL KNEE PAIN: ICD-10-CM

## 2020-03-09 PROCEDURE — 97112 NEUROMUSCULAR REEDUCATION: CPT | Mod: GP | Performed by: PHYSICAL THERAPIST

## 2020-03-09 PROCEDURE — 97110 THERAPEUTIC EXERCISES: CPT | Mod: GP | Performed by: PHYSICAL THERAPIST

## 2020-03-13 DIAGNOSIS — G47.00 PERSISTENT INSOMNIA: ICD-10-CM

## 2020-03-14 NOTE — TELEPHONE ENCOUNTER
"Trazodone 50MG   Last Written Prescription Date:  06/17/2019  Last Fill Quantity: 270,  # refills: 2   Last office visit: 11/4/2019 with prescribing provider:  Yes    Future Office Visit:      Requested Prescriptions   Pending Prescriptions Disp Refills     traZODone (DESYREL) 50 MG tablet [Pharmacy Med Name: TRAZODONE 50 MG TABLET] 270 tablet 2     Sig: TAKE 3 TABLETS BY MOUTH EVERY DAY AT BEDTIME.       Serotonin Modulators Passed - 3/13/2020  4:08 AM        Passed - Recent (12 mo) or future (30 days) visit within the authorizing provider's specialty     Patient has had an office visit with the authorizing provider or a provider within the authorizing providers department within the previous 12 mos or has a future within next 30 days. See \"Patient Info\" tab in inbasket, or \"Choose Columns\" in Meds & Orders section of the refill encounter.              Passed - Medication is active on med list        Passed - Patient is age 18 or older        Passed - No active pregnancy on record        Passed - No positive pregnancy test in past 12 months             "

## 2020-03-16 RX ORDER — TRAZODONE HYDROCHLORIDE 50 MG/1
TABLET, FILM COATED ORAL
Qty: 270 TABLET | Refills: 0 | Status: SHIPPED | OUTPATIENT
Start: 2020-03-16 | End: 2020-06-12

## 2020-03-16 NOTE — TELEPHONE ENCOUNTER
Prescription approved per [facility].  Miya Valdovinos RN      Due for physical in ay  Miya Valdovinos RN

## 2020-05-14 ENCOUNTER — TRANSFERRED RECORDS (OUTPATIENT)
Dept: HEALTH INFORMATION MANAGEMENT | Facility: CLINIC | Age: 70
End: 2020-05-14

## 2020-05-21 ENCOUNTER — OFFICE VISIT (OUTPATIENT)
Dept: PHYSICAL MEDICINE AND REHAB | Facility: CLINIC | Age: 70
End: 2020-05-21
Payer: COMMERCIAL

## 2020-05-21 ENCOUNTER — RECORDS - HEALTHEAST (OUTPATIENT)
Dept: ADMINISTRATIVE | Facility: OTHER | Age: 70
End: 2020-05-21

## 2020-05-21 DIAGNOSIS — G24.3 SPASMODIC TORTICOLLIS: Primary | ICD-10-CM

## 2020-05-21 DIAGNOSIS — G24.1 GENETIC TORSION DYSTONIA: ICD-10-CM

## 2020-05-21 NOTE — LETTER
5/21/2020       RE: Lilli Steven  [address]  [facility] [id number]     Dear Colleague,    Thank you for referring your patient, Lilli Steven, to the [facility] PHYSICAL MEDICINE AND REHABILITATION at [facility]. Please see a copy of my visit note below.    BOTULINUM TOXIN PROCEDURE NOTE    Chief Complaint   Patient presents with     Dystonia     Botox injections Cervical Dystonia     LMP  (LMP Unknown)     Current Outpatient Medications:      cephALEXin (KEFLEX) 500 MG capsule, Take 1 capsule (500 mg) by mouth 3 times daily, Disp: 30 capsule, Rfl: 0     mometasone (ELOCON) 0.1 % external cream, Apply sparingly to affected area twice daily as needed.  Do not apply to face., Disp: 45 g, Rfl: 0     OnabotulinumtoxinA (BOTOX IJ), Inject 150 Units into the muscle once Lot # /C3 with Expiration Date: 03/2021, Disp: , Rfl:      RESTASIS MULTIDOSE 0.05 % ophthalmic emulsion, Place 1 drop into both eyes 2 times daily, Disp: , Rfl: 3     simvastatin (ZOCOR) 20 MG tablet, TAKE 1 TABLET BY MOUTH EVERYDAY AT BEDTIME, Disp: 90 tablet, Rfl: 3     traZODone (DESYREL) 50 MG tablet, TAKE 3 TABLETS BY MOUTH EVERY DAY AT BEDTIME., Disp: 270 tablet, Rfl: 0    Current Facility-Administered Medications:      botulinum toxin type A (BOTOX) 100 units injection 150 Units, 150 Units, Intramuscular, Once, StandalCassidy MD     botulinum toxin type A (BOTOX) 100 units injection 150 Units, 150 Units, Intramuscular, Once, StandalCassidy MD     Allergies   Allergen Reactions     Meloxicam      Diarrhea, vomiting     Primidone Other (See Comments)     Felt like motion sickness     Tramadol      Diarrhea, vomiting      Adhesive Tape Rash     Durabond only     Allergy Rash     Dermabond  Anesthesia IV set misc          PHYSICAL EXAM:  HEAD, NECK AND TRUNK PATTERN:   Head Tremor: Observed - no-no tremor present  Head & Neck Extension: Present -  Slight  Sub-Occipital Extension: Present - Slight  Forward Head: Present - Slight   Head & Neck Lateral Bend: Left  Shoulder Elevation: Right      HPI:  Patient denies new medical diagnoses, illnesses, hospitalizations, emergency room visits, and injuries since the previous injection with botulinum neurotoxin.    We reviewed the recommended safety guidelines for  Botox from any vaccine injection, such as the seasonal flu vaccine, by a minimum of 10-14 days with Lilli Steven. She acknowledged understanding.      RESPONSE TO PREVIOUS TREATMENT:  Lilli Steven received 150 units of Botox on 2020.    Problems following the previous series of neurotoxin injections included:  No problems reported      BENEFITS BY PATIENT REPORT:  Pain Improvement: Yes.  Percent Improvement: unable to quantify in % but reports significant improvement Duration of Benefit:  10-12 weeks.     Dystonia and tremor Improvement: Yes.  Percent Improvement: unable to quantify in %, but reports significant overall improvement     BOTULINUM NEUROTOXIN INJECTION PROCEDURES:    VERIFICATION OF PATIENT IDENTIFICATION AND PROCEDURE     Initials   Patient Name lmd   Patient  lmd   Procedure Verified by: lmd     Prior to the start of the procedure and with procedural staff participation, I verbally confirmed the patient s identity using two indicators, relevant allergies, that the procedure was appropriate and matched the consent or emergent situation, and that the correct equipment/implants were available. Immediately prior to starting the procedure I conducted the Time Out with the procedural staff and re-confirmed the patient s name, procedure, and site/side. (The Joint Commission universal protocol was followed.)  Yes    Sedation (Moderate or Deep): None    Above assessments performed by:  Mikki Sotelo RN Care Coordinator    Jahaira Cardozo MD      INDICATION/S FOR PROCEDURE/S:  Lilli Steven is a 70-year-old patient  with dystonia affecting the  head, neck and shoulder girdle musculature secondary to a diagnosis of cervical dystonia with associated  pain, tremor, spasms, loss of joint motion, loss of volitional motor control and difficulty with activities of daily living.      Her baseline symptoms have been recalcitrant to oral medications and conservative therapy.  She is here today for an injection of Botox.       GOAL OF PROCEDURE:  The goal of this procedure is to increase active range of motion, improve volitional motor control and decrease pain  associated with dystonic movements, spasticity and tremor.      TOTAL DOSE USED: 200 units  Dose Administered:  150 units Botox    Diluent Used:  Preservative Free Normal Saline  Total Volume of Diluent Used:  1.5 ml  Lot # /C3 with Expiration Date:  10/2022  NDC #: Botox 100u ([address]-12)    Was there drug waste? Yes  Amount of drug waste (mL): 50 units Botox.  Reason for waste:  Single use vial  Multi-dose vial: Ena Cardozo MD  May 21, 2020      Medication guide was offered to patient and was declined.    CONSENT:  The risks, benefits, and treatment options were discussed with Lilli Steven and she agreed to proceed.      Written consent was obtained by lmd.     EQUIPMENT USED:  Needle-37mm stimulating/recording  EMG/NCS Machine     SKIN PREPARATION:  Skin preparation was performed using an alcohol wipe.     GUIDANCE DESCRIPTION:  Electro-myographic guidance was necessary throughout the procedure to accurately identify all areas of dystonic and spastic muscles while avoiding injection of non-dystonic muscles and non-spastic muscles, neighboring nerves and nearby vascular structures.     AREA/MUSCLE INJECTED:  150 UNITS BOTOX = TOTAL DOSE     1. HEAD & NECK MUSCLES: 150 units Botox = Total Dose, 1:1 Dilution                 Right Mid-Trapezius - 5 units of Botox at 1 site/s.   Left Mid-Trapezius - 10 units of Botox at 1 site/s.      Right Splenius Capitus - 25  units of Botox at 2 site/s.   Left Splenius Capitus - 5 units of Botox at 1 site/s.      Right Levator Scapulae - 30 units of Botox at 1 site/s (shoulder muscles).   Left Levator Scapulae - 20 units of Botox at 1 site/s (shoulder muscles).      Right Inferior Obliquus Capitis - 20 units of Botox at 1 site/s.   Left Inferior Obliquus Capitis - 5 units of at 1 site/s.      Right Sternal head of the Sternocleidomastoid - 15 units of Botox at 1 site/s.               Left Sternal head of the Sternocleidomastoid - 15 units of Botox at 1 site/s.       RESPONSE TO PROCEDURE:  Lilli Steven tolerated the procedure well and there were no immediate complications.  She was allowed to recover for an appropriate period of time and was discharged home in stable condition.    FOLLOW UP:  Lilli Steven was asked to follow up by phone in 7-14 days with Lennie Sánchez PT, Care Coordinator or Mikki Rojas RN, Care Coordinator, to report her response to this series of injections.  Based on the patient's previous response to this therapy, Lilli Steven was rescheduled for the next series of injections in 12 weeks.    PLAN (Medication Changes, Therapy Orders, Work or Disability Issues, etc.): Will monitor and report response to today's injections.    No change in the total dose or sites of injections today.    She reported constant neck pain, not related to the procedure. Offered PT and other intervention but she was not interested at this point.     Jahaira Cardozo MD  Physical Medicine & Rehabilitation

## 2020-05-21 NOTE — PROGRESS NOTES
BOTULINUM TOXIN PROCEDURE NOTE    Chief Complaint   Patient presents with     Dystonia     Botox injections Cervical Dystonia     LMP  (LMP Unknown)     Current Outpatient Medications:      cephALEXin (KEFLEX) 500 MG capsule, Take 1 capsule (500 mg) by mouth 3 times daily, Disp: 30 capsule, Rfl: 0     mometasone (ELOCON) 0.1 % external cream, Apply sparingly to affected area twice daily as needed.  Do not apply to face., Disp: 45 g, Rfl: 0     OnabotulinumtoxinA (BOTOX IJ), Inject 150 Units into the muscle once Lot # /C3 with Expiration Date: 03/2021, Disp: , Rfl:      RESTASIS MULTIDOSE 0.05 % ophthalmic emulsion, Place 1 drop into both eyes 2 times daily, Disp: , Rfl: 3     simvastatin (ZOCOR) 20 MG tablet, TAKE 1 TABLET BY MOUTH EVERYDAY AT BEDTIME, Disp: 90 tablet, Rfl: 3     traZODone (DESYREL) 50 MG tablet, TAKE 3 TABLETS BY MOUTH EVERY DAY AT BEDTIME., Disp: 270 tablet, Rfl: 0    Current Facility-Administered Medications:      botulinum toxin type A (BOTOX) 100 units injection 150 Units, 150 Units, Intramuscular, Once, Cassidy Ramirez MD     botulinum toxin type A (BOTOX) 100 units injection 150 Units, 150 Units, Intramuscular, Once, Cassidy Ramirez MD     Allergies   Allergen Reactions     Meloxicam      Diarrhea, vomiting     Primidone Other (See Comments)     Felt like motion sickness     Tramadol      Diarrhea, vomiting      Adhesive Tape Rash     Durabond only     Allergy Rash     Dermabond  Anesthesia IV set misc          PHYSICAL EXAM:  HEAD, NECK AND TRUNK PATTERN:   Head Tremor: Observed - no-no tremor present  Head & Neck Extension: Present - Slight  Sub-Occipital Extension: Present - Slight  Forward Head: Present - Slight   Head & Neck Lateral Bend: Left  Shoulder Elevation: Right      HPI:  Patient denies new medical diagnoses, illnesses, hospitalizations, emergency room visits, and injuries since the previous injection with botulinum neurotoxin.    We reviewed the  recommended safety guidelines for  Botox from any vaccine injection, such as the seasonal flu vaccine, by a minimum of 10-14 days with Lilli Steven. She acknowledged understanding.      RESPONSE TO PREVIOUS TREATMENT:  Lilli Steven received 150 units of Botox on 2020.    Problems following the previous series of neurotoxin injections included:  No problems reported      BENEFITS BY PATIENT REPORT:  Pain Improvement: Yes.  Percent Improvement: unable to quantify in % but reports significant improvement Duration of Benefit:  10-12 weeks.     Dystonia and tremor Improvement: Yes.  Percent Improvement: unable to quantify in %, but reports significant overall improvement     BOTULINUM NEUROTOXIN INJECTION PROCEDURES:    VERIFICATION OF PATIENT IDENTIFICATION AND PROCEDURE     Initials   Patient Name lmd   Patient  lmd   Procedure Verified by: lmd     Prior to the start of the procedure and with procedural staff participation, I verbally confirmed the patient s identity using two indicators, relevant allergies, that the procedure was appropriate and matched the consent or emergent situation, and that the correct equipment/implants were available. Immediately prior to starting the procedure I conducted the Time Out with the procedural staff and re-confirmed the patient s name, procedure, and site/side. (The Joint Commission universal protocol was followed.)  Yes    Sedation (Moderate or Deep): None    Above assessments performed by:  Mikki Sotelo RN Care Coordinator    Jahaira Cardozo MD      INDICATION/S FOR PROCEDURE/S:  Lilli Steven is a 70-year-old patient with dystonia affecting the  head, neck and shoulder girdle musculature secondary to a diagnosis of cervical dystonia with associated  pain, tremor, spasms, loss of joint motion, loss of volitional motor control and difficulty with activities of daily living.      Her baseline symptoms have been recalcitrant to oral medications  and conservative therapy.  She is here today for an injection of Botox.       GOAL OF PROCEDURE:  The goal of this procedure is to increase active range of motion, improve volitional motor control and decrease pain  associated with dystonic movements, spasticity and tremor.      TOTAL DOSE USED: 200 units  Dose Administered:  150 units Botox    Diluent Used:  Preservative Free Normal Saline  Total Volume of Diluent Used:  1.5 ml  Lot # /C3 with Expiration Date:  10/2022  NDC #: Botox 100u ([address]-65)    Was there drug waste? Yes  Amount of drug waste (mL): 50 units Botox.  Reason for waste:  Single use vial  Multi-dose vial: Ena Cardozo MD  May 21, 2020      Medication guide was offered to patient and was declined.    CONSENT:  The risks, benefits, and treatment options were discussed with Lilli Steven and she agreed to proceed.      Written consent was obtained by lmd.     EQUIPMENT USED:  Needle-37mm stimulating/recording  EMG/NCS Machine     SKIN PREPARATION:  Skin preparation was performed using an alcohol wipe.     GUIDANCE DESCRIPTION:  Electro-myographic guidance was necessary throughout the procedure to accurately identify all areas of dystonic and spastic muscles while avoiding injection of non-dystonic muscles and non-spastic muscles, neighboring nerves and nearby vascular structures.     AREA/MUSCLE INJECTED:  150 UNITS BOTOX = TOTAL DOSE     1. HEAD & NECK MUSCLES: 150 units Botox = Total Dose, 1:1 Dilution                 Right Mid-Trapezius - 5 units of Botox at 1 site/s.   Left Mid-Trapezius - 10 units of Botox at 1 site/s.      Right Splenius Capitus - 25 units of Botox at 2 site/s.   Left Splenius Capitus - 5 units of Botox at 1 site/s.      Right Levator Scapulae - 30 units of Botox at 1 site/s (shoulder muscles).   Left Levator Scapulae - 20 units of Botox at 1 site/s (shoulder muscles).      Right Inferior Obliquus Capitis - 20 units of Botox at 1 site/s.   Left Inferior  Obliquus Capitis - 5 units of at 1 site/s.      Right Sternal head of the Sternocleidomastoid - 15 units of Botox at 1 site/s.               Left Sternal head of the Sternocleidomastoid - 15 units of Botox at 1 site/s.       RESPONSE TO PROCEDURE:  Lilli Steven tolerated the procedure well and there were no immediate complications.  She was allowed to recover for an appropriate period of time and was discharged home in stable condition.    FOLLOW UP:  Lilli Steven was asked to follow up by phone in 7-14 days with Lennie Sánchez, PT, Care Coordinator or Mikki Rojas RN, Care Coordinator, to report her response to this series of injections.  Based on the patient's previous response to this therapy, Lilli Steven was rescheduled for the next series of injections in 12 weeks.    PLAN (Medication Changes, Therapy Orders, Work or Disability Issues, etc.): Will monitor and report response to today's injections.    No change in the total dose or sites of injections today.    She reported constant neck pain, not related to the procedure. Offered PT and other intervention but she was not interested at this point.     Jahaira Cardozo MD  Physical Medicine & Rehabilitation

## 2020-06-10 PROBLEM — M25.561 BILATERAL KNEE PAIN: Status: RESOLVED | Noted: 2020-02-24 | Resolved: 2020-06-10

## 2020-06-10 PROBLEM — M17.10 PATELLOFEMORAL ARTHRITIS: Status: RESOLVED | Noted: 2020-02-24 | Resolved: 2020-06-10

## 2020-06-10 PROBLEM — M25.562 BILATERAL KNEE PAIN: Status: RESOLVED | Noted: 2020-02-24 | Resolved: 2020-06-10

## 2020-06-10 NOTE — PROGRESS NOTES
"Discharge Note    Progress reporting period is from initial eval to Mar 9, 2020.     Lilli failed to return for next follow up visit and current status is unknown.  Please see information below for last relevant information on current status.  Patient seen for Rxs Used: 2 visits.    SUBJECTIVE  Subjective changes noted by patient:  Subjective: Patient notes that she feels about the same as last visit. She has not done her HEP because she has had many appts and just finished up hand therapy. She will do the exercises more next couple weeks. Notes no difference in standing from seated position or with stairs..  Current pain level is Current Pain level: 0/10.     Previous pain level was  Initial Pain level: 5/10.   Changes in function:  Yes (See Goal flowsheet attached for changes in current functional level)  Adverse reaction to treatment or activity: None    OBJECTIVE  Changes noted in objective findings: Objective: Full, equal, painfree ROM of the knees. Pain noted only with 6\" lateral step down, but not with a smaller step. Retropatellar pain with 8\" step up but no shorter than that..    ASSESSMENT/PLAN  Diagnosis: B patellofemoral OA   DIAGP:  Diagnoses of Patellofemoral arthritis and Bilateral knee pain were pertinent to this visit.  Updated problem list and treatment plan:     Pain - HEP  Decreased ROM/flexibility - HEP  Decreased function - HEP  Decreased strength - HEP    STG/LTGs have been met or progress has been made towards goals:  Yes, please see goal flowsheet for most current information.    Assessment of Progress: current status is unknown.  Last current status: Assessment of progress: Pt has not made progress.  Self Management Plans:  HEP    I have re-evaluated this patient and find that the nature, scope, duration and intensity of the therapy is appropriate for the medical condition of the patient.  Lilli continues to require the following intervention to meet STG and LTG's:  " HEP.    Recommendations:  Discharge with current home program.  Patient to follow up with MD as needed. Episode to be closed at this time and patient formally discharged from therapy.    Harjeet Sahu, PT, DPT, OCS      Please refer to the daily flowsheet for treatment today, total treatment time and time spent performing 1:1 timed codes.

## 2020-06-12 ENCOUNTER — VIRTUAL VISIT (OUTPATIENT)
Dept: FAMILY MEDICINE | Facility: CLINIC | Age: 70
End: 2020-06-12
Payer: COMMERCIAL

## 2020-06-12 DIAGNOSIS — G47.00 PERSISTENT INSOMNIA: ICD-10-CM

## 2020-06-12 DIAGNOSIS — G25.0 ESSENTIAL TREMOR: ICD-10-CM

## 2020-06-12 DIAGNOSIS — E78.5 HYPERLIPIDEMIA LDL GOAL <160: ICD-10-CM

## 2020-06-12 PROCEDURE — 99214 OFFICE O/P EST MOD 30 MIN: CPT | Mod: GT | Performed by: FAMILY MEDICINE

## 2020-06-12 RX ORDER — SIMVASTATIN 20 MG
TABLET ORAL
Qty: 90 TABLET | Refills: 3 | Status: SHIPPED | OUTPATIENT
Start: 2020-06-12 | End: 2021-05-27

## 2020-06-12 RX ORDER — TRAZODONE HYDROCHLORIDE 50 MG/1
TABLET, FILM COATED ORAL
Qty: 270 TABLET | Refills: 3 | Status: SHIPPED | OUTPATIENT
Start: 2020-06-12 | End: 2021-06-01

## 2020-06-12 NOTE — PROGRESS NOTES
"Lilli Steven is a 70 year old female who is being evaluated via a billable video visit.      The patient has been notified of following:     \"This video visit will be conducted via a call between you and your physician/provider. We have found that certain health care needs can be provided without the need for an in-person physical exam.  This service lets us provide the care you need with a video conversation.  If a prescription is necessary we can send it directly to your pharmacy.  If lab work is needed we can place an order for that and you can then stop by our lab to have the test done at a later time.    Video visits are billed at different rates depending on your insurance coverage.  Please reach out to your insurance provider with any questions.    If during the course of the call the physician/provider feels a video visit is not appropriate, you will not be charged for this service.\"    Patient has given verbal consent for Video visit? Yes    Will anyone else be joining your video visit? No    Subjective     Lilli Steven is a 70 year old female who presents today via video visit for the following health issues:    HPI  Hyperlipidemia Follow-Up      Are you regularly taking any medication or supplement to lower your cholesterol?   Yes- simvastatin    Are you having muscle aches or other side effects that you think could be caused by your cholesterol lowering medication?  No      How many servings of fruits and vegetables do you eat daily?  2-3    On average, how many sweetened beverages do you drink each day (Examples: soda, juice, sweet tea, etc.  Do NOT count diet or artificially sweetened beverages)?   0    How many days per week do you exercise enough to make your heart beat faster? 7 days a week    How many minutes a day do you exercise enough to make your heart beat faster? 20 - 29    How many days per week do you miss taking your medication? 0       Video Start Time: 1:50 PM        Patient " Active Problem List   Diagnosis     Spasmodic torticollis     Wears glasses     Neck pain     Seasonal allergies     Genetic torsion dystonia     Osteopenia     Hyperlipidemia LDL goal <160     Tear of lateral cartilage or meniscus of knee, current     Urinary incontinence     PONV (postoperative nausea and vomiting)     Past Surgical History:   Procedure Laterality Date     COLONOSCOPY N/A 5/2/2019    Procedure: COLONOSCOPY;  Surgeon: Martin Ocampo MD;  Location:  GI     COSMETIC ABDOMINOPLASTY MODIFIED N/A 2/16/2017    Procedure: COSMETIC ABDOMINOPLASTY MODIFIED;  Surgeon: Inés Harrell MD;  Location:  OR     HERNIORRHAPHY EPIGASTRIC N/A 2/16/2017    Procedure: HERNIORRHAPHY EPIGASTRIC;  Surgeon: Joey Sargent MD;  Location:  OR     HERNIORRHAPHY UMBILICAL N/A 2/16/2017    Procedure: HERNIORRHAPHY UMBILICAL;  Surgeon: Joey Sargent MD;  Location:  OR     IMPLANT STIMULATOR SACRAL NERVE STAGE ONE      for bladder incontinence, failed; battery is dead     INCISION AND DRAINAGE ABDOMEN WASHOUT, COMBINED N/A 6/2/2017    Procedure: COMBINED INCISION AND DRAINAGE ABDOMEN WASHOUT;  EVACUATION OF ABDOMINAL WALL SEROMA;  Surgeon: Inés Harrell MD;  Location:  OR     IR SINOGRAM INJECTION DIAGNOSTIC  1/31/2019     IRRIGATION AND DEBRIDEMENT TRUNK, COMBINED N/A 3/29/2017    Procedure: COMBINED IRRIGATION AND DEBRIDEMENT TRUNK;  Surgeon: Inés Harrell MD;  Location: [facility]     IRRIGATION AND DEBRIDEMENT TRUNK, COMBINED N/A 11/29/2018    Procedure: EVACUATION OF ABDOMINAL WALL SEROMA;  Surgeon: Inés Harrell MD;  Location: [facility]     REVISE SCAR TRUNK N/A 3/21/2018    Procedure: REVISE SCAR TRUNK;  ABDOMINAL SCAR REVISION.;  Surgeon: Inés Harrell MD;  Location: [facility]     TONSILLECTOMY         Social History     Tobacco Use     Smoking status: Former Smoker     Packs/day: 0.50     Years: 10.00     Pack years: 5.00     Types: Cigarettes     Start  date: 1968     Last attempt to quit: 1988     Years since quittin.4     Smokeless tobacco: Never Used   Substance Use Topics     Alcohol use: Yes     Alcohol/week: 0.0 standard drinks     Comment: 3 - 4 drinks/week     Family History   Problem Relation Age of Onset     Cancer Father         49 yrs old - bladder     Other Cancer Father         bladder     Dementia Mother         she is living in [facility] in [facility]     Breast Cancer Mother         70s     Osteoporosis Mother      Heart Disease Maternal Grandfather         disease     Hypertension Maternal Grandfather      High cholesterol Maternal Grandfather      Heart Disease Paternal Grandmother         disease     Hypertension Paternal Grandmother      High cholesterol Paternal Grandmother      Heart Disease Maternal Grandmother         disease     Hypertension Paternal Grandfather      High cholesterol Paternal Grandfather      Hypertension Sister      Hypertension Sister      Osteoporosis Sister      Diabetes No family hx of      Coronary Artery Disease No family hx of      Hyperlipidemia No family hx of      Cerebrovascular Disease No family hx of      Colon Cancer No family hx of      Prostate Cancer No family hx of      Other Cancer No family hx of      Depression No family hx of      Anxiety Disorder No family hx of      Mental Illness No family hx of      Substance Abuse No family hx of      Anesthesia Reaction No family hx of      Asthma No family hx of      Osteoporosis No family hx of      Genetic Disorder No family hx of      Thyroid Disease No family hx of      Obesity No family hx of      Unknown/Adopted No family hx of          Current Outpatient Medications   Medication Sig Dispense Refill     cephALEXin (KEFLEX) 500 MG capsule Take 1 capsule (500 mg) by mouth 3 times daily 30 capsule 0     mometasone (ELOCON) 0.1 % external cream Apply sparingly to affected area twice daily as needed.  Do not apply to face. 45 g 0      "OnabotulinumtoxinA (BOTOX IJ) Inject 150 Units into the muscle once Lot # /C3 with Expiration Date: 03/2021       RESTASIS MULTIDOSE 0.05 % ophthalmic emulsion Place 1 drop into both eyes 2 times daily  3     simvastatin (ZOCOR) 20 MG tablet TAKE 1 TABLET BY MOUTH EVERYDAY AT BEDTIME 90 tablet 3     traZODone (DESYREL) 50 MG tablet TAKE 3 TABLETS BY MOUTH EVERY DAY AT BEDTIME. 270 tablet 3     Allergies   Allergen Reactions     Meloxicam      Diarrhea, vomiting     Primidone Other (See Comments)     Felt like motion sickness     Tramadol      Diarrhea, vomiting      Adhesive Tape Rash     Durabond only     Allergy Rash     Dermabond  Anesthesia IV set misc         Reviewed and updated as needed this visit by Provider         Review of Systems   CONSTITUTIONAL: NEGATIVE for fever, chills, change in weight  ENT/MOUTH: NEGATIVE for ear, mouth and throat problems  RESP: NEGATIVE for significant cough or SOB  CV: NEGATIVE for chest pain, palpitations or peripheral edema      Objective    LMP  (LMP Unknown)   Estimated body mass index is 30.54 kg/m  as calculated from the following:    Height as of 1/30/20: 1.575 m (5' 2\").    Weight as of 1/30/20: 75.8 kg (167 lb).  Physical Exam     GENERAL: Healthy, alert and no distress  EYES: Eyes grossly normal to inspection.  No discharge or erythema, or obvious scleral/conjunctival abnormalities.  RESP: No audible wheeze, cough, or visible cyanosis.  No visible retractions or increased work of breathing.    SKIN: Visible skin clear. No significant rash, abnormal pigmentation or lesions.  NEURO: Cranial nerves grossly intact.  Mentation and speech appropriate for age.  Essential tremor of the head    PSYCH: Mentation appears normal, affect normal/bright, judgement and insight intact, normal speech and appearance well-groomed.          Assessment & Plan       ICD-10-CM    1. Hyperlipidemia LDL goal <160  E78.5 simvastatin (ZOCOR) 20 MG tablet   2. Persistent insomnia  G47.00 " traZODone (DESYREL) 50 MG tablet          We will need to do follow up labs once the COVID19 pandemic has passed in about 3 months or so      No follow-ups on file.    Vargas Chaudhry MD  [facility]      Video-Visit Details    Type of service:  Video Visit    Video End Time:2:00 PM    Originating Location (pt. Location): Home    Distant Location (provider location):  [facility]     Platform used for Video Visit: Doximity    No follow-ups on file.       Vargas Chaudhry MD

## 2020-08-13 ENCOUNTER — OFFICE VISIT (OUTPATIENT)
Dept: PHYSICAL MEDICINE AND REHAB | Facility: CLINIC | Age: 70
End: 2020-08-13

## 2020-08-13 ENCOUNTER — RECORDS - HEALTHEAST (OUTPATIENT)
Dept: ADMINISTRATIVE | Facility: OTHER | Age: 70
End: 2020-08-13

## 2020-08-13 VITALS — TEMPERATURE: 99.2 F

## 2020-08-13 DIAGNOSIS — G24.1 GENETIC TORSION DYSTONIA: ICD-10-CM

## 2020-08-13 DIAGNOSIS — G24.3 SPASMODIC TORTICOLLIS: Primary | ICD-10-CM

## 2020-08-13 NOTE — LETTER
8/13/2020    RE: Lilli Steven  [address]  [facility] [address]     Dear Colleague,    Thank you for referring your patient, Lilli Steven, to the [facility] PHYSICAL MEDICINE AND REHABILITATION at [facility]. Please see a copy of my visit note below.    BOTULINUM TOXIN PROCEDURE NOTE    No chief complaint on file.    Temp 99.2  F (37.3  C) (Oral)   LMP  (LMP Unknown)       Current Outpatient Medications:      cephALEXin (KEFLEX) 500 MG capsule, Take 1 capsule (500 mg) by mouth 3 times daily, Disp: 30 capsule, Rfl: 0     mometasone (ELOCON) 0.1 % external cream, Apply sparingly to affected area twice daily as needed.  Do not apply to face., Disp: 45 g, Rfl: 0     OnabotulinumtoxinA (BOTOX IJ), Inject 150 Units into the muscle once Lot # /C3 with Expiration Date: 03/2021, Disp: , Rfl:      RESTASIS MULTIDOSE 0.05 % ophthalmic emulsion, Place 1 drop into both eyes 2 times daily, Disp: , Rfl: 3     simvastatin (ZOCOR) 20 MG tablet, TAKE 1 TABLET BY MOUTH EVERYDAY AT BEDTIME, Disp: 90 tablet, Rfl: 3     traZODone (DESYREL) 50 MG tablet, TAKE 3 TABLETS BY MOUTH EVERY DAY AT BEDTIME., Disp: 270 tablet, Rfl: 3    Current Facility-Administered Medications:      botulinum toxin type A (BOTOX) 100 units injection 150 Units, 150 Units, Intramuscular, Once, Cassidy Ramirez MD     botulinum toxin type A (BOTOX) 100 units injection 150 Units, 150 Units, Intramuscular, Once, StandCassidy cuenca MD     Allergies   Allergen Reactions     Meloxicam      Diarrhea, vomiting     Primidone Other (See Comments)     Felt like motion sickness     Tramadol      Diarrhea, vomiting      Adhesive Tape Rash     Durabond only     Allergy Rash     Dermabond  Anesthesia IV set misc          PHYSICAL EXAM:    HEAD, NECK AND TRUNK PATTERN:   Head Tremor: Observed - no-no tremor present  Head & Neck Extension: Present - Slight  Sub-Occipital Extension: Present -  Slight  Forward Head: Present - Slight   Head & Neck Lateral Bend: Left  Shoulder Elevation: Right    HPI:    Patient denies new medical diagnoses, illnesses, hospitalizations, emergency room visits, and injuries since the previous injection with botulinum neurotoxin.    We reviewed the recommended safety guidelines for  Botox from any vaccine injection, such as the seasonal flu vaccine, by a minimum of 10-14 days with Lilli Steven. She acknowledged understanding.    RESPONSE TO PREVIOUS TREATMENT:    Lilli Steven received 105 units of Botox on 20.    Problems following the previous series of neurotoxin injections included:  No problems reported    BENEFITS BY PATIENT REPORT:    Pain Improvement: Yes.  Percent Improvement: unable to quantify in % but reports significant improvement Duration of Benefit:  10-12 weeks.      Dystonia and tremor Improvement: Yes.  Percent Improvement: unable to quantify in %, but reports significant overall improvement       BOTULINUM NEUROTOXIN INJECTION PROCEDURES:    VERIFICATION OF PATIENT IDENTIFICATION AND PROCEDURE     Initials   Patient Name jq   Patient  jq   Procedure Verified by: kaylah     Prior to the start of the procedure and with procedural staff participation, I verbally confirmed the patient s identity using two indicators, relevant allergies, that the procedure was appropriate and matched the consent or emergent situation, and that the correct equipment/implants were available. Immediately prior to starting the procedure I conducted the Time Out with the procedural staff and re-confirmed the patient s name, procedure, and site/side. (The Joint Commission universal protocol was followed.)  Yes    Sedation (Moderate or Deep): None      Above assessments performed by:  Resident/Fellow         Enio Mann DO          The attending provider was present for the entire procedure documented below.    Cassidy Ramirez MD    INDICATION/S FOR  PROCEDURE/S:  Lilli Steven is a 70-year-old patient with dystonia affecting the  head, neck and shoulder girdle musculature secondary to a diagnosis of cervical dystonia with associated  pain, tremor, spasms, loss of joint motion, loss of volitional motor control and difficulty with activities of daily living.      Her baseline symptoms have been recalcitrant to oral medications and conservative therapy.  She is here today for an injection of Botox.       GOAL OF PROCEDURE:  The goal of this procedure is to increase active range of motion, improve volitional motor control and decrease pain  associated with dystonic movements, spasticity and tremor.    TOTAL DOSE ADMINISTERED:  Dose Administered:  150 units Botox    Diluent Used:  Preservative Free Normal Saline  Total Volume of Diluent Used:  1.5 ml  Lot # /C3 with Expiration Date:  3/2023  NDC #: Botox 100u ([address]-74)    Was there drug waste? Yes  Amount of drug waste (mL): 50 units Botox.  Reason for waste:  Single use vial  Multi-dose vial: No    Medication guide was offered to patient and was declined.    CONSENT:  The risks, benefits, and treatment options were discussed with Lilli Steven and she agreed to proceed.      Written consent was obtained by jhugo.     EQUIPMENT USED:  Needle-37mm stimulating/recording  EMG/NCS Machine     SKIN PREPARATION:  Skin preparation was performed using an alcohol wipe.     GUIDANCE DESCRIPTION:  Electro-myographic guidance was necessary throughout the procedure to accurately identify all areas of dystonic and spastic muscles while avoiding injection of non-dystonic muscles and non-spastic muscles, neighboring nerves and nearby vascular structures.      AREA/MUSCLE INJECTED:  150 UNITS BOTOX = TOTAL DOSE     1. HEAD & NECK MUSCLES: 150 units Botox = Total Dose, 1:1 Dilution                 Right Mid-Trapezius - 5 units of Botox at 1 site/s.   Left Mid-Trapezius - 10 units of Botox at 1 site/s.      Right  Splenius Capitus - 25 units of Botox at 2 site/s.   Left Splenius Capitus - 5 units of Botox at 1 site/s.      Right Levator Scapulae - 30 units of Botox at 1 site/s (shoulder muscles).   Left Levator Scapulae - 20 units of Botox at 1 site/s (shoulder muscles).      Right Inferior Obliquus Capitis - 20 units of Botox at 1 site/s.   Left Inferior Obliquus Capitis - 5 units of at 1 site/s.      Right Sternal head of the Sternocleidomastoid - 15 units of Botox at 1 site/s.               Left Sternal head of the Sternocleidomastoid - 15 units of Botox at 1 site/s.         RESPONSE TO PROCEDURE:  Lilli Steven tolerated the procedure well and there were no immediate complications.  She was allowed to recover for an appropriate period of time and was discharged home in stable condition.     FOLLOW UP:  Lilli Steven was asked to follow up by phone in 7-14 days with Lennie Sánchez PT, Care Coordinator or Mikki Rojas RN, Care Coordinator, to report her response to this series of injections.  Based on the patient's previous response to this therapy, Lilli Steven was rescheduled for the next series of injections in 12 weeks.     PLAN (Medication Changes, Therapy Orders, Work or Disability Issues, etc.): Will monitor and report response to today's injections.    Again, thank you for allowing me to participate in the care of your patient.  Sincerely,    Cassidy Ramirez MD

## 2020-08-13 NOTE — PROGRESS NOTES
BOTULINUM TOXIN PROCEDURE NOTE    No chief complaint on file.    Temp 99.2  F (37.3  C) (Oral)   LMP  (LMP Unknown)       Current Outpatient Medications:      cephALEXin (KEFLEX) 500 MG capsule, Take 1 capsule (500 mg) by mouth 3 times daily, Disp: 30 capsule, Rfl: 0     mometasone (ELOCON) 0.1 % external cream, Apply sparingly to affected area twice daily as needed.  Do not apply to face., Disp: 45 g, Rfl: 0     OnabotulinumtoxinA (BOTOX IJ), Inject 150 Units into the muscle once Lot # /C3 with Expiration Date: 03/2021, Disp: , Rfl:      RESTASIS MULTIDOSE 0.05 % ophthalmic emulsion, Place 1 drop into both eyes 2 times daily, Disp: , Rfl: 3     simvastatin (ZOCOR) 20 MG tablet, TAKE 1 TABLET BY MOUTH EVERYDAY AT BEDTIME, Disp: 90 tablet, Rfl: 3     traZODone (DESYREL) 50 MG tablet, TAKE 3 TABLETS BY MOUTH EVERY DAY AT BEDTIME., Disp: 270 tablet, Rfl: 3    Current Facility-Administered Medications:      botulinum toxin type A (BOTOX) 100 units injection 150 Units, 150 Units, Intramuscular, Once, Cassidy Ramirez MD     botulinum toxin type A (BOTOX) 100 units injection 150 Units, 150 Units, Intramuscular, Once, Cassidy Ramirez MD     Allergies   Allergen Reactions     Meloxicam      Diarrhea, vomiting     Primidone Other (See Comments)     Felt like motion sickness     Tramadol      Diarrhea, vomiting      Adhesive Tape Rash     Durabond only     Allergy Rash     Dermabond  Anesthesia IV set misc          PHYSICAL EXAM:    HEAD, NECK AND TRUNK PATTERN:   Head Tremor: Observed - no-no tremor present  Head & Neck Extension: Present - Slight  Sub-Occipital Extension: Present - Slight  Forward Head: Present - Slight   Head & Neck Lateral Bend: Left  Shoulder Elevation: Right    HPI:    Patient denies new medical diagnoses, illnesses, hospitalizations, emergency room visits, and injuries since the previous injection with botulinum neurotoxin.    We reviewed the recommended safety guidelines for   Botox from any vaccine injection, such as the seasonal flu vaccine, by a minimum of 10-14 days with Lilli Steven. She acknowledged understanding.    RESPONSE TO PREVIOUS TREATMENT:    Lilli Steven received 105 units of Botox on 20.    Problems following the previous series of neurotoxin injections included:  No problems reported    BENEFITS BY PATIENT REPORT:    Pain Improvement: Yes.  Percent Improvement: unable to quantify in % but reports significant improvement Duration of Benefit:  10-12 weeks.      Dystonia and tremor Improvement: Yes.  Percent Improvement: unable to quantify in %, but reports significant overall improvement       BOTULINUM NEUROTOXIN INJECTION PROCEDURES:    VERIFICATION OF PATIENT IDENTIFICATION AND PROCEDURE     Initials   Patient Name jq   Patient  jq   Procedure Verified by: kaylah     Prior to the start of the procedure and with procedural staff participation, I verbally confirmed the patient s identity using two indicators, relevant allergies, that the procedure was appropriate and matched the consent or emergent situation, and that the correct equipment/implants were available. Immediately prior to starting the procedure I conducted the Time Out with the procedural staff and re-confirmed the patient s name, procedure, and site/side. (The Joint Commission universal protocol was followed.)  Yes    Sedation (Moderate or Deep): None      Above assessments performed by:  Resident/Fellow         Enio Mann, DO          The attending provider was present for the entire procedure documented below.    Cassidy Ramirez MD    INDICATION/S FOR PROCEDURE/S:  Lilli Steven is a 70-year-old patient with dystonia affecting the  head, neck and shoulder girdle musculature secondary to a diagnosis of cervical dystonia with associated  pain, tremor, spasms, loss of joint motion, loss of volitional motor control and difficulty with activities of daily living.      Her  baseline symptoms have been recalcitrant to oral medications and conservative therapy.  She is here today for an injection of Botox.       GOAL OF PROCEDURE:  The goal of this procedure is to increase active range of motion, improve volitional motor control and decrease pain  associated with dystonic movements, spasticity and tremor.    TOTAL DOSE ADMINISTERED:  Dose Administered:  150 units Botox    Diluent Used:  Preservative Free Normal Saline  Total Volume of Diluent Used:  1.5 ml  Lot # /C3 with Expiration Date:  3/2023  NDC #: Botox 100u ([address]-12)    Was there drug waste? Yes  Amount of drug waste (mL): 50 units Botox.  Reason for waste:  Single use vial  Multi-dose vial: No    Medication guide was offered to patient and was declined.    CONSENT:  The risks, benefits, and treatment options were discussed with Lilli Steven and she agreed to proceed.      Written consent was obtained by kaylah.     EQUIPMENT USED:  Needle-37mm stimulating/recording  EMG/NCS Machine     SKIN PREPARATION:  Skin preparation was performed using an alcohol wipe.     GUIDANCE DESCRIPTION:  Electro-myographic guidance was necessary throughout the procedure to accurately identify all areas of dystonic and spastic muscles while avoiding injection of non-dystonic muscles and non-spastic muscles, neighboring nerves and nearby vascular structures.      AREA/MUSCLE INJECTED:  150 UNITS BOTOX = TOTAL DOSE     1. HEAD & NECK MUSCLES: 150 units Botox = Total Dose, 1:1 Dilution                 Right Mid-Trapezius - 5 units of Botox at 1 site/s.   Left Mid-Trapezius - 10 units of Botox at 1 site/s.      Right Splenius Capitus - 25 units of Botox at 2 site/s.   Left Splenius Capitus - 5 units of Botox at 1 site/s.      Right Levator Scapulae - 30 units of Botox at 1 site/s (shoulder muscles).   Left Levator Scapulae - 20 units of Botox at 1 site/s (shoulder muscles).      Right Inferior Obliquus Capitis - 20 units of Botox at 1 site/s.    Left Inferior Obliquus Capitis - 5 units of at 1 site/s.      Right Sternal head of the Sternocleidomastoid - 15 units of Botox at 1 site/s.               Left Sternal head of the Sternocleidomastoid - 15 units of Botox at 1 site/s.         RESPONSE TO PROCEDURE:  Lilli Steven tolerated the procedure well and there were no immediate complications.  She was allowed to recover for an appropriate period of time and was discharged home in stable condition.     FOLLOW UP:  Lilli Steven was asked to follow up by phone in 7-14 days with Lennie Sánchez PT, Care Coordinator or Mikki Rojas RN, Care Coordinator, to report her response to this series of injections.  Based on the patient's previous response to this therapy, Lilli Steven was rescheduled for the next series of injections in 12 weeks.     PLAN (Medication Changes, Therapy Orders, Work or Disability Issues, etc.): Will monitor and report response to today's injections.

## 2020-09-23 ENCOUNTER — OFFICE VISIT (OUTPATIENT)
Dept: OPHTHALMOLOGY | Facility: CLINIC | Age: 70
End: 2020-09-23
Attending: OPHTHALMOLOGY
Payer: COMMERCIAL

## 2020-09-23 DIAGNOSIS — H52.201 HYPEROPIC ASTIGMATISM, RIGHT: ICD-10-CM

## 2020-09-23 DIAGNOSIS — H52.202 MYOPIC ASTIGMATISM, LEFT: ICD-10-CM

## 2020-09-23 DIAGNOSIS — H01.02A SQUAMOUS BLEPHARITIS OF UPPER AND LOWER EYELIDS OF BOTH EYES: Primary | ICD-10-CM

## 2020-09-23 DIAGNOSIS — H52.12 MYOPIC ASTIGMATISM, LEFT: ICD-10-CM

## 2020-09-23 DIAGNOSIS — H52.4 PRESBYOPIA: ICD-10-CM

## 2020-09-23 DIAGNOSIS — H25.13 NUCLEAR SCLEROTIC CATARACT OF BOTH EYES: ICD-10-CM

## 2020-09-23 DIAGNOSIS — H01.02B SQUAMOUS BLEPHARITIS OF UPPER AND LOWER EYELIDS OF BOTH EYES: Primary | ICD-10-CM

## 2020-09-23 DIAGNOSIS — H04.123 DRY EYES, BILATERAL: ICD-10-CM

## 2020-09-23 PROCEDURE — 92015 DETERMINE REFRACTIVE STATE: CPT | Mod: ZF

## 2020-09-23 PROCEDURE — G0463 HOSPITAL OUTPT CLINIC VISIT: HCPCS | Mod: ZF

## 2020-09-23 RX ORDER — DOXYCYCLINE 100 MG/1
100 CAPSULE ORAL 2 TIMES DAILY
Qty: 90 CAPSULE | Refills: 0 | Status: SHIPPED | OUTPATIENT
Start: 2020-09-23 | End: 2020-11-01

## 2020-09-23 RX ORDER — TRIAMCINOLONE ACETONIDE 1 MG/G
CREAM TOPICAL
COMMUNITY
Start: 2020-02-17 | End: 2022-01-13

## 2020-09-23 ASSESSMENT — REFRACTION_WEARINGRX
OS_SPHERE: -1.50
OD_SPHERE: +1.50
OS_CYLINDER: +0.25
SPECS_TYPE: PAL
OS_AXIS: 180
OS_ADD: +2.75
OD_CYLINDER: SPHERE
OD_ADD: +2.75

## 2020-09-23 ASSESSMENT — TONOMETRY
IOP_METHOD: TONOPEN
OS_IOP_MMHG: 15
OD_IOP_MMHG: 13

## 2020-09-23 ASSESSMENT — VISUAL ACUITY
OD_SC: 20/40
CORRECTION_TYPE: GLASSES
OS_CC: 20/25 SLOW
OS_CC+: +3
OS_PH_SC: 20/25
OD_CC: 20/25
METHOD: SNELLEN - LINEAR
OD_PH_SC+: -2
OS_SC+: -1
OD_CC+: -1
METHOD: SNELLEN - LINEAR
OD_PH_SC: 20/25
OD_SC+: +2
OS_SC: 20/60

## 2020-09-23 ASSESSMENT — EXTERNAL EXAM - LEFT EYE: OS_EXAM: NORMAL

## 2020-09-23 ASSESSMENT — REFRACTION_MANIFEST
OD_CYLINDER: SPHERE
OD_ADD: +2.50
OS_AXIS: 165
OS_ADD: +2.50
OS_CYLINDER: +0.75
OD_SPHERE: +2.00
OS_SPHERE: -1.75

## 2020-09-23 ASSESSMENT — CONF VISUAL FIELD
OD_NORMAL: 1
METHOD: COUNTING FINGERS
OS_NORMAL: 1

## 2020-09-23 ASSESSMENT — CUP TO DISC RATIO
OD_RATIO: 0.3
OS_RATIO: 0.3

## 2020-09-23 ASSESSMENT — EXTERNAL EXAM - RIGHT EYE: OD_EXAM: NORMAL

## 2020-09-23 ASSESSMENT — SLIT LAMP EXAM - LIDS
COMMENTS: MGD, MILD BLEPHARITIS
COMMENTS: MGD, MILD BLEPHARITIS

## 2020-09-23 NOTE — NURSING NOTE
Chief Complaint(s) and History of Present Illness(es)     COMPREHENSIVE EYE EXAM     In both eyes.  Associated symptoms include tearing, itching and burning.  Negative for dryness and eye pain.  Treatments tried include eye drops and artificial tears.  Pain was noted as 0/10.              Comments     Complete eye exam. Pt is also interested in seeing if she might have dementia by looking at the eyes (pt read this in an article written by the [facility] [place]). Pt has a history of blepharitis x 2 years and would like for that to be evaluated as well. Pt also gets allergies, so BE do get really itchy and they burn. Pt c/o the vision feels like it is always covered in a film and has to constantly blink to help clear the film x the last year. Pt states all symptoms have gotten worse over the last year.     Ocular meds:  Systane BID BE  Restasis (pt stopped because it was causing the eyes to be really red)    Kerrie Meza, BETH 1:25 PM September 23, 2020

## 2020-09-23 NOTE — PROGRESS NOTES
HPI:  Lilli Steven is a 70 year old female here for full eye exam and evaluation of blepharitis. She was diagnosed with blepharitis about 2 years ago. She has tried using warm compresses and artificial tears which help some, but have not resolved her symptoms of bilateral irritation, filmy vision, and blurring. Her symptoms are exacerbated by eye allergies. She is currently using Systane BID in both eyes. She stopped using Restasis because it caused her eyes to be red.     Assessment & Plan     (H01.02A,  H01.02B) Squamous blepharitis of upper and lower eyelids of both eyes  (primary encounter diagnosis)  (H04.123) Dry eyes, bilateral  Comment: Persistent symptoms. Intolerant to Restasis (redness)  Plan: Continue ATs 2-4 x daily, continue warm compresses BID.  Start fish oil  Start doxycycline 100mg po daily for 3 month course    (H25.13) Nuclear sclerotic cataract of both eyes  Comment: Mild, not visually significant  Plan: Observe    (H52.201) Hyperopic astigmatism, right  (H52.202,  H52.12) Myopic astigmatism, left  (H52.4) Presbyopia  Comment: Good vision with refraction  Plan: Given updated glasses Rx.      -----------------------------------------------------------------------------------    Patient disposition:   Return in about 4 months (around 1/23/2021) for ocular surface check.    Teaching statement:  Complete documentation of historical and exam elements from today's encounter can be found in the full encounter summary report (not reduplicated in this progress note). I personally obtained the chief complaint(s) and history of present illness.  I confirmed and edited as necessary the review of systems, past medical/surgical history, family history, social history, and examination findings as documented by others; and I examined the patient myself. I personally reviewed the relevant tests, images, and reports as documented above.     I formulated and edited as necessary the assessment and plan and  discussed the findings and management plan with the patient and family.      Rose Ford MD  Comprehensive Ophthalmology & Ocular Pathology  Department of Ophthalmology and Visual Neurosciences  [email or web address]  Pager [phone]

## 2020-11-01 DIAGNOSIS — H01.02B SQUAMOUS BLEPHARITIS OF UPPER AND LOWER EYELIDS OF BOTH EYES: ICD-10-CM

## 2020-11-01 DIAGNOSIS — H01.02A SQUAMOUS BLEPHARITIS OF UPPER AND LOWER EYELIDS OF BOTH EYES: ICD-10-CM

## 2020-11-01 RX ORDER — DOXYCYCLINE 100 MG/1
100 CAPSULE ORAL 2 TIMES DAILY
Qty: 60 CAPSULE | Refills: 0 | Status: SHIPPED | OUTPATIENT
Start: 2020-11-01 | End: 2021-06-08

## 2020-11-01 NOTE — TELEPHONE ENCOUNTER
Last Clinic Visit: 9/23/20, [place] 1/25/21  Last clinic note: Start doxycycline 100mg po daily for 3 month course

## 2020-11-02 ENCOUNTER — TELEPHONE (OUTPATIENT)
Dept: OPHTHALMOLOGY | Facility: CLINIC | Age: 70
End: 2020-11-02

## 2020-11-02 NOTE — TELEPHONE ENCOUNTER
Health Call Center    Phone Message    May a detailed message be left on voicemail: yes     Reason for Call: Medication Question or concern regarding medication   Prescription Clarification  Name of Medication: doxycycline hyclate (VIBRAMYCIN) 100 MG capsule   Prescribing Provider: Lilli Cameron RN   Pharmacy: [facility]Y #[id number] - [place], [place] - [address]   What on the order needs clarification? Pt called stating that she had thought that the Rx listed above was not to be refilled at all when she last saw Dr. Ford. Pt stated that her pharmacy got a call yesterday to get this Rx refilled and she is confused as to why. I tried calling the back line for some clarification with no answer. Please give the Pt a call back at [phone]. Thank you!      Action Taken: Message routed to:  [facility] ([facility] EYE    Travel Screening: Not Applicable

## 2020-11-02 NOTE — TELEPHONE ENCOUNTER
I left message that patient should continue on medication for three months from the time of start on 09/23/2020

## 2020-11-05 ENCOUNTER — OFFICE VISIT (OUTPATIENT)
Dept: PHYSICAL MEDICINE AND REHAB | Facility: CLINIC | Age: 70
End: 2020-11-05
Payer: COMMERCIAL

## 2020-11-05 VITALS — HEART RATE: 95 BPM | SYSTOLIC BLOOD PRESSURE: 136 MMHG | OXYGEN SATURATION: 96 % | DIASTOLIC BLOOD PRESSURE: 83 MMHG

## 2020-11-05 DIAGNOSIS — G24.1 GENETIC TORSION DYSTONIA: ICD-10-CM

## 2020-11-05 DIAGNOSIS — G24.3 SPASMODIC TORTICOLLIS: Primary | ICD-10-CM

## 2020-11-05 DIAGNOSIS — G24.1 DYSTONIA, TORSION, FRAGMENTS OF: ICD-10-CM

## 2020-11-05 PROCEDURE — 95874 GUIDE NERV DESTR NEEDLE EMG: CPT | Mod: GC | Performed by: PHYSICAL MEDICINE & REHABILITATION

## 2020-11-05 PROCEDURE — 64616 CHEMODENERV MUSC NECK DYSTON: CPT | Mod: GC | Performed by: PHYSICAL MEDICINE & REHABILITATION

## 2020-11-05 PROCEDURE — 64643 CHEMODENERV 1 EXTREM 1-4 EA: CPT | Mod: GC | Performed by: PHYSICAL MEDICINE & REHABILITATION

## 2020-11-05 PROCEDURE — 64642 CHEMODENERV 1 EXTREMITY 1-4: CPT | Mod: GC | Performed by: PHYSICAL MEDICINE & REHABILITATION

## 2020-11-05 NOTE — LETTER
11/5/2020       RE: Lilli Steven  [address]  [facility] [address]     Dear Colleague,    Thank you for referring your patient, Lilli Steven, to the [facility] PHYSICAL [facility]IC[facility] REHABILITATION CLINIC [place] at [facility]. Please see a copy of my visit note below.    BOTULINUM TOXIN PROCEDURE NOTE    No chief complaint on file.      LMP  (LMP Unknown)       Current Outpatient Medications:      doxycycline hyclate (VIBRAMYCIN) 100 MG capsule, Take 1 capsule (100 mg) by mouth 2 times daily, Disp: 60 capsule, Rfl: 0     mometasone (ELOCON) 0.1 % external cream, Apply sparingly to affected area twice daily as needed.  Do not apply to face., Disp: 45 g, Rfl: 0     OnabotulinumtoxinA (BOTOX IJ), Inject 150 Units into the muscle once Lot # /C3 with Expiration Date: 03/2021, Disp: , Rfl:      polyethylene glycol-propylene glycol (SYSTANE ULTRA) 0.4-0.3 % SOLN ophthalmic solution, Place 1 drop into both eyes as needed, Disp: , Rfl:      RESTASIS MULTIDOSE 0.05 % ophthalmic emulsion, Place 1 drop into both eyes 2 times daily, Disp: , Rfl: 3     simvastatin (ZOCOR) 20 MG tablet, TAKE 1 TABLET BY MOUTH EVERYDAY AT BEDTIME, Disp: 90 tablet, Rfl: 3     traZODone (DESYREL) 50 MG tablet, TAKE 3 TABLETS BY MOUTH EVERY DAY AT BEDTIME., Disp: 270 tablet, Rfl: 3     triamcinolone (KENALOG) 0.1 % external cream, APPLY TOPICALLY, Disp: , Rfl:     Current Facility-Administered Medications:      botulinum toxin type A (BOTOX) 100 units injection 150 Units, 150 Units, Intramuscular, Once, StandCassidy cuenca MD     botulinum toxin type A (BOTOX) 100 units injection 150 Units, 150 Units, Intramuscular, Once, Cassidy Ramirez MD     Allergies   Allergen Reactions     Meloxicam      Diarrhea, vomiting     Primidone Other (See Comments)     Felt like motion sickness     Tramadol      Diarrhea, vomiting      Adhesive Tape Rash     Durabond only      Allergy Rash     Dermabond  Anesthesia IV set misc        PHYSICAL EXAM:  Head Tremor: Observed - no-no tremor present  Head & Neck Extension: Present - Slight  Sub-Occipital Extension: Present - Slight  Forward Head: Present - Slight   Head & Neck Lateral Bend: Left  Shoulder Elevation: Right     HPI:  Patient reports the following new medical problems since last visit: none     Patient denies new medical diagnoses, illnesses, hospitalizations, emergency room visits, and injuries since the previous injection with botulinum neurotoxin. none    We reviewed the recommended safety guidelines for  Botox from any vaccine injection, such as the seasonal flu vaccine, by a minimum of 10-14 days with Lilli Steven. She acknowledged understanding.    RESPONSE TO PREVIOUS TREATMENT:    Lilli Steven received 150 units of Botox on 20.    Problems following the previous series of neurotoxin injections included:  No problems reported    BENEFITS BY PATIENT REPORT:    Pain Improvement: Yes.  Percent Improvement: at least 50 %    Duration of Benefit:  10 weeks.    Dystonia Improvement: Yes.  Percent Improvement: at least 50 %    Duration of Benefit:  10 weeks.    BOTULINUM NEUROTOXIN INJECTION PROCEDURES:    VERIFICATION OF PATIENT IDENTIFICATION AND PROCEDURE     Initials   Patient Name fi   Patient  fi   Procedure Verified by: anisa     Prior to the start of the procedure and with procedural staff participation, I verbally confirmed the patient s identity using two indicators, relevant allergies, that the procedure was appropriate and matched the consent or emergent situation, and that the correct equipment/implants were available. Immediately prior to starting the procedure I conducted the Time Out with the procedural staff and re-confirmed the patient s name, procedure, and site/side. (The Joint Commission universal protocol was followed.)  Yes    Sedation (Moderate or Deep): None    Above assessments  performed by:  Resident/Fellow       Enio Mann DO        The attending provider was present for the entire procedure documented below.    Attending   Jory Bond MD, [facility]   Department of Rehabilitation     INDICATION/S FOR PROCEDURE/S:  Lilli Steven is a 70-year-old patient with dystonia affecting the  head, neck and shoulder girdle musculature secondary to a diagnosis of cervical dystonia with associated  pain, tremor, spasms, loss of joint motion, loss of volitional motor control and difficulty with activities of daily living.      Her baseline symptoms have been recalcitrant to oral medications and conservative therapy.  She is here today for an injection of Botox.       GOAL OF PROCEDURE:  The goal of this procedure is to increase active range of motion, improve volitional motor control and decrease pain  associated with dystonic movements, spasticity and tremor.    TOTAL DOSE ADMINISTERED:  Total dose 200 units   Dose Administered:  150 units Botox    Unavoidable waste 50 units   Diluent Used:  Preservative Free Normal Saline  Total Volume of Diluent Used:  4 ml  Lot #  [id number] with Expiration Date:  8/23  NDC #: Botox 100u ([address]-09)    Was there drug waste? Yes  Amount of drug waste (mL): 50 units Botox.  Reason for waste:  Single use vial  Multi-dose vial: No  Enio Mann DO  11/5/2020    Jory Bond MD, [facility]   Department of Rehabilitation     Medication guide was offered to patient and was declined.    CONSENT:  The risks, benefits, and treatment options were discussed with Lilli Steven and she agreed to proceed.      Written consent was obtained by .     EQUIPMENT USED:  Needle-37mm stimulating/recording  EMG/NCS Machine     SKIN PREPARATION:  Skin preparation was performed using an alcohol wipe.     GUIDANCE DESCRIPTION:  Electro-myographic guidance was necessary throughout the procedure to accurately identify all areas of dystonic and spastic muscles while avoiding  injection of non-dystonic muscles and non-spastic muscles, neighboring nerves and nearby vascular structures.      AREA/MUSCLE INJECTED:  150 UNITS BOTOX = TOTAL DOSE     1. HEAD & NECK MUSCLES: 150 units Botox = Total Dose, 2:1 Dilution                 Right Mid-Trapezius - 5 units of Botox at 1 site/s.   Left Mid-Trapezius - 10 units of Botox at 1 site/s.      Right Splenius Capitus - 25 units of Botox at 2 site/s.   Left Splenius Capitus - 5 units of Botox at 1 site/s.      Right Levator Scapulae - 30 units of Botox at 1 site/s (shoulder muscles).   Left Levator Scapulae - 20 units of Botox at 1 site/s (shoulder muscles).      Right Inferior Obliquus Capitis - 20 units of Botox at 1 site/s.   Left Inferior Obliquus Capitis - 5 units of at 1 site/s.      Right Sternal head of the Sternocleidomastoid - 15 units of Botox at 1 site/s.               Left Sternal head of the Sternocleidomastoid - 15 units of Botox at 1 site/s.       RESPONSE TO PROCEDURE:  Lilli Steven tolerated the procedure well and there were no immediate complications.  She was allowed to recover for an appropriate period of time and was discharged home in stable condition.    FOLLOW UP:  Lilli Steven was asked to follow up by phone in 7-14 days with Lennie Sánchez, PT, Care Coordinator or Mikki Rojas RN, Care Coordinator, to report her response to this series of injections.  Based on the patient's previous response to this therapy, Lilli Steven was rescheduled for the next series of injections in 12 weeks.    PLAN (Medication Changes, Therapy Orders, Work or Disability Issues, etc.): Will monitor and report response to today's injections.       Resident Note: Enio FLOYD DO, prepared the chart for today's encounter.  11/4/2020 6:25 PM    EVERETT Bond MD was present throughout the procedure.   Jory Bond MD, [facility]   Department of Rehabilitation

## 2020-11-05 NOTE — PROGRESS NOTES
BOTULINUM TOXIN PROCEDURE NOTE    No chief complaint on file.      LMP  (LMP Unknown)       Current Outpatient Medications:      doxycycline hyclate (VIBRAMYCIN) 100 MG capsule, Take 1 capsule (100 mg) by mouth 2 times daily, Disp: 60 capsule, Rfl: 0     mometasone (ELOCON) 0.1 % external cream, Apply sparingly to affected area twice daily as needed.  Do not apply to face., Disp: 45 g, Rfl: 0     OnabotulinumtoxinA (BOTOX IJ), Inject 150 Units into the muscle once Lot # /C3 with Expiration Date: 03/2021, Disp: , Rfl:      polyethylene glycol-propylene glycol (SYSTANE ULTRA) 0.4-0.3 % SOLN ophthalmic solution, Place 1 drop into both eyes as needed, Disp: , Rfl:      RESTASIS MULTIDOSE 0.05 % ophthalmic emulsion, Place 1 drop into both eyes 2 times daily, Disp: , Rfl: 3     simvastatin (ZOCOR) 20 MG tablet, TAKE 1 TABLET BY MOUTH EVERYDAY AT BEDTIME, Disp: 90 tablet, Rfl: 3     traZODone (DESYREL) 50 MG tablet, TAKE 3 TABLETS BY MOUTH EVERY DAY AT BEDTIME., Disp: 270 tablet, Rfl: 3     triamcinolone (KENALOG) 0.1 % external cream, APPLY TOPICALLY, Disp: , Rfl:     Current Facility-Administered Medications:      botulinum toxin type A (BOTOX) 100 units injection 150 Units, 150 Units, Intramuscular, Once, StandalCassidy MD     botulinum toxin type A (BOTOX) 100 units injection 150 Units, 150 Units, Intramuscular, Once, StandalCassidy MD     Allergies   Allergen Reactions     Meloxicam      Diarrhea, vomiting     Primidone Other (See Comments)     Felt like motion sickness     Tramadol      Diarrhea, vomiting      Adhesive Tape Rash     Durabond only     Allergy Rash     Dermabond  Anesthesia IV set misc        PHYSICAL EXAM:  Head Tremor: Observed - no-no tremor present  Head & Neck Extension: Present - Slight  Sub-Occipital Extension: Present - Slight  Forward Head: Present - Slight   Head & Neck Lateral Bend: Left  Shoulder Elevation: Right     HPI:  Patient reports the following new medical  problems since last visit: none     Patient denies new medical diagnoses, illnesses, hospitalizations, emergency room visits, and injuries since the previous injection with botulinum neurotoxin. none    We reviewed the recommended safety guidelines for  Botox from any vaccine injection, such as the seasonal flu vaccine, by a minimum of 10-14 days with Lilli Steven. She acknowledged understanding.    RESPONSE TO PREVIOUS TREATMENT:    Lilli Steven received 150 units of Botox on 20.    Problems following the previous series of neurotoxin injections included:  No problems reported    BENEFITS BY PATIENT REPORT:    Pain Improvement: Yes.  Percent Improvement: at least 50 %    Duration of Benefit:  10 weeks.    Dystonia Improvement: Yes.  Percent Improvement: at least 50 %    Duration of Benefit:  10 weeks.    BOTULINUM NEUROTOXIN INJECTION PROCEDURES:    VERIFICATION OF PATIENT IDENTIFICATION AND PROCEDURE     Initials   Patient Name fi   Patient  fi   Procedure Verified by: anisa     Prior to the start of the procedure and with procedural staff participation, I verbally confirmed the patient s identity using two indicators, relevant allergies, that the procedure was appropriate and matched the consent or emergent situation, and that the correct equipment/implants were available. Immediately prior to starting the procedure I conducted the Time Out with the procedural staff and re-confirmed the patient s name, procedure, and site/side. (The Joint Commission universal protocol was followed.)  Yes    Sedation (Moderate or Deep): None    Above assessments performed by:  Resident/Fellow       Enio Mann DO        The attending provider was present for the entire procedure documented below.    Attending   Jory Bond MD, A   Department of Rehabilitation     INDICATION/S FOR PROCEDURE/S:  Lilli Steven is a 70-year-old patient with dystonia affecting the  head, neck and shoulder  girdle musculature secondary to a diagnosis of cervical dystonia with associated  pain, tremor, spasms, loss of joint motion, loss of volitional motor control and difficulty with activities of daily living.      Her baseline symptoms have been recalcitrant to oral medications and conservative therapy.  She is here today for an injection of Botox.       GOAL OF PROCEDURE:  The goal of this procedure is to increase active range of motion, improve volitional motor control and decrease pain  associated with dystonic movements, spasticity and tremor.    TOTAL DOSE ADMINISTERED:  Total dose 200 units   Dose Administered:  150 units Botox    Unavoidable waste 50 units   Diluent Used:  Preservative Free Normal Saline  Total Volume of Diluent Used:  4 ml  Lot #  C3 with Expiration Date:  8/23  NDC #: Botox 100u ([address]-32)    Was there drug waste? Yes  Amount of drug waste (mL): 50 units Botox.  Reason for waste:  Single use vial  Multi-dose vial: No  Eniotiffani Mann, DO  11/5/2020    Jory Bond MD, [facility]   Department of Rehabilitation     Medication guide was offered to patient and was declined.    CONSENT:  The risks, benefits, and treatment options were discussed with Lilli Steven and she agreed to proceed.      Written consent was obtained by .     EQUIPMENT USED:  Needle-37mm stimulating/recording  EMG/NCS Machine     SKIN PREPARATION:  Skin preparation was performed using an alcohol wipe.     GUIDANCE DESCRIPTION:  Electro-myographic guidance was necessary throughout the procedure to accurately identify all areas of dystonic and spastic muscles while avoiding injection of non-dystonic muscles and non-spastic muscles, neighboring nerves and nearby vascular structures.      AREA/MUSCLE INJECTED:  150 UNITS BOTOX = TOTAL DOSE     1. HEAD & NECK MUSCLES: 150 units Botox = Total Dose, 2:1 Dilution                 Right Mid-Trapezius - 5 units of Botox at 1 site/s.   Left Mid-Trapezius - 10 units of Botox  at 1 site/s.      Right Splenius Capitus - 25 units of Botox at 2 site/s.   Left Splenius Capitus - 5 units of Botox at 1 site/s.      Right Levator Scapulae - 30 units of Botox at 1 site/s (shoulder muscles).   Left Levator Scapulae - 20 units of Botox at 1 site/s (shoulder muscles).      Right Inferior Obliquus Capitis - 20 units of Botox at 1 site/s.   Left Inferior Obliquus Capitis - 5 units of at 1 site/s.      Right Sternal head of the Sternocleidomastoid - 15 units of Botox at 1 site/s.               Left Sternal head of the Sternocleidomastoid - 15 units of Botox at 1 site/s.       RESPONSE TO PROCEDURE:  Lilli Steven tolerated the procedure well and there were no immediate complications.  She was allowed to recover for an appropriate period of time and was discharged home in stable condition.    FOLLOW UP:  Lilli Steven was asked to follow up by phone in 7-14 days with Lennie Sánchez PT, Care Coordinator or Mikki Rojas RN, Care Coordinator, to report her response to this series of injections.  Based on the patient's previous response to this therapy, Lilli Steven was rescheduled for the next series of injections in 12 weeks.    PLAN (Medication Changes, Therapy Orders, Work or Disability Issues, etc.): Will monitor and report response to today's injections.       Resident Note: Enio FLOYD DO, prepared the chart for today's encounter.  11/4/2020 6:25 PM    EVERETT Bond MD was present throughout the procedure.   Jory Bond MD, [facility]   Department of Rehabilitation

## 2020-12-11 ENCOUNTER — ANCILLARY PROCEDURE (OUTPATIENT)
Dept: MAMMOGRAPHY | Facility: CLINIC | Age: 70
End: 2020-12-11
Attending: FAMILY MEDICINE
Payer: COMMERCIAL

## 2020-12-11 DIAGNOSIS — Z12.31 VISIT FOR SCREENING MAMMOGRAM: ICD-10-CM

## 2020-12-11 PROCEDURE — 77067 SCR MAMMO BI INCL CAD: CPT

## 2020-12-11 PROCEDURE — 77063 BREAST TOMOSYNTHESIS BI: CPT

## 2021-01-15 ENCOUNTER — HEALTH MAINTENANCE LETTER (OUTPATIENT)
Age: 71
End: 2021-01-15

## 2021-01-25 ENCOUNTER — OFFICE VISIT (OUTPATIENT)
Dept: OPHTHALMOLOGY | Facility: CLINIC | Age: 71
End: 2021-01-25
Attending: OPHTHALMOLOGY
Payer: COMMERCIAL

## 2021-01-25 DIAGNOSIS — H52.12 MYOPIC ASTIGMATISM, LEFT: ICD-10-CM

## 2021-01-25 DIAGNOSIS — H04.123 DRY EYES, BILATERAL: ICD-10-CM

## 2021-01-25 DIAGNOSIS — H52.201 HYPEROPIC ASTIGMATISM, RIGHT: ICD-10-CM

## 2021-01-25 DIAGNOSIS — H52.202 MYOPIC ASTIGMATISM, LEFT: ICD-10-CM

## 2021-01-25 DIAGNOSIS — H01.02A SQUAMOUS BLEPHARITIS OF UPPER AND LOWER EYELIDS OF BOTH EYES: Primary | ICD-10-CM

## 2021-01-25 DIAGNOSIS — H25.13 NUCLEAR SCLEROTIC CATARACT OF BOTH EYES: ICD-10-CM

## 2021-01-25 DIAGNOSIS — H01.02B SQUAMOUS BLEPHARITIS OF UPPER AND LOWER EYELIDS OF BOTH EYES: Primary | ICD-10-CM

## 2021-01-25 DIAGNOSIS — H52.4 PRESBYOPIA: ICD-10-CM

## 2021-01-25 PROCEDURE — G0463 HOSPITAL OUTPT CLINIC VISIT: HCPCS

## 2021-01-25 PROCEDURE — 99213 OFFICE O/P EST LOW 20 MIN: CPT | Performed by: OPHTHALMOLOGY

## 2021-01-25 ASSESSMENT — VISUAL ACUITY
OD_SC: 20/30
OD_PH_SC+: -3
OD_SC+: +1
OD_PH_SC: 20/20
OS_SC+: -2
OS_SC: 20/30
OS_PH_SC: 20/25
OS_PH_SC+: +1
METHOD: SNELLEN - LINEAR

## 2021-01-25 ASSESSMENT — CUP TO DISC RATIO
OD_RATIO: 0.3
OS_RATIO: 0.3

## 2021-01-25 ASSESSMENT — TONOMETRY
OD_IOP_MMHG: 14
IOP_METHOD: TONOPEN
OS_IOP_MMHG: 15

## 2021-01-25 ASSESSMENT — EXTERNAL EXAM - LEFT EYE: OS_EXAM: NORMAL

## 2021-01-25 ASSESSMENT — EXTERNAL EXAM - RIGHT EYE: OD_EXAM: NORMAL

## 2021-01-25 ASSESSMENT — CONF VISUAL FIELD
METHOD: COUNTING FINGERS
OS_NORMAL: 1
OD_NORMAL: 1

## 2021-01-25 ASSESSMENT — SLIT LAMP EXAM - LIDS
COMMENTS: MGD, MILD BLEPHARITIS
COMMENTS: MGD, MILD BLEPHARITIS

## 2021-01-25 NOTE — PROGRESS NOTES
HPI:  Lilli Steven is a 70 year old female here for follow-up of blepharitis. She completed a 3 month course of doxycycline, and she has noted a nice improvement in her bilateral eye redness. She continues ATs and warm compresses which are keeping her eyes comfortable.      POH: Diagnosed with blepharitis around 2018. Her symptoms are exacerbated by eye allergies. (Has allergy to Restasis)    Assessment & Plan     (H01.02A,  H01.02B) Squamous blepharitis of upper and lower eyelids of both eyes  (primary encounter diagnosis)  (H04.123) Dry eyes, bilateral  Comment: Improved symptoms after course of doxycycline. Intolerant to Restasis (redness)  Plan: Continue ATs 2-4 x daily, continue warm compresses BID.  Continue fish oil caps  Ok to continue off doxycycline since she is doing well. If recurrent flares can always consider again.     (H25.13) Nuclear sclerotic cataract of both eyes  Comment: Mild, not visually significant  Plan: Observe    (H52.201) Hyperopic astigmatism, right  (H52.202,  H52.12) Myopic astigmatism, left  (H52.4) Presbyopia  Comment: Good vision with refraction  Plan: Given updated glasses Rx.      -----------------------------------------------------------------------------------    Patient disposition:   Return in about 1 year (around 1/25/2022) for dilated eye exam, or sooner as needed.    Teaching statement:  Complete documentation of historical and exam elements from today's encounter can be found in the full encounter summary report (not reduplicated in this progress note). I personally obtained the chief complaint(s) and history of present illness.  I confirmed and edited as necessary the review of systems, past medical/surgical history, family history, social history, and examination findings as documented by others; and I examined the patient myself. I personally reviewed the relevant tests, images, and reports as documented above.     I formulated and edited as necessary the  assessment and plan and discussed the findings and management plan with the patient and family.      Rose Ford MD  Comprehensive Ophthalmology & Ocular Pathology  Department of Ophthalmology and Visual Neurosciences  [email or web address]  Pager [phone]

## 2021-01-25 NOTE — NURSING NOTE
Chief Complaints and History of Present Illnesses   Patient presents with     Blepharitis Follow Up     Chief Complaint(s) and History of Present Illness(es)     Blepharitis Follow Up               Comments     Lilli is here to continue care for Squamous blepharitis of upper and lower eyelids of both eyes. She is here for an ocular surface check today. She says her eyes feel better than last visit. She says the redness in her eyes  Has gone.     Rocky Rain COT 1:33 PM January 25, 2021

## 2021-01-28 ENCOUNTER — OFFICE VISIT (OUTPATIENT)
Dept: PHYSICAL MEDICINE AND REHAB | Facility: CLINIC | Age: 71
End: 2021-01-28
Payer: COMMERCIAL

## 2021-01-28 VITALS
HEART RATE: 91 BPM | DIASTOLIC BLOOD PRESSURE: 72 MMHG | OXYGEN SATURATION: 97 % | SYSTOLIC BLOOD PRESSURE: 153 MMHG | TEMPERATURE: 98.7 F | RESPIRATION RATE: 16 BRPM

## 2021-01-28 DIAGNOSIS — G24.1 GENETIC TORSION DYSTONIA: Primary | ICD-10-CM

## 2021-01-28 DIAGNOSIS — G24.3 SPASMODIC TORTICOLLIS: ICD-10-CM

## 2021-01-28 PROCEDURE — 95874 GUIDE NERV DESTR NEEDLE EMG: CPT | Performed by: PHYSICAL MEDICINE & REHABILITATION

## 2021-01-28 PROCEDURE — 64643 CHEMODENERV 1 EXTREM 1-4 EA: CPT | Performed by: PHYSICAL MEDICINE & REHABILITATION

## 2021-01-28 PROCEDURE — 64642 CHEMODENERV 1 EXTREMITY 1-4: CPT | Performed by: PHYSICAL MEDICINE & REHABILITATION

## 2021-01-28 PROCEDURE — 64616 CHEMODENERV MUSC NECK DYSTON: CPT | Mod: 50 | Performed by: PHYSICAL MEDICINE & REHABILITATION

## 2021-01-28 ASSESSMENT — PAIN SCALES - GENERAL: PAINLEVEL: MODERATE PAIN (5)

## 2021-01-28 NOTE — LETTER
1/28/2021       RE: Lilli Steven  [address]  [facility] [address]     Dear Colleague,    Thank you for referring your patient, Lilli Steven, to the [facility] PHYSICAL MEDICINE AND REHABILITATION CLINIC [place] at [facility]. Please see a copy of my visit note below.    BOTULINUM TOXIN PROCEDURE NOTE    Chief Complaint   Patient presents with     Botox     [facility] Return Botox     BP (!) 153/72   Pulse 91   Temp 98.7  F (37.1  C)   Resp 16   LMP  (LMP Unknown)   SpO2 97%     Current Outpatient Medications:      doxycycline hyclate (VIBRAMYCIN) 100 MG capsule, Take 1 capsule (100 mg) by mouth 2 times daily, Disp: 60 capsule, Rfl: 0     mometasone (ELOCON) 0.1 % external cream, Apply sparingly to affected area twice daily as needed.  Do not apply to face., Disp: 45 g, Rfl: 0     OnabotulinumtoxinA (BOTOX IJ), Inject 150 Units into the muscle once Lot # /C3 with Expiration Date: 03/2021, Disp: , Rfl:      polyethylene glycol-propylene glycol (SYSTANE ULTRA) 0.4-0.3 % SOLN ophthalmic solution, Place 1 drop into both eyes as needed, Disp: , Rfl:      RESTASIS MULTIDOSE 0.05 % ophthalmic emulsion, Place 1 drop into both eyes 2 times daily, Disp: , Rfl: 3     simvastatin (ZOCOR) 20 MG tablet, TAKE 1 TABLET BY MOUTH EVERYDAY AT BEDTIME, Disp: 90 tablet, Rfl: 3     traZODone (DESYREL) 50 MG tablet, TAKE 3 TABLETS BY MOUTH EVERY DAY AT BEDTIME., Disp: 270 tablet, Rfl: 3     triamcinolone (KENALOG) 0.1 % external cream, APPLY TOPICALLY, Disp: , Rfl:     Current Facility-Administered Medications:      botulinum toxin type A (BOTOX) 100 units injection 150 Units, 150 Units, Intramuscular, Once, Cassidy Ramirez MD     botulinum toxin type A (BOTOX) 100 units injection 150 Units, 150 Units, Intramuscular, Once, StandalCassidy MD     Allergies   Allergen Reactions     Meloxicam      Diarrhea, vomiting     Primidone Other  (See Comments)     Felt like motion sickness     Tramadol      Diarrhea, vomiting      Adhesive Tape Rash     Durabond only     Allergy Rash     Dermabond  Anesthesia IV set misc          PHYSICAL EXAM:  Head Tremor: Observed - no-no tremor present  Head & Neck Extension: Present - Slight  Sub-Occipital Extension: Present - Slight  Forward Head: Present - Slight   Head & Neck Lateral Bend: Left  Shoulder Elevation: Right      HPI:    Patient denies new medical diagnoses, illnesses, hospitalizations, emergency room visits, and injuries since the previous injection with botulinum neurotoxin.    We reviewed the recommended safety guidelines for  Botox from any vaccine injection, such as the seasonal flu vaccine, by a minimum of 10-14 days with Lilli Steven. She acknowledged understanding.    RESPONSE TO PREVIOUS TREATMENT:    Lilli Steven received 150 units of Botox on 2020.    Problems following the previous series of neurotoxin injections included:  No problems reported    BENEFITS BY PATIENT REPORT:  Pain Improvement: Yes.  Percent Improvement: at least 50 %    Duration of Benefit:  10 weeks.     Dystonia Improvement: Yes.  Percent Improvement: at least 50 %    Duration of Benefit:  10 weeks.    BOTULINUM NEUROTOXIN INJECTION PROCEDURES:    VERIFICATION OF PATIENT IDENTIFICATION AND PROCEDURE     Initials   Patient Name lmd   Patient  lmd   Procedure Verified by: lmd     Prior to the start of the procedure and with procedural staff participation, I verbally confirmed the patient s identity using two indicators, relevant allergies, that the procedure was appropriate and matched the consent or emergent situation, and that the correct equipment/implants were available. Immediately prior to starting the procedure I conducted the Time Out with the procedural staff and re-confirmed the patient s name, procedure, and site/side. (The Joint Commission universal protocol was followed.)   Yes    Sedation (Moderate or Deep): None    Above assessments performed by:  Mikki Sotelo RN Care Coordinator    Cassidy Ramirez MD    INDICATION/S FOR PROCEDURE/S:  Lilli Steven is a 70-year-old patient with dystonia affecting the  head, neck and shoulder girdle musculature secondary to a diagnosis of cervical dystonia with associated  pain, tremor, spasms, loss of joint motion, loss of volitional motor control and difficulty with activities of daily living.      Her baseline symptoms have been recalcitrant to oral medications and conservative therapy.  She is here today for an injection of Botox.       GOAL OF PROCEDURE:  The goal of this procedure is to increase active range of motion, improve volitional motor control and decrease pain  associated with dystonic movements, spasticity and tremor.     TOTAL DOSE: 200 UNITS BOTOX   Dose Administered:  150 units Botox    Diluent Used:  Preservative Free Normal Saline  Total Volume of Diluent Used:  1.5 ml  Lot # /C3 with Expiration Date:  10/2023  NDC #: Botox 100u ([address]-11)    Was there drug waste? Yes  Amount of drug waste (mL): 50 units Botox.  Reason for waste:  Single use vial  Multi-dose vial: No    Cassidy Ramirez MD  January 27, 2021      Medication guide was offered to patient and was declined.    CONSENT:  The risks, benefits, and treatment options were discussed with Lilli Steven and she agreed to proceed.      Written consent was obtained by lmd.     EQUIPMENT USED:  Needle-37mm stimulating/recording  EMG/NCS Machine     SKIN PREPARATION:  Skin preparation was performed using an alcohol wipe.     GUIDANCE DESCRIPTION:  Electro-myographic guidance was necessary throughout the procedure to accurately identify all areas of dystonic and spastic muscles while avoiding injection of non-dystonic muscles and non-spastic muscles, neighboring nerves and nearby vascular structures.     AREA/MUSCLE INJECTED:  150 UNITS BOTOX =  TOTAL DOSE     1. HEAD & NECK MUSCLES: 150 units Botox = Total Dose, 2:1 Dilution                 Right Mid-Trapezius - 5 units of Botox at 1 site/s.   Left Mid-Trapezius - 10 units of Botox at 1 site/s.      Right Splenius Capitus - 25 units of Botox at 2 site/s.   Left Splenius Capitus - 5 units of Botox at 1 site/s.      Right Levator Scapulae - 30 units of Botox at 1 site/s (shoulder muscles).   Left Levator Scapulae - 20 units of Botox at 1 site/s (shoulder muscles).      Right Inferior Obliquus Capitis - 20 units of Botox at 1 site/s.   Left Inferior Obliquus Capitis - 5 units of at 1 site/s.      Right Sternal head of the Sternocleidomastoid - 15 units of Botox at 1 site/s.               Left Sternal head of the Sternocleidomastoid - 15 units of Botox at 1 site/s.       RESPONSE TO PROCEDURE:  Lilli Steven tolerated the procedure well and there were no immediate complications.  She was allowed to recover for an appropriate period of time and was discharged home in stable condition.    FOLLOW UP:  Lilli Steven was asked to follow up by phone in 7-14 days with Merry Bolaños RN, Care Coordinator, to report her response to this series of injections.  Based on the patient's previous response to this therapy, Lilli Steven was rescheduled for the next series of injections in 12 weeks.    PLAN (Medication Changes, Therapy Orders, Work or Disability Issues, etc.): Will monitor and report response to today's injections.      Again, thank you for allowing me to participate in the care of your patient.      Sincerely,    Cassidy Ramirez MD

## 2021-01-28 NOTE — PROGRESS NOTES
BOTULINUM TOXIN PROCEDURE NOTE    Chief Complaint   Patient presents with     Botox     [facility] Return Botox     BP (!) 153/72   Pulse 91   Temp 98.7  F (37.1  C)   Resp 16   LMP  (LMP Unknown)   SpO2 97%     Current Outpatient Medications:      doxycycline hyclate (VIBRAMYCIN) 100 MG capsule, Take 1 capsule (100 mg) by mouth 2 times daily, Disp: 60 capsule, Rfl: 0     mometasone (ELOCON) 0.1 % external cream, Apply sparingly to affected area twice daily as needed.  Do not apply to face., Disp: 45 g, Rfl: 0     OnabotulinumtoxinA (BOTOX IJ), Inject 150 Units into the muscle once Lot # /C3 with Expiration Date: 03/2021, Disp: , Rfl:      polyethylene glycol-propylene glycol (SYSTANE ULTRA) 0.4-0.3 % SOLN ophthalmic solution, Place 1 drop into both eyes as needed, Disp: , Rfl:      RESTASIS MULTIDOSE 0.05 % ophthalmic emulsion, Place 1 drop into both eyes 2 times daily, Disp: , Rfl: 3     simvastatin (ZOCOR) 20 MG tablet, TAKE 1 TABLET BY MOUTH EVERYDAY AT BEDTIME, Disp: 90 tablet, Rfl: 3     traZODone (DESYREL) 50 MG tablet, TAKE 3 TABLETS BY MOUTH EVERY DAY AT BEDTIME., Disp: 270 tablet, Rfl: 3     triamcinolone (KENALOG) 0.1 % external cream, APPLY TOPICALLY, Disp: , Rfl:     Current Facility-Administered Medications:      botulinum toxin type A (BOTOX) 100 units injection 150 Units, 150 Units, Intramuscular, Once, StandalCassidy MD     botulinum toxin type A (BOTOX) 100 units injection 150 Units, 150 Units, Intramuscular, Once, StandalCassidy MD     Allergies   Allergen Reactions     Meloxicam      Diarrhea, vomiting     Primidone Other (See Comments)     Felt like motion sickness     Tramadol      Diarrhea, vomiting      Adhesive Tape Rash     Durabond only     Allergy Rash     Dermabond  Anesthesia IV set misc          PHYSICAL EXAM:  Head Tremor: Observed - no-no tremor present  Head & Neck Extension: Present - Slight  Sub-Occipital Extension: Present - Slight  Forward Head: Present -  Slight   Head & Neck Lateral Bend: Left  Shoulder Elevation: Right      HPI:    Patient denies new medical diagnoses, illnesses, hospitalizations, emergency room visits, and injuries since the previous injection with botulinum neurotoxin.    We reviewed the recommended safety guidelines for  Botox from any vaccine injection, such as the seasonal flu vaccine, by a minimum of 10-14 days with Lilli Steven. She acknowledged understanding.    RESPONSE TO PREVIOUS TREATMENT:    Lilli Steven received 150 units of Botox on 2020.    Problems following the previous series of neurotoxin injections included:  No problems reported    BENEFITS BY PATIENT REPORT:  Pain Improvement: Yes.  Percent Improvement: at least 50 %    Duration of Benefit:  10 weeks.     Dystonia Improvement: Yes.  Percent Improvement: at least 50 %    Duration of Benefit:  10 weeks.    BOTULINUM NEUROTOXIN INJECTION PROCEDURES:    VERIFICATION OF PATIENT IDENTIFICATION AND PROCEDURE     Initials   Patient Name lmd   Patient  lmd   Procedure Verified by: lmd     Prior to the start of the procedure and with procedural staff participation, I verbally confirmed the patient s identity using two indicators, relevant allergies, that the procedure was appropriate and matched the consent or emergent situation, and that the correct equipment/implants were available. Immediately prior to starting the procedure I conducted the Time Out with the procedural staff and re-confirmed the patient s name, procedure, and site/side. (The Joint Commission universal protocol was followed.)  Yes    Sedation (Moderate or Deep): None    Above assessments performed by:  Mikki Sotelo RN Care Coordinator    Cassidy Ramirez MD    INDICATION/S FOR PROCEDURE/S:  Lilli Steven is a 70-year-old patient with dystonia affecting the  head, neck and shoulder girdle musculature secondary to a diagnosis of cervical dystonia with associated  pain, tremor,  spasms, loss of joint motion, loss of volitional motor control and difficulty with activities of daily living.      Her baseline symptoms have been recalcitrant to oral medications and conservative therapy.  She is here today for an injection of Botox.       GOAL OF PROCEDURE:  The goal of this procedure is to increase active range of motion, improve volitional motor control and decrease pain  associated with dystonic movements, spasticity and tremor.     TOTAL DOSE: 200 UNITS BOTOX   Dose Administered:  150 units Botox    Diluent Used:  Preservative Free Normal Saline  Total Volume of Diluent Used:  1.5 ml  Lot # /C3 with Expiration Date:  10/2023  NDC #: Botox 100u ([address]-81)    Was there drug waste? Yes  Amount of drug waste (mL): 50 units Botox.  Reason for waste:  Single use vial  Multi-dose vial: No    Cassidy Ramirez MD  January 27, 2021      Medication guide was offered to patient and was declined.    CONSENT:  The risks, benefits, and treatment options were discussed with Lilli Steven and she agreed to proceed.      Written consent was obtained by lmd.     EQUIPMENT USED:  Needle-37mm stimulating/recording  EMG/NCS Machine     SKIN PREPARATION:  Skin preparation was performed using an alcohol wipe.     GUIDANCE DESCRIPTION:  Electro-myographic guidance was necessary throughout the procedure to accurately identify all areas of dystonic and spastic muscles while avoiding injection of non-dystonic muscles and non-spastic muscles, neighboring nerves and nearby vascular structures.     AREA/MUSCLE INJECTED:  150 UNITS BOTOX = TOTAL DOSE     1. HEAD & NECK MUSCLES: 150 units Botox = Total Dose, 2:1 Dilution                 Right Mid-Trapezius - 5 units of Botox at 1 site/s.   Left Mid-Trapezius - 10 units of Botox at 1 site/s.      Right Splenius Capitus - 25 units of Botox at 2 site/s.   Left Splenius Capitus - 5 units of Botox at 1 site/s.      Right Levator Scapulae - 30 units of Botox at 1  site/s (shoulder muscles).   Left Levator Scapulae - 20 units of Botox at 1 site/s (shoulder muscles).      Right Inferior Obliquus Capitis - 20 units of Botox at 1 site/s.   Left Inferior Obliquus Capitis - 5 units of at 1 site/s.      Right Sternal head of the Sternocleidomastoid - 15 units of Botox at 1 site/s.               Left Sternal head of the Sternocleidomastoid - 15 units of Botox at 1 site/s.       RESPONSE TO PROCEDURE:  Lilli Steven tolerated the procedure well and there were no immediate complications.  She was allowed to recover for an appropriate period of time and was discharged home in stable condition.    FOLLOW UP:  Lilli Steven was asked to follow up by phone in 7-14 days with Merry Bolaños RN, Care Coordinator, to report her response to this series of injections.  Based on the patient's previous response to this therapy, Lilli Steven was rescheduled for the next series of injections in 12 weeks.    PLAN (Medication Changes, Therapy Orders, Work or Disability Issues, etc.): Will monitor and report response to today's injections.

## 2021-04-14 ENCOUNTER — TELEPHONE (OUTPATIENT)
Dept: FAMILY MEDICINE | Facility: CLINIC | Age: 71
End: 2021-04-14

## 2021-04-14 NOTE — TELEPHONE ENCOUNTER
JF,   Patient has new grandchild she wants to visit  TDaP given March 2017  Is the pertussis part of vaccine also good for 10 years?   Please advise  Thanks,  Dasha RYAN RN    Call pt back at [phone] (home)   If no answer, detailed VM is ok

## 2021-04-22 ENCOUNTER — OFFICE VISIT (OUTPATIENT)
Dept: PHYSICAL MEDICINE AND REHAB | Facility: CLINIC | Age: 71
End: 2021-04-22
Payer: COMMERCIAL

## 2021-04-22 VITALS
OXYGEN SATURATION: 96 % | DIASTOLIC BLOOD PRESSURE: 84 MMHG | RESPIRATION RATE: 16 BRPM | HEART RATE: 88 BPM | TEMPERATURE: 98.5 F | SYSTOLIC BLOOD PRESSURE: 141 MMHG

## 2021-04-22 DIAGNOSIS — G24.1 GENETIC TORSION DYSTONIA: Primary | ICD-10-CM

## 2021-04-22 DIAGNOSIS — G24.3 SPASMODIC TORTICOLLIS: ICD-10-CM

## 2021-04-22 PROCEDURE — 64616 CHEMODENERV MUSC NECK DYSTON: CPT | Mod: LT | Performed by: PHYSICAL MEDICINE & REHABILITATION

## 2021-04-22 PROCEDURE — 95874 GUIDE NERV DESTR NEEDLE EMG: CPT | Performed by: PHYSICAL MEDICINE & REHABILITATION

## 2021-04-22 PROCEDURE — 64643 CHEMODENERV 1 EXTREM 1-4 EA: CPT | Performed by: PHYSICAL MEDICINE & REHABILITATION

## 2021-04-22 PROCEDURE — 64642 CHEMODENERV 1 EXTREMITY 1-4: CPT | Performed by: PHYSICAL MEDICINE & REHABILITATION

## 2021-04-22 ASSESSMENT — PAIN SCALES - GENERAL: PAINLEVEL: MODERATE PAIN (5)

## 2021-04-22 NOTE — NURSING NOTE
Chief Complaint   Patient presents with     Dystonia     Botox     [facility] RETURN BOTOX- CERVICAL DYSTONIA       Ruddy Burton, EMT

## 2021-04-22 NOTE — LETTER
4/22/2021       RE: Lilli Steven  [address]  [facility] [address]     Dear Colleague,    Thank you for referring your patient, Lilli Steven, to the [facility] PHYSICAL MEDICINE AND REHABILITATION CLINIC [place] at [facility]. Please see a copy of my visit note below.    error    BOTULINUM TOXIN PROCEDURE NOTE    Chief Complaint   Patient presents with     Dystonia     Botox     UMP RETURN BOTOX- CERVICAL DYSTONIA     BP (!) 141/84   Pulse 88   Temp 98.5  F (36.9  C) (Oral)   Resp 16   LMP  (LMP Unknown)   SpO2 96%     Current Outpatient Medications:      doxycycline hyclate (VIBRAMYCIN) 100 MG capsule, Take 1 capsule (100 mg) by mouth 2 times daily, Disp: 60 capsule, Rfl: 0     mometasone (ELOCON) 0.1 % external cream, Apply sparingly to affected area twice daily as needed.  Do not apply to face., Disp: 45 g, Rfl: 0     OnabotulinumtoxinA (BOTOX IJ), Inject 150 Units into the muscle once Lot # /C3 with Expiration Date: 03/2021, Disp: , Rfl:      polyethylene glycol-propylene glycol (SYSTANE ULTRA) 0.4-0.3 % SOLN ophthalmic solution, Place 1 drop into both eyes as needed, Disp: , Rfl:      RESTASIS MULTIDOSE 0.05 % ophthalmic emulsion, Place 1 drop into both eyes 2 times daily, Disp: , Rfl: 3     simvastatin (ZOCOR) 20 MG tablet, TAKE 1 TABLET BY MOUTH EVERYDAY AT BEDTIME, Disp: 90 tablet, Rfl: 3     traZODone (DESYREL) 50 MG tablet, TAKE 3 TABLETS BY MOUTH EVERY DAY AT BEDTIME., Disp: 270 tablet, Rfl: 3     triamcinolone (KENALOG) 0.1 % external cream, APPLY TOPICALLY, Disp: , Rfl:     Current Facility-Administered Medications:      botulinum toxin type A (BOTOX) 100 units injection 150 Units, 150 Units, Intramuscular, Once, StandalCassidy MD     botulinum toxin type A (BOTOX) 100 units injection 150 Units, 150 Units, Intramuscular, Once, Standal, Cassidy New MD     Allergies   Allergen Reactions      "Meloxicam      Diarrhea, vomiting     Primidone Other (See Comments)     Felt like motion sickness     Tramadol      Diarrhea, vomiting      Adhesive Tape Rash     Durabond only     Allergy Rash     Dermabond  Anesthesia IV set misc          PHYSICAL EXAM:  Head Tremor: Observed - no-no tremor present  Head & Neck Extension: Present - Slight  Sub-Occipital Extension: Present - Slight  Forward Head: Present - Slight   Head & Neck Lateral Bend: Left  Shoulder Elevation: Right      HPI:    Patient denies new medical diagnoses, illnesses, hospitalizations, emergency room visits, and injuries since the previous injection with botulinum neurotoxin.     Patient reports her neck is \"always sore\", especially laterally. It is difficult to assess if this injection cycle was helpful. Overall, she knows Botox is beneficial because when she has missed injections in the past her symptoms increased significantly. Patient reports that she has a difficulty falling asleep due to symptoms. She also has noticed more recent onset of headaches after working at her computer for more than 4-5 hours during her 8 hour work day.     We reviewed the recommended safety guidelines for  Botox from any vaccine injection, such as the seasonal flu vaccine, by a minimum of 10-14 days with Lilli Steven. She acknowledged understanding.    RESPONSE TO PREVIOUS TREATMENT:    Lilli Steven received 150 units of Botox on 2021.    Problems following the previous series of neurotoxin injections included:  No problems reported    BENEFITS BY PATIENT REPORT:  Pain Improvement: Yes.  Percent Improvement: slightly more than 50 %    Duration of Benefit:  difficult to assess     Dystonia Improvement: Yes.  Percent Improvement: slightly more than 50 %    Duration of Benefit:  difficult to assess    BOTULINUM NEUROTOXIN INJECTION PROCEDURES:    VERIFICATION OF PATIENT IDENTIFICATION AND PROCEDURE     Initials   Patient Name [facility]   Patient  chantel "   Procedure Verified by: chantel     Prior to the start of the procedure and with procedural staff participation, I verbally confirmed the patient s identity using two indicators, relevant allergies, that the procedure was appropriate and matched the consent or emergent situation, and that the correct equipment/implants were available. Immediately prior to starting the procedure I conducted the Time Out with the procedural staff and re-confirmed the patient s name, procedure, and site/side. (The Joint Commission universal protocol was followed.)  Yes    Sedation (Moderate or Deep): None    Above assessments performed by:  Lennie Sánchez PT, Care Coordinator    Cassidy Ramirez MD     INDICATION/S FOR PROCEDURE/S:  Lilli Steven is a 71-year-old patient with dystonia affecting the  head, neck and shoulder girdle musculature secondary to a diagnosis of cervical dystonia with associated  pain, tremor, spasms, loss of joint motion, loss of volitional motor control and difficulty with activities of daily living.      Her baseline symptoms have been recalcitrant to oral medications and conservative therapy.  She is here today for an injection of Botox.       GOAL OF PROCEDURE:  The goal of this procedure is to increase active range of motion, improve volitional motor control and decrease pain  associated with dystonic movements, spasticity and tremor.     TOTAL DOSE: 200 UNITS BOTOX   Dose Administered:  150 units Botox    Diluent Used:  Preservative Free Normal Saline  Total Volume of Diluent Used:  1.5 ml  Lot # /C4 with Expiration Date:  7/2023  NDC #: Botox 100u ([address]-63)    Was there drug waste? Yes  Amount of drug waste (mL): 50 units Botox.  Reason for waste:  Single use vial  Multi-dose vial: No    Cassidy Ramirez MD  April 22, 2021      Medication guide was offered to patient and was declined.    CONSENT:  The risks, benefits, and treatment options were discussed with Lilli Steven  and she agreed to proceed.      Written consent was obtained by lmd.     EQUIPMENT USED:  Needle-37mm stimulating/recording  EMG/NCS Machine     SKIN PREPARATION:  Skin preparation was performed using an alcohol wipe.     GUIDANCE DESCRIPTION:  Electro-myographic guidance was necessary throughout the procedure to accurately identify all areas of dystonic and spastic muscles while avoiding injection of non-dystonic muscles and non-spastic muscles, neighboring nerves and nearby vascular structures.     AREA/MUSCLE INJECTED:  175 UNITS BOTOX = TOTAL DOSE     1. HEAD, NECK & SHOULDER GIRDLE MUSCLES: 175 units Botox = Total Dose, 2:1 Dilution                 Right Mid-Trapezius - 2.5 units of Botox at 1 site/s.   Left Mid-Trapezius - 2.5 units of Botox at 1 site/s.      Right Splenius Capitus - 25 units of Botox at 2 site/s.   Left Splenius Capitus - 20 units of Botox at 1 site/s.    Right Lateral Trapezius (low cervical) - 10 units of Botox at 2 site/s.   Left Lateral Trapezius (low cervical) - 5 units of Botox at 1 site/s.      Right Levator Scapulae - 25 units of Botox at 1 site/s.   Left Levator Scapulae - 20 units of Botox at 1 site/s .      Right Inferior Obliquus Capitis - 20 units of Botox at 1 site/s.   Left Inferior Obliquus Capitis - 5 units of at 1 site/s.      Right Sternocleidomastoid - 20 units of Botox at 1 site/s.               Left Sternocleidomastoid - 20 units of Botox at 1 site/s.       RESPONSE TO PROCEDURE:  Lilli Steven tolerated the procedure well and there were no immediate complications.  She was allowed to recover for an appropriate period of time and was discharged home in stable condition.    FOLLOW UP:  Lilli Steven was asked to follow up by phone in 7-14 days with Merry Bolaños RN, Care Coordinator, to report her response to this series of injections.  Based on the patient's previous response to this therapy, Lilli Steven was rescheduled for the next series  of injections in 12 weeks.    PLAN (Medication Changes, Therapy Orders, Work or Disability Issues, etc.): Increased dose today to 175 units in order to improve efficacy and prolong duration of benefit. Will monitor and report response to today's injections.      Again, thank you for allowing me to participate in the care of your patient.      Sincerely,    Cassidy Ramirez MD

## 2021-04-22 NOTE — PROGRESS NOTES
BOTULINUM TOXIN PROCEDURE NOTE    Chief Complaint   Patient presents with     Dystonia     Botox     [facility] RETURN BOTOX- CERVICAL DYSTONIA     BP (!) 141/84   Pulse 88   Temp 98.5  F (36.9  C) (Oral)   Resp 16   LMP  (LMP Unknown)   SpO2 96%     Current Outpatient Medications:      doxycycline hyclate (VIBRAMYCIN) 100 MG capsule, Take 1 capsule (100 mg) by mouth 2 times daily, Disp: 60 capsule, Rfl: 0     mometasone (ELOCON) 0.1 % external cream, Apply sparingly to affected area twice daily as needed.  Do not apply to face., Disp: 45 g, Rfl: 0     OnabotulinumtoxinA (BOTOX IJ), Inject 150 Units into the muscle once Lot # /C3 with Expiration Date: 03/2021, Disp: , Rfl:      polyethylene glycol-propylene glycol (SYSTANE ULTRA) 0.4-0.3 % SOLN ophthalmic solution, Place 1 drop into both eyes as needed, Disp: , Rfl:      RESTASIS MULTIDOSE 0.05 % ophthalmic emulsion, Place 1 drop into both eyes 2 times daily, Disp: , Rfl: 3     simvastatin (ZOCOR) 20 MG tablet, TAKE 1 TABLET BY MOUTH EVERYDAY AT BEDTIME, Disp: 90 tablet, Rfl: 3     traZODone (DESYREL) 50 MG tablet, TAKE 3 TABLETS BY MOUTH EVERY DAY AT BEDTIME., Disp: 270 tablet, Rfl: 3     triamcinolone (KENALOG) 0.1 % external cream, APPLY TOPICALLY, Disp: , Rfl:     Current Facility-Administered Medications:      botulinum toxin type A (BOTOX) 100 units injection 150 Units, 150 Units, Intramuscular, Once, Cassidy Ramirez MD     botulinum toxin type A (BOTOX) 100 units injection 150 Units, 150 Units, Intramuscular, Once, StandCassidy cuenca MD     Allergies   Allergen Reactions     Meloxicam      Diarrhea, vomiting     Primidone Other (See Comments)     Felt like motion sickness     Tramadol      Diarrhea, vomiting      Adhesive Tape Rash     Durabond only     Allergy Rash     Dermabond  Anesthesia IV set misc          PHYSICAL EXAM:  Head Tremor: Observed - no-no tremor present  Head & Neck Extension: Present - Slight  Sub-Occipital Extension:  "Present - Slight  Forward Head: Present - Slight   Head & Neck Lateral Bend: Left  Shoulder Elevation: Right      HPI:    Patient denies new medical diagnoses, illnesses, hospitalizations, emergency room visits, and injuries since the previous injection with botulinum neurotoxin.     Patient reports her neck is \"always sore\", especially laterally. It is difficult to assess if this injection cycle was helpful. Overall, she knows Botox is beneficial because when she has missed injections in the past her symptoms increased significantly. Patient reports that she has a difficulty falling asleep due to symptoms. She also has noticed more recent onset of headaches after working at her computer for more than 4-5 hours during her 8 hour work day.     We reviewed the recommended safety guidelines for  Botox from any vaccine injection, such as the seasonal flu vaccine, by a minimum of 10-14 days with Lilli Steven. She acknowledged understanding.    RESPONSE TO PREVIOUS TREATMENT:    Lilli Steven received 150 units of Botox on 2021.    Problems following the previous series of neurotoxin injections included:  No problems reported    BENEFITS BY PATIENT REPORT:  Pain Improvement: Yes.  Percent Improvement: slightly more than 50 %    Duration of Benefit:  difficult to assess     Dystonia Improvement: Yes.  Percent Improvement: slightly more than 50 %    Duration of Benefit:  difficult to assess    BOTULINUM NEUROTOXIN INJECTION PROCEDURES:    VERIFICATION OF PATIENT IDENTIFICATION AND PROCEDURE     Initials   Patient Name chantel   Patient  chantel   Procedure Verified by: chantel     Prior to the start of the procedure and with procedural staff participation, I verbally confirmed the patient s identity using two indicators, relevant allergies, that the procedure was appropriate and matched the consent or emergent situation, and that the correct equipment/implants were available. Immediately prior to starting the " procedure I conducted the Time Out with the procedural staff and re-confirmed the patient s name, procedure, and site/side. (The Joint Commission universal protocol was followed.)  Yes    Sedation (Moderate or Deep): None    Above assessments performed by:  Lennie Sánchez PT, Care Coordinator    Cassidy Ramirez MD     INDICATION/S FOR PROCEDURE/S:  Lilli Steven is a 71-year-old patient with dystonia affecting the  head, neck and shoulder girdle musculature secondary to a diagnosis of cervical dystonia with associated  pain, tremor, spasms, loss of joint motion, loss of volitional motor control and difficulty with activities of daily living.      Her baseline symptoms have been recalcitrant to oral medications and conservative therapy.  She is here today for an injection of Botox.       GOAL OF PROCEDURE:  The goal of this procedure is to increase active range of motion, improve volitional motor control and decrease pain  associated with dystonic movements, spasticity and tremor.     TOTAL DOSE: 200 UNITS BOTOX   Dose Administered:  150 units Botox    Diluent Used:  Preservative Free Normal Saline  Total Volume of Diluent Used:  1.5 ml  Lot # /C4 with Expiration Date:  7/2023  NDC #: Botox 100u ([address]-43)    Was there drug waste? Yes  Amount of drug waste (mL): 50 units Botox.  Reason for waste:  Single use vial  Multi-dose vial: No    Cassidy Ramirez MD  April 22, 2021      Medication guide was offered to patient and was declined.    CONSENT:  The risks, benefits, and treatment options were discussed with Lilli Steven and she agreed to proceed.      Written consent was obtained by lmd.     EQUIPMENT USED:  Needle-37mm stimulating/recording  EMG/NCS Machine     SKIN PREPARATION:  Skin preparation was performed using an alcohol wipe.     GUIDANCE DESCRIPTION:  Electro-myographic guidance was necessary throughout the procedure to accurately identify all areas of dystonic and  spastic muscles while avoiding injection of non-dystonic muscles and non-spastic muscles, neighboring nerves and nearby vascular structures.     AREA/MUSCLE INJECTED:  175 UNITS BOTOX = TOTAL DOSE     1. HEAD, NECK & SHOULDER GIRDLE MUSCLES: 175 units Botox = Total Dose, 2:1 Dilution                 Right Mid-Trapezius - 2.5 units of Botox at 1 site/s.   Left Mid-Trapezius - 2.5 units of Botox at 1 site/s.      Right Splenius Capitus - 25 units of Botox at 2 site/s.   Left Splenius Capitus - 20 units of Botox at 1 site/s.    Right Lateral Trapezius (low cervical) - 10 units of Botox at 2 site/s.   Left Lateral Trapezius (low cervical) - 5 units of Botox at 1 site/s.      Right Levator Scapulae - 25 units of Botox at 1 site/s.   Left Levator Scapulae - 20 units of Botox at 1 site/s .      Right Inferior Obliquus Capitis - 20 units of Botox at 1 site/s.   Left Inferior Obliquus Capitis - 5 units of at 1 site/s.      Right Sternocleidomastoid - 20 units of Botox at 1 site/s.               Left Sternocleidomastoid - 20 units of Botox at 1 site/s.       RESPONSE TO PROCEDURE:  Lilli Steven tolerated the procedure well and there were no immediate complications.  She was allowed to recover for an appropriate period of time and was discharged home in stable condition.    FOLLOW UP:  Lilli Steven was asked to follow up by phone in 7-14 days with Merry Bolaños RN, Care Coordinator, to report her response to this series of injections.  Based on the patient's previous response to this therapy, Lilli Steven was rescheduled for the next series of injections in 12 weeks.    PLAN (Medication Changes, Therapy Orders, Work or Disability Issues, etc.): Increased dose today to 175 units in order to improve efficacy and prolong duration of benefit. Will monitor and report response to today's injections.

## 2021-06-01 ENCOUNTER — RECORDS - HEALTHEAST (OUTPATIENT)
Dept: ADMINISTRATIVE | Facility: CLINIC | Age: 71
End: 2021-06-01

## 2021-06-03 DIAGNOSIS — E78.5 HYPERLIPIDEMIA LDL GOAL <160: ICD-10-CM

## 2021-06-03 RX ORDER — SIMVASTATIN 20 MG
TABLET ORAL
Qty: 30 TABLET | Refills: 0 | Status: SHIPPED | OUTPATIENT
Start: 2021-06-03 | End: 2021-06-08

## 2021-06-03 NOTE — TELEPHONE ENCOUNTER
LVM with patient, due for fasting physical. Call back to schedule appt. Sent mychart reminder.      Medication is being filled for 1 time refill only due to:  Patient needs to be seen because due for fasting physical.

## 2021-06-08 ENCOUNTER — OFFICE VISIT (OUTPATIENT)
Dept: FAMILY MEDICINE | Facility: CLINIC | Age: 71
End: 2021-06-08
Payer: COMMERCIAL

## 2021-06-08 VITALS
DIASTOLIC BLOOD PRESSURE: 75 MMHG | RESPIRATION RATE: 22 BRPM | TEMPERATURE: 97 F | SYSTOLIC BLOOD PRESSURE: 116 MMHG | BODY MASS INDEX: 32.04 KG/M2 | OXYGEN SATURATION: 95 % | WEIGHT: 174.1 LBS | HEIGHT: 62 IN | HEART RATE: 86 BPM

## 2021-06-08 DIAGNOSIS — Z23 NEED FOR VACCINATION: ICD-10-CM

## 2021-06-08 DIAGNOSIS — M85.859 OSTEOPENIA OF NECK OF FEMUR, UNSPECIFIED LATERALITY: ICD-10-CM

## 2021-06-08 DIAGNOSIS — E78.5 HYPERLIPIDEMIA LDL GOAL <160: Primary | ICD-10-CM

## 2021-06-08 DIAGNOSIS — M17.10 ARTHRITIS OF KNEE: ICD-10-CM

## 2021-06-08 DIAGNOSIS — G47.00 PERSISTENT INSOMNIA: ICD-10-CM

## 2021-06-08 PROCEDURE — 36415 COLL VENOUS BLD VENIPUNCTURE: CPT | Performed by: FAMILY MEDICINE

## 2021-06-08 PROCEDURE — 80048 BASIC METABOLIC PNL TOTAL CA: CPT | Performed by: FAMILY MEDICINE

## 2021-06-08 PROCEDURE — 90471 IMMUNIZATION ADMIN: CPT | Performed by: FAMILY MEDICINE

## 2021-06-08 PROCEDURE — 80061 LIPID PANEL: CPT | Performed by: FAMILY MEDICINE

## 2021-06-08 PROCEDURE — 99214 OFFICE O/P EST MOD 30 MIN: CPT | Mod: 25 | Performed by: FAMILY MEDICINE

## 2021-06-08 PROCEDURE — 90750 HZV VACC RECOMBINANT IM: CPT | Performed by: FAMILY MEDICINE

## 2021-06-08 RX ORDER — TRAZODONE HYDROCHLORIDE 50 MG/1
TABLET, FILM COATED ORAL
Qty: 270 TABLET | Refills: 3 | Status: SHIPPED | OUTPATIENT
Start: 2021-06-08 | End: 2022-06-13

## 2021-06-08 RX ORDER — SIMVASTATIN 20 MG
TABLET ORAL
Qty: 90 TABLET | Refills: 3 | Status: SHIPPED | OUTPATIENT
Start: 2021-06-08 | End: 2022-06-16

## 2021-06-08 ASSESSMENT — MIFFLIN-ST. JEOR: SCORE: 1257.96

## 2021-06-08 NOTE — PATIENT INSTRUCTIONS
For DEXA:    For scheduling at [facility] ([facility] [place]), call [phone] or [phone]     For scheduling in the North ([facility], [facility]) call  [phone] or  [phone]        For scheduling in the [place] ([facility]) call [phone]  or   [phone]
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
## 2021-06-10 LAB
ANION GAP SERPL CALCULATED.3IONS-SCNC: 8 MMOL/L (ref 3–14)
BUN SERPL-MCNC: 18 MG/DL (ref 7–30)
CALCIUM SERPL-MCNC: 8.7 MG/DL (ref 8.5–10.1)
CHLORIDE SERPL-SCNC: 109 MMOL/L (ref 94–109)
CHOLEST SERPL-MCNC: 189 MG/DL
CO2 SERPL-SCNC: 23 MMOL/L (ref 20–32)
CREAT SERPL-MCNC: 1.01 MG/DL (ref 0.52–1.04)
GFR SERPL CREATININE-BSD FRML MDRD: 56 ML/MIN/{1.73_M2}
GLUCOSE SERPL-MCNC: 93 MG/DL (ref 70–99)
HDLC SERPL-MCNC: 58 MG/DL
LDLC SERPL CALC-MCNC: 100 MG/DL
NONHDLC SERPL-MCNC: 131 MG/DL
POTASSIUM SERPL-SCNC: 4.1 MMOL/L (ref 3.4–5.3)
SODIUM SERPL-SCNC: 140 MMOL/L (ref 133–144)
TRIGL SERPL-MCNC: 153 MG/DL

## 2021-06-15 NOTE — TELEPHONE ENCOUNTER
DIAGNOSIS: Arthritis of knee EARLE, wants inj /Dr Chaudhry/ XR from 2020   APPOINTMENT DATE: 6.23.21   NOTES STATUS DETAILS   OFFICE NOTE from referring provider Internal 6.8.21 Dr Vargas Chaudhry, [facility]   OFFICE NOTE from other specialist Internal 1.30.20 Dr Cruz Carvalho, [facility]  1.17.20 Dr Joey Lund, [facility]  10.10.16 Dr Venkata Baca, [facility]  7.16.16   DISCHARGE SUMMARY from hospital N/A    DISCHARGE REPORT from the ER N/A    OPERATIVE REPORT N/A    EMG report N/A    MEDICATION LIST Internal    MRI N/A    DEXA (osteoporosis/bone health) Internal    CT SCAN N/A    XRAYS (IMAGES & REPORTS) Internal 1.30.20 B knee  7.16.16 B knee, R knee

## 2021-06-23 ENCOUNTER — OFFICE VISIT (OUTPATIENT)
Dept: ORTHOPEDICS | Facility: CLINIC | Age: 71
End: 2021-06-23
Payer: COMMERCIAL

## 2021-06-23 ENCOUNTER — PRE VISIT (OUTPATIENT)
Dept: ORTHOPEDICS | Facility: CLINIC | Age: 71
End: 2021-06-23

## 2021-06-23 VITALS — WEIGHT: 174 LBS | HEIGHT: 62 IN | BODY MASS INDEX: 32.02 KG/M2

## 2021-06-23 DIAGNOSIS — M17.10 PATELLOFEMORAL ARTHRITIS: Primary | ICD-10-CM

## 2021-06-23 DIAGNOSIS — M22.2X2 PATELLOFEMORAL PAIN SYNDROME OF BOTH KNEES: ICD-10-CM

## 2021-06-23 DIAGNOSIS — M22.2X1 PATELLOFEMORAL PAIN SYNDROME OF BOTH KNEES: ICD-10-CM

## 2021-06-23 PROCEDURE — 99207 PR DROP WITH A PROCEDURE: CPT | Performed by: FAMILY MEDICINE

## 2021-06-23 PROCEDURE — 20610 DRAIN/INJ JOINT/BURSA W/O US: CPT | Mod: 50 | Performed by: FAMILY MEDICINE

## 2021-06-23 RX ORDER — TRIAMCINOLONE ACETONIDE 40 MG/ML
40 INJECTION, SUSPENSION INTRA-ARTICULAR; INTRAMUSCULAR
Status: DISCONTINUED | OUTPATIENT
Start: 2021-06-23 | End: 2022-02-07

## 2021-06-23 RX ORDER — LIDOCAINE HYDROCHLORIDE 10 MG/ML
4 INJECTION, SOLUTION EPIDURAL; INFILTRATION; INTRACAUDAL; PERINEURAL
Status: DISCONTINUED | OUTPATIENT
Start: 2021-06-23 | End: 2022-02-07

## 2021-06-23 RX ADMIN — TRIAMCINOLONE ACETONIDE 40 MG: 40 INJECTION, SUSPENSION INTRA-ARTICULAR; INTRAMUSCULAR at 16:59

## 2021-06-23 RX ADMIN — LIDOCAINE HYDROCHLORIDE 4 ML: 10 INJECTION, SOLUTION EPIDURAL; INFILTRATION; INTRACAUDAL; PERINEURAL at 16:59

## 2021-06-23 ASSESSMENT — MIFFLIN-ST. JEOR: SCORE: 1257.51

## 2021-06-23 NOTE — PROGRESS NOTES
June 23, 2021: Lilli Steven is a 71 year old female who is seen in f/u up for increased B knee pain.    2 months ago went to [facility] to take care of her new grandson and had sustained overuse due to the stairs she had to take.  She has h/o PTF knee djd.  Still painful with walking, stair climbing.  Wants to try shots.  Has seen PT in the past.    Mentions prior CSI in the past (many years ago).    Large Joint Injection/Arthocentesis: bilateral knee    Date/Time: 6/23/2021 4:59 PM  Performed by: Cruz Carvalho MD  Authorized by: Cruz Carvalho MD     Indications:  Pain and osteoarthritis  Needle Size:  25 G  Guidance: landmark guided    Approach:  Anterolateral  Location:  Knee  Laterality:  Bilateral      Medications (Right):  40 mg triamcinolone 40 MG/ML; 4 mL lidocaine (PF) 1 %  Medications (Left):  40 mg triamcinolone 40 MG/ML; 4 mL lidocaine (PF) 1 %  Outcome:  Tolerated well, no immediate complications  Procedure discussed: discussed risks, benefits, and alternatives    Consent Given by:  Patient  Timeout: timeout called immediately prior to procedure    Prep: patient was prepped and draped in usual sterile fashion     Scribed by Jonnie Baum ATC, ATC for Dr. Carvalho on 6/23/21 at 5:00PM, based on the provider's statements to me.                    PMH:  Past Medical History:   Diagnosis Date     Arthritis right knee     Diarrhea 9/6/2012     Hypercholesterolemia 9/6/2012     Neck pain 9/6/2012     PONV (postoperative nausea and vomiting)      Seasonal allergies 9/6/2012     Snores 9/6/2012     Tremor      Wears glasses 9/6/2012       Active problem list:  Patient Active Problem List   Diagnosis     Spasmodic torticollis     Wears glasses     Neck pain     Seasonal allergies     Genetic torsion dystonia     Osteopenia     Hyperlipidemia LDL goal <160     Tear of lateral cartilage or meniscus of knee, current     Urinary incontinence     PONV (postoperative nausea and vomiting)      Essential tremor       FH:  Family History   Problem Relation Age of Onset     Cancer Father         49 yrs old - bladder     Other Cancer Father         bladder     Dementia Mother         she is living in [facility] in [facility]     Breast Cancer Mother         70s     Osteoporosis Mother      Heart Disease Maternal Grandfather         disease     Hypertension Maternal Grandfather      High cholesterol Maternal Grandfather      Heart Disease Paternal Grandmother         disease     Hypertension Paternal Grandmother      High cholesterol Paternal Grandmother      Heart Disease Maternal Grandmother         disease     Hypertension Paternal Grandfather      High cholesterol Paternal Grandfather      Hypertension Sister      Hypertension Sister      Osteoporosis Sister      Macular Degeneration Other      Diabetes No family hx of      Coronary Artery Disease No family hx of      Hyperlipidemia No family hx of      Cerebrovascular Disease No family hx of      Colon Cancer No family hx of      Prostate Cancer No family hx of      Depression No family hx of      Anxiety Disorder No family hx of      Mental Illness No family hx of      Substance Abuse No family hx of      Anesthesia Reaction No family hx of      Asthma No family hx of      Genetic Disorder No family hx of      Thyroid Disease No family hx of      Obesity No family hx of      Unknown/Adopted No family hx of      Glaucoma No family hx of        SH:  Social History     Socioeconomic History     Marital status: Single     Spouse name: Not on file     Number of children: Not on file     Years of education: Not on file     Highest education level: Not on file   Occupational History     Not on file   Social Needs     Financial resource strain: Not on file     Food insecurity     Worry: Not on file     Inability: Not on file     Transportation needs     Medical: Not on file     Non-medical: Not on file   Tobacco Use     Smoking status: Former Smoker      Packs/day: 0.50     Years: 10.00     Pack years: 5.00     Types: Cigarettes     Start date: 1968     Quit date: 1988     Years since quittin.4     Smokeless tobacco: Never Used   Substance and Sexual Activity     Alcohol use: Yes     Alcohol/week: 0.0 standard drinks     Comment: 3 - 4 drinks/week     Drug use: No     Sexual activity: Yes     Partners: Male     Birth control/protection: Post-menopausal   Lifestyle     Physical activity     Days per week: Not on file     Minutes per session: Not on file     Stress: Not on file   Relationships     Social connections     Talks on phone: Not on file     Gets together: Not on file     Attends Buddhism service: Not on file     Active member of club or organization: Not on file     Attends meetings of clubs or organizations: Not on file     Relationship status: Not on file     Intimate partner violence     Fear of current or ex partner: Not on file     Emotionally abused: Not on file     Physically abused: Not on file     Forced sexual activity: Not on file   Other Topics Concern     Parent/sibling w/ CABG, MI or angioplasty before 65F 55M? No   Social History Narrative    Family History: Her father  at 49 of cancer. Her mother is alive at age 82. She has four sisters ages 56, 52, 50 and 42. She has one brother age 44. She has one daughter who is 24 yrs old and living with her at age 19. The patient is single. She is . She was  one year. Her ex- was an alcoholic.             She works at [employer].     She drinks three drinks per week. No smoking, quit 30 years ago.     Living in [facility] for the past 7-8 yrs.             Mother was [place] - Askenazi Gnosticism; both of her parents lived in [place].    Father was scotch-reid               MEDS:  See EMR, reviewed  ALL:  See EMR, reviewed    REVIEW OF SYSTEMS:  CONSTITUTIONAL:NEGATIVE for fever, chills, change in weight  INTEGUMENTARY/SKIN: NEGATIVE for worrisome rashes,  moles or lesions  EYES: NEGATIVE for vision changes or irritation  ENT/MOUTH: NEGATIVE for ear, mouth and throat problems  RESP:NEGATIVE for significant cough or SOB  BREAST: NEGATIVE for masses, tenderness or discharge  CV: NEGATIVE for chest pain, palpitations or peripheral edema  GI: NEGATIVE for nausea, abdominal pain, heartburn, or change in bowel habits  :NEGATIVE for frequency, dysuria, or hematuria  :NEGATIVE for frequency, dysuria, or hematuria  NEURO: NEGATIVE for weakness, dizziness or paresthesias  ENDOCRINE: NEGATIVE for temperature intolerance, skin/hair changes  HEME/ALLERGY/IMMUNE: NEGATIVE for bleeding problems  PSYCHIATRIC: NEGATIVE for changes in mood or affect      EXAM: XR KNEE BILATERAL 3 VW  1/30/2020 12:17 PM , images reviewed by me     HISTORY: Bilateral AP Standing, lateral and sunrise; Pain in both  knees, unspecified chronicity; Pain in both knees, unspecified  chronicity     COMPARISON: 7/16/2016     FINDINGS: Standing AP bilateral knees, lateral and patellofemoral  views of the knees.     Right knee: No acute osseous normality. No joint effusion. No patellar  tilt or subluxation. Moderate patellofemoral joint space loss. Mild  medial compartment joint space loss.     Left knee: No acute osseous abnormality. No joint effusion. No  patellar tilt or subluxation. No substantial joint space loss.     Soft tissues unremarkable.                                                                       IMPRESSION:   1. No acute osseous abnormality.  2. Patellofemoral predominant right knee osteoarthrosis.  3. No substantial degenerative change of the left knee.     CHUN MIRELES MD (Joe)          Objective the bilateral knees reveal no effusion.  I can flex and extend the knees fully.  She is nontender over the medial or lateral joint line of either knee.  Nontender over the patellar tendon or pes anserine bursa bilaterally.  Adequate range of motion of the bilateral hips.  Anterior  posterior drawer is negative bilaterally.  Peripheral pulses strong and symmetrical.  Appropriate in conversation affect.    We went over previous x-rays of her knees that show mild patellofemoral DJD    Assessment bilateral knee DJD    Plan: I encouraged her with her physical therapy exercises.  She would like to try cortisone injections.  We talked about Synvisc injections as an option for the future if needed.  After informed consent about bleeding, infection, steroid flare after prepping with surgical scrub she was injected in the right knee from a lateral approach in the seated position with 1 cc of Kenalog 40 and 5 cc of 1% lidocaine and in the same procedure with the same medicine was completed for the opposite knee.  She tolerated this without issue left the clinic ambulatory she will look for improvement with the injection and follow-up as needed.

## 2021-06-23 NOTE — LETTER
6/23/2021      RE: Lilli Steven  [address]  [facility] [address]       June 23, 2021: Lilli Steven is a 71 year old female who is seen in f/u up for increased B knee pain.    2 months ago went to [facility] to take care of her new grandson and had sustained overuse due to the stairs she had to take.  She has h/o PTF knee djd.  Still painful with walking, stair climbing.  Wants to try shots.  Has seen PT in the past.    Mentions prior CSI in the past (many years ago).    Large Joint Injection/Arthocentesis: bilateral knee    Date/Time: 6/23/2021 4:59 PM  Performed by: Cruz Carvalho MD  Authorized by: Cruz Carvalho MD     Indications:  Pain and osteoarthritis  Needle Size:  25 G  Guidance: landmark guided    Approach:  Anterolateral  Location:  Knee  Laterality:  Bilateral      Medications (Right):  40 mg triamcinolone 40 MG/ML; 4 mL lidocaine (PF) 1 %  Medications (Left):  40 mg triamcinolone 40 MG/ML; 4 mL lidocaine (PF) 1 %  Outcome:  Tolerated well, no immediate complications  Procedure discussed: discussed risks, benefits, and alternatives    Consent Given by:  Patient  Timeout: timeout called immediately prior to procedure    Prep: patient was prepped and draped in usual sterile fashion     Scribed by Jonnie Baum ATC, ATC for Dr. Carvalho on 6/23/21 at 5:00PM, based on the provider's statements to me.                    PMH:  Past Medical History:   Diagnosis Date     Arthritis right knee     Diarrhea 9/6/2012     Hypercholesterolemia 9/6/2012     Neck pain 9/6/2012     PONV (postoperative nausea and vomiting)      Seasonal allergies 9/6/2012     Snores 9/6/2012     Tremor      Wears glasses 9/6/2012       Active problem list:  Patient Active Problem List   Diagnosis     Spasmodic torticollis     Wears glasses     Neck pain     Seasonal allergies     Genetic torsion dystonia     Osteopenia     Hyperlipidemia LDL goal <160     Tear of lateral cartilage or  meniscus of knee, current     Urinary incontinence     PONV (postoperative nausea and vomiting)     Essential tremor       FH:  Family History   Problem Relation Age of Onset     Cancer Father         49 yrs old - bladder     Other Cancer Father         bladder     Dementia Mother         she is living in [facility] in [facility]     Breast Cancer Mother         70s     Osteoporosis Mother      Heart Disease Maternal Grandfather         disease     Hypertension Maternal Grandfather      High cholesterol Maternal Grandfather      Heart Disease Paternal Grandmother         disease     Hypertension Paternal Grandmother      High cholesterol Paternal Grandmother      Heart Disease Maternal Grandmother         disease     Hypertension Paternal Grandfather      High cholesterol Paternal Grandfather      Hypertension Sister      Hypertension Sister      Osteoporosis Sister      Macular Degeneration Other      Diabetes No family hx of      Coronary Artery Disease No family hx of      Hyperlipidemia No family hx of      Cerebrovascular Disease No family hx of      Colon Cancer No family hx of      Prostate Cancer No family hx of      Depression No family hx of      Anxiety Disorder No family hx of      Mental Illness No family hx of      Substance Abuse No family hx of      Anesthesia Reaction No family hx of      Asthma No family hx of      Genetic Disorder No family hx of      Thyroid Disease No family hx of      Obesity No family hx of      Unknown/Adopted No family hx of      Glaucoma No family hx of        SH:  Social History     Socioeconomic History     Marital status: Single     Spouse name: Not on file     Number of children: Not on file     Years of education: Not on file     Highest education level: Not on file   Occupational History     Not on file   Social Needs     Financial resource strain: Not on file     Food insecurity     Worry: Not on file     Inability: Not on file     Transportation needs      Medical: Not on file     Non-medical: Not on file   Tobacco Use     Smoking status: Former Smoker     Packs/day: 0.50     Years: 10.00     Pack years: 5.00     Types: Cigarettes     Start date: 1968     Quit date: 1988     Years since quittin.4     Smokeless tobacco: Never Used   Substance and Sexual Activity     Alcohol use: Yes     Alcohol/week: 0.0 standard drinks     Comment: 3 - 4 drinks/week     Drug use: No     Sexual activity: Yes     Partners: Male     Birth control/protection: Post-menopausal   Lifestyle     Physical activity     Days per week: Not on file     Minutes per session: Not on file     Stress: Not on file   Relationships     Social connections     Talks on phone: Not on file     Gets together: Not on file     Attends Congregational service: Not on file     Active member of club or organization: Not on file     Attends meetings of clubs or organizations: Not on file     Relationship status: Not on file     Intimate partner violence     Fear of current or ex partner: Not on file     Emotionally abused: Not on file     Physically abused: Not on file     Forced sexual activity: Not on file   Other Topics Concern     Parent/sibling w/ CABG, MI or angioplasty before 65F 55M? No   Social History Narrative    Family History: Her father  at 49 of cancer. Her mother is alive at age 82. She has four sisters ages 56, 52, 50 and 42. She has one brother age 44. She has one daughter who is 24 yrs old and living with her at age 19. The patient is single. She is . She was  one year. Her ex- was an alcoholic.             She works at [employer].     She drinks three drinks per week. No smoking, quit 30 years ago.     Living in [facility] for the past 7-8 yrs.             Mother was Mosotho - Askenazi Sikhism; both of her parents lived in [place].    Father was scotch-reid               MEDS:  See EMR, reviewed  ALL:  See EMR, reviewed    REVIEW OF  SYSTEMS:  CONSTITUTIONAL:NEGATIVE for fever, chills, change in weight  INTEGUMENTARY/SKIN: NEGATIVE for worrisome rashes, moles or lesions  EYES: NEGATIVE for vision changes or irritation  ENT/MOUTH: NEGATIVE for ear, mouth and throat problems  RESP:NEGATIVE for significant cough or SOB  BREAST: NEGATIVE for masses, tenderness or discharge  CV: NEGATIVE for chest pain, palpitations or peripheral edema  GI: NEGATIVE for nausea, abdominal pain, heartburn, or change in bowel habits  :NEGATIVE for frequency, dysuria, or hematuria  :NEGATIVE for frequency, dysuria, or hematuria  NEURO: NEGATIVE for weakness, dizziness or paresthesias  ENDOCRINE: NEGATIVE for temperature intolerance, skin/hair changes  HEME/ALLERGY/IMMUNE: NEGATIVE for bleeding problems  PSYCHIATRIC: NEGATIVE for changes in mood or affect      EXAM: XR KNEE BILATERAL 3 VW  1/30/2020 12:17 PM , images reviewed by me     HISTORY: Bilateral AP Standing, lateral and sunrise; Pain in both  knees, unspecified chronicity; Pain in both knees, unspecified  chronicity     COMPARISON: 7/16/2016     FINDINGS: Standing AP bilateral knees, lateral and patellofemoral  views of the knees.     Right knee: No acute osseous normality. No joint effusion. No patellar  tilt or subluxation. Moderate patellofemoral joint space loss. Mild  medial compartment joint space loss.     Left knee: No acute osseous abnormality. No joint effusion. No  patellar tilt or subluxation. No substantial joint space loss.     Soft tissues unremarkable.                                                                       IMPRESSION:   1. No acute osseous abnormality.  2. Patellofemoral predominant right knee osteoarthrosis.  3. No substantial degenerative change of the left knee.     CHUN MIRELES MD (Joe)          Objective the bilateral knees reveal no effusion.  I can flex and extend the knees fully.  She is nontender over the medial or lateral joint line of either knee.  Nontender  over the patellar tendon or pes anserine bursa bilaterally.  Adequate range of motion of the bilateral hips.  Anterior posterior drawer is negative bilaterally.  Peripheral pulses strong and symmetrical.  Appropriate in conversation affect.    We went over previous x-rays of her knees that show mild patellofemoral DJD    Assessment bilateral knee DJD    Plan: I encouraged her with her physical therapy exercises.  She would like to try cortisone injections.  We talked about Synvisc injections as an option for the future if needed.  After informed consent about bleeding, infection, steroid flare after prepping with surgical scrub she was injected in the right knee from a lateral approach in the seated position with 1 cc of Kenalog 40 and 5 cc of 1% lidocaine and in the same procedure with the same medicine was completed for the opposite knee.  She tolerated this without issue left the clinic ambulatory she will look for improvement with the injection and follow-up as needed.        Cruz Carvalho MD

## 2021-06-23 NOTE — NURSING NOTE
[address] [address]  Dept: [phone]  ______________________________________________________________________________    Patient: Lilli Steven   : 1950   MRN: [id number]   2021    INVASIVE PROCEDURE SAFETY CHECKLIST    Date: 21   Procedure:B knee CSI with kenalog  Patient Name: Lilli Steven  MRN: [id number]  YOB: 1950    Action: Complete sections as appropriate. Any discrepancy results in a HARD COPY until resolved.     PRE PROCEDURE:  Patient ID verified with 2 identifiers (name and  or MRN): Yes  Procedure and site verified with patient/designee (when able): Yes  Accurate consent documentation in medical record: Yes  H&P (or appropriate assessment) documented in medical record: Yes  H&P must be up to 20 days prior to procedure and updates within 24 hours of procedure as applicable: NA  Relevant diagnostic and radiology test results appropriately labeled and displayed as applicable: Yes  Procedure site(s) marked with provider initials: NA    TIMEOUT:  Time-Out performed immediately prior to starting procedure, including verbal and active participation of all team members addressing the following:Yes  * Correct patient identify  * Confirmed that the correct side and site are marked  * An accurate procedure consent form  * Agreement on the procedure to be done  * Correct patient position  * Relevant images and results are properly labeled and appropriately displayed  * The need to administer antibiotics or fluids for irrigation purposes during the procedure as applicable   * Safety precautions based on patient history or medication use    DURING PROCEDURE: Verification of correct person, site, and procedures any time the responsibility for care of the patient is transferred to another member of the care team.       Prior to injection, verified patient identity using patient's name and date of birth.  Due  to injection administration, patient instructed to remain in clinic for 15 minutes  afterwards, and to report any adverse reaction to me immediately.    Joint injection was performed.      Drug Amount Wasted:  Yes: 2 mg/ml   Vial/Syringe: Single dose vial  Expiration Date:  11/22 - kenalog  12/24 - lidocaine      Jonnie Bautista, [facility]  June 23, 2021

## 2021-07-15 ENCOUNTER — OFFICE VISIT (OUTPATIENT)
Dept: PHYSICAL MEDICINE AND REHAB | Facility: CLINIC | Age: 71
End: 2021-07-15
Payer: COMMERCIAL

## 2021-07-15 VITALS
SYSTOLIC BLOOD PRESSURE: 150 MMHG | OXYGEN SATURATION: 96 % | HEART RATE: 100 BPM | RESPIRATION RATE: 16 BRPM | DIASTOLIC BLOOD PRESSURE: 77 MMHG | TEMPERATURE: 98.3 F

## 2021-07-15 DIAGNOSIS — G24.3 SPASMODIC TORTICOLLIS: Primary | ICD-10-CM

## 2021-07-15 DIAGNOSIS — G24.1 GENETIC TORSION DYSTONIA: ICD-10-CM

## 2021-07-15 PROCEDURE — 64616 CHEMODENERV MUSC NECK DYSTON: CPT | Mod: RT | Performed by: PHYSICAL MEDICINE & REHABILITATION

## 2021-07-15 PROCEDURE — 95874 GUIDE NERV DESTR NEEDLE EMG: CPT | Performed by: PHYSICAL MEDICINE & REHABILITATION

## 2021-07-15 PROCEDURE — 64643 CHEMODENERV 1 EXTREM 1-4 EA: CPT | Performed by: PHYSICAL MEDICINE & REHABILITATION

## 2021-07-15 PROCEDURE — 64642 CHEMODENERV 1 EXTREMITY 1-4: CPT | Performed by: PHYSICAL MEDICINE & REHABILITATION

## 2021-07-15 NOTE — PROGRESS NOTES
BOTULINUM TOXIN PROCEDURE NOTE    Chief Complaint   Patient presents with     Botox     BP (!) 150/77   Pulse 100   Temp 98.3  F (36.8  C)   Resp 16   LMP  (LMP Unknown)   SpO2 96%     Current Outpatient Medications:      mometasone (ELOCON) 0.1 % external cream, Apply sparingly to affected area twice daily as needed.  Do not apply to face., Disp: 45 g, Rfl: 0     OnabotulinumtoxinA (BOTOX IJ), Inject 150 Units into the muscle once Lot # /[id number] with Expiration Date: 03/2021, Disp: , Rfl:      polyethylene glycol-propylene glycol (SYSTANE ULTRA) 0.4-0.3 % SOLN ophthalmic solution, Place 1 drop into both eyes as needed, Disp: , Rfl:      RESTASIS MULTIDOSE 0.05 % ophthalmic emulsion, Place 1 drop into both eyes 2 times daily, Disp: , Rfl: 3     simvastatin (ZOCOR) 20 MG tablet, TAKE 1 TABLET BY MOUTH EVERYDAY AT BEDTIME, Disp: 90 tablet, Rfl: 3     traZODone (DESYREL) 50 MG tablet, TAKE 3 TABLETS BY MOUTH AT BEDTIME, Disp: 270 tablet, Rfl: 3     triamcinolone (KENALOG) 0.1 % external cream, APPLY TOPICALLY, Disp: , Rfl:     Current Facility-Administered Medications:      botulinum toxin type A (BOTOX) 100 units injection 150 Units, 150 Units, Intramuscular, Once, StandalCassidy MD     botulinum toxin type A (BOTOX) 100 units injection 150 Units, 150 Units, Intramuscular, Once, StandCassidy cuenca MD     lidocaine (PF) (XYLOCAINE) 1 % injection 4 mL, 4 mL, , , Cruz Carvalho MD, 4 mL at 06/23/21 1659     lidocaine (PF) (XYLOCAINE) 1 % injection 4 mL, 4 mL, , , Cruz Carvalho MD, 4 mL at 06/23/21 1659     triamcinolone (KENALOG-40) injection 40 mg, 40 mg, , , Cruz Carvalho MD, 40 mg at 06/23/21 1659     triamcinolone (KENALOG-40) injection 40 mg, 40 mg, , , Cruz Carvalho MD, 40 mg at 06/23/21 1659     Allergies   Allergen Reactions     Meloxicam      Diarrhea, vomiting     Primidone Other (See Comments)     Felt like motion sickness     Tramadol      Diarrhea, vomiting       Adhesive Tape Rash     Durabond only     Allergy Rash     Dermabond  Anesthesia IV set misc          PHYSICAL EXAM:  Head Tremor: Observed - no-no tremor present  Head & Neck Extension: Present - Slight  Sub-Occipital Extension: Present - Slight  Forward Head: Present - Slight   Head & Neck Lateral Bend: Left  Shoulder Elevation: Right      HPI:    Patient denies new medical diagnoses, illnesses, hospitalizations, emergency room visits, and injuries since the previous injection with botulinum neurotoxin.     We reviewed the recommended safety guidelines for  Botox from any vaccine injection, such as the seasonal flu vaccine, by a minimum of 10-14 days with Lilli Steven. She acknowledged understanding.    RESPONSE TO PREVIOUS TREATMENT:    Lilli Steven received 175 units Botox 2021 which was an increase from preious dose of 150 units of Botox on 2021.    Problems following the previous series of neurotoxin injections included:  No problems reported    BENEFITS BY PATIENT REPORT:  Pain Improvement: Yes.  Percent Improvement: 30-40 %  Duration of Benefit:  ongoing to date.      Dystonia Improvement: Yes.  Percent Improvement: 30-40 %  Duration of Benefit:  ongoing to date. With the dose increase, she noticed an improvement in her symptoms as well as longer lasting effect.     BOTULINUM NEUROTOXIN INJECTION PROCEDURES:    VERIFICATION OF PATIENT IDENTIFICATION AND PROCEDURE     Initials   Patient Name ses   Patient  ses   Procedure Verified by: dionicio     Prior to the start of the procedure and with procedural staff participation, I verbally confirmed the patient s identity using two indicators, relevant allergies, that the procedure was appropriate and matched the consent or emergent situation, and that the correct equipment/implants were available. Immediately prior to starting the procedure I conducted the Time Out with the procedural staff and re-confirmed the patient s name,  procedure, and site/side. (The Joint Commission universal protocol was followed.)  Yes    Sedation (Moderate or Deep): None    Above assessments performed by:    Cassidy Ramirez MD     INDICATION/S FOR PROCEDURE/S:  Lilli Steven is a 71-year-old patient with dystonia affecting the  head, neck and shoulder girdle musculature secondary to a diagnosis of cervical dystonia with associated pain, tremor, spasms, loss of joint motion, loss of volitional motor control and difficulty with activities of daily living.      Her baseline symptoms have been recalcitrant to oral medications and conservative therapy. She is here today for an injection of Botox.       GOAL OF PROCEDURE:  The goal of this procedure is to increase active range of motion, improve volitional motor control and decrease pain  associated with dystonic movements, spasticity and tremor.     TOTAL DOSE: 200 UNITS BOTOX   Dose Administered:  175 units Botox    Diluent Used:  Preservative Free Normal Saline  Total Volume of Diluent Used:  1.75 ml  Lot # [id number] with Expiration Date:  10/2023  NDC #: Botox 100u ([address]-29)    Was there drug waste? Yes  Amount of drug waste (mL): 25 units Botox.  Reason for waste:  Single use vial  Multi-dose vial: No    Cassidy Ramirez MD  July 15, 2021      Medication guide was offered to patient and was declined.    CONSENT:  The risks, benefits, and treatment options were discussed with Lilli Steven and she agreed to proceed.      Written consent was obtained by [facility].     EQUIPMENT USED:  Needle-37mm stimulating/recording  EMG/NCS Machine     SKIN PREPARATION:  Skin preparation was performed using an alcohol wipe.     GUIDANCE DESCRIPTION:  Electro-myographic guidance was necessary throughout the procedure to accurately identify all areas of dystonic and spastic muscles while avoiding injection of non-dystonic muscles and non-spastic muscles, neighboring nerves and nearby vascular  structures.     AREA/MUSCLE INJECTED:  175 UNITS BOTOX = TOTAL DOSE     1. HEAD, NECK & SHOULDER GIRDLE MUSCLES: 175 units Botox = Total Dose, 1:1 Dilution                 Right Mid-Trapezius - 2.5 units of Botox at 1 site/s.   Left Mid-Trapezius - 2.5 units of Botox at 1 site/s.      Right Splenius Capitus - 20 units of Botox at 2 site/s.   Left Splenius Capitus - 20 units of Botox at 1 site/s.    Right Lateral Trapezius (low cervical) - 10 units of Botox at 2 site/s.   Left Lateral Trapezius (low cervical) - 5 units of Botox at 1 site/s.      Right Levator Scapulae - 20 units of Botox at 1 site/s.   Left Levator Scapulae - 20 units of Botox at 1 site/s .      Right Inferior Obliquus Capitis - 20 units of Botox at 1 site/s.   Left Inferior Obliquus Capitis - 10 units of at 1 site/s.      Right Sternocleidomastoid - 25 units of Botox at 2 site/s.               Left Sternocleidomastoid - 20 units of Botox at 2 site/s.       RESPONSE TO PROCEDURE:  Lilli Steven tolerated the procedure well and there were no immediate complications.  She was allowed to recover for an appropriate period of time and was discharged home in stable condition.    FOLLOW UP:  Lilli Steven was asked to follow up by phone in 7-14 days with Merry Bolaños RN, Care Coordinator, to report her response to this series of injections.  Based on the patient's previous response to this therapy, Lilli Steven was rescheduled for the next series of injections in 12 weeks.    PLAN (Medication Changes, Therapy Orders, Work or Disability Issues, etc.): No changes made to overall dose, but distribution changed based on EMG activity at today's visit. Will monitor and report response to today's injections.

## 2021-07-15 NOTE — LETTER
7/15/2021       RE: Lilli Steven  [address]  [facility] [address]     Dear Colleague,    Thank you for referring your patient, Lilli Steven, to the [facility] [place] at [facility]. Please see a copy of my visit note below.    BOTULINUM TOXIN PROCEDURE NOTE    Chief Complaint   Patient presents with     Botox     BP (!) 150/77   Pulse 100   Temp 98.3  F (36.8  C)   Resp 16   LMP  (LMP Unknown)   SpO2 96%     Current Outpatient Medications:      mometasone (ELOCON) 0.1 % external cream, Apply sparingly to affected area twice daily as needed.  Do not apply to face., Disp: 45 g, Rfl: 0     OnabotulinumtoxinA (BOTOX IJ), Inject 150 Units into the muscle once Lot # /C3 with Expiration Date: 03/2021, Disp: , Rfl:      polyethylene glycol-propylene glycol (SYSTANE ULTRA) 0.4-0.3 % SOLN ophthalmic solution, Place 1 drop into both eyes as needed, Disp: , Rfl:      RESTASIS MULTIDOSE 0.05 % ophthalmic emulsion, Place 1 drop into both eyes 2 times daily, Disp: , Rfl: 3     simvastatin (ZOCOR) 20 MG tablet, TAKE 1 TABLET BY MOUTH EVERYDAY AT BEDTIME, Disp: 90 tablet, Rfl: 3     traZODone (DESYREL) 50 MG tablet, TAKE 3 TABLETS BY MOUTH AT BEDTIME, Disp: 270 tablet, Rfl: 3     triamcinolone (KENALOG) 0.1 % external cream, APPLY TOPICALLY, Disp: , Rfl:     Current Facility-Administered Medications:      botulinum toxin type A (BOTOX) 100 units injection 150 Units, 150 Units, Intramuscular, Once, StandalCassidy MD     botulinum toxin type A (BOTOX) 100 units injection 150 Units, 150 Units, Intramuscular, Once, StandCassidy cuenca MD     lidocaine (PF) (XYLOCAINE) 1 % injection 4 mL, 4 mL, , , Cruz Carvalho MD, 4 mL at 06/23/21 1659     lidocaine (PF) (XYLOCAINE) 1 % injection 4 mL, 4 mL, , , Cruz Carvalho MD, 4 mL at 06/23/21 1659     triamcinolone (KENALOG-40)  injection 40 mg, 40 mg, , , Cruz Carvalho MD, 40 mg at 21 165     triamcinolone (KENALOG-40) injection 40 mg, 40 mg, , , Cruz Carvalho MD, 40 mg at 21 1659     Allergies   Allergen Reactions     Meloxicam      Diarrhea, vomiting     Primidone Other (See Comments)     Felt like motion sickness     Tramadol      Diarrhea, vomiting      Adhesive Tape Rash     Durabond only     Allergy Rash     Dermabond  Anesthesia IV set misc          PHYSICAL EXAM:  Head Tremor: Observed - no-no tremor present  Head & Neck Extension: Present - Slight  Sub-Occipital Extension: Present - Slight  Forward Head: Present - Slight   Head & Neck Lateral Bend: Left  Shoulder Elevation: Right      HPI:    Patient denies new medical diagnoses, illnesses, hospitalizations, emergency room visits, and injuries since the previous injection with botulinum neurotoxin.     We reviewed the recommended safety guidelines for  Botox from any vaccine injection, such as the seasonal flu vaccine, by a minimum of 10-14 days with Lilli Steven. She acknowledged understanding.    RESPONSE TO PREVIOUS TREATMENT:    Lilli Steven received 175 units Botox 2021 which was an increase from preious dose of 150 units of Botox on 2021.    Problems following the previous series of neurotoxin injections included:  No problems reported    BENEFITS BY PATIENT REPORT:  Pain Improvement: Yes.  Percent Improvement: 30-40 %  Duration of Benefit:  ongoing to date.      Dystonia Improvement: Yes.  Percent Improvement: 30-40 %  Duration of Benefit:  ongoing to date. With the dose increase, she noticed an improvement in her symptoms as well as longer lasting effect.     BOTULINUM NEUROTOXIN INJECTION PROCEDURES:    VERIFICATION OF PATIENT IDENTIFICATION AND PROCEDURE     Initials   Patient Name ses   Patient  ses   Procedure Verified by: dionicio     Prior to the start of the procedure and with procedural staff participation, I  verbally confirmed the patient s identity using two indicators, relevant allergies, that the procedure was appropriate and matched the consent or emergent situation, and that the correct equipment/implants were available. Immediately prior to starting the procedure I conducted the Time Out with the procedural staff and re-confirmed the patient s name, procedure, and site/side. (The Joint Commission universal protocol was followed.)  Yes    Sedation (Moderate or Deep): None    Above assessments performed by:    Cassidy Ramirez MD     INDICATION/S FOR PROCEDURE/S:  Lilli Steven is a 71-year-old patient with dystonia affecting the  head, neck and shoulder girdle musculature secondary to a diagnosis of cervical dystonia with associated pain, tremor, spasms, loss of joint motion, loss of volitional motor control and difficulty with activities of daily living.      Her baseline symptoms have been recalcitrant to oral medications and conservative therapy. She is here today for an injection of Botox.       GOAL OF PROCEDURE:  The goal of this procedure is to increase active range of motion, improve volitional motor control and decrease pain  associated with dystonic movements, spasticity and tremor.     TOTAL DOSE: 200 UNITS BOTOX   Dose Administered:  175 units Botox    Diluent Used:  Preservative Free Normal Saline  Total Volume of Diluent Used:  1.75 ml  Lot # [id number] with Expiration Date:  10/2023  NDC #: Botox 100u ([address]-87)    Was there drug waste? Yes  Amount of drug waste (mL): 25 units Botox.  Reason for waste:  Single use vial  Multi-dose vial: No    Cassidy Ramirez MD  July 15, 2021      Medication guide was offered to patient and was declined.    CONSENT:  The risks, benefits, and treatment options were discussed with Lilli Steven and she agreed to proceed.      Written consent was obtained by [facility].     EQUIPMENT USED:  Needle-37mm stimulating/recording  EMG/NCS Machine     SKIN  PREPARATION:  Skin preparation was performed using an alcohol wipe.     GUIDANCE DESCRIPTION:  Electro-myographic guidance was necessary throughout the procedure to accurately identify all areas of dystonic and spastic muscles while avoiding injection of non-dystonic muscles and non-spastic muscles, neighboring nerves and nearby vascular structures.     AREA/MUSCLE INJECTED:  175 UNITS BOTOX = TOTAL DOSE     1. HEAD, NECK & SHOULDER GIRDLE MUSCLES: 175 units Botox = Total Dose, 1:1 Dilution                 Right Mid-Trapezius - 2.5 units of Botox at 1 site/s.   Left Mid-Trapezius - 2.5 units of Botox at 1 site/s.      Right Splenius Capitus - 20 units of Botox at 2 site/s.   Left Splenius Capitus - 20 units of Botox at 1 site/s.    Right Lateral Trapezius (low cervical) - 10 units of Botox at 2 site/s.   Left Lateral Trapezius (low cervical) - 5 units of Botox at 1 site/s.      Right Levator Scapulae - 20 units of Botox at 1 site/s.   Left Levator Scapulae - 20 units of Botox at 1 site/s .      Right Inferior Obliquus Capitis - 20 units of Botox at 1 site/s.   Left Inferior Obliquus Capitis - 10 units of at 1 site/s.      Right Sternocleidomastoid - 25 units of Botox at 2 site/s.               Left Sternocleidomastoid - 20 units of Botox at 2 site/s.     RESPONSE TO PROCEDURE:  Lilli Steven tolerated the procedure well and there were no immediate complications.  She was allowed to recover for an appropriate period of time and was discharged home in stable condition.    FOLLOW UP:  Lilli Steven was asked to follow up by phone in 7-14 days with Merry Bolaños RN, Care Coordinator, to report her response to this series of injections.  Based on the patient's previous response to this therapy, Lilli Steven was rescheduled for the next series of injections in 12 weeks.    PLAN (Medication Changes, Therapy Orders, Work or Disability Issues, etc.): No changes made to overall dose, but  distribution changed based on EMG activity at today's visit. Will monitor and report response to today's injections.        Again, thank you for allowing me to participate in the care of your patient.      Sincerely,    Cassidy Ramirez MD

## 2021-09-05 ENCOUNTER — HEALTH MAINTENANCE LETTER (OUTPATIENT)
Age: 71
End: 2021-09-05

## 2021-10-06 ENCOUNTER — NURSE TRIAGE (OUTPATIENT)
Dept: NURSING | Facility: CLINIC | Age: 71
End: 2021-10-06

## 2021-10-06 NOTE — TELEPHONE ENCOUNTER
The patient was seen on 6/8/2021 for low back pain and the patient thought that she was going to have an X-ray or some sort of follow up on her back and she has not heard anything. Please advise.    Back pain has been present since June. She thinks that this was a sudden onset and she noticed this started at night when she couldn't get comfortable sleeping.    The pain is on the left side and in the center of the low back    Rates her pain when lying down she rates her pain 7/10    Sitting she rates her pain at a 2-3/10    Standing she rates her pain at a 2-3/10    Walking she rates her pain 7/10    Pain doesn't radiate anywhere    Denies any numbness or tingling    Denies any weakness    Denies any bowel or bladder control issues    Denies a fever    Denies any abdominal pain    Denies any UTI symptoms    Has tried ASA and she thinks it might have helped some. She has not been real regular with the ASA usage. Has also tried Aleve and that has also helped.     Has not been walking a lot due to the pain.    Did try Ice but didn't feel that helped at all.     Per protocol patient should be seen in the next 3 days    Marylu Thompson RN  [facility] Nurse Advisor  3:42 PM  10/6/2021    COVID 19 Nurse Triage Plan/Patient Instructions    Please be aware that novel coronavirus (COVID-19) may be circulating in the community. If you develop symptoms such as fever, cough, or SOB or if you have concerns about the presence of another infection including coronavirus (COVID-19), please contact your health care provider or visit [email or web address].     Disposition/Instructions    In-Person Visit with provider recommended. Reference Visit Selection Guide.    Thank you for taking steps to prevent the spread of this virus.  o Limit your contact with others.  o Wear a simple mask to cover your cough.  o Wash your hands well and often.    Resources    [facility]: About COVID-19: [email or web address]    CDC:  What to Do If You're Sick: www.cdc.gov/coronavirus/2019-ncov/about/steps-when-sick.html    CDC: Ending Home Isolation: www.cdc.gov/coronavirus/2019-ncov/hcp/disposition-in-home-patients.html     CDC: Caring for Someone: www.cdc.gov/coronavirus/2019-ncov/if-you-are-sick/care-for-someone.html     [facility]: Interim Guidance for Hospital Discharge to Home: [email or web address]/hcp/hospdischarge.pdf    [facility] clinical trials (COVID-19 research studies): [email or web address]     Below are the COVID-19 hotlines at the [place] Department of [facility] ([facility]). Interpreters are available.   o For health questions: Call 502-800-3339 or 1-921.822.6940 (7 a.m. to 7 p.m.)  o For questions about schools and childcare: Call 116-663-2625 or 1-843.514.9935 (7 a.m. to 7 p.m.)                         Reason for Disposition    MODERATE back pain (e.g., interferes with normal activities) and present > 3 days    Additional Information    Negative: Passed out (i.e., fainted, collapsed and was not responding)    Negative: Shock suspected (e.g., cold/pale/clammy skin, too weak to stand, low BP, rapid pulse)    Negative: Sounds like a life-threatening emergency to the triager    Negative: Major injury to the back (e.g., MVA, fall > 10 feet or 3 meters, penetrating injury, etc.)    Negative: Pain in the upper back over the ribs (rib cage) that radiates (travels) into the chest    Negative: Pain in the upper back over the ribs (rib cage) and worsened by coughing (or clearly increases with breathing)    Negative: SEVERE back pain of sudden onset and age > 60    Negative: SEVERE abdominal pain (e.g., excruciating)    Negative: Abdominal pain and age > 60    Negative: Unable to urinate (or only a few drops) and bladder feels very full    Negative: Loss of bladder or bowel control (urine or bowel incontinence; wetting self, leaking stool) of new onset    Negative: Numbness (loss of sensation)  in groin or rectal area    Negative: Pain radiates into groin, scrotum    Negative: Blood in urine (red, pink, or tea-colored)    Negative: Vomiting and pain over lower ribs of back (i.e., flank - kidney area)    Negative: Weakness of a leg or foot (e.g., unable to bear weight, dragging foot)    Negative: Patient sounds very sick or weak to the triager    Negative: Fever > 100.4 F (38.0 C) and flank pain    Negative: Pain or burning with passing urine (urination)    Negative: SEVERE back pain (e.g., excruciating, unable to do any normal activities) and not improved after pain medicine and CARE ADVICE    Negative: Numbness in an arm or hand (i.e., loss of sensation) and upper back pain    Negative: Numbness in a leg or foot (i.e., loss of sensation)    Negative: High-risk adult (e.g., history of cancer, history of HIV, or history of IV drug abuse)    Negative: Painful rash with multiple small blisters grouped together (i.e., dermatomal distribution or 'band' or 'stripe')    Negative: Pain radiates into the thigh or further down the leg, and in both legs    Negative: Age > 50 and no history of prior similar back pain    Protocols used: BACK PAIN-A-[place]

## 2021-10-07 ENCOUNTER — OFFICE VISIT (OUTPATIENT)
Dept: PHYSICAL MEDICINE AND REHAB | Facility: CLINIC | Age: 71
End: 2021-10-07
Payer: COMMERCIAL

## 2021-10-07 VITALS
SYSTOLIC BLOOD PRESSURE: 140 MMHG | OXYGEN SATURATION: 96 % | TEMPERATURE: 98.1 F | DIASTOLIC BLOOD PRESSURE: 84 MMHG | RESPIRATION RATE: 16 BRPM

## 2021-10-07 DIAGNOSIS — G24.3 SPASMODIC TORTICOLLIS: Primary | ICD-10-CM

## 2021-10-07 DIAGNOSIS — G24.1 GENETIC TORSION DYSTONIA: ICD-10-CM

## 2021-10-07 PROCEDURE — 64643 CHEMODENERV 1 EXTREM 1-4 EA: CPT | Performed by: PHYSICAL MEDICINE & REHABILITATION

## 2021-10-07 PROCEDURE — 95874 GUIDE NERV DESTR NEEDLE EMG: CPT | Performed by: PHYSICAL MEDICINE & REHABILITATION

## 2021-10-07 PROCEDURE — 64642 CHEMODENERV 1 EXTREMITY 1-4: CPT | Performed by: PHYSICAL MEDICINE & REHABILITATION

## 2021-10-07 PROCEDURE — 64616 CHEMODENERV MUSC NECK DYSTON: CPT | Mod: RT | Performed by: PHYSICAL MEDICINE & REHABILITATION

## 2021-10-07 ASSESSMENT — PAIN SCALES - GENERAL: PAINLEVEL: MODERATE PAIN (5)

## 2021-10-07 NOTE — PROGRESS NOTES
BOTULINUM TOXIN PROCEDURE NOTE    Current Outpatient Medications:      mometasone (ELOCON) 0.1 % external cream, Apply sparingly to affected area twice daily as needed.  Do not apply to face., Disp: 45 g, Rfl: 0     OnabotulinumtoxinA (BOTOX IJ), Inject 150 Units into the muscle once Lot # /C3 with Expiration Date: 03/2021, Disp: , Rfl:      polyethylene glycol-propylene glycol (SYSTANE ULTRA) 0.4-0.3 % SOLN ophthalmic solution, Place 1 drop into both eyes as needed, Disp: , Rfl:      RESTASIS MULTIDOSE 0.05 % ophthalmic emulsion, Place 1 drop into both eyes 2 times daily, Disp: , Rfl: 3     simvastatin (ZOCOR) 20 MG tablet, TAKE 1 TABLET BY MOUTH EVERYDAY AT BEDTIME, Disp: 90 tablet, Rfl: 3     traZODone (DESYREL) 50 MG tablet, TAKE 3 TABLETS BY MOUTH AT BEDTIME, Disp: 270 tablet, Rfl: 3     triamcinolone (KENALOG) 0.1 % external cream, APPLY TOPICALLY, Disp: , Rfl:     Current Facility-Administered Medications:      botulinum toxin type A (BOTOX) 100 units injection 150 Units, 150 Units, Intramuscular, Once, Cassidy Ramirez MD     botulinum toxin type A (BOTOX) 100 units injection 150 Units, 150 Units, Intramuscular, Once, Cassidy Ramirez MD     botulinum toxin type A (BOTOX) 100 units injection 200 Units, 200 Units, Intramuscular, Q90 Days, Cassidy Ramirez MD, 200 Units at 07/15/21 1648     lidocaine (PF) (XYLOCAINE) 1 % injection 4 mL, 4 mL, , , Cruz Carvalho MD, 4 mL at 06/23/21 1659     lidocaine (PF) (XYLOCAINE) 1 % injection 4 mL, 4 mL, , , Cruz Carvalho MD, 4 mL at 06/23/21 1659     triamcinolone (KENALOG-40) injection 40 mg, 40 mg, , , Cruz Carvalho MD, 40 mg at 06/23/21 1659     triamcinolone (KENALOG-40) injection 40 mg, 40 mg, , , Cruz Carvalho MD, 40 mg at 06/23/21 1659     Allergies   Allergen Reactions     Meloxicam      Diarrhea, vomiting     Primidone Other (See Comments)     Felt like motion sickness     Tramadol      Diarrhea,  vomiting      Adhesive Tape Rash     Durabond only     Allergy Rash     Dermabond  Anesthesia IV set misc          PHYSICAL EXAM:    VS: BP (!) 140/84   Temp 98.1  F (36.7  C)   Resp 16   LMP  (LMP Unknown)   SpO2 96%    Head Tremor: Observed - no-no tremor present  Head & Neck Extension: Present - Slight  Sub-Occipital Extension: Present - Slight  Forward Head: Present - Slight   Head & Neck Lateral Bend: Left   Shoulder Elevation: Right     HPI:  Patient denies new medical diagnoses, illnesses, hospitalizations, emergency room visits, and injuries since the previous injection with botulinum neurotoxin.     She tried neck electrodes at the [facility] for a clinical study, and she noted that it was not only not helpful, but exacerbated her symptoms and likely decreased the efficacy of last botulinum toxin injections.    We reviewed the recommended safety guidelines for  Botox from any vaccine injection, such as the seasonal flu vaccine, by a minimum of 10-14 days with Lilli Steven. She acknowledged understanding.    RESPONSE TO PREVIOUS TREATMENT:    Lilli Steven received 175 units of Botox on 7/15/2021.    Problems following the previous series of neurotoxin injections included:  No problems reported    BENEFITS BY PATIENT REPORT:    Pain Improvement: Yes.  Percent Improvement: 30%  Duration of Benefit: lasted until September when she got trial injections and then gradual reduction in benefit since     Dystonia Improvement: Yes.  Percent Improvement: 30%  Duration of Benefit: lasted until September when she got trial injections and then gradual reduction in benefit since    BOTULINUM NEUROTOXIN INJECTION PROCEDURES:    VERIFICATION OF PATIENT IDENTIFICATION AND PROCEDURE     Initials   Patient Name jq   Patient  jq   Procedure Verified by: kaylah     Prior to the start of the procedure and with procedural staff participation, I verbally confirmed the patient s identity using two indicators,  relevant allergies, that the procedure was appropriate and matched the consent or emergent situation, and that the correct equipment/implants were available. Immediately prior to starting the procedure I conducted the Time Out with the procedural staff and re-confirmed the patient s name, procedure, and site/side. (The Joint Commission universal protocol was followed.)  Yes    Sedation (Moderate or Deep): None      Above assessments performed by:  Resident/Fellow       Enio Mann DO        The attending provider was present for the entire procedure documented below.    Attending: Cassidy Ramirez MD     INDICATION/S FOR PROCEDURE/S:  Lilli Steven is a 71-year-old patient with dystonia affecting the  head, neck and shoulder girdle musculature secondary to a diagnosis of cervical dystonia with associated pain, tremor, spasms, loss of joint motion, loss of volitional motor control and difficulty with activities of daily living.      Her baseline symptoms have been recalcitrant to oral medications and conservative therapy. She is here today for an injection of Botox.       GOAL OF PROCEDURE:  The goal of this procedure is to increase active range of motion, improve volitional motor control and decrease pain  associated with dystonic movements, spasticity and tremor.    TOTAL DOSE ADMINISTERED:  Dose Administered:  175 units Botox    Diluent Used:  Preservative Free Normal Saline  Total Volume of Diluent Used:  1.75 ml  Lot # /C4 with Expiration Date:  2/2024  NDC #: Botox 100u ([address]-08)    Was there drug waste? Yes  Amount of drug waste (mL): 25 units Botox.  Reason for waste:  Single use vial    Multi-dose vial: No    Enio Mann DO  10/7/2021    Medication guide was offered to patient and was declined.    CONSENT:  The risks, benefits, and treatment options were discussed with Lilli Steven and she agreed to proceed.      Written consent was obtained by kaylah.     EQUIPMENT  USED:  Needle-37mm stimulating/recording  EMG/NCS Machine     SKIN PREPARATION:  Skin preparation was performed using an alcohol wipe.     GUIDANCE DESCRIPTION:  Electro-myographic guidance was necessary throughout the procedure to accurately identify all areas of dystonic and spastic muscles while avoiding injection of non-dystonic muscles and non-spastic muscles, neighboring nerves and nearby vascular structures.      AREA/MUSCLE INJECTED:  175 UNITS BOTOX = TOTAL DOSE     1. HEAD, NECK & SHOULDER GIRDLE MUSCLES: 175 units Botox = Total Dose, 1:1 Dilution                 Right Mid-Trapezius - 2.5 units of Botox at 1 site/s.   Left Mid-Trapezius - 2.5 units of Botox at 1 site/s.      Right Splenius Capitus - 20 units of Botox at 2 site/s.   Left Splenius Capitus - 20 units of Botox at 1 site/s.     Right Lateral Trapezius (low cervical) - 10 units of Botox at 2 site/s.   Left Lateral Trapezius (low cervical) - 5 units of Botox at 1 site/s.      Right Levator Scapulae - 20 units of Botox at 1 site/s.   Left Levator Scapulae - 20 units of Botox at 1 site/s .      Right Inferior Obliquus Capitis - 20 units of Botox at 1 site/s.   Left Inferior Obliquus Capitis - 10 units of at 1 site/s.      Right Sternocleidomastoid - 25 units of Botox at 2 site/s.               Left Sternocleidomastoid - 20 units of Botox at 2 site/s.         RESPONSE TO PROCEDURE:  Lilli Steven tolerated the procedure well and there were no immediate complications.  She was allowed to recover for an appropriate period of time and was discharged home in stable condition.     FOLLOW UP:  Lilli Steven was asked to follow up by phone in 7-14 days with Merry Bolaños RN, Care Coordinator, to report her response to this series of injections.  Based on the patient's previous response to this therapy, Lilli Steven was rescheduled for the next series of injections in 12 weeks.     PLAN (Medication Changes, Therapy Orders,  Work or Disability Issues, etc.): Will monitor and report response to today's injections.

## 2021-10-07 NOTE — LETTER
10/7/2021       RE: Lilli Steven  [address]  [facility] [address]     Dear Colleague,    Thank you for referring your patient, Lilli Steven, to the [facility] PHYSICAL MEDICINE [facility]ILITATION [facility]IN[facility] [place] at [facility]. Please see a copy of my visit note below.    BOTULINUM TOXIN PROCEDURE NOTE    Current Outpatient Medications:      mometasone (ELOCON) 0.1 % external cream, Apply sparingly to affected area twice daily as needed.  Do not apply to face., Disp: 45 g, Rfl: 0     OnabotulinumtoxinA (BOTOX IJ), Inject 150 Units into the muscle once Lot # /C3 with Expiration Date: 03/2021, Disp: , Rfl:      polyethylene glycol-propylene glycol (SYSTANE ULTRA) 0.4-0.3 % SOLN ophthalmic solution, Place 1 drop into both eyes as needed, Disp: , Rfl:      RESTASIS MULTIDOSE 0.05 % ophthalmic emulsion, Place 1 drop into both eyes 2 times daily, Disp: , Rfl: 3     simvastatin (ZOCOR) 20 MG tablet, TAKE 1 TABLET BY MOUTH EVERYDAY AT BEDTIME, Disp: 90 tablet, Rfl: 3     traZODone (DESYREL) 50 MG tablet, TAKE 3 TABLETS BY MOUTH AT BEDTIME, Disp: 270 tablet, Rfl: 3     triamcinolone (KENALOG) 0.1 % external cream, APPLY TOPICALLY, Disp: , Rfl:     Current Facility-Administered Medications:      botulinum toxin type A (BOTOX) 100 units injection 150 Units, 150 Units, Intramuscular, Once, Cassidy Ramirez MD     botulinum toxin type A (BOTOX) 100 units injection 150 Units, 150 Units, Intramuscular, Once, Cassidy Ramirez MD     botulinum toxin type A (BOTOX) 100 units injection 200 Units, 200 Units, Intramuscular, Q90 Days, Cassidy Ramirez MD, 200 Units at 07/15/21 1648     lidocaine (PF) (XYLOCAINE) 1 % injection 4 mL, 4 mL, , , Cruz Carvalho MD, 4 mL at 06/23/21 1659     lidocaine (PF) (XYLOCAINE) 1 % injection 4 mL, 4 mL, , , Cruz Carvalho MD, 4 mL at 06/23/21 2876     triamcinolone (KENALOG-40)  injection 40 mg, 40 mg, , , Cruz Carvalho MD, 40 mg at 06/23/21 1659     triamcinolone (KENALOG-40) injection 40 mg, 40 mg, , , Cruz Carvalho MD, 40 mg at 06/23/21 1659     Allergies   Allergen Reactions     Meloxicam      Diarrhea, vomiting     Primidone Other (See Comments)     Felt like motion sickness     Tramadol      Diarrhea, vomiting      Adhesive Tape Rash     Durabond only     Allergy Rash     Dermabond  Anesthesia IV set misc          PHYSICAL EXAM:    VS: BP (!) 140/84   Temp 98.1  F (36.7  C)   Resp 16   LMP  (LMP Unknown)   SpO2 96%    Head Tremor: Observed - no-no tremor present  Head & Neck Extension: Present - Slight  Sub-Occipital Extension: Present - Slight  Forward Head: Present - Slight   Head & Neck Lateral Bend: Left   Shoulder Elevation: Right     HPI:  Patient denies new medical diagnoses, illnesses, hospitalizations, emergency room visits, and injuries since the previous injection with botulinum neurotoxin.     She tried neck electrodes at the [facility] for a clinical study, and she noted that it was not only not helpful, but exacerbated her symptoms and likely decreased the efficacy of last botulinum toxin injections.    We reviewed the recommended safety guidelines for  Botox from any vaccine injection, such as the seasonal flu vaccine, by a minimum of 10-14 days with Lilli Steven. She acknowledged understanding.    RESPONSE TO PREVIOUS TREATMENT:    Lilli Steven received 175 units of Botox on 7/15/2021.    Problems following the previous series of neurotoxin injections included:  No problems reported    BENEFITS BY PATIENT REPORT:    Pain Improvement: Yes.  Percent Improvement: 30%  Duration of Benefit: lasted until September when she got trial injections and then gradual reduction in benefit since     Dystonia Improvement: Yes.  Percent Improvement: 30%  Duration of Benefit: lasted until September when she got trial injections and then gradual  reduction in benefit since    BOTULINUM NEUROTOXIN INJECTION PROCEDURES:    VERIFICATION OF PATIENT IDENTIFICATION AND PROCEDURE     Initials   Patient Name jq   Patient  jq   Procedure Verified by: jq     Prior to the start of the procedure and with procedural staff participation, I verbally confirmed the patient s identity using two indicators, relevant allergies, that the procedure was appropriate and matched the consent or emergent situation, and that the correct equipment/implants were available. Immediately prior to starting the procedure I conducted the Time Out with the procedural staff and re-confirmed the patient s name, procedure, and site/side. (The Joint Commission universal protocol was followed.)  Yes    Sedation (Moderate or Deep): None      Above assessments performed by:  Resident/Fellow       Enio Mann DO        The attending provider was present for the entire procedure documented below.    Attending: Cassidy Ramirez MD     INDICATION/S FOR PROCEDURE/S:  Lilli Steven is a 71-year-old patient with dystonia affecting the  head, neck and shoulder girdle musculature secondary to a diagnosis of cervical dystonia with associated pain, tremor, spasms, loss of joint motion, loss of volitional motor control and difficulty with activities of daily living.      Her baseline symptoms have been recalcitrant to oral medications and conservative therapy. She is here today for an injection of Botox.       GOAL OF PROCEDURE:  The goal of this procedure is to increase active range of motion, improve volitional motor control and decrease pain  associated with dystonic movements, spasticity and tremor.    TOTAL DOSE ADMINISTERED:  Dose Administered:  175 units Botox    Diluent Used:  Preservative Free Normal Saline  Total Volume of Diluent Used:  1.75 ml  Lot # /C4 with Expiration Date:  2024  NDC #: Botox 100u ([address]-96)    Was there drug waste? Yes  Amount of drug waste (mL): 25  units Botox.  Reason for waste:  Single use vial    Multi-dose vial: Ena Mann DO  10/7/2021    Medication guide was offered to patient and was declined.    CONSENT:  The risks, benefits, and treatment options were discussed with Lilli Steven and she agreed to proceed.      Written consent was obtained by jhugo.     EQUIPMENT USED:  Needle-37mm stimulating/recording  EMG/NCS Machine     SKIN PREPARATION:  Skin preparation was performed using an alcohol wipe.     GUIDANCE DESCRIPTION:  Electro-myographic guidance was necessary throughout the procedure to accurately identify all areas of dystonic and spastic muscles while avoiding injection of non-dystonic muscles and non-spastic muscles, neighboring nerves and nearby vascular structures.      AREA/MUSCLE INJECTED:  175 UNITS BOTOX = TOTAL DOSE     1. HEAD, NECK & SHOULDER GIRDLE MUSCLES: 175 units Botox = Total Dose, 1:1 Dilution                 Right Mid-Trapezius - 2.5 units of Botox at 1 site/s.   Left Mid-Trapezius - 2.5 units of Botox at 1 site/s.      Right Splenius Capitus - 20 units of Botox at 2 site/s.   Left Splenius Capitus - 20 units of Botox at 1 site/s.     Right Lateral Trapezius (low cervical) - 10 units of Botox at 2 site/s.   Left Lateral Trapezius (low cervical) - 5 units of Botox at 1 site/s.      Right Levator Scapulae - 20 units of Botox at 1 site/s.   Left Levator Scapulae - 20 units of Botox at 1 site/s .      Right Inferior Obliquus Capitis - 20 units of Botox at 1 site/s.   Left Inferior Obliquus Capitis - 10 units of at 1 site/s.      Right Sternocleidomastoid - 25 units of Botox at 2 site/s.               Left Sternocleidomastoid - 20 units of Botox at 2 site/s.         RESPONSE TO PROCEDURE:  Lilli Steven tolerated the procedure well and there were no immediate complications.  She was allowed to recover for an appropriate period of time and was discharged home in stable condition.     FOLLOW UP:  Lilli Steven was  asked to follow up by phone in 7-14 days with Lennie Sánchez, Merry Lemons RN, Care Coordinator, to report her response to this series of injections.  Based on the patient's previous response to this therapy, Lilli Steven was rescheduled for the next series of injections in 12 weeks.     PLAN (Medication Changes, Therapy Orders, Work or Disability Issues, etc.): Will monitor and report response to today's injections.             Again, thank you for allowing me to participate in the care of your patient.      Sincerely,    Cassidy Ramirez MD

## 2021-10-13 ENCOUNTER — OFFICE VISIT (OUTPATIENT)
Dept: FAMILY MEDICINE | Facility: CLINIC | Age: 71
End: 2021-10-13
Payer: COMMERCIAL

## 2021-10-13 VITALS
WEIGHT: 173.9 LBS | OXYGEN SATURATION: 97 % | SYSTOLIC BLOOD PRESSURE: 132 MMHG | HEIGHT: 62 IN | TEMPERATURE: 98.8 F | RESPIRATION RATE: 22 BRPM | BODY MASS INDEX: 32 KG/M2 | DIASTOLIC BLOOD PRESSURE: 82 MMHG | HEART RATE: 95 BPM

## 2021-10-13 DIAGNOSIS — M54.42 BILATERAL LOW BACK PAIN WITH LEFT-SIDED SCIATICA, UNSPECIFIED CHRONICITY: Primary | ICD-10-CM

## 2021-10-13 DIAGNOSIS — S80.11XA CONTUSION OF RIGHT LOWER EXTREMITY, INITIAL ENCOUNTER: ICD-10-CM

## 2021-10-13 DIAGNOSIS — E66.811 CLASS 1 OBESITY DUE TO EXCESS CALORIES WITHOUT SERIOUS COMORBIDITY WITH BODY MASS INDEX (BMI) OF 31.0 TO 31.9 IN ADULT: ICD-10-CM

## 2021-10-13 DIAGNOSIS — G24.1 GENETIC TORSION DYSTONIA: ICD-10-CM

## 2021-10-13 DIAGNOSIS — E66.09 CLASS 1 OBESITY DUE TO EXCESS CALORIES WITHOUT SERIOUS COMORBIDITY WITH BODY MASS INDEX (BMI) OF 31.0 TO 31.9 IN ADULT: ICD-10-CM

## 2021-10-13 PROCEDURE — 99214 OFFICE O/P EST MOD 30 MIN: CPT | Mod: 25 | Performed by: FAMILY MEDICINE

## 2021-10-13 PROCEDURE — 90471 IMMUNIZATION ADMIN: CPT | Performed by: FAMILY MEDICINE

## 2021-10-13 PROCEDURE — 90750 HZV VACC RECOMBINANT IM: CPT | Performed by: FAMILY MEDICINE

## 2021-10-13 ASSESSMENT — MIFFLIN-ST. JEOR: SCORE: 1257.06

## 2021-10-13 NOTE — PROGRESS NOTES
"    Assessment & Plan     Bilateral low back pain with left-sided sciatica, unspecified chronicity  Her symptoms appear consistent with muscle/lumbar strain.  I recommended ice or heat as needed.  She may take Tylenol as needed and use Aleve sparingly, with food, if needed.  She was also given a referral to physical therapy.  She would likely benefit from improved core muscle strengthening exercises.  - Physical Therapy Referral; Future    Genetic torsion dystonia  She may continue to follow with her specialist who is providing Botox injections.    Contusion right shin/leg  He may ice this area as needed.             BMI:   Estimated body mass index is 31.81 kg/m  as calculated from the following:    Height as of this encounter: 1.575 m (5' 2\").    Weight as of this encounter: 78.9 kg (173 lb 14.4 oz).   Weight management plan: Discussed healthy diet and exercise guidelines        Return in about 4 weeks (around 11/10/2021) for Follow up if symptoms not improving..    Kenneth Mata [facility]   Lilli is a 71 year old who presents for the following health issues   HPI     Pt needs 2nd shingrix today.    Back Pain  Onset/Duration: x3 months  Description:   Location of pain: low back left  Character of pain: sharp  Pain radiation: radiates into the left buttocks and radiates into the left leg  New numbness or weakness in legs, not attributed to pain: no   Intensity: Currently 7/10, moderate  Progression of Symptoms: same  History:   Specific cause: none  Pain interferes with job: no - retired a week ago  History of back problems: no prior back problems  Any previous MRI or X-rays: none  Sees a specialist for back pain: No  Alleviating factors:   Improved by: aleve    Precipitating factors:  Worsened by: standing and walking, sitting  Therapies tried and outcome: aleve - helpful      She describes having pain in her bilateral low back, worse on the left side that is " "radiating into the buttock and thigh at times.  Symptoms have been present for about 2 months.  She attributes this to prolonged sitting in her chair for work.  She just retired and is trying to ambulate more regularly.  She reports hitting her right shin on her desk chair a couple of weeks ago.  She has a bruise in this area and it is .  She is able to bear weight and ambulate without difficulty.  She had an MRI of the lumbar spine on 11/8/2019 which showed multilevel lumbar spondylosis and moderate spinal canal stenosis at L3-4 with impingement on the bilateral L3 and L4 nerve roots.  There was also a moderate L4 superior endplate compression deformity seen.    She has a history of dystonia affecting the head, neck and shoulder girdle musculature secondary to a diagnosis of cervical dystonia with associated pain, tremor, spasms, loss of joint motion, loss of volitional motor control and difficulty with activities of daily living.      Her baseline symptoms have been recalcitrant to oral medications and conservative therapy. She has been receiving Botox injections which have been helpful this.    MRI Lumbar Spine 11/8/19   Impression:   1. Multilevel lumbar spondylosis including moderate spinal canal  stenosis at L3-4. Impingement on the bilateral L3 and left L4 nerve  roots in their respective neural foramina.  2. Moderate L4 superior endplate compression deformity with loss of up  to 40-50% height appears chronic although new since August 2018.      Review of Systems   Constitutional, HEENT, cardiovascular, pulmonary, GI, , musculoskeletal, neuro, skin, endocrine and psych systems are negative, except as otherwise noted.      Objective    /82   Pulse 95   Temp 98.8  F (37.1  C) (Tympanic)   Resp 22   Ht 1.575 m (5' 2\")   Wt 78.9 kg (173 lb 14.4 oz)   LMP  (LMP Unknown)   SpO2 97%   BMI 31.81 kg/m    Body mass index is 31.81 kg/m .  Physical Exam   GENERAL: healthy, alert and no " distress  EYES: Eyes grossly normal to inspection, PERRL and conjunctivae and sclerae normal  NECK: no adenopathy, no asymmetry, masses, or scars and thyroid normal to palpation  RESP: lungs clear to auscultation - no rales, rhonchi or wheezes  CV: regular rate and rhythm, normal S1 S2, no S3 or S4, no murmur, click or rub, no peripheral edema and peripheral pulses strong  MS: Nontender over the cervical, thoracic and lumbar spine.  She is tender over the paraspinal muscles in the lumbar region and in the musculature in her buttocks/SI joints.  Straight leg raise negative bilaterally.  Internal and external hip rotation normal bilaterally.  There is tenderness with some bruising in the right upper shin.  She is able to bear weight and ambulate without difficulty.  NEURO: Normal strength and tone, mentation intact and speech normal.  Deep tendon reflexes 2-4 bilaterally  PSYCH: mentation appears normal, affect normal/bright

## 2021-10-13 NOTE — NURSING NOTE
Prior to immunization administration, verified patients identity using patient s name and date of birth. Please see Immunization Activity for additional information.     Screening Questionnaire for Adult Immunization    Are you sick today?   No   Do you have allergies to medications, food, a vaccine component or latex?   No   Have you ever had a serious reaction after receiving a vaccination?   No   Do you have a long-term health problem with heart, lung, kidney, or metabolic disease (e.g., diabetes), asthma, a blood disorder, no spleen, complement component deficiency, a cochlear implant, or a spinal fluid leak?  Are you on long-term aspirin therapy?   No   Do you have cancer, leukemia, HIV/AIDS, or any other immune system problem?   No   Do you have a parent, brother, or sister with an immune system problem?   No   In the past 3 months, have you taken medications that affect  your immune system, such as prednisone, other steroids, or anticancer drugs; drugs for the treatment of rheumatoid arthritis, Crohn s disease, or psoriasis; or have you had radiation treatments?   No   Have you had a seizure, or a brain or other nervous system problem?   No   During the past year, have you received a transfusion of blood or blood    products, or been given immune (gamma) globulin or antiviral drug?   No   For women: Are you pregnant or is there a chance you could become       pregnant during the next month?   No   Have you received any vaccinations in the past 4 weeks?   No     Immunization questionnaire answers were all negative.        Per orders of Dr. Elyse Mata, injection of Shingrix given by Heather Simons MA. Patient instructed to remain in clinic for 15 minutes afterwards, and to report any adverse reaction to me immediately.       Screening performed by Heather Simons MA on 10/13/2021 at 11:30 AM.  Heather Simons MA on 10/13/2021 at 11:30 AM

## 2021-10-15 ENCOUNTER — THERAPY VISIT (OUTPATIENT)
Dept: PHYSICAL THERAPY | Facility: CLINIC | Age: 71
End: 2021-10-15
Payer: COMMERCIAL

## 2021-10-15 DIAGNOSIS — M54.9 BACK PAIN: ICD-10-CM

## 2021-10-15 DIAGNOSIS — M54.42 BILATERAL LOW BACK PAIN WITH LEFT-SIDED SCIATICA, UNSPECIFIED CHRONICITY: ICD-10-CM

## 2021-10-15 PROCEDURE — 97110 THERAPEUTIC EXERCISES: CPT | Mod: GP | Performed by: PHYSICAL THERAPIST

## 2021-10-15 PROCEDURE — 97161 PT EVAL LOW COMPLEX 20 MIN: CPT | Mod: GP | Performed by: PHYSICAL THERAPIST

## 2021-10-15 NOTE — PROGRESS NOTES
Physical Therapy Initial Evaluation  Subjective:  The history is provided by the patient. No  was used.   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  Now having back pain.  Pain to left buttock.  Sore in the morning.  Sleeps on her sides. Has trouble getting going.   Not waking her at night.  Walks a bit now for 30 minutes.  NO change with this. Has some small dumbbells at home.  Alieve helps.  No x-rays taken.  Has had several abdominal surgeries in the past for henias etc.  .         Type of problem:  Lumbar.    This is a new condition.  Condition occurred with:  Insidious onset.  Where condition occurred: at home.  Patient reports pain:  Lower lumbar spine and lumbar spine left.  Pain is described as aching and is intermittent.  Pain radiates to:  Gluteals left.   Since onset symptoms are gradually worsening.  Associated symptoms:  Loss of motion/stiffness and loss of strength. Symptoms are exacerbated by standing and sitting  and relieved by NSAID's.      Barriers include:  None as reported by patient and lives alone.                        Objective:  System         Lumbar/SI Evaluation  ROM:    AROM Lumbar:   Flexion:          Hands to knees, no reversal of lordosis  Ext:                    Central low back pain   Side Bend:        Left:  Pain on the left    Right:  No pain  Rotation:           Left:     Right:   Side Glide:        Left:     Right:           Lumbar Myotomes:  normal            Lumbar DTR's:  not assessed      Cord Signs:  not assessed    Lumbar Dermtomes:  normal                Neural Tension/Mobility:  Lumbar:  Normal        Lumbar Palpation:    Tenderness present at Left:    Quadratus Lumborum and Erector Spinae    Functional Tests:  not assessed        Lumbar Provocation:    Left positive with:  PROM hip    Right negative with:  PROM hip                                                     General  Evaluation:            Palpation:    Significant findings:  Decreased mobility of abdominal scar tissue and scar tissue in right low back from trial of implant for incontinence                                                       ROS    Assessment/Plan:    Patient is a 71 year old female with lumbar complaints.    Patient has the following significant findings with corresponding treatment plan.                Diagnosis 1:  Low back pain  Pain -  manual therapy, self management, education and home program  Decreased ROM/flexibility - manual therapy, therapeutic exercise and home program  Decreased strength - therapeutic exercise, therapeutic activities and home program  Decreased function - therapeutic activities and home program    Therapy Evaluation Codes:   1) History comprised of:   Personal factors that impact the plan of care:      None.    Comorbidity factors that impact the plan of care are:      None.     Medications impacting care: None.  2) Examination of Body Systems comprised of:   Body structures and functions that impact the plan of care:      Lumbar spine.   Activity limitations that impact the plan of care are:      Bending, Sitting, Sports, Standing and Laying down.  3) Clinical presentation characteristics are:   Stable/Uncomplicated.  4) Decision-Making    Low complexity using standardized patient assessment instrument and/or measureable assessment of functional outcome.  Cumulative Therapy Evaluation is: Low complexity.    Previous and current functional limitations:  (See Goal Flow Sheet for this information)    Short term and Long term goals: (See Goal Flow Sheet for this information)     Communication ability:  Patient appears to be able to clearly communicate and understand verbal and written communication and follow directions correctly.  Treatment Explanation - The following has been discussed with the patient:   RX ordered/plan of care  Anticipated outcomes  Possible risks and side  effects  This patient would benefit from PT intervention to resume normal activities.   Rehab potential is good.    Frequency:  1 X week, once daily  Duration:  for 2 weeks tapering to 2 X a month over 4 weeks  Discharge Plan:  Achieve all LTG.  Independent in home treatment program.  Reach maximal therapeutic benefit.    Please refer to the daily flowsheet for treatment today, total treatment time and time spent performing 1:1 timed codes.

## 2021-10-22 ENCOUNTER — THERAPY VISIT (OUTPATIENT)
Dept: PHYSICAL THERAPY | Facility: CLINIC | Age: 71
End: 2021-10-22
Payer: COMMERCIAL

## 2021-10-22 DIAGNOSIS — M54.9 BACK PAIN: ICD-10-CM

## 2021-10-22 PROCEDURE — 97140 MANUAL THERAPY 1/> REGIONS: CPT | Mod: GP | Performed by: PHYSICAL THERAPIST

## 2021-10-22 PROCEDURE — 97110 THERAPEUTIC EXERCISES: CPT | Mod: GP | Performed by: PHYSICAL THERAPIST

## 2021-11-04 ENCOUNTER — THERAPY VISIT (OUTPATIENT)
Dept: PHYSICAL THERAPY | Facility: CLINIC | Age: 71
End: 2021-11-04
Payer: COMMERCIAL

## 2021-11-04 DIAGNOSIS — M54.9 BACK PAIN: ICD-10-CM

## 2021-11-04 PROCEDURE — 97140 MANUAL THERAPY 1/> REGIONS: CPT | Mod: GP | Performed by: PHYSICAL THERAPIST

## 2021-11-04 PROCEDURE — 97110 THERAPEUTIC EXERCISES: CPT | Mod: GP | Performed by: PHYSICAL THERAPIST

## 2021-11-04 NOTE — PROGRESS NOTES
Subjective:  HPI  Physical Exam  Oswestry Score: 16 %                 Objective:  System    Physical Exam    General     ROS    Assessment/Plan:    PROGRESS  REPORT    Progress reporting period is from 10/15/2021 to 11/4/2021.       SUBJECTIVE  Subjective changes noted by patient:  .  Subjective: Stiff in the morning.  Wondering if her mattress is the cause of the issues.  Went to visit her daughter and had less pain waking in the mornig.  Reprts she fell backwards carrying her grandson while she was there and also reports she was bending forward a bit to put him in his crib or on the floor    Current pain level is 5/10 Current Pain level: 5/10.     Previous pain level was   Initial Pain level: 5/10.   Changes in function:  None  Adverse reaction to treatment or activity: None    OBJECTIVE  Changes noted in objective findings:    Objective: No increase in pain in the morning.  Sleeps on her side.  Discussed trying to slowly work into a straight position in supine in the morning before standing.  Also engae gluteals with moving sit to stand which helped pain today.  Patient unsure about continuing PT with insurance change.       ASSESSMENT/PLAN  Updated problem list and treatment plan: Diagnosis 1:  Back pain  Pain -  manual therapy, self management, education and home program  Decreased ROM/flexibility - manual therapy, therapeutic exercise and home program  Decreased joint mobility - manual therapy, therapeutic exercise and home program  Decreased strength - therapeutic exercise, therapeutic activities and home program  Decreased function - therapeutic activities and home program  STG/LTGs have been met or progress has been made towards goals:  Yes (See Goal flow sheet completed today.)  Assessment of Progress: The patient's condition has potential to improve.  The patient has had set backs in their progress.  Self Management Plans:  Patient has been instructed in a home treatment program.    Lilli continues to  require the following intervention to meet STG and LTG's:  PT    Recommendations:  Patient to look into replacing 20 year old mattress.    Please refer to the daily flowsheet for treatment today, total treatment time and time spent performing 1:1 timed codes.

## 2021-12-14 ENCOUNTER — TELEPHONE (OUTPATIENT)
Dept: FAMILY MEDICINE | Facility: CLINIC | Age: 71
End: 2021-12-14
Payer: COMMERCIAL

## 2021-12-14 NOTE — PROGRESS NOTES
"[id number]    Assessment & Plan     Frequent urination  Urine is clear  Encouraged to stay hydrated.  No signs or symptoms consistent with urinary tract infection- and counseling given    Dystonic head tremor  - considered this problem when making today's plans  - no interventions today       -followed by neurologist- for Botox injecyion      She see urologist for bladder spasm and bot injection  Advised to keep up with the follow up              Return in about 1 week (around 12/23/2021) for concerns,unresolved.    Nesha Su MD  [employer]   Lilli is a 71 year old who presents for the following health issues HPI     Genitourinary - Female  Onset/Duration: Couple weeks  Description:   Painful urination (Dysuria): YES           Frequency: YES  Blood in urine (Hematuria): no  Delay in urine (Hesitency): no  Intensity: mild  Progression of Symptoms:  same  Accompanying Signs & Symptoms:  Fever/chills: no  Flank pain: no  Nausea and vomiting: no  Vaginal symptoms: odor and itching  Abdominal/Pelvic Pain: no  History:   History of frequent UTI s: no  History of kidney stones: no  Sexually Active: no  Possibility of pregnancy: No  Precipitating or alleviating factors: None  Therapies tried and outcome: none    Gets bladder injection from urologist      Review of Systems   Constitutional, HEENT, cardiovascular, pulmonary, GI, , musculoskeletal, neuro, skin, endocrine and psych systems are negative, except as otherwise noted.      Objective    /81   Pulse 83   Temp 97.4  F (36.3  C)   Ht 1.575 m (5' 2\")   Wt 77.1 kg (169 lb 14.4 oz)   LMP  (LMP Unknown)   SpO2 96%   BMI 31.08 kg/m    Body mass index is 31.08 kg/m .  Physical Exam   GENERAL: healthy, alert and no distress, very pleasant  Head tremors   RESP: lungs clear to auscultation - no rales, rhonchi or wheezes  CV: regular rate and rhythm, normal S1 S2, no S3 or S4, no murmur, click or rub, no peripheral edema and " peripheral pulses strong  ABDOMEN: soft, nontender, no hepatosplenomegaly, no masses and bowel sounds normal  MS: no gross musculoskeletal defects noted, no edema  PSYCH: mentation appears normal, affect normal/bright    Results for orders placed or performed in visit on 12/16/21 (from the past 24 hour(s))   UA Macro with Reflex to Micro and Culture - lab collect    Specimen: Urine, Midstream   Result Value Ref Range    Color Urine Yellow Colorless, Straw, Light Yellow, Yellow    Appearance Urine Clear Clear    Glucose Urine Negative Negative mg/dL    Bilirubin Urine Negative Negative    Ketones Urine Negative Negative mg/dL    Specific Gravity Urine 1.015 1.003 - 1.035    Blood Urine Negative Negative    pH Urine 5.5 5.0 - 7.0    Protein Albumin Urine Negative Negative mg/dL    Urobilinogen Urine 0.2 0.2, 1.0 E.U./dL    Nitrite Urine Negative Negative    Leukocyte Esterase Urine Negative Negative    Narrative    Microscopic not indicated

## 2021-12-16 ENCOUNTER — OFFICE VISIT (OUTPATIENT)
Dept: FAMILY MEDICINE | Facility: CLINIC | Age: 71
End: 2021-12-16
Payer: COMMERCIAL

## 2021-12-16 VITALS
HEIGHT: 62 IN | OXYGEN SATURATION: 96 % | HEART RATE: 83 BPM | SYSTOLIC BLOOD PRESSURE: 134 MMHG | DIASTOLIC BLOOD PRESSURE: 81 MMHG | BODY MASS INDEX: 31.27 KG/M2 | WEIGHT: 169.9 LBS | TEMPERATURE: 97.4 F

## 2021-12-16 DIAGNOSIS — G25.0 ESSENTIAL TREMOR: ICD-10-CM

## 2021-12-16 DIAGNOSIS — R35.0 FREQUENT URINATION: Primary | ICD-10-CM

## 2021-12-16 LAB
ALBUMIN UR-MCNC: NEGATIVE MG/DL
APPEARANCE UR: CLEAR
BILIRUB UR QL STRIP: NEGATIVE
COLOR UR AUTO: YELLOW
GLUCOSE UR STRIP-MCNC: NEGATIVE MG/DL
HGB UR QL STRIP: NEGATIVE
KETONES UR STRIP-MCNC: NEGATIVE MG/DL
LEUKOCYTE ESTERASE UR QL STRIP: NEGATIVE
NITRATE UR QL: NEGATIVE
PH UR STRIP: 5.5 [PH] (ref 5–7)
SP GR UR STRIP: 1.01 (ref 1–1.03)
UROBILINOGEN UR STRIP-ACNC: 0.2 E.U./DL

## 2021-12-16 PROCEDURE — 81003 URINALYSIS AUTO W/O SCOPE: CPT | Performed by: FAMILY MEDICINE

## 2021-12-16 PROCEDURE — 99213 OFFICE O/P EST LOW 20 MIN: CPT | Performed by: FAMILY MEDICINE

## 2021-12-16 ASSESSMENT — MIFFLIN-ST. JEOR: SCORE: 1238.91

## 2021-12-31 ENCOUNTER — ANCILLARY PROCEDURE (OUTPATIENT)
Dept: MAMMOGRAPHY | Facility: CLINIC | Age: 71
End: 2021-12-31
Payer: COMMERCIAL

## 2021-12-31 DIAGNOSIS — Z12.31 VISIT FOR SCREENING MAMMOGRAM: ICD-10-CM

## 2021-12-31 PROCEDURE — 77063 BREAST TOMOSYNTHESIS BI: CPT | Performed by: RADIOLOGY

## 2021-12-31 PROCEDURE — 77067 SCR MAMMO BI INCL CAD: CPT | Performed by: RADIOLOGY

## 2022-01-03 ENCOUNTER — ANCILLARY PROCEDURE (OUTPATIENT)
Dept: MAMMOGRAPHY | Facility: CLINIC | Age: 72
End: 2022-01-03
Attending: FAMILY MEDICINE
Payer: COMMERCIAL

## 2022-01-03 DIAGNOSIS — R92.8 ABNORMAL MAMMOGRAM OF LEFT BREAST: ICD-10-CM

## 2022-01-03 PROCEDURE — 76642 ULTRASOUND BREAST LIMITED: CPT | Mod: LT | Performed by: RADIOLOGY

## 2022-01-03 PROCEDURE — 77065 DX MAMMO INCL CAD UNI: CPT | Mod: LT | Performed by: RADIOLOGY

## 2022-01-03 PROCEDURE — G0279 TOMOSYNTHESIS, MAMMO: HCPCS | Performed by: RADIOLOGY

## 2022-01-04 ENCOUNTER — OFFICE VISIT (OUTPATIENT)
Dept: PHYSICAL MEDICINE AND REHAB | Facility: CLINIC | Age: 72
End: 2022-01-04
Payer: COMMERCIAL

## 2022-01-04 VITALS
DIASTOLIC BLOOD PRESSURE: 89 MMHG | OXYGEN SATURATION: 94 % | RESPIRATION RATE: 16 BRPM | HEART RATE: 86 BPM | TEMPERATURE: 98.3 F | SYSTOLIC BLOOD PRESSURE: 137 MMHG

## 2022-01-04 DIAGNOSIS — G24.1 GENETIC TORSION DYSTONIA: ICD-10-CM

## 2022-01-04 DIAGNOSIS — G24.3 SPASMODIC TORTICOLLIS: Primary | ICD-10-CM

## 2022-01-04 PROCEDURE — 96372 THER/PROPH/DIAG INJ SC/IM: CPT | Mod: GC | Performed by: PHYSICAL MEDICINE & REHABILITATION

## 2022-01-04 PROCEDURE — 64616 CHEMODENERV MUSC NECK DYSTON: CPT | Mod: 59 | Performed by: PHYSICAL MEDICINE & REHABILITATION

## 2022-01-04 PROCEDURE — 64643 CHEMODENERV 1 EXTREM 1-4 EA: CPT | Mod: 59 | Performed by: PHYSICAL MEDICINE & REHABILITATION

## 2022-01-04 PROCEDURE — 95874 GUIDE NERV DESTR NEEDLE EMG: CPT | Mod: GC | Performed by: PHYSICAL MEDICINE & REHABILITATION

## 2022-01-04 PROCEDURE — 64642 CHEMODENERV 1 EXTREMITY 1-4: CPT | Mod: 59 | Performed by: PHYSICAL MEDICINE & REHABILITATION

## 2022-01-04 ASSESSMENT — PAIN SCALES - GENERAL: PAINLEVEL: NO PAIN (0)

## 2022-01-04 NOTE — LETTER
1/4/2022       RE: Lilli Steven  [address]  [facility] [id number]     Dear Colleague,    Thank you for referring your patient, Lilli Steven, to the [facility] [place] at [facility]. Please see a copy of my visit note below.    BOTULINUM TOXIN PROCEDURE NOTE    Current Outpatient Medications:      mometasone (ELOCON) 0.1 % external cream, Apply sparingly to affected area twice daily as needed.  Do not apply to face., Disp: 45 g, Rfl: 0     OnabotulinumtoxinA (BOTOX IJ), Inject 150 Units into the muscle once Lot # /C3 with Expiration Date: 03/2021, Disp: , Rfl:      polyethylene glycol-propylene glycol (SYSTANE ULTRA) 0.4-0.3 % SOLN ophthalmic solution, Place 1 drop into both eyes as needed, Disp: , Rfl:      RESTASIS MULTIDOSE 0.05 % ophthalmic emulsion, Place 1 drop into both eyes 2 times daily, Disp: , Rfl: 3     simvastatin (ZOCOR) 20 MG tablet, TAKE 1 TABLET BY MOUTH EVERYDAY AT BEDTIME, Disp: 90 tablet, Rfl: 3     traZODone (DESYREL) 50 MG tablet, TAKE 3 TABLETS BY MOUTH AT BEDTIME, Disp: 270 tablet, Rfl: 3     triamcinolone (KENALOG) 0.1 % external cream, APPLY TOPICALLY, Disp: , Rfl:     Current Facility-Administered Medications:      botulinum toxin type A (BOTOX) 100 units injection 150 Units, 150 Units, Intramuscular, Once, StandCassidy cuenca MD     botulinum toxin type A (BOTOX) 100 units injection 150 Units, 150 Units, Intramuscular, Once, Standal, Cassidy New MD     botulinum toxin type A (BOTOX) 100 units injection 175 Units, 175 Units, Intramuscular, Once, Cassidy Ramirez MD     botulinum toxin type A (BOTOX) 100 units injection 200 Units, 200 Units, Intramuscular, Q90 Days, Cassidy Ramirez MD, 200 Units at 07/15/21 1648     lidocaine (PF) (XYLOCAINE) 1 % injection 4 mL, 4 mL, , , Cruz Carvalho MD, 4 mL at 06/23/21 1659     lidocaine  (PF) (XYLOCAINE) 1 % injection 4 mL, 4 mL, , , Cruz Carvalho MD, 4 mL at 06/23/21 1659     triamcinolone (KENALOG-40) injection 40 mg, 40 mg, , , Cruz Carvalho MD, 40 mg at 06/23/21 1659     triamcinolone (KENALOG-40) injection 40 mg, 40 mg, , , Cruz Carvalho MD, 40 mg at 06/23/21 1659     Allergies   Allergen Reactions     Meloxicam      Diarrhea, vomiting     Primidone Other (See Comments)     Felt like motion sickness     Tramadol      Diarrhea, vomiting      Adhesive Tape Rash     Durabond only     Allergy Rash     Dermabond  Anesthesia IV set misc          PHYSICAL EXAM:    VS: /89   Pulse 86   Temp 98.3  F (36.8  C)   Resp 16   LMP  (LMP Unknown)   SpO2 94%    Head Tremor: Mild left-right resting tremor of head  Head & Neck Extension: Mild  Sub-Occipital Extension: Mild  Forward Head: Negative   Head & Neck Lateral Bend: Negative   Shoulder Elevation: Negative    HPI:  Lilli Steven is a 71 year old female with a history of involuntary neck and shoulder muscle spasms who presents for Botox injections for management of cervical dystonia.     Patient denies new medical diagnoses, illnesses, hospitalizations, emergency room visits, and injuries since the previous injection with botulinum neurotoxin.     She is still experiencing adverse effects from the [facility] electrode study, with residual neck and back pain.  She had to sit in a chair with no back support, and has back and leg pain exacerbating her pain profile.  It interferes with her daily activities, and she recently sustained a fall onto her back while holding her grandchild due to her leg pain.    We reviewed the recommended safety guidelines for  Botox from any vaccine injection, such as the seasonal flu vaccine, by a minimum of 10-14 days with Lilli Steven. She acknowledged understanding.    RESPONSE TO PREVIOUS TREATMENT:    Lilli Steven received 175 units of Botox on 10/7/2021.    Problems  following the previous series of neurotoxin injections included:  No problems reported    BENEFITS BY PATIENT REPORT:    Pain Improvement: Yes.  Percent Improvement: 30%  Duration of Benefit: 10 weeks with gradual decline.     Dystonia Improvement: Yes.  Percent Improvement: 30%  Duration of Benefit: 10 weeks with gradual decline.    BOTULINUM NEUROTOXIN INJECTION PROCEDURES:    VERIFICATION OF PATIENT IDENTIFICATION AND PROCEDURE     Initials   Patient Name DYB   Patient  DYB   Procedure Verified by: JOANA     Prior to the start of the procedure and with procedural staff participation, I verbally confirmed the patient s identity using two indicators, relevant allergies, that the procedure was appropriate and matched the consent or emergent situation, and that the correct equipment/implants were available. Immediately prior to starting the procedure I conducted the Time Out with the procedural staff and re-confirmed the patient s name, procedure, and site/side. (The Joint Commission universal protocol was followed.)  Yes    Sedation (Moderate or Deep): None      Above assessments performed by:  Resident/Fellow       Kenneth Fernandes MD        The attending provider was present for the entire procedure documented below.    Attending: Cassidy Ramirez MD     INDICATION/S FOR PROCEDURE/S:  Lilli Steven is a 71-year-old patient with dystonia affecting the  head, neck and shoulder girdle musculature secondary to a diagnosis of cervical dystonia with associated pain, tremor, spasms, loss of joint motion, loss of volitional motor control and difficulty with activities of daily living.      Her baseline symptoms have been recalcitrant to oral medications and conservative therapy. She is here today for an injection of Botox.       GOAL OF PROCEDURE:  The goal of this procedure is to increase active range of motion, improve volitional motor control and decrease pain  associated with dystonic movements, spasticity  and tremor.    TOTAL DOSE: 200 UNITS BOTOX  Dose Administered:  175 units Botox    Diluent Used:  Preservative Free Normal Saline  Total Volume of Diluent Used:  1.75 ml  Lot # [id number] with Expiration Date: 02/2024  NDC #: Botox 100u ([address]-12)    Was there drug waste? Yes  Amount of drug waste (mL): 25 units Botox.  Reason for waste:  Single use vial    Multi-dose vial: No    Medication guide was offered to patient and was declined.    CONSENT:  The risks, benefits, and treatment options were discussed with Lilli Steven and she agreed to proceed.      Written consent was obtained by DYAROLDO.     EQUIPMENT USED:  Needle-37mm stimulating/recording  EMG/NCS Machine     SKIN PREPARATION:  Skin preparation was performed using an alcohol wipe.     GUIDANCE DESCRIPTION:  Electro-myographic guidance was necessary throughout the procedure to accurately identify all areas of dystonic and spastic muscles while avoiding injection of non-dystonic muscles and non-spastic muscles, neighboring nerves and nearby vascular structures.      AREA/MUSCLE INJECTED:  175 UNITS BOTOX = TOTAL DOSE     1. HEAD, NECK & SHOULDER GIRDLE MUSCLES: 175 units Botox = Total Dose, 1:1 Dilution                 Right Mid-Trapezius - 2.5 units of Botox at 1 site/s.   Left Mid-Trapezius - 2.5 units of Botox at 1 site/s.      Right Splenius Capitus - 20 units of Botox at 2 site/s.   Left Splenius Capitus - 20 units of Botox at 1 site/s.     Right Lateral Trapezius (low cervical) - 10 units of Botox at 2 site/s.   Left Lateral Trapezius (low cervical) - 5 units of Botox at 1 site/s.      Right Levator Scapulae - 20 units of Botox at 1 site/s.   Left Levator Scapulae - 20 units of Botox at 1 site/s .      Right Inferior Obliquus Capitis - 20 units of Botox at 1 site/s.   Left Inferior Obliquus Capitis - 10 units of at 1 site/s.      Right Sternocleidomastoid - 25 units of Botox at 2 site/s.               Left Sternocleidomastoid - 20 units of Botox at  2 site/s.         RESPONSE TO PROCEDURE:  Lilli Steven tolerated the procedure well and there were no immediate complications. She was allowed to recover for an appropriate period of time and was discharged home in stable condition.     FOLLOW UP:  Lilli Steven was asked to follow up by phone in 7-14 days with Merry Bolaños RN, Care Coordinator, to report her response to this series of injections.  Based on the patient's previous response to this therapy, Lilli Steven was rescheduled for the next series of injections in 12 weeks.     PLAN (Medication Changes, Therapy Orders, Work or Disability Issues, etc.): Consultation with Dr. Montez was placed to evaluate bilateral leg pain (R>L) in the setting of low back pain. She was scheduled with him for 1/13/2022.  With regards to the Botox, no changes made today. She will monitor and report response to today's injections.       Cassidy Ramirez MD

## 2022-01-04 NOTE — PROGRESS NOTES
BOTULINUM TOXIN PROCEDURE NOTE    Current Outpatient Medications:      mometasone (ELOCON) 0.1 % external cream, Apply sparingly to affected area twice daily as needed.  Do not apply to face., Disp: 45 g, Rfl: 0     OnabotulinumtoxinA (BOTOX IJ), Inject 150 Units into the muscle once Lot # /C3 with Expiration Date: 03/2021, Disp: , Rfl:      polyethylene glycol-propylene glycol (SYSTANE ULTRA) 0.4-0.3 % SOLN ophthalmic solution, Place 1 drop into both eyes as needed, Disp: , Rfl:      RESTASIS MULTIDOSE 0.05 % ophthalmic emulsion, Place 1 drop into both eyes 2 times daily, Disp: , Rfl: 3     simvastatin (ZOCOR) 20 MG tablet, TAKE 1 TABLET BY MOUTH EVERYDAY AT BEDTIME, Disp: 90 tablet, Rfl: 3     traZODone (DESYREL) 50 MG tablet, TAKE 3 TABLETS BY MOUTH AT BEDTIME, Disp: 270 tablet, Rfl: 3     triamcinolone (KENALOG) 0.1 % external cream, APPLY TOPICALLY, Disp: , Rfl:     Current Facility-Administered Medications:      botulinum toxin type A (BOTOX) 100 units injection 150 Units, 150 Units, Intramuscular, Once, StandCassidy cuenca MD     botulinum toxin type A (BOTOX) 100 units injection 150 Units, 150 Units, Intramuscular, Once, Cassidy Ramirez MD     botulinum toxin type A (BOTOX) 100 units injection 175 Units, 175 Units, Intramuscular, Once, StandCassidy cuenca MD     botulinum toxin type A (BOTOX) 100 units injection 200 Units, 200 Units, Intramuscular, Q90 Days, Cassidy Ramirez MD, 200 Units at 07/15/21 1648     lidocaine (PF) (XYLOCAINE) 1 % injection 4 mL, 4 mL, , , Cruz Carvalho MD, 4 mL at 06/23/21 1659     lidocaine (PF) (XYLOCAINE) 1 % injection 4 mL, 4 mL, , , Cruz Carvalho MD, 4 mL at 06/23/21 1659     triamcinolone (KENALOG-40) injection 40 mg, 40 mg, , , Cruz Carvalho MD, 40 mg at 06/23/21 1659     triamcinolone (KENALOG-40) injection 40 mg, 40 mg, , , Cruz Carvalho MD, 40 mg at 06/23/21 7712     Allergies   Allergen Reactions      Meloxicam      Diarrhea, vomiting     Primidone Other (See Comments)     Felt like motion sickness     Tramadol      Diarrhea, vomiting      Adhesive Tape Rash     Durabond only     Allergy Rash     Dermabond  Anesthesia IV set misc          PHYSICAL EXAM:    VS: /89   Pulse 86   Temp 98.3  F (36.8  C)   Resp 16   LMP  (LMP Unknown)   SpO2 94%    Head Tremor: Mild left-right resting tremor of head  Head & Neck Extension: Mild  Sub-Occipital Extension: Mild  Forward Head: Negative   Head & Neck Lateral Bend: Negative   Shoulder Elevation: Negative    HPI:  Lilli Steven is a 71 year old female with a history of involuntary neck and shoulder muscle spasms who presents for Botox injections for management of cervical dystonia.     Patient denies new medical diagnoses, illnesses, hospitalizations, emergency room visits, and injuries since the previous injection with botulinum neurotoxin.     She is still experiencing adverse effects from the [facility] electrode study, with residual neck and back pain.  She had to sit in a chair with no back support, and has back and leg pain exacerbating her pain profile.  It interferes with her daily activities, and she recently sustained a fall onto her back while holding her grandchild due to her leg pain.    We reviewed the recommended safety guidelines for  Botox from any vaccine injection, such as the seasonal flu vaccine, by a minimum of 10-14 days with Lilli Steven. She acknowledged understanding.    RESPONSE TO PREVIOUS TREATMENT:    Lilli Steven received 175 units of Botox on 10/7/2021.    Problems following the previous series of neurotoxin injections included:  No problems reported    BENEFITS BY PATIENT REPORT:    Pain Improvement: Yes.  Percent Improvement: 30%  Duration of Benefit: 10 weeks with gradual decline.     Dystonia Improvement: Yes.  Percent Improvement: 30%  Duration of Benefit: 10 weeks with gradual decline.    BOTULINUM NEUROTOXIN  INJECTION PROCEDURES:    VERIFICATION OF PATIENT IDENTIFICATION AND PROCEDURE     Initials   Patient Name DYB   Patient  DYB   Procedure Verified by: JOANA     Prior to the start of the procedure and with procedural staff participation, I verbally confirmed the patient s identity using two indicators, relevant allergies, that the procedure was appropriate and matched the consent or emergent situation, and that the correct equipment/implants were available. Immediately prior to starting the procedure I conducted the Time Out with the procedural staff and re-confirmed the patient s name, procedure, and site/side. (The Joint Commission universal protocol was followed.)  Yes    Sedation (Moderate or Deep): None      Above assessments performed by:  Resident/Fellow       Kenneth Fernandes MD        The attending provider was present for the entire procedure documented below.    Attending: Cassidy Ramirez MD     INDICATION/S FOR PROCEDURE/S:  Lilli Steven is a 71-year-old patient with dystonia affecting the  head, neck and shoulder girdle musculature secondary to a diagnosis of cervical dystonia with associated pain, tremor, spasms, loss of joint motion, loss of volitional motor control and difficulty with activities of daily living.      Her baseline symptoms have been recalcitrant to oral medications and conservative therapy. She is here today for an injection of Botox.       GOAL OF PROCEDURE:  The goal of this procedure is to increase active range of motion, improve volitional motor control and decrease pain  associated with dystonic movements, spasticity and tremor.    TOTAL DOSE: 200 UNITS BOTOX  Dose Administered:  175 units Botox    Diluent Used:  Preservative Free Normal Saline  Total Volume of Diluent Used:  1.75 ml  Lot # [id number] with Expiration Date: 2024  NDC #: Botox 100u ([address]-12)    Was there drug waste? Yes  Amount of drug waste (mL): 25 units Botox.  Reason for waste:  Single use  vial    Multi-dose vial: No    Medication guide was offered to patient and was declined.    CONSENT:  The risks, benefits, and treatment options were discussed with Lilli Steven and she agreed to proceed.      Written consent was obtained by DYAROLDO.     EQUIPMENT USED:  Needle-37mm stimulating/recording  EMG/NCS Machine     SKIN PREPARATION:  Skin preparation was performed using an alcohol wipe.     GUIDANCE DESCRIPTION:  Electro-myographic guidance was necessary throughout the procedure to accurately identify all areas of dystonic and spastic muscles while avoiding injection of non-dystonic muscles and non-spastic muscles, neighboring nerves and nearby vascular structures.      AREA/MUSCLE INJECTED:  175 UNITS BOTOX = TOTAL DOSE     1. HEAD, NECK & SHOULDER GIRDLE MUSCLES: 175 units Botox = Total Dose, 1:1 Dilution                 Right Mid-Trapezius - 2.5 units of Botox at 1 site/s.   Left Mid-Trapezius - 2.5 units of Botox at 1 site/s.      Right Splenius Capitus - 20 units of Botox at 2 site/s.   Left Splenius Capitus - 20 units of Botox at 1 site/s.     Right Lateral Trapezius (low cervical) - 10 units of Botox at 2 site/s.   Left Lateral Trapezius (low cervical) - 5 units of Botox at 1 site/s.      Right Levator Scapulae - 20 units of Botox at 1 site/s.   Left Levator Scapulae - 20 units of Botox at 1 site/s .      Right Inferior Obliquus Capitis - 20 units of Botox at 1 site/s.   Left Inferior Obliquus Capitis - 10 units of at 1 site/s.      Right Sternocleidomastoid - 25 units of Botox at 2 site/s.               Left Sternocleidomastoid - 20 units of Botox at 2 site/s.         RESPONSE TO PROCEDURE:  Lilli Steven tolerated the procedure well and there were no immediate complications. She was allowed to recover for an appropriate period of time and was discharged home in stable condition.     FOLLOW UP:  Lilli Steven was asked to follow up by phone in 7-14 days with Lennie Sánchez, Merry Lemons,  RN, Care Coordinator, to report her response to this series of injections.  Based on the patient's previous response to this therapy, Lilli Steven was rescheduled for the next series of injections in 12 weeks.     PLAN (Medication Changes, Therapy Orders, Work or Disability Issues, etc.): Consultation with Dr. Montez was placed to evaluate bilateral leg pain (R>L) in the setting of low back pain. She was scheduled with him for 1/13/2022.  With regards to the Botox, no changes made today. She will monitor and report response to today's injections.

## 2022-01-05 NOTE — PROGRESS NOTES
Lilli is booked to see Shola Montez next week Thursday at Jan 13th 10:30 am for consult re leg pain.    Cassdiy Camejo RN on 1/5/2022 at 4:50 PM
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
## 2022-01-13 ENCOUNTER — OFFICE VISIT (OUTPATIENT)
Dept: PHYSICAL MEDICINE AND REHAB | Facility: CLINIC | Age: 72
End: 2022-01-13
Payer: COMMERCIAL

## 2022-01-13 ENCOUNTER — TELEPHONE (OUTPATIENT)
Dept: PHYSICAL MEDICINE AND REHAB | Facility: CLINIC | Age: 72
End: 2022-01-13

## 2022-01-13 VITALS
SYSTOLIC BLOOD PRESSURE: 143 MMHG | DIASTOLIC BLOOD PRESSURE: 82 MMHG | RESPIRATION RATE: 16 BRPM | HEART RATE: 84 BPM | OXYGEN SATURATION: 96 %

## 2022-01-13 DIAGNOSIS — M67.952 TENDINOPATHY OF LEFT GLUTEAL REGION: ICD-10-CM

## 2022-01-13 DIAGNOSIS — M54.16 LUMBAR RADICULITIS: Primary | ICD-10-CM

## 2022-01-13 PROCEDURE — 99215 OFFICE O/P EST HI 40 MIN: CPT | Mod: 24 | Performed by: PHYSICAL MEDICINE & REHABILITATION

## 2022-01-13 RX ORDER — GABAPENTIN 300 MG/1
300 CAPSULE ORAL 3 TIMES DAILY
Qty: 90 CAPSULE | Refills: 3 | Status: SHIPPED | OUTPATIENT
Start: 2022-01-13 | End: 2022-05-12
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
## 2022-01-13 NOTE — PATIENT INSTRUCTIONS
Gabapentin (Neurontin)           AM        PM       BEDTIME    Day 0-5         -            -             300mg  Day 5-10      300       -              300mg  Day 10+       300       300         300mg    Continue to increase your dose as discussed above if you are not experiencing any side effects (in other words - if you experience side effects do not progress to the next week). If you are experiencing any side effects, return to the previous dose that was tolerable and continue until follow-up.     The most common side effect is sedation. Do not drive a motor vehicle until after you are familiar with how the medication may impact your attention and reaction.    Note that it may take several weeks to notice the benefits of this medication.   Do not abruptly stopped this medication prior to consulting with a physician.        Preparing for Spine Injection Therapy              Why Do I Need Spine Injection Therapy?  Your Health Care Provider is recommending spine injection therapy to  help relieve your back and neck pain. This will be in addition to other therapies such as medications and physical therapy. The purpose of these injections is to reduce the amount of inflammation (swelling, pain, heat, redness, loss of body function) around the nerves thus reducing the amount of pain.     The medications you will receive with the injections will include:     Anesthetic - medication to numb the painful area.      Steroid - medication that prevents or reduces swelling and pain (anti-inflammatory).   To reduce your discomfort during the injection procedure, you will receive a numbing medication injection prior to the placement of the needles. You will be lying on your stomach during the injection procedure. We will use a low-dose x-ray (fluoroscopy) to help guide needle placement.  You must have a  for this procedure. We will need to reschedule your injection if you do not have a  with you.      What are the  different types of injections and procedure?  Below, is a brief description of the different types of injections we use to deliver pain medication as close as possible to the nerves in the painful area:      Epidural injection: (picture on the right) Epidural injections place 2 medications in your epidural space.  This is the space alongside your spinal canal (not inside of it). Nerves from your spinal cord pass through this space. The medications will bathe those nerves.         What Should I Expect if I Receive Sedation? THIS IS ORDERED BY THE PHYSICIAN  This is conscious sedation.  The medications we give to you will help you relax and reduce your anxiety.  You will still be awake for the procedure so we can ask you questions and hear your answers.     What Are My Responsibilities With Sedation?    You must stop eating and drinking 8 (eight) hours before your procedure. You can have clear fluids (water, coffee/tea without milk) up to 2 hours prior to the injections. Nothing by mouth for 2 hours prior to injection.  This includes gum, mints, or chew.  Take your morning medications with a small sip of water.      You must have a  who will check-in with you, and stay in the building while the procedure is underway.  If you do not have a  your sedation will be cancelled.  We will monitor you for at least 30 minutes after the procedure before being discharged home.      What Are the Risks and Complications For This Procedure?  Risks and complication are rare, but can still occur.  You should understand, discuss, and accept these risks before agreeing to the procedure. They include, but are not limited to:    infection    nerve damage     paralysis    injection failure or a need for further injections or additional procedures    continued or worsening of symptoms/pain,     medication reaction,    dural leak (into the hole covering around the spinal cord. This may cause a a spinal headache)    leak of the  medication into the spinal canal, nerves, or blood vessel.    death       What do I need to do before the procedure?   If you do not follow these instructions your procedure may be cancelled.    Tell us if you are on major blood thinners such as Coumadin, Xarelto , Plavix, Eliquis , Pradaxa , or others.    o Contact the doctor who prescribed your blood thinner to ask for permission to stop taking it before you have the injection.    o Schedule your pain injection procedure after your doctor gave their permission.   o We will notify you when to stop and re-start your blood thinner.    Tell us if you have any allergies to contrast dye.  If you do, we may give additional medications to take before the procedure.    Tell us if you are pregnant, or possibly pregnant.  If so, you cannot receive steroid medications or be exposed to fluoroscopic X-rays.    Tell us if you have been sick during the 10 days before the procedure. This includes:   o colds   o gastrointestinal illness or discomfort  o dental sores,   o skin infection, or any other type of infection.    Tell us if you have taken antibiotics during the 10 days prior to the procedure.    Do not drink alcohol the night before or on the day of the procedure.    You must shower the night before and on the day of your procedure.    Wear comfortable, clean clothing.    If you have an outside MRI (Magnetic Resonance Imaging photo), please bring it with you.    What Will Happen After the Procedure?    If you did not receive sedation we will monitor you for 15 minutes after the procedure. If you received sedation, we will monitor you for at least 30 minutes after the procedure     How soon can I expect pain relief?    You have received 2 types of medications with your injection:   o  Anesthetic - numbing medication which only acts for a few hours   o  Steroid which may take 3-14 days to be effective.     You can expect to feel your normal pain after the anesthetic wears  off, until the steroid becomes effective.    How should I care for myself at home?    Get plenty of rest and avoid twisting, bending movements, heavy lifting, or strenuous activity for the first 24 hours. This will help the steroid be more effective. Medial Branch Block injections will have different discharge instructions.  Those will be discussed at that injection appointment.    Resume your pain medications    Apply ice packs (on for 20 minutes at a time), every 2-3 hours to your injection area for the first 2-3 days to help with pain control.      Avoid heat (pads or water bottles), which can cause the veins to open up, making the steroid less effective. You can use heat after 48 hours.    Take showers only for the first 48 hours.  No baths, hot tubs, swimming, or soaking for 48 hours to reduce the risk of infection.     When should I call the doctor?  Call us if you have any of the following:     Fever more than 100.5 degrees Fahrenheit     Signs of infection     Severe headache     Severe back pain     Increased numbness or weakness in your legs or arms     Loss of bladder or bowel control    Nausea     Other concerns    What is the contact information?    During business hours Monday-Friday 8a-5p call [phone].     After business hours, on weekends and holidays call [phone] and ask for the PM&R doctor on call

## 2022-01-27 DIAGNOSIS — Z11.59 ENCOUNTER FOR SCREENING FOR OTHER VIRAL DISEASES: Primary | ICD-10-CM

## 2022-02-04 ENCOUNTER — LAB (OUTPATIENT)
Dept: LAB | Facility: CLINIC | Age: 72
End: 2022-02-04
Payer: COMMERCIAL

## 2022-02-04 DIAGNOSIS — Z11.59 ENCOUNTER FOR SCREENING FOR OTHER VIRAL DISEASES: ICD-10-CM

## 2022-02-04 PROCEDURE — U0005 INFEC AGEN DETEC AMPLI PROBE: HCPCS

## 2022-02-04 PROCEDURE — U0003 INFECTIOUS AGENT DETECTION BY NUCLEIC ACID (DNA OR RNA); SEVERE ACUTE RESPIRATORY SYNDROME CORONAVIRUS 2 (SARS-COV-2) (CORONAVIRUS DISEASE [COVID-19]), AMPLIFIED PROBE TECHNIQUE, MAKING USE OF HIGH THROUGHPUT TECHNOLOGIES AS DESCRIBED BY CMS-2020-01-R: HCPCS

## 2022-02-05 LAB — SARS-COV-2 RNA RESP QL NAA+PROBE: NEGATIVE

## 2022-02-07 ENCOUNTER — ANCILLARY PROCEDURE (OUTPATIENT)
Dept: RADIOLOGY | Facility: AMBULATORY SURGERY CENTER | Age: 72
End: 2022-02-07
Attending: PHYSICAL MEDICINE & REHABILITATION
Payer: COMMERCIAL

## 2022-02-07 ENCOUNTER — HOSPITAL ENCOUNTER (OUTPATIENT)
Facility: AMBULATORY SURGERY CENTER | Age: 72
Discharge: HOME | End: 2022-02-07
Attending: PHYSICAL MEDICINE & REHABILITATION | Admitting: PHYSICAL MEDICINE & REHABILITATION
Payer: COMMERCIAL

## 2022-02-07 VITALS
BODY MASS INDEX: 31.1 KG/M2 | OXYGEN SATURATION: 99 % | RESPIRATION RATE: 12 BRPM | SYSTOLIC BLOOD PRESSURE: 143 MMHG | WEIGHT: 169 LBS | DIASTOLIC BLOOD PRESSURE: 70 MMHG | TEMPERATURE: 97.5 F | HEIGHT: 62 IN | HEART RATE: 78 BPM

## 2022-02-07 DIAGNOSIS — M54.16 LUMBAR RADICULITIS: ICD-10-CM

## 2022-02-07 PROCEDURE — 62323 NJX INTERLAMINAR LMBR/SAC: CPT

## 2022-02-07 RX ORDER — LIDOCAINE HYDROCHLORIDE 10 MG/ML
INJECTION, SOLUTION EPIDURAL; INFILTRATION; INTRACAUDAL; PERINEURAL PRN
Status: DISCONTINUED | OUTPATIENT
Start: 2022-02-07 | End: 2022-02-07 | Stop reason: HOSPADM

## 2022-02-07 RX ORDER — DEXAMETHASONE SODIUM PHOSPHATE 10 MG/ML
INJECTION INTRAMUSCULAR; INTRAVENOUS PRN
Status: DISCONTINUED | OUTPATIENT
Start: 2022-02-07 | End: 2022-02-07 | Stop reason: HOSPADM

## 2022-02-07 RX ORDER — IOPAMIDOL 408 MG/ML
INJECTION, SOLUTION INTRATHECAL PRN
Status: DISCONTINUED | OUTPATIENT
Start: 2022-02-07 | End: 2022-02-07 | Stop reason: HOSPADM

## 2022-02-07 ASSESSMENT — MIFFLIN-ST. JEOR: SCORE: 1234.83

## 2022-02-07 NOTE — DISCHARGE INSTRUCTIONS
PAIN INJECTION HOME CARE INSTRUCTIONS  Activity  -You may resume most normal activity levels with the exception of strenuous activity. It may help to move in ways that hurt before the injection, to see if the pain is still there, but do not overdo it. Take it easy for the rest of the day.    -DO NOT remove bandaid for 24 hours  -DO NOT shower for 24 hours      Pain  -You may feel immediate pain relief and numbness for a period of time after the injection. This may indicate the medication has reached the right spot.  -Your pain may return after this short pain-free period, or may even be a little worse for a day or two. It may be caused by needle irritation or by the medication itself. The medications usually take two or three days to start working, but can take as long as a week.    -You may use an ice pack for 20 minutes every 2 hours for the first 24 hours  -You may use a heating pad after the first 24 hours  -You may use Tylenol (acetaminophen) every 4 hours or other pain medicines as directed by your physician            DID YOU RECEIVE STEROIDS TODAY?  YES    Common side effects of steroids:  Not everyone will experience corticosteroid side effects. If side effects are experienced, they will gradually subside in the 7-10 day period following an injection. Most common side effects include:  -Flushed face and/or chest  -Feeling of warmth, particularly in the face but could be an overall feeling of warmth  -Increased blood sugar in diabetic patients  -Menstrual irregularities my occur. If taking hormone-based birth control an alternate method of birth control is recommended  -Sleep disturbances and/or mood swings are possible  -Leg cramps    PLEASE KEEP TRACK OF YOUR SYMPTOMS AND NOTE ANY CHANGES FOR YOUR DOCTOR.       Please contact us if you have:  -Severe pain  -Fever more than 101.5 degrees Fahrenheit  -Signs of infection at the injection site (redness, swelling, or drainage)            FOR PM&R PATIENTS:  For  patients seen by the PM and R service, please call [phone]. If you need to fax a pain diary to PM&R the fax number is [phone]. If you are unable to fax, uploading to [employer] is encouraged, then send to provider. If you have procedure scheduling questions please call [phone] Option #2

## 2022-02-07 NOTE — PROCEDURES
PROCEDURE NOTE: Lumbar Interlaminar Epidural Steroid Injection    PROCEDURE DATE: 2022    PATIENT NAME: Lilli Steven  YOB: 1950    ATTENDING PHYSICIAN: Shola Montez MD    PREOPERATIVE DIAGNOSIS:   Lumbar radiculitis   POSTOPERATIVE DIAGNOSIS: Same    PROCEDURE PERFORMED: Interlaminar Epidural Steroid Injection at the L5-S1 level, with a left paramedian approach under fluoroscopic guidance       FLUOROSCOPY WAS USED.     INDICATIONS FOR PROCEDURE:   This is a 71 year old female with a clinical picture consistent with the above-mentioned diagnosis, resulting in radicular pain to the left lower extremity.    PROCEDURE AND FINDINGS:   The patient was greeted in the pre procedure holding area. The risk, benefits and alternatives to the procedure were again reviewed with the patient and written informed consent was placed in the chart. Prior to the procedure a time out was completed, verifying correct patient, procedure, site, positioning, and implants and/or special equipment.    An IV line was not placed. The patient was taken to the procedure room and positioned prone on the fluoroscopy table. Routine monitors were applied including blood pressure cuff, and pulse oximetry. Then a  film was taken to identify the correct level. The skin was prepped and draped in the usual sterile fashion. The overlying skin and subcutaneous tissue was anesthetized using a 27-guage 1-1/2 inch needle with 1% preservative-free lidocaine for a total volume of 3 mls. Then an 20.5-inch Tuohy needle was advanced under fluorosocpic guidance using an AP lateral views into the interlaminar space. A loss of resistance syringe was attached and loss of resistance to saline.    After negative aspiration, 1mls of isovue 200-220 mg/ml contrast was injected under AP view with live fluoroscopy and confirmed adequate spread along the nerve root and in the epidural space. There was no evidence of intravascular uptake or  intrathecal spread on imaging. A lateral view was also taken confirming adequate epidural spread.     At this point, after negative aspiration, a total 5mL volume of treatment injectate, consisting of 1mL Dexamethasone (10mg/mL) and 4mL of preservative free normal saline (PFNS) was injected easily.  The needle was then flushed with 1 ml of PFNS and removed. The needle insertion site was dressed appropriately.     The patient was taken to the recovery room where they were monitored for a brief period of time. she tolerated the procedure well and were discharged home in stable condition with post procedural instructions.     Follow-up will be in clinic.    COMPLICATIONS: None    COMMENTS: None

## 2022-02-20 ENCOUNTER — HEALTH MAINTENANCE LETTER (OUTPATIENT)
Age: 72
End: 2022-02-20

## 2022-02-25 ENCOUNTER — OFFICE VISIT (OUTPATIENT)
Dept: OPHTHALMOLOGY | Facility: CLINIC | Age: 72
End: 2022-02-25
Payer: COMMERCIAL

## 2022-02-25 DIAGNOSIS — H25.13 NUCLEAR AGE-RELATED CATARACT, BOTH EYES: Primary | ICD-10-CM

## 2022-02-25 DIAGNOSIS — H52.4 PRESBYOPIA: ICD-10-CM

## 2022-02-25 DIAGNOSIS — H52.201 HYPEROPIC ASTIGMATISM, RIGHT: ICD-10-CM

## 2022-02-25 DIAGNOSIS — H52.12 MYOPIC ASTIGMATISM, LEFT: ICD-10-CM

## 2022-02-25 DIAGNOSIS — H52.202 MYOPIC ASTIGMATISM, LEFT: ICD-10-CM

## 2022-02-25 PROCEDURE — 92015 DETERMINE REFRACTIVE STATE: CPT | Mod: GY | Performed by: OPTOMETRIST

## 2022-02-25 PROCEDURE — 92004 COMPRE OPH EXAM NEW PT 1/>: CPT | Performed by: OPTOMETRIST

## 2022-02-25 ASSESSMENT — VISUAL ACUITY
OS_CC: 20/40
OD_CC+: -2
OD_CC: 20/30
OD_CC: J2
METHOD: SNELLEN - LINEAR
CORRECTION_TYPE: GLASSES
OS_PH_CC: 20/30
OS_CC: J3

## 2022-02-25 ASSESSMENT — CONF VISUAL FIELD
OS_NORMAL: 1
METHOD: COUNTING FINGERS
OD_NORMAL: 1

## 2022-02-25 ASSESSMENT — TONOMETRY
OD_IOP_MMHG: 14
IOP_METHOD: TONOPEN
OS_IOP_MMHG: 14

## 2022-02-25 ASSESSMENT — REFRACTION_MANIFEST
OD_ADD: +2.75
OS_SPHERE: -1.50
OD_SPHERE: +1.00
OD_CYLINDER: SPHERE
OS_CYLINDER: +1.00
OS_ADD: +2.75
OS_AXIS: 160

## 2022-02-25 ASSESSMENT — REFRACTION_WEARINGRX
OS_AXIS: 180
OS_SPHERE: -1.50
OS_CYLINDER: +0.25
OD_ADD: +2.75
OD_SPHERE: +1.50
OD_CYLINDER: SPHERE
OS_ADD: +2.75
SPECS_TYPE: PAL

## 2022-02-25 ASSESSMENT — CUP TO DISC RATIO
OD_RATIO: 0.3
OS_RATIO: 0.3

## 2022-02-25 ASSESSMENT — SLIT LAMP EXAM - LIDS
COMMENTS: 1-2+ MGD
COMMENTS: 1-2+ MGD

## 2022-02-25 ASSESSMENT — EXTERNAL EXAM - RIGHT EYE: OD_EXAM: NORMAL

## 2022-02-25 ASSESSMENT — EXTERNAL EXAM - LEFT EYE: OS_EXAM: NORMAL

## 2022-02-25 NOTE — NURSING NOTE
Chief Complaints and History of Present Illnesses   Patient presents with     COMPREHENSIVE EYE EXAM     History with Blepharitis OU     Chief Complaint(s) and History of Present Illness(es)     COMPREHENSIVE EYE EXAM     Laterality: both eyes    Associated symptoms: Negative for eye pain, redness, tearing, discharge and dryness    Treatments tried: no treatments    Pain scale: 0/10    Comments: History with Blepharitis OU              Comments     NVA each eye decline the past few months. Has been using a magnifier. Distance vision each eye has remained stable the past year.   Not using any gtts or medication. Not having any issues with the lids bothering at all.     Paola Burton, COT COT 10:05 AM February 25, 2022

## 2022-02-25 NOTE — PROGRESS NOTES
A/P  1.) Cataracts OU  -Mildly visually significant. No issues with night vision or low light settings at this time  -BCVA 20/30 right, 20/25 left with updated Rx  -Reviewed findings with pt including cataract surgery and expectations. Okay to monitor at this time - we may consider this more seriously in 1-2 years time    2.) MGD OU  -H/o blepharitis treatment. Eye asymptomatic today  -Does have evidence of MGD OU. Continue daily warm compresses    3.) Astigmatism, Presbyopia  -Improves with small adjustment in Rx today    Monitor 1 year comprehensive eye exam    I have confirmed the patient's CC, HPI and reviewed Past Medical History, Past Surgical History, Social History, Family History, Problem List, Medication List and agree with Tech note.     Sahra Fuentes, SUSAN CARLOSO ZACHS
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
## 2022-03-21 ENCOUNTER — OFFICE VISIT (OUTPATIENT)
Dept: PHYSICAL MEDICINE AND REHAB | Facility: CLINIC | Age: 72
End: 2022-03-21
Payer: COMMERCIAL

## 2022-03-21 VITALS
SYSTOLIC BLOOD PRESSURE: 121 MMHG | OXYGEN SATURATION: 96 % | HEART RATE: 87 BPM | RESPIRATION RATE: 16 BRPM | DIASTOLIC BLOOD PRESSURE: 77 MMHG

## 2022-03-21 DIAGNOSIS — M67.952 TENDINOPATHY OF LEFT GLUTEAL REGION: ICD-10-CM

## 2022-03-21 DIAGNOSIS — M54.16 LUMBAR RADICULITIS: Primary | ICD-10-CM

## 2022-03-21 PROCEDURE — 99213 OFFICE O/P EST LOW 20 MIN: CPT | Performed by: PHYSICAL MEDICINE & REHABILITATION

## 2022-03-21 ASSESSMENT — PAIN SCALES - GENERAL: PAINLEVEL: NO PAIN (0)
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
## 2022-03-29 ENCOUNTER — OFFICE VISIT (OUTPATIENT)
Dept: PHYSICAL MEDICINE AND REHAB | Facility: CLINIC | Age: 72
End: 2022-03-29
Payer: COMMERCIAL

## 2022-03-29 VITALS
RESPIRATION RATE: 16 BRPM | SYSTOLIC BLOOD PRESSURE: 149 MMHG | TEMPERATURE: 98.4 F | DIASTOLIC BLOOD PRESSURE: 83 MMHG | HEART RATE: 75 BPM | OXYGEN SATURATION: 96 %

## 2022-03-29 DIAGNOSIS — G24.3 SPASMODIC TORTICOLLIS: Primary | ICD-10-CM

## 2022-03-29 DIAGNOSIS — G24.1 GENETIC TORSION DYSTONIA: ICD-10-CM

## 2022-03-29 PROCEDURE — 64616 CHEMODENERV MUSC NECK DYSTON: CPT | Mod: 59 | Performed by: PHYSICAL MEDICINE & REHABILITATION

## 2022-03-29 PROCEDURE — 95874 GUIDE NERV DESTR NEEDLE EMG: CPT | Mod: GC | Performed by: PHYSICAL MEDICINE & REHABILITATION

## 2022-03-29 PROCEDURE — 64642 CHEMODENERV 1 EXTREMITY 1-4: CPT | Mod: 59 | Performed by: PHYSICAL MEDICINE & REHABILITATION

## 2022-03-29 PROCEDURE — 96372 THER/PROPH/DIAG INJ SC/IM: CPT | Mod: GC | Performed by: PHYSICAL MEDICINE & REHABILITATION

## 2022-03-29 PROCEDURE — 64643 CHEMODENERV 1 EXTREM 1-4 EA: CPT | Mod: 59 | Performed by: PHYSICAL MEDICINE & REHABILITATION

## 2022-03-29 ASSESSMENT — PAIN SCALES - GENERAL: PAINLEVEL: MILD PAIN (3)

## 2022-03-29 NOTE — PROGRESS NOTES
BOTULINUM TOXIN PROCEDURE NOTE    Current Outpatient Medications:      gabapentin (NEURONTIN) 300 MG capsule, Take 1 capsule (300 mg) by mouth 3 times daily, Disp: 90 capsule, Rfl: 3     Naproxen Sodium (ALEVE PO), , Disp: , Rfl:      OnabotulinumtoxinA (BOTOX IJ), Inject 150 Units into the muscle once Lot # /C3 with Expiration Date: 03/2021, Disp: , Rfl:      simvastatin (ZOCOR) 20 MG tablet, TAKE 1 TABLET BY MOUTH EVERYDAY AT BEDTIME, Disp: 90 tablet, Rfl: 3     traZODone (DESYREL) 50 MG tablet, TAKE 3 TABLETS BY MOUTH AT BEDTIME, Disp: 270 tablet, Rfl: 3    Current Facility-Administered Medications:      botulinum toxin type A (BOTOX) 100 units injection 200 Units, 200 Units, Intramuscular, Q90 Days, Standal, Cassidy New MD, 175 Units at 01/07/22 1001     Allergies   Allergen Reactions     Meloxicam      Diarrhea, vomiting     Primidone Other (See Comments)     Felt like motion sickness     Tramadol      Diarrhea, vomiting      Adhesive Tape Rash     Durabond only     Allergy Rash     Dermabond  Anesthesia IV set misc          PHYSICAL EXAM:    VS: BP (!) 149/83   Pulse 75   Temp 98.4  F (36.9  C)   Resp 16   LMP  (LMP Unknown)   SpO2 96%    Head Tremor: Mild left-right resting tremor of head  Head & Neck Extension: Mild  Sub-Occipital Extension: Mild  Forward Head: Negative   Head & Neck Lateral Bend: Negative   Shoulder Elevation: Negative    HPI:  Lilli Steven is a 72 year old female with a history of involuntary neck and shoulder muscle spasms who presents for Botox injections for management of cervical dystonia.     Patient denies new medical diagnoses, illnesses, hospitalizations, emergency room visits, and injuries since the previous injection with botulinum neurotoxin.     She reports soreness at the sides of the neck. Otherwise, she reports last cycle of injections went well.     We reviewed the recommended safety guidelines for  Botox from any vaccine injection, such as the  seasonal flu vaccine, by a minimum of 10-14 days with Lilli Steven. She acknowledged understanding.    RESPONSE TO PREVIOUS TREATMENT:    Lilli Steven received 175 units of Botox on 10/7/2021.    Problems following the previous series of neurotoxin injections included:  No problems reported    BENEFITS BY PATIENT REPORT:    Pain Improvement: Yes.  Percent Improvement: 30%  Duration of Benefit: 10 weeks with gradual decline.     Dystonia Improvement: Yes.  Percent Improvement: 30%  Duration of Benefit: 10 weeks with gradual decline.    BOTULINUM NEUROTOXIN INJECTION PROCEDURES:    VERIFICATION OF PATIENT IDENTIFICATION AND PROCEDURE     Initials   Patient Name ma   Patient  ma   Procedure Verified by: ma     Prior to the start of the procedure and with procedural staff participation, I verbally confirmed the patient s identity using two indicators, relevant allergies, that the procedure was appropriate and matched the consent or emergent situation, and that the correct equipment/implants were available. Immediately prior to starting the procedure I conducted the Time Out with the procedural staff and re-confirmed the patient s name, procedure, and site/side. (The Joint Commission universal protocol was followed.)  Yes    Sedation (Moderate or Deep): None      Above assessments performed by:  Shola Brown MD         The attending provider was present for the entire procedure documented below.    Attending: Cassidy Ramirez MD       INDICATION/S FOR PROCEDURE/S:  Lilli Steven is a 72 year old patient with dystonia affecting the  head, neck and shoulder girdle musculature secondary to a diagnosis of cervical dystonia with associated pain, tremor, spasms, loss of joint motion, loss of volitional motor control and difficulty with activities of daily living.      Her baseline symptoms have been recalcitrant to oral medications and conservative therapy. She is here today for an injection of  Botox.       GOAL OF PROCEDURE:  The goal of this procedure is to increase active range of motion, improve volitional motor control and decrease pain  associated with dystonic movements, spasticity and tremor.    TOTAL DOSE: 200 UNITS BOTOX  Dose Administered:  175 units Botox    Diluent Used:  Preservative Free Normal Saline  Total Volume of Diluent Used:  1.75 ml  Lot # [id number] with Expiration Date: 05/2024  NDC #: Botox 100u ([address]-84)    Was there drug waste? Yes  Amount of drug waste (mL): 25 units Botox.  Reason for waste:  Single use vial    Multi-dose vial: No    Medication guide was offered to patient and was declined.    CONSENT:  The risks, benefits, and treatment options were discussed with Lilli Steven and she agreed to proceed.      Written consent was obtained by [facility]     EQUIPMENT USED:  Needle-37mm stimulating/recording  EMG/NCS Machine     SKIN PREPARATION:  Skin preparation was performed using an alcohol wipe.     GUIDANCE DESCRIPTION:  Electro-myographic guidance was necessary throughout the procedure to accurately identify all areas of dystonic and spastic muscles while avoiding injection of non-dystonic muscles and non-spastic muscles, neighboring nerves and nearby vascular structures.      AREA/MUSCLE INJECTED:  175 UNITS BOTOX = TOTAL DOSE     1. HEAD, NECK & SHOULDER GIRDLE MUSCLES: 175 units Botox = Total Dose, 1:1 Dilution                 Right Mid-Trapezius - 2.5 units of Botox at 1 site/s.   Left Mid-Trapezius - 2.5 units of Botox at 1 site/s.      Right Splenius Capitus - 20 units of Botox at 2 site/s.   Left Splenius Capitus - 20 units of Botox at 1 site/s.     Right Lateral Trapezius (low cervical) - 10 units of Botox at 2 site/s.   Left Lateral Trapezius (low cervical) - 5 units of Botox at 1 site/s.      Right Levator Scapulae - 20 units of Botox at 1 site/s.   Left Levator Scapulae - 20 units of Botox at 1 site/s .      Right Inferior Obliquus Capitis - 20 units of Botox  at 1 site/s.   Left Inferior Obliquus Capitis - 10 units of at 1 site/s.      Right Sternocleidomastoid - 25 units of Botox at 2 site/s.               Left Sternocleidomastoid - 20 units of Botox at 2 site/s.         RESPONSE TO PROCEDURE:  Lilli Steven tolerated the procedure well and there were no immediate complications. She was allowed to recover for an appropriate period of time and was discharged home in stable condition.     FOLLOW UP:  Lilli Steven was asked to follow up by phone in 7-14 days with Merry Bolaños RN, Care Coordinator, to report her response to this series of injections.  Based on the patient's previous response to this therapy, Lilli Steven was rescheduled for the next series of injections in 12 weeks.     PLAN (Medication Changes, Therapy Orders, Work or Disability Issues, etc.): No changes made to Botox dose or distribution today. Patient will continue to monitor response to Botox and report at next appointment.

## 2022-03-29 NOTE — LETTER
3/29/2022       RE: Lilli Steven  [address]  [facility] [address]     Dear Colleague,    Thank you for referring your patient, Lilli Steven, to the [facility] [place] [facility]. Please see a copy of my visit note below.    BOTULINUM TOXIN PROCEDURE NOTE    Current Outpatient Medications:      gabapentin (NEURONTIN) 300 MG capsule, Take 1 capsule (300 mg) by mouth 3 times daily, Disp: 90 capsule, Rfl: 3     Naproxen Sodium (ALEVE PO), , Disp: , Rfl:      OnabotulinumtoxinA (BOTOX IJ), Inject 150 Units into the muscle once Lot # /C3 with Expiration Date: 03/2021, Disp: , Rfl:      simvastatin (ZOCOR) 20 MG tablet, TAKE 1 TABLET BY MOUTH EVERYDAY AT BEDTIME, Disp: 90 tablet, Rfl: 3     traZODone (DESYREL) 50 MG tablet, TAKE 3 TABLETS BY MOUTH AT BEDTIME, Disp: 270 tablet, Rfl: 3    Current Facility-Administered Medications:      botulinum toxin type A (BOTOX) 100 units injection 200 Units, 200 Units, Intramuscular, Q90 Days, Standal, Cassidy New MD, 175 Units at 01/07/22 1001     Allergies   Allergen Reactions     Meloxicam      Diarrhea, vomiting     Primidone Other (See Comments)     Felt like motion sickness     Tramadol      Diarrhea, vomiting      Adhesive Tape Rash     Durabond only     Allergy Rash     Dermabond  Anesthesia IV set misc          PHYSICAL EXAM:    VS: BP (!) 149/83   Pulse 75   Temp 98.4  F (36.9  C)   Resp 16   LMP  (LMP Unknown)   SpO2 96%    Head Tremor: Mild left-right resting tremor of head  Head & Neck Extension: Mild  Sub-Occipital Extension: Mild  Forward Head: Negative   Head & Neck Lateral Bend: Negative   Shoulder Elevation: Negative    HPI:  Lilli Steven is a 72 year old female with a history of involuntary neck and shoulder muscle spasms who presents for Botox injections for management of cervical dystonia.     Patient denies new  medical diagnoses, illnesses, hospitalizations, emergency room visits, and injuries since the previous injection with botulinum neurotoxin.     She reports soreness at the sides of the neck. Otherwise, she reports last cycle of injections went well.     We reviewed the recommended safety guidelines for  Botox from any vaccine injection, such as the seasonal flu vaccine, by a minimum of 10-14 days with Lilli Steven. She acknowledged understanding.    RESPONSE TO PREVIOUS TREATMENT:    Lilli Steven received 175 units of Botox on 10/7/2021.    Problems following the previous series of neurotoxin injections included:  No problems reported    BENEFITS BY PATIENT REPORT:    Pain Improvement: Yes.  Percent Improvement: 30%  Duration of Benefit: 10 weeks with gradual decline.     Dystonia Improvement: Yes.  Percent Improvement: 30%  Duration of Benefit: 10 weeks with gradual decline.    BOTULINUM NEUROTOXIN INJECTION PROCEDURES:    VERIFICATION OF PATIENT IDENTIFICATION AND PROCEDURE     Initials   Patient Name ma   Patient  ma   Procedure Verified by: ma     Prior to the start of the procedure and with procedural staff participation, I verbally confirmed the patient s identity using two indicators, relevant allergies, that the procedure was appropriate and matched the consent or emergent situation, and that the correct equipment/implants were available. Immediately prior to starting the procedure I conducted the Time Out with the procedural staff and re-confirmed the patient s name, procedure, and site/side. (The Joint Commission universal protocol was followed.)  Yes    Sedation (Moderate or Deep): None      Above assessments performed by:  Shola Brown MD         The attending provider was present for the entire procedure documented below.    Attending: Cassidy Ramirez MD       INDICATION/S FOR PROCEDURE/S:  Lilli Steven is a 72 year old patient with dystonia affecting the  head,  neck and shoulder girdle musculature secondary to a diagnosis of cervical dystonia with associated pain, tremor, spasms, loss of joint motion, loss of volitional motor control and difficulty with activities of daily living.      Her baseline symptoms have been recalcitrant to oral medications and conservative therapy. She is here today for an injection of Botox.       GOAL OF PROCEDURE:  The goal of this procedure is to increase active range of motion, improve volitional motor control and decrease pain  associated with dystonic movements, spasticity and tremor.    TOTAL DOSE: 200 UNITS BOTOX  Dose Administered:  175 units Botox    Diluent Used:  Preservative Free Normal Saline  Total Volume of Diluent Used:  1.75 ml  Lot # [id number] with Expiration Date: 05/2024  NDC #: Botox 100u ([address]-91)    Was there drug waste? Yes  Amount of drug waste (mL): 25 units Botox.  Reason for waste:  Single use vial    Multi-dose vial: No    Medication guide was offered to patient and was declined.    CONSENT:  The risks, benefits, and treatment options were discussed with Lilli Steven and she agreed to proceed.      Written consent was obtained by [facility].     EQUIPMENT USED:  Needle-37mm stimulating/recording  EMG/NCS Machine     SKIN PREPARATION:  Skin preparation was performed using an alcohol wipe.     GUIDANCE DESCRIPTION:  Electro-myographic guidance was necessary throughout the procedure to accurately identify all areas of dystonic and spastic muscles while avoiding injection of non-dystonic muscles and non-spastic muscles, neighboring nerves and nearby vascular structures.      AREA/MUSCLE INJECTED:  175 UNITS BOTOX = TOTAL DOSE     1. HEAD, NECK & SHOULDER GIRDLE MUSCLES: 175 units Botox = Total Dose, 1:1 Dilution                 Right Mid-Trapezius - 2.5 units of Botox at 1 site/s.   Left Mid-Trapezius - 2.5 units of Botox at 1 site/s.      Right Splenius Capitus - 20 units of Botox at 2 site/s.   Left Splenius Capitus  - 20 units of Botox at 1 site/s.     Right Lateral Trapezius (low cervical) - 10 units of Botox at 2 site/s.   Left Lateral Trapezius (low cervical) - 5 units of Botox at 1 site/s.      Right Levator Scapulae - 20 units of Botox at 1 site/s.   Left Levator Scapulae - 20 units of Botox at 1 site/s .      Right Inferior Obliquus Capitis - 20 units of Botox at 1 site/s.   Left Inferior Obliquus Capitis - 10 units of at 1 site/s.      Right Sternocleidomastoid - 25 units of Botox at 2 site/s.               Left Sternocleidomastoid - 20 units of Botox at 2 site/s.         RESPONSE TO PROCEDURE:  Lilli Steven tolerated the procedure well and there were no immediate complications. She was allowed to recover for an appropriate period of time and was discharged home in stable condition.     FOLLOW UP:  Lilli Steven was asked to follow up by phone in 7-14 days with Merry Bolaños RN, Care Coordinator, to report her response to this series of injections.  Based on the patient's previous response to this therapy, Lilli Steven was rescheduled for the next series of injections in 12 weeks.     PLAN (Medication Changes, Therapy Orders, Work or Disability Issues, etc.): No changes made to Botox dose or distribution today. Patient will continue to monitor response to Botox and report at next appointment.       Cassidy Ramirez MD

## 2022-05-12 DIAGNOSIS — M54.16 LUMBAR RADICULITIS: ICD-10-CM

## 2022-05-12 NOTE — TELEPHONE ENCOUNTER
Received Refill request from [facility], [address] [place], [place].     Medication: Gabapentin 300 mg Cap  Last written: 1/13/22  Last filled:  4/5/22  Last OV: 3/21/22 (discussed tapering gabapentin)  Next OV: None (6/21- Botox with Dr. Ramirez)    Ashley Alvarez RN on 5/12/2022 at 12:55 PM

## 2022-05-16 RX ORDER — GABAPENTIN 300 MG/1
300 CAPSULE ORAL 3 TIMES DAILY
Qty: 90 CAPSULE | Refills: 3 | Status: SHIPPED | OUTPATIENT
Start: 2022-05-16 | End: 2022-08-12

## 2022-06-01 NOTE — TELEPHONE ENCOUNTER
DIAGNOSIS: right knee, fell a couple months ago/ self ref/ no imaging   APPOINTMENT DATE: 6.7.22   NOTES STATUS DETAILS   OFFICE NOTE from other specialist Internal 6.23.21 Dr Cruz Carvalho, [facility] Sports  1.30.20  6.8.21 Dr Vargas Chaudhry, [facility] FP  1.17.20 Dr Joey Lund, [facility] Ortho  10.10.16 Dr Venkata Baca, [facility] Sports Med  7.16.16   MEDICATION LIST Internal    DEXA (osteoporosis/bone health) Internal    XRAYS (IMAGES & REPORTS) Internal 1.30.20 B knee  7.16.16 B knee, R knee

## 2022-06-02 DIAGNOSIS — M25.561 RIGHT KNEE PAIN: Primary | ICD-10-CM

## 2022-06-06 ENCOUNTER — NURSE TRIAGE (OUTPATIENT)
Dept: NURSING | Facility: CLINIC | Age: 72
End: 2022-06-06
Payer: COMMERCIAL

## 2022-06-06 ENCOUNTER — OFFICE VISIT (OUTPATIENT)
Dept: FAMILY MEDICINE | Facility: CLINIC | Age: 72
End: 2022-06-06
Payer: COMMERCIAL

## 2022-06-06 VITALS
RESPIRATION RATE: 20 BRPM | WEIGHT: 166.7 LBS | OXYGEN SATURATION: 93 % | DIASTOLIC BLOOD PRESSURE: 92 MMHG | SYSTOLIC BLOOD PRESSURE: 127 MMHG | HEART RATE: 78 BPM | TEMPERATURE: 98.1 F | BODY MASS INDEX: 30.49 KG/M2

## 2022-06-06 DIAGNOSIS — T30.0 BURN: ICD-10-CM

## 2022-06-06 DIAGNOSIS — L03.90 CELLULITIS, UNSPECIFIED CELLULITIS SITE: Primary | ICD-10-CM

## 2022-06-06 PROCEDURE — 99214 OFFICE O/P EST MOD 30 MIN: CPT | Performed by: PREVENTIVE MEDICINE

## 2022-06-06 RX ORDER — CEPHALEXIN 500 MG/1
500 CAPSULE ORAL 4 TIMES DAILY
Qty: 28 CAPSULE | Refills: 0 | Status: SHIPPED | OUTPATIENT
Start: 2022-06-06 | End: 2022-06-13
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
## 2022-06-06 NOTE — PROGRESS NOTES
Sports Medicine Clinic Visit    PCP: Vargas Chaudhry    Lilli Steven is a 72 year old female who is seen  as self referral presenting with right knee pain. She reports that she has point tenderness over the anteriolateral aspect of her right knee since an injury that she had 2 months ago.  She was returning an office chair, after retiring, that was on wheels.  She was pushing up a steep hill and the office chair rolled down against gravity and struck her in the anterior right knee.  She reports at the time of her injury her right knee did swell, her knee is clicking, popping and snapping. She denies instability.    She has gone to physical therapy in the past but reports it did not provide relief to her knee pain.     Injury: office chair fell on her right knee.     Location of Pain: right knee  Duration of Pain: 2 month(s)  Rating of Pain: 7/10  Pain is better with: Tylenol and Rest  Pain is worse with: use of stairs, walking for an extended period of time and squat position.   Treatment so far consists of: Ice, Ibuprofen and Rest    History of bilateral knee cortisone injections 6/2021 for moderate bilateral knee patellofemoral DJD.  Referred to physical therapy in 2020 for bilateral knee patellofemoral DJD.    Intolerant to meloxicam, tramadol.        EXAM: XR KNEE BILATERAL 3 VW  1/30/2020 12:17 PM       HISTORY: Bilateral AP Standing, lateral and sunrise; Pain in both  knees, unspecified chronicity; Pain in both knees, unspecified  chronicity     COMPARISON: 7/16/2016     FINDINGS: Standing AP bilateral knees, lateral and patellofemoral  views of the knees.     Right knee: No acute osseous normality. No joint effusion. No patellar  tilt or subluxation. Moderate patellofemoral joint space loss. Mild  medial compartment joint space loss.     Left knee: No acute osseous abnormality. No joint effusion. No  patellar tilt or subluxation. No substantial joint space loss.     Soft tissues unremarkable.                                                                        IMPRESSION:   1. No acute osseous abnormality.  2. Patellofemoral predominant right knee osteoarthrosis.  3. No substantial degenerative change of the left knee.     CHUN MIRELES MD (Joe)            PMH:  Past Medical History:   Diagnosis Date     Arthritis right knee     Diarrhea 9/6/2012     Hypercholesterolemia 9/6/2012     Neck pain 9/6/2012     Other chronic pain      PONV (postoperative nausea and vomiting)      Seasonal allergies 9/6/2012     Snores 9/6/2012     Tremor      Wears glasses 9/6/2012       Active problem list:  Patient Active Problem List   Diagnosis     Spasmodic torticollis     Wears glasses     Neck pain     Seasonal allergies     Genetic torsion dystonia     Osteopenia     Hyperlipidemia LDL goal <160     Tear of lateral cartilage or meniscus of knee, current     Urinary incontinence     PONV (postoperative nausea and vomiting)     Essential tremor     Back pain     Frequent urination     Lumbar radiculitis       FH:  Family History   Problem Relation Age of Onset     Cancer Father      Other Cancer Father         bladder     Dementia Mother         she is living in [facility] in [facility]     Breast Cancer Mother         diagnosed in her 70's     Osteoporosis Mother      Heart Disease Maternal Grandfather         disease     Hypertension Maternal Grandfather      High cholesterol Maternal Grandfather      Heart Disease Paternal Grandmother         disease     Hypertension Paternal Grandmother      High cholesterol Paternal Grandmother      Heart Disease Maternal Grandmother         disease     Hypertension Paternal Grandfather      High cholesterol Paternal Grandfather      Hypertension Sister      Hypertension Sister      Osteoporosis Sister      Macular Degeneration Other      No Known Problems Sister      No Known Problems Brother      No Known Problems Sister      No Known Problems Sister      No Known  Problems Sister      Diabetes No family hx of      Coronary Artery Disease No family hx of      Hyperlipidemia No family hx of      Cerebrovascular Disease No family hx of      Colon Cancer No family hx of      Prostate Cancer No family hx of      Depression No family hx of      Anxiety Disorder No family hx of      Mental Illness No family hx of      Substance Abuse No family hx of      Anesthesia Reaction No family hx of      Asthma No family hx of      Genetic Disorder No family hx of      Thyroid Disease No family hx of      Obesity No family hx of      Unknown/Adopted No family hx of      Glaucoma No family hx of      Hereditary Breast and Ovarian Cancer Syndrome No family hx of      Endometrial Cancer No family hx of      Ovarian Cancer No family hx of        SH:  Social History     Socioeconomic History     Marital status: Single     Spouse name: Not on file     Number of children: Not on file     Years of education: Not on file     Highest education level: Not on file   Occupational History     Not on file   Tobacco Use     Smoking status: Former Smoker     Packs/day: 0.50     Years: 10.00     Pack years: 5.00     Types: Cigarettes     Start date: 1968     Quit date: 1988     Years since quittin.4     Smokeless tobacco: Never Used   Vaping Use     Vaping Use: Never used   Substance and Sexual Activity     Alcohol use: Yes     Alcohol/week: 0.0 standard drinks     Comment: 3 - 4 drinks/week     Drug use: No     Sexual activity: Yes     Partners: Male     Birth control/protection: Post-menopausal   Other Topics Concern     Parent/sibling w/ CABG, MI or angioplasty before 65F 55M? No   Social History Narrative    Family History: Her father  at 49 of cancer. Her mother is alive at age 82. She has four sisters ages 56, 52, 50 and 42. She has one brother age 44. She has one daughter who is 24 yrs old and living with her at age 19. The patient is single. She is . She was  one year.  Her ex- was an alcoholic.             She works at [employer].     She drinks three drinks per week. No smoking, quit 30 years ago.     Living in [facility] for the past 7-8 yrs.             Mother was Citizen of the [place] - Askenazi [place]; both of her parents lived in [place].    Father was scotch-Moh[place]             Social Determinants of Health     Financial Resource Strain: Not on file   Food Insecurity: Not on file   Transportation Needs: Not on file   Physical Activity: Not on file   Stress: Not on file   Social Connections: Not on file   Intimate Partner Violence: Not on file   Housing Stability: Not on file       MEDS:  See EMR, reviewed  ALL:  See EMR, reviewed    REVIEW OF SYSTEMS:  CONSTITUTIONAL:NEGATIVE for fever, chills, change in weight  INTEGUMENTARY/SKIN: NEGATIVE for worrisome rashes, moles or lesions  EYES: NEGATIVE for vision changes or irritation  ENT/MOUTH: NEGATIVE for ear, mouth and throat problems  RESP:NEGATIVE for significant cough or SOB  BREAST: NEGATIVE for masses, tenderness or discharge  CV: NEGATIVE for chest pain, palpitations or peripheral edema  GI: NEGATIVE for nausea, abdominal pain, heartburn, or change in bowel habits  :NEGATIVE for frequency, dysuria, or hematuria  :NEGATIVE for frequency, dysuria, or hematuria  NEURO: NEGATIVE for weakness, dizziness or paresthesias  ENDOCRINE: NEGATIVE for temperature intolerance, skin/hair changes  HEME/ALLERGY/IMMUNE: NEGATIVE for bleeding problems  PSYCHIATRIC: NEGATIVE for changes in mood or affect        Objective: She do an active straight leg raise on the right with no extension lag.  There is no bruising or discoloration over the right knee.  She points to the anterior lateral area of the knee, and the area of the fat pad and lateral patellar facet is the area that can be uncomfortable.  She is mildly tender in this area.  Patellar apprehension signs are negative.  She is nontender over the remainder of the lateral joint  line.  Nontender over the proximal fibula or fibular neck.  I can flex and extend the knee fully.  Anterior and posterior drawer is negative.  Appropriate conversation and affect.      We reviewed and updated x-ray of her knee that showed no acute fracture.  She has mild to moderate patellofemoral DJD.    Assessment right-sided traumatic patellofemoral knee discomfort with patellofemoral arthritis    Plan: She would like to try a cortisone injection.  It was previously helpful year ago.  After informed consent about bleeding, infection, steroid flare after prep with surgical scrub she was injected in the right knee from a lateral approach in a seated position with 1 cc of Kenalog 40 and 5 cc of 1% lidocaine.  Medicine went in easily, she left the clinic ambulatory she will follow-up as needed.      Large Joint Injection: R knee joint    Date/Time: 6/7/2022 11:49 AM  Performed by: Cruz Carvalho MD  Authorized by: Cruz Carvalho MD     Indications:  Pain and osteoarthritis  Needle Size:  25 G  Guidance: landmark guided    Approach:  Anterolateral  Location:  Knee      Medications:  40 mg triamcinolone 40 MG/ML; 4 mL lidocaine (PF) 1 %  Outcome:  Tolerated well, no immediate complications  Procedure discussed: discussed risks, benefits, and alternatives    Consent Given by:  Patient  Timeout: timeout called immediately prior to procedure    Prep: patient was prepped and draped in usual sterile fashion

## 2022-06-07 ENCOUNTER — OFFICE VISIT (OUTPATIENT)
Dept: ORTHOPEDICS | Facility: CLINIC | Age: 72
End: 2022-06-07
Payer: COMMERCIAL

## 2022-06-07 ENCOUNTER — ANCILLARY PROCEDURE (OUTPATIENT)
Dept: GENERAL RADIOLOGY | Facility: CLINIC | Age: 72
End: 2022-06-07
Attending: FAMILY MEDICINE
Payer: COMMERCIAL

## 2022-06-07 ENCOUNTER — PRE VISIT (OUTPATIENT)
Dept: ORTHOPEDICS | Facility: CLINIC | Age: 72
End: 2022-06-07
Payer: COMMERCIAL

## 2022-06-07 DIAGNOSIS — M25.561 RIGHT KNEE PAIN: ICD-10-CM

## 2022-06-07 DIAGNOSIS — M17.11 PATELLOFEMORAL ARTHRITIS OF RIGHT KNEE: Primary | ICD-10-CM

## 2022-06-07 PROCEDURE — 73562 X-RAY EXAM OF KNEE 3: CPT | Mod: RT | Performed by: RADIOLOGY

## 2022-06-07 PROCEDURE — 20610 DRAIN/INJ JOINT/BURSA W/O US: CPT | Mod: RT | Performed by: FAMILY MEDICINE

## 2022-06-07 PROCEDURE — 99214 OFFICE O/P EST MOD 30 MIN: CPT | Mod: 25 | Performed by: FAMILY MEDICINE

## 2022-06-07 RX ORDER — TRIAMCINOLONE ACETONIDE 40 MG/ML
40 INJECTION, SUSPENSION INTRA-ARTICULAR; INTRAMUSCULAR
Status: SHIPPED | OUTPATIENT
Start: 2022-06-07

## 2022-06-07 RX ORDER — LIDOCAINE HYDROCHLORIDE 10 MG/ML
4 INJECTION, SOLUTION EPIDURAL; INFILTRATION; INTRACAUDAL; PERINEURAL
Status: SHIPPED | OUTPATIENT
Start: 2022-06-07

## 2022-06-07 RX ADMIN — TRIAMCINOLONE ACETONIDE 40 MG: 40 INJECTION, SUSPENSION INTRA-ARTICULAR; INTRAMUSCULAR at 11:49

## 2022-06-07 RX ADMIN — LIDOCAINE HYDROCHLORIDE 4 ML: 10 INJECTION, SOLUTION EPIDURAL; INFILTRATION; INTRACAUDAL; PERINEURAL at 11:49

## 2022-06-07 NOTE — LETTER
6/7/2022      RE: Lilli Steven  [address]  [facility] [address]     Dear Colleague,    Thank you for referring your patient, Lilli Steven, to the [facility] [place]. Please see a copy of my visit note below.    Sports Medicine Clinic Visit    PCP: Vargas Chaudhry    Lilli Steven is a 72 year old female who is seen  as self referral presenting with right knee pain. She reports that she has point tenderness over the anteriolateral aspect of her right knee since an injury that she had 2 months ago.  She was returning an office chair, after retiring, that was on wheels.  She was pushing up a steep hill and the office chair rolled down against gravity and struck her in the anterior right knee.  She reports at the time of her injury her right knee did swell, her knee is clicking, popping and snapping. She denies instability.    She has gone to physical therapy in the past but reports it did not provide relief to her knee pain.     Injury: office chair fell on her right knee.     Location of Pain: right knee  Duration of Pain: 2 month(s)  Rating of Pain: 7/10  Pain is better with: Tylenol and Rest  Pain is worse with: use of stairs, walking for an extended period of time and squat position.   Treatment so far consists of: Ice, Ibuprofen and Rest    History of bilateral knee cortisone injections 6/2021 for moderate bilateral knee patellofemoral DJD.  Referred to physical therapy in 2020 for bilateral knee patellofemoral DJD.    Intolerant to meloxicam, tramadol.        EXAM: XR KNEE BILATERAL 3 VW  1/30/2020 12:17 PM       HISTORY: Bilateral AP Standing, lateral and sunrise; Pain in both  knees, unspecified chronicity; Pain in both knees, unspecified  chronicity     COMPARISON: 7/16/2016     FINDINGS: Standing AP bilateral knees, lateral and patellofemoral  views of the knees.     Right knee: No acute osseous normality. No joint effusion. No  patellar  tilt or subluxation. Moderate patellofemoral joint space loss. Mild  medial compartment joint space loss.     Left knee: No acute osseous abnormality. No joint effusion. No  patellar tilt or subluxation. No substantial joint space loss.     Soft tissues unremarkable.                                                                       IMPRESSION:   1. No acute osseous abnormality.  2. Patellofemoral predominant right knee osteoarthrosis.  3. No substantial degenerative change of the left knee.     CHUN MIRELES MD (Joe)            PMH:  Past Medical History:   Diagnosis Date     Arthritis right knee     Diarrhea 9/6/2012     Hypercholesterolemia 9/6/2012     Neck pain 9/6/2012     Other chronic pain      PONV (postoperative nausea and vomiting)      Seasonal allergies 9/6/2012     Snores 9/6/2012     Tremor      Wears glasses 9/6/2012       Active problem list:  Patient Active Problem List   Diagnosis     Spasmodic torticollis     Wears glasses     Neck pain     Seasonal allergies     Genetic torsion dystonia     Osteopenia     Hyperlipidemia LDL goal <160     Tear of lateral cartilage or meniscus of knee, current     Urinary incontinence     PONV (postoperative nausea and vomiting)     Essential tremor     Back pain     Frequent urination     Lumbar radiculitis       FH:  Family History   Problem Relation Age of Onset     Cancer Father      Other Cancer Father         bladder     Dementia Mother         she is living in [facility] in [facility]     Breast Cancer Mother         diagnosed in her 70's     Osteoporosis Mother      Heart Disease Maternal Grandfather         disease     Hypertension Maternal Grandfather      High cholesterol Maternal Grandfather      Heart Disease Paternal Grandmother         disease     Hypertension Paternal Grandmother      High cholesterol Paternal Grandmother      Heart Disease Maternal Grandmother         disease     Hypertension Paternal Grandfather      High  cholesterol Paternal Grandfather      Hypertension Sister      Hypertension Sister      Osteoporosis Sister      Macular Degeneration Other      No Known Problems Sister      No Known Problems Brother      No Known Problems Sister      No Known Problems Sister      No Known Problems Sister      Diabetes No family hx of      Coronary Artery Disease No family hx of      Hyperlipidemia No family hx of      Cerebrovascular Disease No family hx of      Colon Cancer No family hx of      Prostate Cancer No family hx of      Depression No family hx of      Anxiety Disorder No family hx of      Mental Illness No family hx of      Substance Abuse No family hx of      Anesthesia Reaction No family hx of      Asthma No family hx of      Genetic Disorder No family hx of      Thyroid Disease No family hx of      Obesity No family hx of      Unknown/Adopted No family hx of      Glaucoma No family hx of      Hereditary Breast and Ovarian Cancer Syndrome No family hx of      Endometrial Cancer No family hx of      Ovarian Cancer No family hx of        SH:  Social History     Socioeconomic History     Marital status: Single     Spouse name: Not on file     Number of children: Not on file     Years of education: Not on file     Highest education level: Not on file   Occupational History     Not on file   Tobacco Use     Smoking status: Former Smoker     Packs/day: 0.50     Years: 10.00     Pack years: 5.00     Types: Cigarettes     Start date: 1968     Quit date: 1988     Years since quittin.4     Smokeless tobacco: Never Used   Vaping Use     Vaping Use: Never used   Substance and Sexual Activity     Alcohol use: Yes     Alcohol/week: 0.0 standard drinks     Comment: 3 - 4 drinks/week     Drug use: No     Sexual activity: Yes     Partners: Male     Birth control/protection: Post-menopausal   Other Topics Concern     Parent/sibling w/ CABG, MI or angioplasty before 65F 55M? No   Social History Narrative    Family History:  Her father  at 49 of cancer. Her mother is alive at age 82. She has four sisters ages 56, 52, 50 and 42. She has one brother age 44. She has one daughter who is 24 yrs old and living with her at age 19. The patient is single. She is . She was  one year. Her ex- was an alcoholic.             She works at [employer].     She drinks three drinks per week. No smoking, quit 30 years ago.     Living in [facility] for the past 7-8 yrs.             Mother was [place] - Askenazi [place]; both of her parents lived in [place].    Father was scotch-Ivorian             Social Determinants of Health     Financial Resource Strain: Not on file   Food Insecurity: Not on file   Transportation Needs: Not on file   Physical Activity: Not on file   Stress: Not on file   Social Connections: Not on file   Intimate Partner Violence: Not on file   Housing Stability: Not on file       MEDS:  See EMR, reviewed  ALL:  See EMR, reviewed    REVIEW OF SYSTEMS:  CONSTITUTIONAL:NEGATIVE for fever, chills, change in weight  INTEGUMENTARY/SKIN: NEGATIVE for worrisome rashes, moles or lesions  EYES: NEGATIVE for vision changes or irritation  ENT/MOUTH: NEGATIVE for ear, mouth and throat problems  RESP:NEGATIVE for significant cough or SOB  BREAST: NEGATIVE for masses, tenderness or discharge  CV: NEGATIVE for chest pain, palpitations or peripheral edema  GI: NEGATIVE for nausea, abdominal pain, heartburn, or change in bowel habits  :NEGATIVE for frequency, dysuria, or hematuria  :NEGATIVE for frequency, dysuria, or hematuria  NEURO: NEGATIVE for weakness, dizziness or paresthesias  ENDOCRINE: NEGATIVE for temperature intolerance, skin/hair changes  HEME/ALLERGY/IMMUNE: NEGATIVE for bleeding problems  PSYCHIATRIC: NEGATIVE for changes in mood or affect        Objective: She do an active straight leg raise on the right with no extension lag.  There is no bruising or discoloration over the right knee.  She points to  the anterior lateral area of the knee, and the area of the fat pad and lateral patellar facet is the area that can be uncomfortable.  She is mildly tender in this area.  Patellar apprehension signs are negative.  She is nontender over the remainder of the lateral joint line.  Nontender over the proximal fibula or fibular neck.  I can flex and extend the knee fully.  Anterior and posterior drawer is negative.  Appropriate conversation and affect.      We reviewed and updated x-ray of her knee that showed no acute fracture.  She has mild to moderate patellofemoral DJD.    Assessment right-sided traumatic patellofemoral knee discomfort with patellofemoral arthritis    Plan: She would like to try a cortisone injection.  It was previously helpful year ago.  After informed consent about bleeding, infection, steroid flare after prep with surgical scrub she was injected in the right knee from a lateral approach in a seated position with 1 cc of Kenalog 40 and 5 cc of 1% lidocaine.  Medicine went in easily, she left the clinic ambulatory she will follow-up as needed.      Large Joint Injection: R knee joint    Date/Time: 6/7/2022 11:49 AM  Performed by: Cruz Carvalho MD  Authorized by: Cruz Carvalho MD     Indications:  Pain and osteoarthritis  Needle Size:  25 G  Guidance: landmark guided    Approach:  Anterolateral  Location:  Knee      Medications:  40 mg triamcinolone 40 MG/ML; 4 mL lidocaine (PF) 1 %  Outcome:  Tolerated well, no immediate complications  Procedure discussed: discussed risks, benefits, and alternatives    Consent Given by:  Patient  Timeout: timeout called immediately prior to procedure    Prep: patient was prepped and draped in usual sterile fashion          Again, thank you for allowing me to participate in the care of your patient.      Sincerely,    Cruz Carvalho MD

## 2022-06-07 NOTE — NURSING NOTE
[address] [address]  Dept: [phone]  ______________________________________________________________________________    Patient: Lilli Steven   : 1950   MRN: [id number]   2022    INVASIVE PROCEDURE SAFETY CHECKLIST    Date: 2022   Procedure: Right knee joint injection with kenalog  Patient Name: Lilli Steven  MRN: [id number]  YOB: 1950    Action: Complete sections as appropriate. Any discrepancy results in a HARD COPY until resolved.     PRE PROCEDURE:  Patient ID verified with 2 identifiers (name and  or MRN): Yes  Procedure and site verified with patient/designee (when able): Yes  Accurate consent documentation in medical record: Yes  H&P (or appropriate assessment) documented in medical record: Yes  H&P must be up to 20 days prior to procedure and updates within 24 hours of procedure as applicable: NA  Relevant diagnostic and radiology test results appropriately labeled and displayed as applicable: NA  Procedure site(s) marked with provider initials: NA    TIMEOUT:  Time-Out performed immediately prior to starting procedure, including verbal and active participation of all team members addressing the following:Yes  * Correct patient identify  * Confirmed that the correct side and site are marked  * An accurate procedure consent form  * Agreement on the procedure to be done  * Correct patient position  * Relevant images and results are properly labeled and appropriately displayed  * The need to administer antibiotics or fluids for irrigation purposes during the procedure as applicable   * Safety precautions based on patient history or medication use    DURING PROCEDURE: Verification of correct person, site, and procedures any time the responsibility for care of the patient is transferred to another member of the care team.       Prior to injection, verified patient identity using patient's name and  date of birth.  Due to injection administration, patient instructed to remain in clinic for 15 minutes  afterwards, and to report any adverse reaction to me immediately.    Joint injection was performed.      Lido  Drug Amount Wasted:  Yes: 1 mg/ml   Vial/Syringe: Single dose vial  Expiration Date:  01/01/2025    Kenalog  Drug Amount Wasted: No  Vial/Syringe: Single dose vial  Expiration Date:  01/01/2024    Brinda Goss, ATC  June 7, 2022

## 2022-06-21 ENCOUNTER — OFFICE VISIT (OUTPATIENT)
Dept: PHYSICAL MEDICINE AND REHAB | Facility: CLINIC | Age: 72
End: 2022-06-21
Payer: COMMERCIAL

## 2022-06-21 DIAGNOSIS — G24.3 SPASMODIC TORTICOLLIS: Primary | ICD-10-CM

## 2022-06-21 DIAGNOSIS — G25.0 ESSENTIAL TREMOR: ICD-10-CM

## 2022-06-21 DIAGNOSIS — G24.1 GENETIC TORSION DYSTONIA: ICD-10-CM

## 2022-06-21 PROCEDURE — 64643 CHEMODENERV 1 EXTREM 1-4 EA: CPT | Mod: 59 | Performed by: PHYSICAL MEDICINE & REHABILITATION

## 2022-06-21 PROCEDURE — 64616 CHEMODENERV MUSC NECK DYSTON: CPT | Mod: 59 | Performed by: PHYSICAL MEDICINE & REHABILITATION

## 2022-06-21 PROCEDURE — 64642 CHEMODENERV 1 EXTREMITY 1-4: CPT | Mod: 59 | Performed by: PHYSICAL MEDICINE & REHABILITATION

## 2022-06-21 PROCEDURE — 95874 GUIDE NERV DESTR NEEDLE EMG: CPT | Performed by: PHYSICAL MEDICINE & REHABILITATION

## 2022-06-21 PROCEDURE — 96372 THER/PROPH/DIAG INJ SC/IM: CPT | Performed by: PHYSICAL MEDICINE & REHABILITATION

## 2022-06-21 RX ORDER — PRIMIDONE 50 MG/1
50 TABLET ORAL 2 TIMES DAILY
Qty: 60 TABLET | Refills: 0 | Status: SHIPPED | OUTPATIENT
Start: 2022-06-21 | End: 2022-07-07 | Stop reason: SINTOL

## 2022-06-21 NOTE — LETTER
6/21/2022       RE: Lilli Steven  [address]  [facility] [address]     Dear Colleague,    Thank you for referring your patient, Lilli Steven, to the [facility] [place] [facility]. Please see a copy of my visit note below.      [facility]     PM&R CLINIC NOTE  BOTULINUM TOXIN PROCEDURE      HPI  No chief complaint on file.    Lilli Steven is a 72 year old female with a history of involuntary head and neck movements who presents to clinic for botulinum toxin injections for management of cervical dystonia.     SINCE LAST VISIT  Lilli Steven was last seen here in clinic on 3/29/2022, at which time she received 175 units of Botox.    Patient denies new medical diagnoses, illnesses, hospitalizations, emergency room visits, and injuries since the previous injection with botulinum neurotoxin.    RESPONSE TO PREVIOUS TREATMENT    Side effects: No problems reported    Pain Improvement: Yes.  Percent Improvement: 40 %   Duration of Benefit:  10-11 weeks and followed by a gradual reduction in benefit.    Dystonia Improvement: Yes.  Percent Improvement: 40 %    Duration of Benefit:  10-11 weeks and followed by a gradual reduction in benefit. Overall, she believes that her tremor is likely constant to some extent, but she does not typically notice it.     PHYSICAL EXAM  VS: LMP  (LMP Unknown)    GEN: Pleasant and cooperative, in no acute distress  HEENT: No facial asymmetry  NECK: Head Tremor: Mild left-right resting tremor of head  Head & Neck Extension: Mild  Sub-Occipital Extension: Mild  Forward Head: Negative   Head & Neck Lateral Bend: Negative   Shoulder Elevation: Negative    ALLERGIES  Allergies   Allergen Reactions     Meloxicam      Diarrhea, vomiting     Primidone Other (See Comments)     Felt like motion sickness     Tramadol      Diarrhea,  vomiting      Adhesive Tape Rash     Durabond only     Allergy Rash     Dermabond  Anesthesia IV set misc         CURRENT MEDICATIONS    Current Outpatient Medications:      gabapentin (NEURONTIN) 300 MG capsule, Take 1 capsule (300 mg) by mouth 3 times daily, Disp: 90 capsule, Rfl: 3     Naproxen Sodium (ALEVE PO), , Disp: , Rfl:      OnabotulinumtoxinA (BOTOX IJ), Inject 150 Units into the muscle once Lot # /C3 with Expiration Date: 2021, Disp: , Rfl:      simvastatin (ZOCOR) 20 MG tablet, TAKE 1 TABLET BY MOUTH EVERYDAY AT BEDTIME, Disp: 90 tablet, Rfl: 0     traZODone (DESYREL) 50 MG tablet, TAKE 3 TABLETS BY MOUTH AT BEDTIME, Disp: 270 tablet, Rfl: 0    Current Facility-Administered Medications:      botulinum toxin type A (BOTOX) 100 units injection 200 Units, 200 Units, Intramuscular, Q90 Days, Cassidy Ramirez MD     botulinum toxin type A (BOTOX) 100 units injection 200 Units, 200 Units, Intramuscular, Q90 Days, Cassidy Ramirez MD, 175 Units at 22 1121     lidocaine (PF) (XYLOCAINE) 1 % injection 4 mL, 4 mL, , , Cruz Carvalho MD, 4 mL at 22 1149     triamcinolone (KENALOG-40) injection 40 mg, 40 mg, , , Cruz Carvalho MD, 40 mg at 22 1149       BOTULINUM NEUROTOXIN INJECTION PROCEDURES    VERIFICATION OF PATIENT IDENTIFICATION AND PROCEDURE     Initials   Patient Name SES   Patient  SES   Procedure Verified by: SES     Prior to the start of the procedure and with procedural staff participation, I verbally confirmed the patient s identity using two indicators, relevant allergies, that the procedure was appropriate and matched the consent or emergent situation, and that the correct equipment/implants were available. Immediately prior to starting the procedure I conducted the Time Out with the procedural staff and re-confirmed the patient s name, procedure, and site/side. (The Joint Commission universal protocol was followed.)  Yes    Sedation  (Moderate or Deep): None    ABOVE ASSESSMENTS PERFORMED BY    Cassidy Ramirez MD      INDICATIONS FOR PROCEDURES  Lilli Steven is a 72 year old patient with involuntary movements of the neck musculature with tremor secondary to the diagnosis of cervical dystonia. Her baseline symptoms have been recalcitrant to oral medications and conservative therapy.  She is here today for reinjection with Botox.    GOAL OF PROCEDURE  The goal of this procedure is to increase active range of motion, improve volitional motor control, decrease pain  and enhance functional independence.      TOTAL DOSE ADMINISTERED  Dose Administered:  175 units  Botox (Botulinum Toxin Type A)       1:1 Dilution   Unavoidable Drug Waste: Yes  Amount of drug waste (mL): 25 units Botox.  Reason for waste:  Single use vial  Diluent Used:  Preservative Free Normal Saline  Total Volume of Diluent Used:  1.75 ml  Lot # [id number] with Expiration Date:  09/2023  NDC #: Botox 100u ([address]-92)      CONSENT  The risks, benefits, and treatment options were discussed with Lilli Steven and she agreed to proceed.    Written consent was obtained by [facility].     EQUIPMENT USED  Needle-37mm stimulating/recording  EMG/NCS Machine    SKIN PREPARATION  Skin preparation was performed using an alcohol wipe.    GUIDANCE DESCRIPTION  Electro-myographic guidance was necessary throughout the procedure to accurately identify all areas of dystonic muscles while avoiding injection of non-dystonic muscles, neighboring nerves and nearby vascular structures.     AREA/MUSCLE INJECTED:  175 UNITS BOTOX = TOTAL DOSE     1. HEAD, NECK & SHOULDER GIRDLE MUSCLES: 175 units Botox = Total Dose, 1:1 Dilution                 Right Mid-Trapezius - 2.5 units of Botox at 1 site/s.   Left Mid-Trapezius - 2.5 units of Botox at 1 site/s.      Right Splenius Capitus - 20 units of Botox at 2 site/s.   Left Splenius Capitus - 20 units of Botox at 1 site/s.     Right Lateral Trapezius (low  cervical) - 10 units of Botox at 2 site/s.   Left Lateral Trapezius (low cervical) - 5 units of Botox at 1 site/s.      Right Levator Scapulae - 20 units of Botox at 1 site/s.   Left Levator Scapulae - 20 units of Botox at 1 site/s .      Right Inferior Obliquus Capitis - 20 units of Botox at 1 site/s.   Left Inferior Obliquus Capitis - 10 units of at 1 site/s.      Right Sternocleidomastoid - 25 units of Botox at 2 site/s.               Left Sternocleidomastoid - 20 units of Botox at 2 site/s.       RESPONSE TO PROCEDURE  Lilli Steven tolerated the procedure well and there were no immediate complications. She was allowed to recover for an appropriate period of time and was discharged home in stable condition.    ASSESSMENT AND PLAN   1. Botulinum toxin injections: No changes made to Botox dose or distribution today. Patient will continue to monitor response and report at next appointment.   2. Medications: Prescription for primidone sent to her pharmacy for trial for better tremor control. This was on her allergy list, but the patient states that she does not have a true allergy to this medication and denies any true allergy-like adverse effects from taking this medication.   3. Referrals: None.   4. Follow up: Lilli Steven was rescheduled for the next series of injections in 12 weeks, at which time we will evaluate response to today's injections. she may call the clinic prior with any questions or concerns prior to the next appointment.           Sincerely,    Cassidy Ramirez MD

## 2022-06-21 NOTE — PROGRESS NOTES
[facility]     PM&R CLINIC NOTE  BOTULINUM TOXIN PROCEDURE      HPI  No chief complaint on file.    Lilli Steven is a 72 year old female with a history of involuntary head and neck movements who presents to clinic for botulinum toxin injections for management of cervical dystonia.     SINCE LAST VISIT  Lilli Steven was last seen here in clinic on 3/29/2022, at which time she received 175 units of Botox.    Patient denies new medical diagnoses, illnesses, hospitalizations, emergency room visits, and injuries since the previous injection with botulinum neurotoxin.    RESPONSE TO PREVIOUS TREATMENT    Side effects: No problems reported    Pain Improvement: Yes.  Percent Improvement: 40 %   Duration of Benefit:  10-11 weeks and followed by a gradual reduction in benefit.    Dystonia Improvement: Yes.  Percent Improvement: 40 %    Duration of Benefit:  10-11 weeks and followed by a gradual reduction in benefit. Overall, she believes that her tremor is likely constant to some extent, but she does not typically notice it.     PHYSICAL EXAM  VS: LMP  (LMP Unknown)    GEN: Pleasant and cooperative, in no acute distress  HEENT: No facial asymmetry  NECK: Head Tremor: Mild left-right resting tremor of head  Head & Neck Extension: Mild  Sub-Occipital Extension: Mild  Forward Head: Negative   Head & Neck Lateral Bend: Negative   Shoulder Elevation: Negative    ALLERGIES  Allergies   Allergen Reactions     Meloxicam      Diarrhea, vomiting     Primidone Other (See Comments)     Felt like motion sickness     Tramadol      Diarrhea, vomiting      Adhesive Tape Rash     Durabond only     Allergy Rash     Dermabond  Anesthesia IV set misc         CURRENT MEDICATIONS    Current Outpatient Medications:      gabapentin (NEURONTIN) 300 MG capsule, Take 1 capsule (300 mg) by mouth 3 times daily, Disp: 90 capsule, Rfl: 3     Naproxen Sodium (ALEVE PO), , Disp: , Rfl:      OnabotulinumtoxinA  (BOTOX IJ), Inject 150 Units into the muscle once Lot # /C3 with Expiration Date: 2021, Disp: , Rfl:      simvastatin (ZOCOR) 20 MG tablet, TAKE 1 TABLET BY MOUTH EVERYDAY AT BEDTIME, Disp: 90 tablet, Rfl: 0     traZODone (DESYREL) 50 MG tablet, TAKE 3 TABLETS BY MOUTH AT BEDTIME, Disp: 270 tablet, Rfl: 0    Current Facility-Administered Medications:      botulinum toxin type A (BOTOX) 100 units injection 200 Units, 200 Units, Intramuscular, Q90 Days, Cassidy Ramirez MD     botulinum toxin type A (BOTOX) 100 units injection 200 Units, 200 Units, Intramuscular, Q90 Days, Cassidy Ramirez MD, 175 Units at 22 1121     lidocaine (PF) (XYLOCAINE) 1 % injection 4 mL, 4 mL, , , Cruz Carvalho MD, 4 mL at 22 1149     triamcinolone (KENALOG-40) injection 40 mg, 40 mg, , , Cruz Carvalho MD, 40 mg at 22 1149       BOTULINUM NEUROTOXIN INJECTION PROCEDURES    VERIFICATION OF PATIENT IDENTIFICATION AND PROCEDURE     Initials   Patient Name SES   Patient  SES   Procedure Verified by: SES     Prior to the start of the procedure and with procedural staff participation, I verbally confirmed the patient s identity using two indicators, relevant allergies, that the procedure was appropriate and matched the consent or emergent situation, and that the correct equipment/implants were available. Immediately prior to starting the procedure I conducted the Time Out with the procedural staff and re-confirmed the patient s name, procedure, and site/side. (The Joint Commission universal protocol was followed.)  Yes    Sedation (Moderate or Deep): None    ABOVE ASSESSMENTS PERFORMED BY    Cassidy Ramirez MD      INDICATIONS FOR PROCEDURES  Lilli Steven is a 72 year old patient with involuntary movements of the neck musculature with tremor secondary to the diagnosis of cervical dystonia. Her baseline symptoms have been recalcitrant to oral medications and conservative therapy.   She is here today for reinjection with Botox.    GOAL OF PROCEDURE  The goal of this procedure is to increase active range of motion, improve volitional motor control, decrease pain  and enhance functional independence.      TOTAL DOSE ADMINISTERED  Dose Administered:  175 units  Botox (Botulinum Toxin Type A)       1:1 Dilution   Unavoidable Drug Waste: Yes  Amount of drug waste (mL): 25 units Botox.  Reason for waste:  Single use vial  Diluent Used:  Preservative Free Normal Saline  Total Volume of Diluent Used:  1.75 ml  Lot # [id number] with Expiration Date:  09/2023  NDC #: Botox 100u ([address]-09)      CONSENT  The risks, benefits, and treatment options were discussed with Lilli Steven and she agreed to proceed.    Written consent was obtained by [facility].     EQUIPMENT USED  Needle-37mm stimulating/recording  EMG/NCS Machine    SKIN PREPARATION  Skin preparation was performed using an alcohol wipe.    GUIDANCE DESCRIPTION  Electro-myographic guidance was necessary throughout the procedure to accurately identify all areas of dystonic muscles while avoiding injection of non-dystonic muscles, neighboring nerves and nearby vascular structures.     AREA/MUSCLE INJECTED:  175 UNITS BOTOX = TOTAL DOSE     1. HEAD, NECK & SHOULDER GIRDLE MUSCLES: 175 units Botox = Total Dose, 1:1 Dilution                 Right Mid-Trapezius - 2.5 units of Botox at 1 site/s.   Left Mid-Trapezius - 2.5 units of Botox at 1 site/s.      Right Splenius Capitus - 20 units of Botox at 2 site/s.   Left Splenius Capitus - 20 units of Botox at 1 site/s.     Right Lateral Trapezius (low cervical) - 10 units of Botox at 2 site/s.   Left Lateral Trapezius (low cervical) - 5 units of Botox at 1 site/s.      Right Levator Scapulae - 20 units of Botox at 1 site/s.   Left Levator Scapulae - 20 units of Botox at 1 site/s .      Right Inferior Obliquus Capitis - 20 units of Botox at 1 site/s.   Left Inferior Obliquus Capitis - 10 units of at 1 site/s.       Right Sternocleidomastoid - 25 units of Botox at 2 site/s.               Left Sternocleidomastoid - 20 units of Botox at 2 site/s.       RESPONSE TO PROCEDURE  Lilli Steven tolerated the procedure well and there were no immediate complications. She was allowed to recover for an appropriate period of time and was discharged home in stable condition.    ASSESSMENT AND PLAN   1. Botulinum toxin injections: No changes made to Botox dose or distribution today. Patient will continue to monitor response and report at next appointment.   2. Medications: Prescription for primidone sent to her pharmacy for trial for better tremor control. This was on her allergy list, but the patient states that she does not have a true allergy to this medication and denies any true allergy-like adverse effects from taking this medication.   3. Referrals: None.   4. Follow up: Lilli Steven was rescheduled for the next series of injections in 12 weeks, at which time we will evaluate response to today's injections. she may call the clinic prior with any questions or concerns prior to the next appointment.

## 2022-07-06 ENCOUNTER — TELEPHONE (OUTPATIENT)
Dept: PHYSICAL MEDICINE AND REHAB | Facility: CLINIC | Age: 72
End: 2022-07-06

## 2022-07-06 NOTE — TELEPHONE ENCOUNTER
[facility] Call Center    Phone Message    May a detailed message be left on voicemail: yes     Reason for Call: Medication Question or concern regarding medication   Prescription Clarification  Name of Medication: primidone (MYSOLINE) 50 MG tablet  Prescribing Provider: Dr. Ramirez   Pharmacy: NA   What on the order needs clarification? Pt reports that she had a bad reaction to this medication. She states she could not think right and her head hurt.     She took the medication for 3 days and then stopped, She wanted to let Dr. Ramirez know.          Action Taken: Message routed to:  [facility] PMR    Travel Screening: Not Applicable

## 2022-07-06 NOTE — TELEPHONE ENCOUNTER
Last office visit: 6/21/22 for botox:    ASSESSMENT AND PLAN   1. Botulinum toxin injections: No changes made to Botox dose or distribution today. Patient will continue to monitor response and report at next appointment.   2. Medications: Prescription for primidone sent to her pharmacy for trial for better tremor control. This was on her allergy list, but the patient states that she does not have a true allergy to this medication and denies any true allergy-like adverse effects from taking this medication.   3. Referrals: None.   4. Follow up: Lilli Steven was rescheduled for the next series of injections in 12 weeks, at which time we will evaluate response to today's injections. she may call the clinic prior with any questions or concerns prior to the next appointment.        Routing note to Dr Ramirez - may need a new Neurology consult?    Cassidy Camejo RN on 7/6/2022 at 6:17 PM

## 2022-07-07 NOTE — TELEPHONE ENCOUNTER
Called and spoke to pt to let her know Dr Ramirez has reviewed and noted her allergies again.    No new orders at this point, can discuss at next appointment.    Cassidy Camejo RN on 7/7/2022 at 6:12 PM

## 2022-07-07 NOTE — TELEPHONE ENCOUNTER
Oh no! We had a feeling that this medication may not be tolerable for her (it was already on her allergy list). I will discontinue this medication and keep on allergy list, adding the side effects to the notes. Nothing else to do at this point.     Thanks

## 2022-07-26 NOTE — TELEPHONE ENCOUNTER
RECORDS RECEIVED FROM: self   REASON FOR VISIT: tremors   Date of Appt: 11/4/22   NOTES (FOR ALL VISITS) STATUS DETAILS   OFFICE NOTE from other specialist Internal Dr Cassidy Ramirez @ [facility] PMR:  6/21/22  3/29/22  10/7/21  (additional)    Dr Shola Montez @ [facility] PMR:  3/21/22  1/13/22    Dr Frias @ [facility] PMR:  3/25/19  1/16/19  11/5/18  (additional)    Dr Canada @ [facility] Neurology:  9/6/12   MEDICATION LIST Internal    IMAGING  (FOR ALL VISITS)     MRI (HEAD, NECK, SPINE) Internal [facility]:  MRI Lumbar Spine 11/8/19

## 2022-08-07 ENCOUNTER — NURSE TRIAGE (OUTPATIENT)
Dept: NURSING | Facility: CLINIC | Age: 72
End: 2022-08-07

## 2022-08-07 ENCOUNTER — HOSPITAL ENCOUNTER (EMERGENCY)
Facility: CLINIC | Age: 72
Discharge: HOME | End: 2022-08-07
Attending: EMERGENCY MEDICINE | Admitting: EMERGENCY MEDICINE
Payer: COMMERCIAL

## 2022-08-07 VITALS
SYSTOLIC BLOOD PRESSURE: 170 MMHG | HEIGHT: 64 IN | DIASTOLIC BLOOD PRESSURE: 99 MMHG | RESPIRATION RATE: 18 BRPM | BODY MASS INDEX: 27.39 KG/M2 | HEART RATE: 107 BPM | OXYGEN SATURATION: 96 % | WEIGHT: 160.4 LBS | TEMPERATURE: 97.5 F
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
## 2022-08-07 PROCEDURE — 99283 EMERGENCY DEPT VISIT LOW MDM: CPT

## 2022-08-07 PROCEDURE — 250N000013 HC RX MED GY IP 250 OP 250 PS 637: Performed by: EMERGENCY MEDICINE

## 2022-08-07 RX ADMIN — AMOXICILLIN AND CLAVULANATE POTASSIUM 1 TABLET: 875; 125 TABLET, FILM COATED ORAL at 15:14

## 2022-08-07 NOTE — TELEPHONE ENCOUNTER
Nurse Triage SBAR      Is this a 2nd Level Triage? No    Situation: Patient is calling to report that she was bitten by a dog below knee on right leg and left buttock immediately before call. Patient states she can see puncture marks on her leg; the buttock wound is bleeding.    Background: Patient states she was on a walk outside. Was approaching a person walking several dogs. She walked around the person with the dogs when one of the dogs lept at her, pushed her to the ground and bit her. The person with the dog stated they were fostering the dog, refused to provide information and left the scene.    Assessment:   Bleeding from buttock wound    Recommendation: Per disposition, go to ED Now. Home care advice provided. After washing the wound, the patient will go to [facility] ED. Advised patient to call back with any new or worsening symptoms. Patient verbalized understanding and agrees with plan.    Protocol Recommended Disposition: Emergency department    Isa Mcfarlane RN on 8/7/2022 at 1:14 PM    Reason for Disposition    [1] Any break in skin from BITE (e.g., cut, puncture or scratch) AND[2] PET animal (e.g., dog, cat, or ferret) at risk for RABIES (e.g., sick, stray, unprovoked bite, developing country)    Additional Information    Negative: [1] Major bleeding (e.g., actively dripping or spurting) AND [2] can't be stopped    Negative: Sounds like a life-threatening emergency to the triager    Negative: Snake bite    Negative: Bite, wound, or sting from fish    Negative: [1] Any break in skin from BITE (e.g., cut, puncture or scratch) AND[2] WILD animal at risk for RABIES (e.g., bat, raccoon, bauman, skunk, coyote, other carnivores)    Protocols used: ANIMAL BITE-A-

## 2022-08-07 NOTE — ED PROVIDER NOTES
Visit Date: 08/07/2022    HISTORY OF PRESENT ILLNESS:  Lilli Good was attacked by a dog today.  She was bit on the left buttocks and bit or scratched on the right lower leg.  The person who the owned the dog would not give their name and stated that they were fostering this dog.  The patient did not call the police and has no way of tracking this dog or finding out who the owner is.  The patient denies other injury.    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      MEDICATIONS:  1.  Zocor.  2.  Neurontin.  3.  Desyrel.    FAMILY AND SOCIAL HISTORY:  Does not smoke.  Occasionally drinks.  Tetanus is up to date.    REVIEW OF SYSTEMS:  Noted.  All other systems negative.    PHYSICAL EXAMINATION:    GENERAL:  Reveals a white female sitting, no respiratory distress.  VITAL SIGNS:  Blood pressure 170/ temperature 97, pulse 107, respirations 18, pulse oximetry 96% on room air.  DERMATOLOGIC:  There is a superficial abrasion of the left pretibial region below the knee.  There is no joint involvement and this does not appear to go deep.  There are also some abrasions and 1 large bite kaye on the left inferior buttocks.  The one open lesion is approximately 1 x 0.5 cm.  It is more of a gouged injury that is not amenable to closure.  NEUROLOGIC:  The patient is awake, alert, and appropriate.    EMERGENCY DEPARTMENT COURSE:  All wounds were cleaned and dressed and the patient started on Augmentin.  I did have a discussion with her regarding rabies prophylaxis.  Since we do not know anything out, I told her the risks was very low, but that it was not 0.  She has elected to defer rabies therapy at this time.  I have told her if she changes her mind, she may contact her primary who can initiate that therapy.  The patient will be discharged home with a week of Augmentin.  She is to keep the wounds clean and follow up for any infection.    IMPRESSION:  Dog bite to the buttocks and right  lower leg.    Abhishek Kee MD        D: 2022   T: 2022   MT: DENA    Name:     SARA CERRATO  MRN:      [id number]        Account:    [id number]   :      1950           Visit Date: 2022     Document: [id number]

## 2022-08-07 NOTE — ED TRIAGE NOTES
Pt reports was going for walk and a medium-sized black and white dog attacked her and pulled her to the ground. Pt reports bite on rt lower leg and on lt buttock. Owner of dog refused to give pt information and said it was a foster dog. Pt did not call police.      Triage Assessment     Row Name 08/07/22 4081       Triage Assessment (Adult)    Airway WDL WDL       Respiratory WDL    Respiratory WDL WDL       Cardiac WDL    Cardiac WDL WDL       Peripheral/Neurovascular WDL    Peripheral Neurovascular WDL WDL

## 2022-08-09 ENCOUNTER — NURSE TRIAGE (OUTPATIENT)
Dept: NURSING | Facility: CLINIC | Age: 72
End: 2022-08-09

## 2022-08-09 NOTE — TELEPHONE ENCOUNTER
Advised that her wounds were not sutured so it is not unusual that they may ooze a bit of blood. She should keep the dressings clean and dry and use antibiotic ointment on the area. The bite on her leg is not oozing at all but the buttock one is rubbed by clothing and movement so seems to ooze when she is up and about.  Colleen Flores RN  [facility]    Reason for Disposition    Patient wants to be seen    Additional Information    Negative: Major bleeding (actively dripping or spurting) that can't be stopped    Negative: Sounds like a life-threatening emergency to the triager    Negative: Any break in skin from BITE (e.g., cut, puncture, or scratch) and WILD animal at risk for RABIES (e.g., bat, raccoon, bauman, skunk, coyote, other carnivores)    Negative: Any break in skin from BITE (e.g., cut, puncture, or scratch) and PET animal (e.g., dog, cat, or ferret) at risk for RABIES (e.g., sick, stray, unprovoked bite, developing country)    Negative: Any break in skin from BITE (e.g., cut, puncture, or scratch) and monkey    Negative: Cut (length > 1/8 inch or 3 mm) or skin tear and any animal  (Exception: Superficial scratch that doesn't go through dermis.)    Negative: Bleeding won't stop after 10 minutes of direct pressure (using correct technique)    Negative: EXPOSURE of non-intact skin (e.g., exposed person has dermatitis, abrasion, wound)  with animal BODY FLUID (e.g., saliva such as licking, blood, brain) and animal at high-risk for RABIES (e.g., bat, raccoon, baumna, skunk, coyote, other carnivores)    Negative: Sounds like a serious bite injury to the triager    Negative: SEVERE pain (e.g., excruciating)    Negative: Puncture wound (hole through the skin) from cat (teeth or claws)    Negative: Puncture wound or small cut on face  (Exception: Puncture from small pet, such as a gerbil, mouse, hamster, puppy; or shallow scratches.)    Negative: Puncture wound or small cut on hands or genitals   "(Exception: Puncture from small pet, such as a gerbil, mouse, hamster, puppy; or shallow scratches.)    Negative: Puncture wound and weak immune system (e.g., HIV positive, cancer chemo, splenectomy, organ transplant, chronic steroids)    Negative: Looks infected (spreading redness, red streak, or pus)    Negative: No bite kaye and suspected bat exposure (e.g., bat found in same room as sleeping adult)    Negative: No prior tetanus shots (or is not fully vaccinated) and any wound (e.g., cut, scrape)    Negative: Last tetanus shot > 5 years and any wound (e.g., cut, scrape)    Answer Assessment - Initial Assessment Questions  1. ANIMAL: \"What type of animal caused the bite?\" \"Is the injury from a bite or a claw?\" If the animal is a dog or a cat, ask: \"Was it a pet or a stray?\" \"Was it acting ill or behaving strangely?\"      dog  2. LOCATION: \"Where is the bite located?\"       Leg and buttock  3. SIZE: \"How big is the bite?\" \"What does it look like?\"       Can't really see, but knows the buttock wound oozes when she is up and about  4. ONSET: \"When did the bite happen?\" (Minutes or hours ago)       Sunday  5. CIRCUMSTANCES: \"Tell me how this happened.\"       See notes  6. TETANUS: \"When was the last tetanus booster?\"      OK  7. PREGNANCY: \"Is there any chance you are pregnant?\" \"When was your last menstrual period?\"      no    Protocols used: ANIMAL BITE-A-OH      "

## 2022-08-12 DIAGNOSIS — E78.5 HYPERLIPIDEMIA LDL GOAL <160: ICD-10-CM

## 2022-08-12 DIAGNOSIS — G47.00 PERSISTENT INSOMNIA: ICD-10-CM

## 2022-08-12 DIAGNOSIS — M54.16 LUMBAR RADICULITIS: ICD-10-CM

## 2022-08-12 RX ORDER — SIMVASTATIN 20 MG
TABLET ORAL
Qty: 90 TABLET | Refills: 0 | Status: SHIPPED | OUTPATIENT
Start: 2022-08-12

## 2022-08-12 RX ORDER — TRAZODONE HYDROCHLORIDE 50 MG/1
TABLET, FILM COATED ORAL
Qty: 270 TABLET | Refills: 0 | Status: SHIPPED | OUTPATIENT
Start: 2022-08-12

## 2022-08-12 RX ORDER — GABAPENTIN 300 MG/1
300 CAPSULE ORAL DAILY
Qty: 90 CAPSULE | Refills: 0 | Status: SHIPPED | OUTPATIENT
Start: 2022-08-12

## 2022-08-12 NOTE — TELEPHONE ENCOUNTER
Patient calling, she is moving to [place]    She is wanting to establish care with the [facility], first available is in November.    She is requesting 3 month supply to get her through. Has enough medication to get through September but needing another refill to bridge her till  November appointment in [place].      She is only taking Gabapentin only 1 time daily now - rx changed.    Pt informed provider out of office but will address when back in office- pt understands and agreeable.        Rose uGnter RN

## 2022-10-23 ENCOUNTER — HEALTH MAINTENANCE LETTER (OUTPATIENT)
Age: 72
End: 2022-10-23

## 2022-11-04 ENCOUNTER — PRE VISIT (OUTPATIENT)
Dept: NEUROLOGY | Facility: CLINIC | Age: 72
End: 2022-11-04

## 2023-04-02 ENCOUNTER — HEALTH MAINTENANCE LETTER (OUTPATIENT)
Age: 73
End: 2023-04-02

## 2023-12-11 ENCOUNTER — TELEPHONE (OUTPATIENT)
Dept: URGENT CARE | Facility: URGENT CARE | Age: 73
End: 2023-12-11
Payer: COMMERCIAL

## 2023-12-11 NOTE — TELEPHONE ENCOUNTER
Patient calling to request update on [facility] records.  Has since moved to [place] and needs to know dates for records there.  RN provided with information as listed in chart.  Adina ASHER RN

## 2024-01-09 NOTE — LETTER
"  11/5/2019      RE: Lilli Steven  [address]  [facility] [id number]       Sports Medicine Clinic Visit    PCP: Vargas Chaudhry    Lilli Steven is a 69 year old female who is seen  in consultation at the request of Dr. Chaudhry presenting with bilateral elbow pain. (Right is worse),  Righthanded.  Uncomfortable when opening wine bottles.    Has had elbow elbow pain since she fell a few years ago. She thought the pain would improve over time, however it has stayed pretty consistent. Has not had any specific appointments for this issue until now due to other health concerns.    When asked where pain is, she points to a soft tissue deformity on the anterior aspect of her right elbow in the antecubital space. This \"bump\" is quite TTP and has a slightly smaller deformity on the left side.    Injury: Fell a couple years ago and had a FOOSH mechanism.     Location of Pain: right elbow  Duration of Pain: 2 year(s)  Rating of Pain: 5/10  Pain is better with: Nothing  Pain is worse with: elbow flexion  Treatment so far consists of: Nothing  Prior History of related problems: No prior elbow or arm injuries or issues    BP (!) 144/66   Pulse 88   Ht 1.549 m (5' 1\")   Wt 77.6 kg (171 lb)   LMP  (LMP Unknown)   BMI 32.31 kg/m          PMH:  Past Medical History:   Diagnosis Date     Arthritis right knee     Diarrhea 9/6/2012     Hypercholesterolemia 9/6/2012     Neck pain 9/6/2012     PONV (postoperative nausea and vomiting)      Seasonal allergies 9/6/2012     Snores 9/6/2012     Tremor      Wears glasses 9/6/2012       Active problem list:  Patient Active Problem List   Diagnosis     Spasmodic torticollis     Wears glasses     Neck pain     Seasonal allergies     Genetic torsion dystonia     Osteopenia     Hyperlipidemia LDL goal <160     Tear of lateral cartilage or meniscus of knee, current     Urinary incontinence     PONV (postoperative nausea and vomiting)       FH:  Family History "   Problem Relation Age of Onset     Cancer Father         49 yrs old - bladder     Other Cancer Father         bladder     Dementia Mother         she is living in [facility]     Breast Cancer Mother         70s     Osteoporosis Mother      Heart Disease Maternal Grandfather         disease     Hypertension Maternal Grandfather      High cholesterol Maternal Grandfather      Heart Disease Paternal Grandmother         disease     Hypertension Paternal Grandmother      High cholesterol Paternal Grandmother      Heart Disease Maternal Grandmother         disease     Hypertension Paternal Grandfather      High cholesterol Paternal Grandfather      Hypertension Sister      Hypertension Sister      Osteoporosis Sister      Diabetes No family hx of      Coronary Artery Disease No family hx of      Hyperlipidemia No family hx of      Cerebrovascular Disease No family hx of      Colon Cancer No family hx of      Prostate Cancer No family hx of      Other Cancer No family hx of      Depression No family hx of      Anxiety Disorder No family hx of      Mental Illness No family hx of      Substance Abuse No family hx of      Anesthesia Reaction No family hx of      Asthma No family hx of      Osteoporosis No family hx of      Genetic Disorder No family hx of      Thyroid Disease No family hx of      Obesity No family hx of      Unknown/Adopted No family hx of        SH:  Social History     Socioeconomic History     Marital status: Single     Spouse name: Not on file     Number of children: Not on file     Years of education: Not on file     Highest education level: Not on file   Occupational History     Not on file   Social Needs     Financial resource strain: Not on file     Food insecurity:     Worry: Not on file     Inability: Not on file     Transportation needs:     Medical: Not on file     Non-medical: Not on file   Tobacco Use     Smoking status: Former Smoker     Packs/day: 0.50     Years: 10.00      DISPLAY PLAN FREE TEXT Pack years: 5.00     Types: Cigarettes     Start date: 1968     Last attempt to quit: 1988     Years since quittin.8     Smokeless tobacco: Never Used   Substance and Sexual Activity     Alcohol use: Yes     Alcohol/week: 0.0 standard drinks     Comment: 3 - 4 drinks/week     Drug use: No     Sexual activity: Yes     Partners: Male     Birth control/protection: Post-menopausal   Lifestyle     Physical activity:     Days per week: Not on file     Minutes per session: Not on file     Stress: Not on file   Relationships     Social connections:     Talks on phone: Not on file     Gets together: Not on file     Attends Yarsanism service: Not on file     Active member of club or organization: Not on file     Attends meetings of clubs or organizations: Not on file     Relationship status: Not on file     Intimate partner violence:     Fear of current or ex partner: Not on file     Emotionally abused: Not on file     Physically abused: Not on file     Forced sexual activity: Not on file   Other Topics Concern     Parent/sibling w/ CABG, MI or angioplasty before 65F 55M? No   Social History Narrative    Family History: Her father  at 49 of cancer. Her mother is alive at age 82. She has four sisters ages 56, 52, 50 and 42. She has one brother age 44. She has one daughter who is 24 yrs old and living with her at age 19. The patient is single. She is . She was  one year. Her ex- was an alcoholic.             She works at [employer].     She drinks three drinks per week. No smoking, quit 30 years ago.     Living in [facility] for the past 7-8 yrs.             Mother was [employer]; both of her parents lived in [place].    Father was scotch-Israeli               MEDS:  See EMR, reviewed  ALL:  See EMR, reviewed    REVIEW OF SYSTEMS:  CONSTITUTIONAL:NEGATIVE for fever, chills, change in weight  INTEGUMENTARY/SKIN: NEGATIVE for worrisome rashes, moles or lesions  EYES:  DISPLAY PLAN FREE TEXT DISPLAY PLAN FREE TEXT NEGATIVE for vision changes or irritation  ENT/MOUTH: NEGATIVE for ear, mouth and throat problems  RESP:NEGATIVE for significant cough or SOB  BREAST: NEGATIVE for masses, tenderness or discharge  CV: NEGATIVE for chest pain, palpitations or peripheral edema  GI: NEGATIVE for nausea, abdominal pain, heartburn, or change in bowel habits  :NEGATIVE for frequency, dysuria, or hematuria  :NEGATIVE for frequency, dysuria, or hematuria  NEURO: NEGATIVE for weakness, dizziness or paresthesias  ENDOCRINE: NEGATIVE for temperature intolerance, skin/hair changes  HEME/ALLERGY/IMMUNE: NEGATIVE for bleeding problems  PSYCHIATRIC: NEGATIVE for changes in mood or affect        Objective: She has full flexion extension of both elbows without signs of contracture.  She does have a symmetrical soft tissue subcutaneous mass that can be felt in the medial aspect of the distal upper arm that is soft, well-circumscribed and mobile about 4 cm x 3 cm.  She indicates that this has been present ever since the fall that she had 5 years ago.  It was initially associated with a lot of bruising and then she said she had swelling in the circumscribed area.  It is nontender to the touch and the overlying skin is normal.  It is not associated with the joint.  She is nontender over the medial epicondyle, lateral epicondyle or olecranon of either elbow.  I can also not get specific tenderness at the distal biceps attachment and she will supinate against resistance with good strength and flex her elbow against resistance with good strength.  She is nontender over the distal triceps bilaterally.  She is without radicular discomfort into the forearm or hand.  She is alert not without numbness or tingling in the hand.  Distal pulses are intact.  She denies  radicular discomfort from the neck into the shoulder or upper arm bilaterally.  There are no impingement signs at the shoulders.    An x-ray of the bilateral elbow shows no signs of soft tissue  DISPLAY PLAN FREE TEXT calcification and no signs of significant degenerative change.  No bony abnormality is noted.      Assessment bilateral elbow discomfort x5 years, right greater than left, etiology unknown.  Soft tissue mass right upper arm.  Rule out distal biceps tendon injury    Plan: An MRI of the right elbow is pending.  This is to evaluate the soft tissue mass and also the distal biceps attachment.  She will follow-up after the MRI to go over options.              Cruz Carvalho MD     DISPLAY PLAN FREE TEXT DISPLAY PLAN FREE TEXT DISPLAY PLAN FREE TEXT DISPLAY PLAN FREE TEXT DISPLAY PLAN FREE TEXT DISPLAY PLAN FREE TEXT DISPLAY PLAN FREE TEXT DISPLAY PLAN FREE TEXT DISPLAY PLAN FREE TEXT DISPLAY PLAN FREE TEXT

## 2024-03-24 ENCOUNTER — HEALTH MAINTENANCE LETTER (OUTPATIENT)
Age: 74
End: 2024-03-24

## 2024-06-02 ENCOUNTER — HEALTH MAINTENANCE LETTER (OUTPATIENT)
Age: 74
End: 2024-06-02

## 2025-06-15 ENCOUNTER — HEALTH MAINTENANCE LETTER (OUTPATIENT)
Age: 75
End: 2025-06-15

## (undated) DEVICE — DRSG ABDOMINAL 07 1/2X8" 7197D

## (undated) DEVICE — TUBING SUCTION LIPECTOMY

## (undated) DEVICE — DRSG XEROFORM 5X9" 8884431605

## (undated) DEVICE — SPONGE LAP 18X18" X8435

## (undated) DEVICE — SU VICRYL 2-0 CT-2 27" J333H

## (undated) DEVICE — SU ETHILON 5-0 PC-3 18" 1865G

## (undated) DEVICE — DRAIN JACKSON PRATT RESERVOIR 100ML SU130-1305

## (undated) DEVICE — PREP SKIN SCRUB TRAY 4461A

## (undated) DEVICE — SU ETHILON 5-0 P-3 18" BLACK 698G

## (undated) DEVICE — GLOVE ESTEEM POWDER FREE 7.0  2D72PL70

## (undated) DEVICE — SU MONOCRYL 3-0 PS-2 27" Y427H

## (undated) DEVICE — GLOVE PROTEXIS BLUE W/NEU-THERA 6.5  2D73EB65

## (undated) DEVICE — SOL NACL 0.9% IRRIG 1000ML BOTTLE 07138-09

## (undated) DEVICE — LINEN TOWEL PACK X5 5464

## (undated) DEVICE — SU MONOCRYL 4-0 PS-2 18" UND Y496G

## (undated) DEVICE — PACK MINOR SBA15MIFSE

## (undated) DEVICE — BLADE KNIFE SURG 20 371120

## (undated) DEVICE — SU SILK 2-0 FS-1 18" 685G

## (undated) DEVICE — BNDG ABDOMINAL BINDER 9X45-62" 79-89071

## (undated) DEVICE — DRSG GAUZE 4X4" TRAY

## (undated) DEVICE — TRAY PAIN INJECTION 97A 640

## (undated) DEVICE — DRAPE BREAST/CHEST 29420

## (undated) DEVICE — SYR 10ML PERFIX LL 332152

## (undated) DEVICE — SU PDS II 2-0 CT-2 27"  Z333H

## (undated) DEVICE — NDL 19GA 1.5"

## (undated) DEVICE — SYR 10ML FINGER CONTROL W/O NDL 309695

## (undated) DEVICE — BLANKET BAIR HUGGER UPPER BODY 42268

## (undated) DEVICE — SU ETHILON 3-0 FS-1 18" 669H

## (undated) DEVICE — TUBING INFUSION INFILTRATION LIPOSUCTION 156" 24-6008

## (undated) DEVICE — DRSG GAUZE 4X4" 3033

## (undated) DEVICE — BLANKET BAIR HUGGER LOWER BODY 42568

## (undated) DEVICE — NDL EPIDURAL TUOHY 20GA 6" LF DISP 183A28

## (undated) DEVICE — SU VICRYL 3-0 SH 27" J316H

## (undated) DEVICE — DRAIN JACKSON PRATT 15FR ROUND SU130-1323

## (undated) DEVICE — DRSG KERLIX 4 1/2"X4YDS ROLL 6715

## (undated) DEVICE — DECANTER BAG 2002S

## (undated) DEVICE — STPL SKIN 35W 6.9MM  PXW35

## (undated) DEVICE — SU PDS II 2-0 CT-1 27" Z339H

## (undated) DEVICE — PAD CHUX UNDERPAD 23X24" 7136

## (undated) DEVICE — SOL WATER IRRIG 1000ML BOTTLE 2F7114

## (undated) DEVICE — SUCTION TIP YANKAUER W/O VENT K86

## (undated) DEVICE — BLADE KNIFE SURG 10 371110

## (undated) DEVICE — SUCTION CANISTER MEDIVAC LINER 3000ML W/LID 65651-530

## (undated) DEVICE — PEN MARKING SKIN

## (undated) DEVICE — PACK MAJOR SBA15MAFSI

## (undated) DEVICE — DRAPE LAP W/ARMBOARD 29410

## (undated) DEVICE — DRAIN JACKSON PRATT 10FR ROUND SU130-1321

## (undated) DEVICE — SYR BULB IRRIG 50ML LATEX FREE 0035280

## (undated) DEVICE — PREP CHLORAPREP W/ORANGE TINT 10.5ML 260715

## (undated) RX ORDER — TRIAMCINOLONE ACETONIDE 40 MG/ML
INJECTION, SUSPENSION INTRA-ARTICULAR; INTRAMUSCULAR
Status: DISPENSED
Start: 2022-06-07

## (undated) RX ORDER — CEFAZOLIN SODIUM 2 G/100ML
INJECTION, SOLUTION INTRAVENOUS
Status: DISPENSED
Start: 2017-06-02

## (undated) RX ORDER — DEXAMETHASONE SODIUM PHOSPHATE 4 MG/ML
INJECTION, SOLUTION INTRA-ARTICULAR; INTRALESIONAL; INTRAMUSCULAR; INTRAVENOUS; SOFT TISSUE
Status: DISPENSED
Start: 2018-11-29

## (undated) RX ORDER — PROPOFOL 10 MG/ML
INJECTION, EMULSION INTRAVENOUS
Status: DISPENSED
Start: 2017-06-02

## (undated) RX ORDER — PROPOFOL 10 MG/ML
INJECTION, EMULSION INTRAVENOUS
Status: DISPENSED
Start: 2018-11-29

## (undated) RX ORDER — TRIAMCINOLONE ACETONIDE 40 MG/ML
INJECTION, SUSPENSION INTRA-ARTICULAR; INTRAMUSCULAR
Status: DISPENSED
Start: 2021-06-23

## (undated) RX ORDER — ONDANSETRON 2 MG/ML
INJECTION INTRAMUSCULAR; INTRAVENOUS
Status: DISPENSED
Start: 2017-03-29

## (undated) RX ORDER — DIPHENHYDRAMINE HYDROCHLORIDE 50 MG/ML
INJECTION INTRAMUSCULAR; INTRAVENOUS
Status: DISPENSED
Start: 2017-03-29

## (undated) RX ORDER — FENTANYL CITRATE 50 UG/ML
INJECTION, SOLUTION INTRAMUSCULAR; INTRAVENOUS
Status: DISPENSED
Start: 2018-11-29

## (undated) RX ORDER — FENTANYL CITRATE 50 UG/ML
INJECTION, SOLUTION INTRAMUSCULAR; INTRAVENOUS
Status: DISPENSED
Start: 2019-05-02

## (undated) RX ORDER — ACETAMINOPHEN 500 MG
TABLET ORAL
Status: DISPENSED
Start: 2018-11-29

## (undated) RX ORDER — HYDROMORPHONE HYDROCHLORIDE 1 MG/ML
INJECTION, SOLUTION INTRAMUSCULAR; INTRAVENOUS; SUBCUTANEOUS
Status: DISPENSED
Start: 2017-03-29

## (undated) RX ORDER — DEXAMETHASONE SODIUM PHOSPHATE 4 MG/ML
INJECTION, SOLUTION INTRA-ARTICULAR; INTRALESIONAL; INTRAMUSCULAR; INTRAVENOUS; SOFT TISSUE
Status: DISPENSED
Start: 2017-03-29

## (undated) RX ORDER — ACETAMINOPHEN 500 MG
TABLET ORAL
Status: DISPENSED
Start: 2018-03-21

## (undated) RX ORDER — NEOSTIGMINE METHYLSULFATE 1 MG/ML
VIAL (ML) INJECTION
Status: DISPENSED
Start: 2018-11-29

## (undated) RX ORDER — LIDOCAINE HYDROCHLORIDE 20 MG/ML
INJECTION, SOLUTION EPIDURAL; INFILTRATION; INTRACAUDAL; PERINEURAL
Status: DISPENSED
Start: 2018-11-29

## (undated) RX ORDER — ONDANSETRON 2 MG/ML
INJECTION INTRAMUSCULAR; INTRAVENOUS
Status: DISPENSED
Start: 2018-11-29

## (undated) RX ORDER — CEFAZOLIN SODIUM 2 G/100ML
INJECTION, SOLUTION INTRAVENOUS
Status: DISPENSED
Start: 2018-03-21

## (undated) RX ORDER — SCOLOPAMINE TRANSDERMAL SYSTEM 1 MG/1
PATCH, EXTENDED RELEASE TRANSDERMAL
Status: DISPENSED
Start: 2018-11-29

## (undated) RX ORDER — PROPOFOL 10 MG/ML
INJECTION, EMULSION INTRAVENOUS
Status: DISPENSED
Start: 2018-03-21

## (undated) RX ORDER — PROPOFOL 10 MG/ML
INJECTION, EMULSION INTRAVENOUS
Status: DISPENSED
Start: 2017-03-29

## (undated) RX ORDER — LIDOCAINE HYDROCHLORIDE 20 MG/ML
INJECTION, SOLUTION EPIDURAL; INFILTRATION; INTRACAUDAL; PERINEURAL
Status: DISPENSED
Start: 2017-03-29

## (undated) RX ORDER — FENTANYL CITRATE 50 UG/ML
INJECTION, SOLUTION INTRAMUSCULAR; INTRAVENOUS
Status: DISPENSED
Start: 2018-03-21

## (undated) RX ORDER — FENTANYL CITRATE 50 UG/ML
INJECTION, SOLUTION INTRAMUSCULAR; INTRAVENOUS
Status: DISPENSED
Start: 2017-03-29

## (undated) RX ORDER — LIDOCAINE HYDROCHLORIDE 20 MG/ML
INJECTION, SOLUTION EPIDURAL; INFILTRATION; INTRACAUDAL; PERINEURAL
Status: DISPENSED
Start: 2017-06-02

## (undated) RX ORDER — ONDANSETRON 2 MG/ML
INJECTION INTRAMUSCULAR; INTRAVENOUS
Status: DISPENSED
Start: 2019-05-02

## (undated) RX ORDER — FENTANYL CITRATE 50 UG/ML
INJECTION, SOLUTION INTRAMUSCULAR; INTRAVENOUS
Status: DISPENSED
Start: 2017-06-02

## (undated) RX ORDER — SCOLOPAMINE TRANSDERMAL SYSTEM 1 MG/1
PATCH, EXTENDED RELEASE TRANSDERMAL
Status: DISPENSED
Start: 2017-06-02

## (undated) RX ORDER — IBUPROFEN 600 MG/1
TABLET, FILM COATED ORAL
Status: DISPENSED
Start: 2018-11-29

## (undated) RX ORDER — LIDOCAINE HYDROCHLORIDE 10 MG/ML
INJECTION, SOLUTION EPIDURAL; INFILTRATION; INTRACAUDAL; PERINEURAL
Status: DISPENSED
Start: 2021-06-23

## (undated) RX ORDER — DEXAMETHASONE SODIUM PHOSPHATE 4 MG/ML
INJECTION, SOLUTION INTRA-ARTICULAR; INTRALESIONAL; INTRAMUSCULAR; INTRAVENOUS; SOFT TISSUE
Status: DISPENSED
Start: 2017-06-02

## (undated) RX ORDER — DIPHENHYDRAMINE HYDROCHLORIDE 50 MG/ML
INJECTION INTRAMUSCULAR; INTRAVENOUS
Status: DISPENSED
Start: 2018-03-21

## (undated) RX ORDER — LIDOCAINE HYDROCHLORIDE 20 MG/ML
INJECTION, SOLUTION EPIDURAL; INFILTRATION; INTRACAUDAL; PERINEURAL
Status: DISPENSED
Start: 2018-03-21

## (undated) RX ORDER — ACETAMINOPHEN 325 MG/1
TABLET ORAL
Status: DISPENSED
Start: 2017-03-29

## (undated) RX ORDER — GLYCOPYRROLATE 0.2 MG/ML
INJECTION, SOLUTION INTRAMUSCULAR; INTRAVENOUS
Status: DISPENSED
Start: 2017-03-29

## (undated) RX ORDER — SCOLOPAMINE TRANSDERMAL SYSTEM 1 MG/1
PATCH, EXTENDED RELEASE TRANSDERMAL
Status: DISPENSED
Start: 2018-03-21

## (undated) RX ORDER — ONDANSETRON 2 MG/ML
INJECTION INTRAMUSCULAR; INTRAVENOUS
Status: DISPENSED
Start: 2018-03-21

## (undated) RX ORDER — CEFAZOLIN SODIUM 2 G/100ML
INJECTION, SOLUTION INTRAVENOUS
Status: DISPENSED
Start: 2018-11-29

## (undated) RX ORDER — HYDROCODONE BITARTRATE AND ACETAMINOPHEN 5; 325 MG/1; MG/1
TABLET ORAL
Status: DISPENSED
Start: 2018-03-21

## (undated) RX ORDER — ONDANSETRON 2 MG/ML
INJECTION INTRAMUSCULAR; INTRAVENOUS
Status: DISPENSED
Start: 2017-06-02

## (undated) RX ORDER — DIPHENHYDRAMINE HYDROCHLORIDE 50 MG/ML
INJECTION INTRAMUSCULAR; INTRAVENOUS
Status: DISPENSED
Start: 2017-06-02

## (undated) RX ORDER — CEFAZOLIN SODIUM 2 G/100ML
INJECTION, SOLUTION INTRAVENOUS
Status: DISPENSED
Start: 2017-03-29

## (undated) RX ORDER — SCOLOPAMINE TRANSDERMAL SYSTEM 1 MG/1
PATCH, EXTENDED RELEASE TRANSDERMAL
Status: DISPENSED
Start: 2017-03-29

## (undated) RX ORDER — GLYCOPYRROLATE 0.2 MG/ML
INJECTION, SOLUTION INTRAMUSCULAR; INTRAVENOUS
Status: DISPENSED
Start: 2018-11-29

## (undated) RX ORDER — LIDOCAINE HYDROCHLORIDE 10 MG/ML
INJECTION, SOLUTION EPIDURAL; INFILTRATION; INTRACAUDAL; PERINEURAL
Status: DISPENSED
Start: 2022-06-07

## (undated) RX ORDER — GLYCOPYRROLATE 0.2 MG/ML
INJECTION, SOLUTION INTRAMUSCULAR; INTRAVENOUS
Status: DISPENSED
Start: 2017-06-02

## (undated) RX ORDER — HYDROCODONE BITARTRATE AND ACETAMINOPHEN 5; 325 MG/1; MG/1
TABLET ORAL
Status: DISPENSED
Start: 2017-03-29